# Patient Record
Sex: FEMALE | Race: WHITE | NOT HISPANIC OR LATINO | Employment: FULL TIME | URBAN - METROPOLITAN AREA
[De-identification: names, ages, dates, MRNs, and addresses within clinical notes are randomized per-mention and may not be internally consistent; named-entity substitution may affect disease eponyms.]

---

## 2017-01-18 ENCOUNTER — TRANSFERRED RECORDS (OUTPATIENT)
Dept: HEALTH INFORMATION MANAGEMENT | Facility: CLINIC | Age: 32
End: 2017-01-18

## 2017-01-26 DIAGNOSIS — J45.30 MILD PERSISTENT ASTHMA WITHOUT COMPLICATION: Primary | ICD-10-CM

## 2017-01-26 NOTE — TELEPHONE ENCOUNTER
montelukast (SINGULAIR) 10 MG tablet      Last Written Prescription Date: 06/29/2016  Last Fill Quantity: 30,  # refills: 11   Last Office Visit with [facility], [facility] or [facility] prescribing provider: 09/13/2016                                             ** Requesting for a 90 day supply**    Katerin Bhatt MA

## 2017-01-29 RX ORDER — MONTELUKAST SODIUM 10 MG/1
TABLET ORAL
Qty: 90 TABLET | Refills: 0 | Status: SHIPPED | OUTPATIENT
Start: 2017-01-29 | End: 2017-07-05

## 2017-06-22 NOTE — PROGRESS NOTES
"   SUBJECTIVE:     CC: Karrie Zhang is an 32 year old woman who presents for preventive health visit.     Healthy Habits:    Do you get at least three servings of calcium containing foods daily (dairy, green leafy vegetables, etc.)? {YES/NO, DAIRY INTAKE:146405::\"yes\"}    Amount of exercise or daily activities, outside of work: {AMOUNT EXERCISE:263699}    Problems taking medications regularly {Yes /No default:248764::\"No\"}    Medication side effects: {Yes /No default.:851903::\"No\"}    Have you had an eye exam in the past two years? {YESNOBLANK:901875}    Do you see a dentist twice per year? {YESNOBLANK:740659}    Do you have sleep apnea, excessive snoring or daytime drowsiness?{YESNOBLANK:919275}    {Outside tests to abstract? :033905}    {additional problems to add:453113}    Today's PHQ-2 Score:   PHQ-2 ( 1999 [employer]) 6/29/2016   Q1: Little interest or pleasure in doing things 0   Q2: Feeling down, depressed or hopeless 0   PHQ-2 Score 0     {PHQ-2 LOOK IN ASSESSMENTS :117370}  Abuse: Current or Past(Physical, Sexual or Emotional)- {YES/NO/NA:075291}  Do you feel safe in your environment - {YES/NO/NA:269934}    Social History   Substance Use Topics     Smoking status: Never Smoker     Smokeless tobacco: Not on file     Alcohol use 8.4 oz/week     14 Standard drinks or equivalent per week     {ETOH AUDIT:010392}    Recent Labs   Lab Test  06/29/16   1236   CHOL  169   HDL  67   LDL  72  89   TRIG  149   NHDL  102       Reviewed orders with patient.  Reviewed health maintenance and updated orders accordingly - {Yes/No:715906::\"Yes\"}    Mammo Decision Support:  {Mammo:597318}    Pertinent mammograms are reviewed under the imaging tab.  History of abnormal Pap smear: {PAP HX:993261}    Reviewed and updated as needed this visit by clinical staff         Reviewed and updated as needed this visit by Provider        {HISTORY OPTIONS:298578}    ROS:  {FEMALE PREVENTATIVE " "ROS:494449}    {CHRONICPROBDATA:393650}  OBJECTIVE:     There were no vitals taken for this visit.  EXAM:  {Exam Choices:868607}    ASSESSMENT/PLAN:     {Diag Picklist:902028}    COUNSELING:   {FEMALE COUNSELING MESSAGES:816809::\"Reviewed preventive health counseling, as reflected in patient instructions\"}    {Blood Pressure Screenin}     reports that she has never smoked. She does not have any smokeless tobacco history on file.  {Tobacco Cessation needed for ACO -- Delete if patient is a non-smoker:361011}  Estimated body mass index is 32.89 kg/(m^2) as calculated from the following:    Height as of 16: 5' 7\" (1.702 m).    Weight as of 16: 210 lb (95.3 kg).   {Weight Management Plan needed for ACO:592415}    Counseling Resources:  ATP IV Guidelines  Pooled Cohorts Equation Calculator  Breast Cancer Risk Calculator  FRAX Risk Assessment  ICSI Preventive Guidelines  Dietary Guidelines for Americans, 2010  USDA's MyPlate  ASA Prophylaxis  Lung CA Screening    Vanessa Granados MD  [facility]  "

## 2017-06-22 NOTE — PATIENT INSTRUCTIONS
[facility]    If you have any questions regarding to your visit please contact your care team:       Team Red:   Clinic Hours Telephone Number   Dr. Vanessa Becerra  (pediatrics)  Obdulia Acuna NP 7am-7pm  Monday - Thursday   7am-5pm  Fridays  [phone] (fax)    Rogelio NORIEGA  [phone]   Urgent Care - [place] and [place] Monday-Friday  [place] - 11am-8pm  Saturday-Sunday  Both sites - 9am-5pm  [phone] - [facility]  [phone] - [place]       What options do I have for visits at the clinic other than the traditional office visit?  To expand how we care for you, many of our providers are utilizing electronic visits (e-visits) and telephone visits, when medically appropriate, for interactions with their patients rather than a visit in the clinic.   We also offer nurse visits for many medical concerns. Just like any other service, we will bill your insurance company for this type of visit based on time spent on the phone with your provider. Not all insurance companies cover these visits. Please check with your medical insurance if this type of visit is covered. You will be responsible for any charges that are not paid by your insurance.      E-visits via [employer]:  generally incur a $35.00 fee.  Telephone visits:  Time spent on the phone: *charged based on time that is spent on the phone in increments of 10 minutes. Estimated cost:   5-10 mins $30.00   11-20 mins. $59.00   21-30 mins. $85.00     As always, Thank you for trusting us with your health care needs!              Preventive Health Recommendations  Female Ages 26 - 39  Yearly exam:   See your health care provider every year in order to    Review health changes.     Discuss preventive care.      Review your medicines if you your doctor has prescribed any.    Until age 30: Get a Pap test every three years (more often if you have had an abnormal result).    After age 30:  Talk to your doctor about whether you should have a Pap test every 3 years or have a Pap test with HPV screening every 5 years.   You do not need a Pap test if your uterus was removed (hysterectomy) and you have not had cancer.  You should be tested each year for STDs (sexually transmitted diseases), if you're at risk.   Talk to your provider about how often to have your cholesterol checked.  If you are at risk for diabetes, you should have a diabetes test (fasting glucose).  Shots: Get a flu shot each year. Get a tetanus shot every 10 years.   Nutrition:     Eat at least 5 servings of fruits and vegetables each day.    Eat whole-grain bread, whole-wheat pasta and brown rice instead of white grains and rice.    Talk to your provider about Calcium and Vitamin D.     Lifestyle    Exercise at least 150 minutes a week (30 minutes a day, 5 days of the week). This will help you control your weight and prevent disease.    Limit alcohol to one drink per day.    No smoking.     Wear sunscreen to prevent skin cancer.    See your dentist every six months for an exam and cleaning.

## 2017-06-29 DIAGNOSIS — F33.0 MAJOR DEPRESSIVE DISORDER, RECURRENT EPISODE, MILD (H): ICD-10-CM

## 2017-06-29 RX ORDER — BUSPIRONE HYDROCHLORIDE 15 MG/1
TABLET ORAL
Qty: 60 TABLET | Refills: 0 | Status: SHIPPED | OUTPATIENT
Start: 2017-06-29 | End: 2017-08-08

## 2017-06-29 NOTE — TELEPHONE ENCOUNTER
MA - please call and complete a PHQ-9 with patient and schedule a follow up appointment.    Mary Vo RN - BC

## 2017-06-29 NOTE — TELEPHONE ENCOUNTER
busPIRone (BUSPAR) 15 MG tablet       Last Written Prescription Date: 6/29/2016  Last Fill Quantity: 60; # refills: 11  Last Office Visit with [facility], [facility] or [facility] prescribing provider:  9/13/2016   Next 5 appointments (look out 90 days)     Jul 05, 2017  2:20 PM CDT   PHYSICAL with Vanessa Granados MD   [facility] ([facility])    [address] [address]   [phone]                   Last PHQ-9 score on record=   PHQ-9 SCORE 5/3/2016   Total Score 5       No results found for: AST  No results found for: ALT

## 2017-06-29 NOTE — TELEPHONE ENCOUNTER
Prescription approved per [facility] Refill Protocol.  Mary Vo, RN - BC

## 2017-06-29 NOTE — TELEPHONE ENCOUNTER
PHQ 9 completed. Appointment scheduled for next week.  Joan KANG CMA ([facility])

## 2017-06-29 NOTE — TELEPHONE ENCOUNTER
Reason for call:  Med refill  Patient called regarding (reason for call): prescription-buspar  Additional comments: Patient is out and was given 3 as an emergency from [employer]    Phone number to reach patient:  Home number on file [phone] (home)    Best Time:  Anytime till 4 today then after 430    Can we leave a detailed message on this number?  YES

## 2017-06-30 ASSESSMENT — PATIENT HEALTH QUESTIONNAIRE - PHQ9: SUM OF ALL RESPONSES TO PHQ QUESTIONS 1-9: 4

## 2017-07-05 ENCOUNTER — OFFICE VISIT (OUTPATIENT)
Dept: FAMILY MEDICINE | Facility: CLINIC | Age: 32
End: 2017-07-05
Payer: COMMERCIAL

## 2017-07-05 VITALS
DIASTOLIC BLOOD PRESSURE: 80 MMHG | HEIGHT: 67 IN | SYSTOLIC BLOOD PRESSURE: 118 MMHG | RESPIRATION RATE: 16 BRPM | WEIGHT: 215 LBS | HEART RATE: 95 BPM | TEMPERATURE: 97.8 F | BODY MASS INDEX: 33.74 KG/M2 | OXYGEN SATURATION: 95 %

## 2017-07-05 DIAGNOSIS — Z11.3 SCREEN FOR STD (SEXUALLY TRANSMITTED DISEASE): ICD-10-CM

## 2017-07-05 DIAGNOSIS — Z00.00 ROUTINE HISTORY AND PHYSICAL EXAMINATION OF ADULT: Primary | ICD-10-CM

## 2017-07-05 DIAGNOSIS — Z91.09 MULTIPLE ENVIRONMENTAL ALLERGIES: ICD-10-CM

## 2017-07-05 DIAGNOSIS — J45.30 MILD PERSISTENT ASTHMA WITHOUT COMPLICATION: ICD-10-CM

## 2017-07-05 DIAGNOSIS — F33.0 MAJOR DEPRESSIVE DISORDER, RECURRENT EPISODE, MILD (H): ICD-10-CM

## 2017-07-05 DIAGNOSIS — Z30.41 ENCOUNTER FOR BIRTH CONTROL PILLS MAINTENANCE: ICD-10-CM

## 2017-07-05 PROCEDURE — 87591 N.GONORRHOEAE DNA AMP PROB: CPT | Performed by: FAMILY MEDICINE

## 2017-07-05 PROCEDURE — 99395 PREV VISIT EST AGE 18-39: CPT | Performed by: FAMILY MEDICINE

## 2017-07-05 PROCEDURE — G0124 SCREEN C/V THIN LAYER BY MD: HCPCS | Performed by: FAMILY MEDICINE

## 2017-07-05 PROCEDURE — 87389 HIV-1 AG W/HIV-1&-2 AB AG IA: CPT | Performed by: FAMILY MEDICINE

## 2017-07-05 PROCEDURE — 87491 CHLMYD TRACH DNA AMP PROBE: CPT | Performed by: FAMILY MEDICINE

## 2017-07-05 PROCEDURE — 36415 COLL VENOUS BLD VENIPUNCTURE: CPT | Performed by: FAMILY MEDICINE

## 2017-07-05 PROCEDURE — 87624 HPV HI-RISK TYP POOLED RSLT: CPT | Performed by: FAMILY MEDICINE

## 2017-07-05 PROCEDURE — G0145 SCR C/V CYTO,THINLAYER,RESCR: HCPCS | Performed by: FAMILY MEDICINE

## 2017-07-05 PROCEDURE — 86780 TREPONEMA PALLIDUM: CPT | Performed by: FAMILY MEDICINE

## 2017-07-05 RX ORDER — FLUTICASONE PROPIONATE 50 MCG
1 SPRAY, SUSPENSION (ML) NASAL DAILY
Qty: 16 G | Refills: 11 | Status: SHIPPED | OUTPATIENT
Start: 2017-07-05 | End: 2018-03-27

## 2017-07-05 RX ORDER — MONTELUKAST SODIUM 10 MG/1
TABLET ORAL
Qty: 90 TABLET | Refills: 3 | Status: SHIPPED | OUTPATIENT
Start: 2017-07-05 | End: 2018-07-30

## 2017-07-05 RX ORDER — NORGESTIMATE AND ETHINYL ESTRADIOL 7DAYSX3 28
1 KIT ORAL DAILY
Qty: 84 TABLET | Refills: 4 | Status: SHIPPED | OUTPATIENT
Start: 2017-07-05 | End: 2018-08-21

## 2017-07-05 RX ORDER — BUSPIRONE HYDROCHLORIDE 15 MG/1
15 TABLET ORAL 2 TIMES DAILY
Qty: 180 TABLET | Refills: 3 | Status: SHIPPED | OUTPATIENT
Start: 2017-07-05 | End: 2018-08-21

## 2017-07-05 ASSESSMENT — ANXIETY QUESTIONNAIRES
1. FEELING NERVOUS, ANXIOUS, OR ON EDGE: SEVERAL DAYS
GAD7 TOTAL SCORE: 5
6. BECOMING EASILY ANNOYED OR IRRITABLE: SEVERAL DAYS
7. FEELING AFRAID AS IF SOMETHING AWFUL MIGHT HAPPEN: NOT AT ALL
3. WORRYING TOO MUCH ABOUT DIFFERENT THINGS: SEVERAL DAYS
2. NOT BEING ABLE TO STOP OR CONTROL WORRYING: MORE THAN HALF THE DAYS
5. BEING SO RESTLESS THAT IT IS HARD TO SIT STILL: NOT AT ALL

## 2017-07-05 ASSESSMENT — PATIENT HEALTH QUESTIONNAIRE - PHQ9: 5. POOR APPETITE OR OVEREATING: NOT AT ALL

## 2017-07-05 NOTE — LETTER
My Depression Action Plan  Name: Karrie Zhang   Date of Birth 1985  Date: 7/5/2017    My doctor: Vanessa Granados   My clinic: [address]  [place] [address] [phone]          GREEN    ZONE   Good Control    What it looks like:     Things are going generally well. You have normal up s and down s. You may even feel depressed from time to time, but bad moods usually last less than a day.   What you need to do:  1. Continue to care for yourself (see self care plan)  2. Check your depression survival kit and update it as needed  3. Follow your physician s recommendations including any medication.  4. Do not stop taking medication unless you consult with your physician first.           YELLOW         ZONE Getting Worse    What it looks like:     Depression is starting to interfere with your life.     It may be hard to get out of bed; you may be starting to isolate yourself from others.    Symptoms of depression are starting to last most all day and this has happened for several days.     You may have suicidal thoughts but they are not constant.   What you need to do:     1. Call your care team, your response to treatment will improve if you keep your care team informed of your progress. Yellow periods are signs an adjustment may need to be made.     2. Continue your self-care, even if you have to fake it!    3. Talk to someone in your support network    4. Open up your depression survival kit           RED    ZONE Medical Alert - Get Help    What it looks like:     Depression is seriously interfering with your life.     You may experience these or other symptoms: You can t get out of bed most days, can t work or engage in other necessary activities, you have trouble taking care of basic hygiene, or basic responsibilities, thoughts of suicide or death that will not go away, self-injurious behavior.     What you need to do:  1. Call your care team and request  a same-day appointment. If they are not available (weekends or after hours) call your local crisis line, emergency room or 911.      Electronically signed by: Vanessa Granados, July 5, 2017    Depression Self Care Plan / Survival Kit    Self-Care for Depression  Here s the deal. Your body and mind are really not as separate as most people think.  What you do and think affects how you feel and how you feel influences what you do and think. This means if you do things that people who feel good do, it will help you feel better.  Sometimes this is all it takes.  There is also a place for medication and therapy depending on how severe your depression is, so be sure to consult with your medical provider and/ or Behavioral Health Consultant if your symptoms are worsening or not improving.     In order to better manage my stress, I will:    Exercise  Get some form of exercise, every day. This will help reduce pain and release endorphins, the  feel good  chemicals in your brain. This is almost as good as taking antidepressants!  This is not the same as joining a gym and then never going! (they count on that by the way ) It can be as simple as just going for a walk or doing some gardening, anything that will get you moving.      Hygiene   Maintain good hygiene (Get out of bed in the morning, Make your bed, Brush your teeth, Take a shower, and Get dressed like you were going to work, even if you are unemployed).  If your clothes don't fit try to get ones that do.    Diet  I will strive to eat foods that are good for me, drink plenty of water, and avoid excessive sugar, caffeine, alcohol, and other mood-altering substances.  Some foods that are helpful in depression are: complex carbohydrates, B vitamins, flaxseed, fish or fish oil, fresh fruits and vegetables.    Psychotherapy  I agree to participate in Individual Therapy (if recommended).    Medication  If prescribed medications, I agree to take them.  Missing doses can result  in serious side effects.  I understand that drinking alcohol, or other illicit drug use, may cause potential side effects.  I will not stop my medication abruptly without first discussing it with my provider.    Staying Connected With Others  I will stay in touch with my friends, family members, and my primary care provider/team.    Use your imagination  Be creative.  We all have a creative side; it doesn t matter if it s oil painting, sand castles, or mud pies! This will also kick up the endorphins.    Witness Beauty  (AKA stop and smell the roses) Take a look outside, even in mid-winter. Notice colors, textures. Watch the squirrels and birds.     Service to others  Be of service to others.  There is always someone else in need.  By helping others we can  get out of ourselves  and remember the really important things.  This also provides opportunities for practicing all the other parts of the program.    Humor  Laugh and be silly!  Adjust your TV habits for less news and crime-drama and more comedy.    Control your stress  Try breathing deep, massage therapy, biofeedback, and meditation. Find time to relax each day.     My support system    Clinic Contact:  Phone number:    Contact 1:  Phone number:    Contact 2:  Phone number:    Anglican/:  Phone number:    Therapist:  Phone number:    Local crisis center:    Phone number:    Other community support:  Phone number:

## 2017-07-05 NOTE — MR AVS SNAPSHOT
After Visit Summary   7/5/2017    Karrie Zhang    MRN: [id number]           Patient Information     Date Of Birth          1985        Visit Information        Provider Department      7/5/2017 2:20 PM Vanessa Granados MD [facility]        Today's Diagnoses     Routine history and physical examination of adult    -  1    Major depressive disorder, recurrent episode, mild (H)        Mild persistent asthma without complication        Multiple environmental allergies        Encounter for birth control pills maintenance        Screen for STD (sexually transmitted disease)          Care Instructions    [facility]    If you have any questions regarding to your visit please contact your care team:       Team Red:   Clinic Hours Telephone Number   Dr. Vanessa Becerra  (pediatrics)  Obdulia Acuna NP 7am-7pm  Monday - Thursday   7am-5pm  Fridays  [phone] (fax)    Rogelio NORIEGA  [phone]   Urgent Care - [place] and [place] Monday-Friday  [place] - 11am-8pm  Saturday-Sunday  Both sites - 9am-5pm  [phone] - [facility]  [phone] - [place]       What options do I have for visits at the clinic other than the traditional office visit?  To expand how we care for you, many of our providers are utilizing electronic visits (e-visits) and telephone visits, when medically appropriate, for interactions with their patients rather than a visit in the clinic.   We also offer nurse visits for many medical concerns. Just like any other service, we will bill your insurance company for this type of visit based on time spent on the phone with your provider. Not all insurance companies cover these visits. Please check with your medical insurance if this type of visit is covered. You will be responsible for any charges that are not paid by your insurance.      E-visits via [employer]:  generally incur a $35.00  fee.  Telephone visits:  Time spent on the phone: *charged based on time that is spent on the phone in increments of 10 minutes. Estimated cost:   5-10 mins $30.00   11-20 mins. $59.00   21-30 mins. $85.00     As always, Thank you for trusting us with your health care needs!              Preventive Health Recommendations  Female Ages 26 - 39  Yearly exam:   See your health care provider every year in order to    Review health changes.     Discuss preventive care.      Review your medicines if you your doctor has prescribed any.    Until age 30: Get a Pap test every three years (more often if you have had an abnormal result).    After age 30: Talk to your doctor about whether you should have a Pap test every 3 years or have a Pap test with HPV screening every 5 years.   You do not need a Pap test if your uterus was removed (hysterectomy) and you have not had cancer.  You should be tested each year for STDs (sexually transmitted diseases), if you're at risk.   Talk to your provider about how often to have your cholesterol checked.  If you are at risk for diabetes, you should have a diabetes test (fasting glucose).  Shots: Get a flu shot each year. Get a tetanus shot every 10 years.   Nutrition:     Eat at least 5 servings of fruits and vegetables each day.    Eat whole-grain bread, whole-wheat pasta and brown rice instead of white grains and rice.    Talk to your provider about Calcium and Vitamin D.     Lifestyle    Exercise at least 150 minutes a week (30 minutes a day, 5 days of the week). This will help you control your weight and prevent disease.    Limit alcohol to one drink per day.    No smoking.     Wear sunscreen to prevent skin cancer.    See your dentist every six months for an exam and cleaning.            Follow-ups after your visit        Follow-up notes from your care team     Return in about 1 year (around 7/5/2018) for physical, asthma.      Who to contact     If you have questions or need follow up  "information about today's clinic visit or your schedule please contact [facility] directly at [phone].  Normal or non-critical lab and imaging results will be communicated to you by "[employer]hart, letter or phone within 4 business days after the clinic has received the results. If you do not hear from us within 7 days, please contact the clinic through "[employer]hart or phone. If you have a critical or abnormal lab result, we will notify you by phone as soon as possible.  Submit refill requests through [employer] or call your pharmacy and they will forward the refill request to us. Please allow 3 business days for your refill to be completed.          Additional Information About Your Visit        "[employer] Information     [employer] gives you secure access to your electronic health record. If you see a primary care provider, you can also send messages to your care team and make appointments. If you have questions, please call your primary care clinic.  If you do not have a primary care provider, please call [phone] and they will assist you.        Care EveryWhere ID     This is your Care EveryWhere ID. This could be used by other organizations to access your [place] medical records  [id number]        Your Vitals Were     Pulse Temperature Respirations Height Last Period Pulse Oximetry    95 97.8  F (36.6  C) 16 5' 7\" (1.702 m) 06/05/2017 95%    BMI (Body Mass Index)                   33.67 kg/m2            Blood Pressure from Last 3 Encounters:   07/05/17 118/80   09/13/16 111/73   06/29/16 117/74    Weight from Last 3 Encounters:   07/05/17 215 lb (97.5 kg)   09/13/16 210 lb (95.3 kg)   06/29/16 207 lb (93.9 kg)              We Performed the Following     Anti Treponema     Asthma Action Plan (AAP)     Chlamydia trachomatis PCR     DEPRESSION ACTION PLAN (DAP)     HIV Antigen Antibody Combo     HPV High Risk Types DNA Cervical     Neisseria gonorrhoeae PCR     Pap imaged thin layer screen with HPV - " recommended age 30 - 65 years (select HPV order below)          Today's Medication Changes          These changes are accurate as of: 7/5/17  3:06 PM.  If you have any questions, ask your nurse or doctor.               These medicines have changed or have updated prescriptions.        Dose/Directions    * busPIRone 15 MG tablet   Commonly known as:  BUSPAR   This may have changed:  Another medication with the same name was added. Make sure you understand how and when to take each.   Used for:  Major depressive disorder, recurrent episode, mild (H)   Changed by:  Vanessa Granados MD        TAKE 1 TABLET(15 MG) BY MOUTH TWICE DAILY   Quantity:  60 tablet   Refills:  0       * busPIRone 15 MG tablet   Commonly known as:  BUSPAR   This may have changed:  You were already taking a medication with the same name, and this prescription was added. Make sure you understand how and when to take each.   Used for:  Major depressive disorder, recurrent episode, mild (H)   Changed by:  Vanessa Granados MD        Dose:  15 mg   Take 1 tablet (15 mg) by mouth 2 times daily   Quantity:  180 tablet   Refills:  3       montelukast 10 MG tablet   Commonly known as:  SINGULAIR   This may have changed:  See the new instructions.   Used for:  Mild persistent asthma without complication   Changed by:  Vanessa Granados MD        TAKE 1 TABLET(10 MG) BY MOUTH AT BEDTIME   Quantity:  90 tablet   Refills:  3       * Notice:  This list has 2 medication(s) that are the same as other medications prescribed for you. Read the directions carefully, and ask your doctor or other care provider to review them with you.         Where to get your medicines      These medications were sent to [id number]  [facility] - [address]  [address] [place] [address]     Phone:  [phone]     busPIRone 15 MG tablet    FLUoxetine 20 MG capsule    fluticasone 50 MCG/ACT spray     montelukast 10 MG tablet    norgestim-eth estrad triphasic 0.18/0.215/0.25 MG-35 MCG per tablet                Primary Care Provider Office Phone # Fax #    Vanessa Granados [phone]       [address] [address]        Equal Access to Services     [facility]: Hadii aad ku hadasho Soomaali, waaxda luqadaha, qaybta kaalmada adeegyada, waxay idiin hayaan adeeg kharash la'aan ah. So [facility] [phone].    ATENCIÓN: Si habla español, tiene a arenas disposición servicios gratuitos de asistencia lingüística. Llame al [phone].    We comply with applicable federal civil rights laws and [place] laws. We do not discriminate on the basis of race, color, national origin, age, disability sex, sexual orientation or gender identity.            Thank you!     Thank you for choosing [facility]  for your care. Our goal is always to provide you with excellent care. Hearing back from our patients is one way we can continue to improve our services. Please take a few minutes to complete the written survey that you may receive in the mail after your visit with us. Thank you!             Your Updated Medication List - Protect others around you: Learn how to safely use, store and throw away your medicines at www.disposemymeds.org.          This list is accurate as of: 7/5/17  3:06 PM.  Always use your most recent med list.                   Brand Name Dispense Instructions for use Diagnosis    * busPIRone 15 MG tablet    BUSPAR    60 tablet    TAKE 1 TABLET(15 MG) BY MOUTH TWICE DAILY    Major depressive disorder, recurrent episode, mild (H)       * busPIRone 15 MG tablet    BUSPAR    180 tablet    Take 1 tablet (15 mg) by mouth 2 times daily    Major depressive disorder, recurrent episode, mild (H)       clindamycin 1 % solution    CLEOCIN T    60 mL    Apply topically 2 times daily    Acne vulgaris       FLUoxetine 20 MG capsule    PROzac    90 capsule    Take 1  capsule (20 mg) by mouth daily    Major depressive disorder, recurrent episode, mild (H)       fluticasone 50 MCG/ACT spray    FLONASE    16 g    Spray 1 spray into both nostrils daily    Mild persistent asthma without complication       montelukast 10 MG tablet    SINGULAIR    90 tablet    TAKE 1 TABLET(10 MG) BY MOUTH AT BEDTIME    Mild persistent asthma without complication       norgestim-eth estrad triphasic 0.18/0.215/0.25 MG-35 MCG per tablet    TRINESSA (28)    84 tablet    Take 1 tablet by mouth daily    Encounter for birth control pills maintenance       * Notice:  This list has 2 medication(s) that are the same as other medications prescribed for you. Read the directions carefully, and ask your doctor or other care provider to review them with you.

## 2017-07-05 NOTE — PROGRESS NOTES
SUBJECTIVE:   CC: Karrie Zhang is an 32 year old obese woman  who presents for preventive health visit.     Patient has depression, mild recurrent, with no medication side effects and no anhedonia or low mood, without suicidal ideation.  Patient also has asthma, mild persistent.  Patient has had asthma for years.  Occasional wheezing and shortness of breath are triggered by allergies and relieved by albuterol.     Physical   Annual:     Getting at least 3 servings of Calcium per day::  Yes    Bi-annual eye exam::  NO    Dental care twice a year::  NO    Sleep apnea or symptoms of sleep apnea::  Daytime drowsiness and Excessive snoring    Diet::  Regular (no restrictions)    Frequency of exercise::  1 day/week    Duration of exercise::  30-45 minutes    Taking medications regularly::  Yes    Medication side effects::  None    Additional concerns today::  YES        Right knee (feel like a pulled muscle back of knee x4 day     Today's PHQ-2 Score:   PHQ-2 (  [employer]) 2017   Q1: Little interest or pleasure in doing things 1   Q2: Feeling down, depressed or hopeless 1   PHQ-2 Score 2   Q1: Little interest or pleasure in doing things Several days   Q2: Feeling down, depressed or hopeless Several days   PHQ-2 Score 2       Abuse: Current or Past(Physical, Sexual or Emotional)- No  Do you feel safe in your environment - Yes    Social History   Substance Use Topics     Smoking status: Never Smoker     Smokeless tobacco: Not on file     Alcohol use 8.4 oz/week     14 Standard drinks or equivalent per week     The patient does not drink >3 drinks per day nor >7 drinks per week.    Reviewed orders with patient.  Reviewed health maintenance and updated orders accordingly - Yes  Patient Active Problem List   Diagnosis     Perioral dermatitis     Mild persistent asthma without complication     Anxiety     Major depressive disorder, recurrent episode, mild (H)     Multiple environmental allergies     Obesity  (BMI 30-39.9)     Cervical high risk HPV (human papillomavirus) test positive     Rosacea, acne     Past Surgical History:   Procedure Laterality Date     LASER [place], CERVICAL  2005       Social History   Substance Use Topics     Smoking status: Never Smoker     Smokeless tobacco: Not on file     Alcohol use 8.4 oz/week     14 Standard drinks or equivalent per week     Family History   Problem Relation Age of Onset     OSTEOPOROSIS Mother      DIABETES Father      Other Cancer Father      Pancreatic     Substance Abuse Father      alcoholic: stopped when I was in grade school     Prostate Cancer Maternal Grandfather      Prostate Cancer Other      Depression Brother      Coronary Artery Disease No family hx of          Current Outpatient Prescriptions   Medication Sig Dispense Refill     busPIRone (BUSPAR) 15 MG tablet Take 1 tablet (15 mg) by mouth 2 times daily 180 tablet 3     FLUoxetine (PROZAC) 20 MG capsule Take 1 capsule (20 mg) by mouth daily 90 capsule 3     fluticasone (FLONASE) 50 MCG/ACT spray Spray 1 spray into both nostrils daily 16 g 11     norgestim-eth estrad triphasic (TRINESSA, 28,) 0.18/0.215/0.25 MG-35 MCG per tablet Take 1 tablet by mouth daily 84 tablet 4     montelukast (SINGULAIR) 10 MG tablet TAKE 1 TABLET(10 MG) BY MOUTH AT BEDTIME 90 tablet 3     busPIRone (BUSPAR) 15 MG tablet TAKE 1 TABLET(15 MG) BY MOUTH TWICE DAILY 60 tablet 0     [DISCONTINUED] montelukast (SINGULAIR) 10 MG tablet TAKE 1 TABLET(10 MG) BY MOUTH AT BEDTIME 90 tablet 0     clindamycin (CLEOCIN T) 1 % solution Apply topically 2 times daily (Patient not taking: Reported on 7/5/2017) 60 mL 11     Allergies   Allergen Reactions     Penicillins Rash       Mammogram not appropriate for this patient based on age.    Pertinent mammograms are reviewed under the imaging tab.  History of abnormal Pap smear:   YES - ELBA 2/3 on biopsy - PAP/HPV co-testing at 12, 24 months.  If two negative results repeat co-testing in 3 years, if  "negative then routine screening.  Last 3 Pap Results:   PAP (no units)   Date Value   06/29/2016 NIL       Reviewed and updated as needed this visit by clinical staff  Tobacco  Allergies  Meds  Problems  Med Hx  Surg Hx  Fam Hx  Soc Hx          Reviewed and updated as needed this visit by Provider  Tobacco  Allergies  Meds  Problems  Surg Hx  Fam Hx  Soc Hx       Past Medical History:   Diagnosis Date     Allergic rhinitis      Cervical dysplasia      Cervical high risk HPV (human papillomavirus) test positive 6/29/16    neg 16/18     Depressive disorder      Rosacea, acne      Uncomplicated asthma       Past Surgical History:   Procedure Laterality Date     LASER TX, CERVICAL  2005       ROS:  C: NEGATIVE for fever, chills, change in weight  I: NEGATIVE for worrisome rashes, moles or lesions  E: NEGATIVE for vision changes or irritation  ENT: NEGATIVE for ear, mouth and throat problems  R: NEGATIVE for significant cough or SOB  B: NEGATIVE for masses, tenderness or discharge  CV: NEGATIVE for chest pain, palpitations or peripheral edema  GI: NEGATIVE for nausea, abdominal pain, heartburn, or change in bowel habits  : NEGATIVE for unusual urinary or vaginal symptoms. Periods are regular.  M: NEGATIVE for significant arthralgias or myalgia  N: NEGATIVE for weakness, dizziness or paresthesias  P: NEGATIVE for changes in mood or affect     OBJECTIVE:   /80  Pulse 95  Temp 97.8  F (36.6  C)  Resp 16  Ht 5' 7\" (1.702 m)  Wt 215 lb (97.5 kg)  LMP 06/05/2017  SpO2 95%  BMI 33.67 kg/m2  EXAM:  GENERAL: alert, no distress and obese  EYES: Eyes grossly normal to inspection, PERRL and conjunctivae and sclerae normal  HENT: ear canals and TM's normal, nose and mouth without ulcers or lesions  NECK: no adenopathy, no asymmetry, masses, or scars and thyroid normal to palpation  RESP: lungs clear to auscultation - no rales, rhonchi or wheezes  BREAST: normal without masses, tenderness or nipple " discharge and no palpable axillary masses or adenopathy  CV: regular rate and rhythm, normal S1 S2, no S3 or S4, no murmur   ABDOMEN: soft, nontender, no hepatosplenomegaly, no masses and bowel sounds normal   (female): normal female external genitalia, normal urethral meatus, vaginal mucosa pink, moist, well rugated, and normal cervix/adnexa/uterus without masses or discharge  MS: no gross musculoskeletal defects noted, no edema  SKIN: no suspicious lesions or rashes  NEURO: Normal strength and tone, mentation intact and speech normal  PSYCH: mentation appears normal, affect normal/bright    ASSESSMENT/PLAN:   (Z00.00) Routine history and physical examination of adult  (primary encounter diagnosis)  Plan: Pap imaged thin layer screen with HPV -         recommended age 30 - 65 years (select HPV order        below), HPV High Risk Types DNA Cervical          (F33.0) Major depressive disorder, recurrent episode, mild (H)  Comment: Well controlled with medications without side effects.   Plan: busPIRone (BUSPAR) 15 MG tablet, FLUoxetine         (PROZAC) 20 MG capsule        Follow-up yearly     (J45.30) Mild persistent asthma without complication  Comment: Well controlled with medications without side effects.   Plan: fluticasone (FLONASE) 50 MCG/ACT spray,         montelukast (SINGULAIR) 10 MG tablet        Follow-up yearly     (Z91.09) Multiple environmental allergies  Comment: Well controlled with medications without side effects.     (Z30.41) Encounter for birth control pills maintenance  Plan: norgestim-eth estrad triphasic (TRINESSA, 28,)         0.18/0.215/0.25 MG-35 MCG per tablet             COUNSELING:  Reviewed preventive health counseling, as reflected in patient instructions  Special attention given to:        Regular exercise       Healthy diet/nutrition       Contraception       Osteoporosis Prevention/Bone Health       Safe sex practices/STD prevention       HIV screeninx in teen years, 1x in adult  "years, and at intervals if high risk       The ASCVD Risk score (Gauri MIKAEAL Jr, et al., 2013) failed to calculate for the following reasons:    The 2013 ASCVD risk score is only valid for ages 40 to 79    BP Screening:   Last 3 BP Readings:    BP Readings from Last 3 Encounters:   07/05/17 118/80   09/13/16 111/73   06/29/16 117/74       The following was recommended to the patient:  Re-screen BP within a year and recommended lifestyle modifications     reports that she has never smoked. She does not have any smokeless tobacco history on file.    Estimated body mass index is 33.67 kg/(m^2) as calculated from the following:    Height as of this encounter: 5' 7\" (1.702 m).    Weight as of this encounter: 215 lb (97.5 kg).   Weight management plan: Discussed healthy diet and exercise guidelines and patient will follow up in 12 months in clinic to re-evaluate.    Counseling Resources:  ATP IV Guidelines  Pooled Cohorts Equation Calculator  Breast Cancer Risk Calculator  FRAX Risk Assessment  ICSI Preventive Guidelines  Dietary Guidelines for Americans, 2010  USDA's MyPlate  ASA Prophylaxis  Lung CA Screening    Vanessa Granados MD  [facility]  Answers for HPI/ROS submitted by the patient on 7/2/2017   PHQ-2 Score: 2    "

## 2017-07-05 NOTE — LETTER
My Asthma Action Plan  Name: Karrie Zhang   YOB: 1985  Date: 7/5/2017   My doctor: Vanessa Granados MD   My clinic: [facility]        My Control Medicine: Montelukast (Singulair) -  10 mg daily  My Rescue Medicine: Albuterol (Proair/Ventolin/Proventil) inhaler 2 puffs every 4 hours as needed   My Asthma Severity: intermittent  Avoid your asthma triggers: upper respiratory infections               GREEN ZONE   Good Control    I feel good    No cough or wheeze    Can work, sleep and play without asthma symptoms       Take your asthma control medicine every day.     1. If exercise triggers your asthma, take your rescue medication    15 minutes before exercise or sports, and    During exercise if you have asthma symptoms  2. Spacer to use with inhaler: If you have a spacer, make sure to use it with your inhaler             YELLOW ZONE Getting Worse  I have ANY of these:    I do not feel good    Cough or wheeze    Chest feels tight    Wake up at night   1. Keep taking your Green Zone medications  2. Start taking your rescue medicine:    every 20 minutes for up to 1 hour. Then every 4 hours for 24-48 hours.  3. If you stay in the Yellow Zone for more than 12-24 hours, contact your doctor.  4. If you do not return to the Green Zone in 12-24 hours or you get worse, start taking your oral steroid medicine if prescribed by your provider.           RED ZONE Medical Alert - Get Help  I have ANY of these:    I feel awful    Medicine is not helping    Breathing getting harder    Trouble walking or talking    Nose opens wide to breathe       1. Take your rescue medicine NOW  2. If your provider has prescribed an oral steroid medicine, start taking it NOW  3. Call your doctor NOW  4. If you are still in the Red Zone after 20 minutes and you have not reached your doctor:    Take your rescue medicine again and    Call 911 or go to the emergency room right away    See your regular doctor within 2 weeks  of an Emergency Room or Urgent Care visit for follow-up treatment.        Electronically signed by: Vanessa Granados, July 5, 2017    Annual Reminders:  Meet with Asthma Educator,  Flu Shot in the Fall, consider Pneumonia Vaccination for patients with asthma (aged 19 and older).    Pharmacy: [id number] - [place], [place] - [address] AT [facility] [facility] & [address]                    Asthma Triggers  How To Control Things That Make Your Asthma Worse    Triggers are things that make your asthma worse.  Look at the list below to help you find your triggers and what you can do about them.  You can help prevent asthma flare-ups by staying away from your triggers.      Trigger                                                          What you can do   Cigarette Smoke  Tobacco smoke can make asthma worse. Do not allow smoking in your home, car or around you.  Be sure no one smokes at a child s day care or school.  If you smoke, ask your health care provider for ways to help you quit.  Ask family members to quit too.  Ask your health care provider for a referral to Quit Plan to help you quit smoking, or call 4-562-686-PLAN.     Colds, Flu, Bronchitis  These are common triggers of asthma. Wash your hands often.  Don t touch your eyes, nose or mouth.  Get a flu shot every year.     Dust Mites  These are tiny bugs that live in cloth or carpet. They are too small to see. Wash sheets and blankets in hot water every week.   Encase pillows and mattress in dust mite proof covers.  Avoid having carpet if you can. If you have carpet, vacuum weekly.   Use a dust mask and HEPA vacuum.   Pollen and Outdoor Mold  Some people are allergic to trees, grass, or weed pollen, or molds. Try to keep your windows closed.  Limit time out doors when pollen count is high.   Ask you health care provider about taking medicine during allergy season.     Animal Dander  Some people are allergic to skin flakes, urine or saliva from  pets with fur or feathers. Keep pets with fur or feathers out of your home.    If you can t keep the pet outdoors, then keep the pet out of your bedroom.  Keep the bedroom door closed.  Keep pets off cloth furniture and away from stuffed toys.     Mice, Rats, and Cockroaches  Some people are allergic to the waste from these pests.   Cover food and garbage.  Clean up spills and food crumbs.  Store grease in the refrigerator.   Keep food out of the bedroom.   Indoor Mold  This can be a trigger if your home has high moisture. Fix leaking faucets, pipes, or other sources of water.   Clean moldy surfaces.  Dehumidify basement if it is damp and smelly.   Smoke, Strong Odors, and Sprays  These can reduce air quality. Stay away from strong odors and sprays, such as perfume, powder, hair spray, paints, smoke incense, paint, cleaning products, candles and new carpet.   Exercise or Sports  Some people with asthma have this trigger. Be active!  Ask your doctor about taking medicine before sports or exercise to prevent symptoms.    Warm up for 5-10 minutes before and after sports or exercise.     Other Triggers of Asthma  Cold air:  Cover your nose and mouth with a scarf.  Sometimes laughing or crying can be a trigger.  Some medicines and food can trigger asthma.

## 2017-07-05 NOTE — NURSING NOTE
"Chief Complaint   Patient presents with     Physical       Initial /80  Pulse 95  Temp 97.8  F (36.6  C)  Resp 16  Ht 5' 7\" (1.702 m)  Wt 215 lb (97.5 kg)  LMP 06/05/2017  SpO2 95%  BMI 33.67 kg/m2 Estimated body mass index is 33.67 kg/(m^2) as calculated from the following:    Height as of this encounter: 5' 7\" (1.702 m).    Weight as of this encounter: 215 lb (97.5 kg).  Medication Reconciliation: complete     Fernando Prescott. MA      "

## 2017-07-06 LAB
C TRACH DNA SPEC QL NAA+PROBE: NORMAL
HIV 1+2 AB+HIV1 P24 AG SERPL QL IA: NORMAL
N GONORRHOEA DNA SPEC QL NAA+PROBE: NORMAL
SPECIMEN SOURCE: NORMAL
SPECIMEN SOURCE: NORMAL

## 2017-07-06 ASSESSMENT — ASTHMA QUESTIONNAIRES: ACT_TOTALSCORE: 24

## 2017-07-06 ASSESSMENT — ANXIETY QUESTIONNAIRES: GAD7 TOTAL SCORE: 5

## 2017-07-06 ASSESSMENT — PATIENT HEALTH QUESTIONNAIRE - PHQ9: SUM OF ALL RESPONSES TO PHQ QUESTIONS 1-9: 6

## 2017-07-06 NOTE — PROGRESS NOTES
Your results are normal.  Your final test results are pending.  Please check your chart again within 3 to 5 days. You will receive further instruction when your full test result panel is complete.    Vanessa Granados MD

## 2017-07-07 LAB — T PALLIDUM IGG+IGM SER QL: NEGATIVE

## 2017-07-11 LAB
COPATH REPORT: ABNORMAL
PAP: ABNORMAL

## 2017-07-12 LAB
FINAL DIAGNOSIS: ABNORMAL
HPV HR 12 DNA CVX QL NAA+PROBE: POSITIVE
HPV16 DNA SPEC QL NAA+PROBE: NEGATIVE
HPV18 DNA SPEC QL NAA+PROBE: NEGATIVE
SPECIMEN DESCRIPTION: ABNORMAL

## 2017-08-08 ENCOUNTER — OFFICE VISIT (OUTPATIENT)
Dept: OBGYN | Facility: CLINIC | Age: 32
End: 2017-08-08
Payer: COMMERCIAL

## 2017-08-08 ENCOUNTER — TELEPHONE (OUTPATIENT)
Dept: FAMILY MEDICINE | Facility: CLINIC | Age: 32
End: 2017-08-08

## 2017-08-08 VITALS
TEMPERATURE: 97.8 F | WEIGHT: 217 LBS | DIASTOLIC BLOOD PRESSURE: 80 MMHG | HEART RATE: 79 BPM | BODY MASS INDEX: 33.99 KG/M2 | OXYGEN SATURATION: 97 % | SYSTOLIC BLOOD PRESSURE: 123 MMHG

## 2017-08-08 DIAGNOSIS — R87.810 CERVICAL HIGH RISK HPV (HUMAN PAPILLOMAVIRUS) TEST POSITIVE: ICD-10-CM

## 2017-08-08 DIAGNOSIS — R87.612 PAPANICOLAOU SMEAR OF CERVIX WITH LOW GRADE SQUAMOUS INTRAEPITHELIAL LESION (LGSIL): Primary | ICD-10-CM

## 2017-08-08 PROCEDURE — 99202 OFFICE O/P NEW SF 15 MIN: CPT | Mod: 25 | Performed by: OBSTETRICS & GYNECOLOGY

## 2017-08-08 PROCEDURE — 57454 BX/CURETT OF CERVIX W/SCOPE: CPT | Performed by: OBSTETRICS & GYNECOLOGY

## 2017-08-08 PROCEDURE — 88305 TISSUE EXAM BY PATHOLOGIST: CPT | Performed by: OBSTETRICS & GYNECOLOGY

## 2017-08-08 NOTE — MR AVS SNAPSHOT
After Visit Summary   8/8/2017    Karrie Zhang    MRN: [id number]           Patient Information     Date Of Birth          1985        Visit Information        Provider Department      8/8/2017 1:00 PM Vincent Armstrong MD; [place] [facility] ROOM [facility]        Today's Diagnoses     Papanicolaou smear of cervix with low grade squamous intraepithelial lesion (LGSIL)    -  1    Cervical high risk HPV (human papillomavirus) test positive           Follow-ups after your visit        Who to contact     If you have questions or need follow up information about today's clinic visit or your schedule please contact [facility] directly at [phone].  Normal or non-critical lab and imaging results will be communicated to you by [employer], letter or phone within 4 business days after the clinic has received the results. If you do not hear from us within 7 days, please contact the clinic through [employer] or phone. If you have a critical or abnormal lab result, we will notify you by phone as soon as possible.  Submit refill requests through [employer] or call your pharmacy and they will forward the refill request to us. Please allow 3 business days for your refill to be completed.          Additional Information About Your Visit        MyChart Information     [employer] gives you secure access to your electronic health record. If you see a primary care provider, you can also send messages to your care team and make appointments. If you have questions, please call your primary care clinic.  If you do not have a primary care provider, please call [phone] and they will assist you.        Care EveryWhere ID     This is your Care EveryWhere ID. This could be used by other organizations to access your [facility] medical records  [id number]        Your Vitals Were     Pulse Temperature Pulse Oximetry BMI (Body Mass Index)          79 97.8  F (36.6  C) (Oral) 97% 33.99 kg/m2          Blood Pressure from Last 3 Encounters:   08/08/17 123/80   07/05/17 118/80   09/13/16 111/73    Weight from Last 3 Encounters:   08/08/17 217 lb (98.4 kg)   07/05/17 215 lb (97.5 kg)   09/13/16 210 lb (95.3 kg)              We Performed the Following     COLP CERVIX/UPPER VAGINA     Surgical pathology exam        Primary Care Provider Office Phone # Fax #    Vanessa Granados [phone]       [address] [address]        Equal Access to Services     [facility]: Hadii aad ku hadasho Soomaali, waaxda luqadaha, qaybta kaalmada adeegyada, estephanie stanton hayaan vinnie clinton . So [facility] [phone].    ATENCIÓN: Si habla español, tiene a arenas disposición servicios gratuitos de asistencia lingüística. Llame al [phone].    We comply with applicable federal civil rights laws and [place] laws. We do not discriminate on the basis of race, color, national origin, age, disability sex, sexual orientation or gender identity.            Thank you!     Thank you for choosing [facility]  for your care. Our goal is always to provide you with excellent care. Hearing back from our patients is one way we can continue to improve our services. Please take a few minutes to complete the written survey that you may receive in the mail after your visit with us. Thank you!             Your Updated Medication List - Protect others around you: Learn how to safely use, store and throw away your medicines at www.disposemymeds.org.          This list is accurate as of: 8/8/17  2:03 PM.  Always use your most recent med list.                   Brand Name Dispense Instructions for use Diagnosis    busPIRone 15 MG tablet    BUSPAR    180 tablet    Take 1 tablet (15 mg) by mouth 2 times daily    Major depressive disorder, recurrent episode, mild (H)       FLUoxetine 20 MG capsule    PROzac    90 capsule    Take 1 capsule (20 mg) by mouth daily    Major depressive  disorder, recurrent episode, mild (H)       fluticasone 50 MCG/ACT spray    FLONASE    16 g    Spray 1 spray into both nostrils daily    Mild persistent asthma without complication       montelukast 10 MG tablet    SINGULAIR    90 tablet    TAKE 1 TABLET(10 MG) BY MOUTH AT BEDTIME    Mild persistent asthma without complication       norgestim-eth estrad triphasic 0.18/0.215/0.25 MG-35 MCG per tablet    TRINESSA (28)    84 tablet    Take 1 tablet by mouth daily    Encounter for birth control pills maintenance

## 2017-08-08 NOTE — TELEPHONE ENCOUNTER
Encouraged patient to go to urgent care, patient very hesitant due to cost. Has not decided if she will go or not.  Obdulia Walter RN

## 2017-08-08 NOTE — NURSING NOTE
"Chief Complaint   Patient presents with     Colposcopy       Initial /80  Pulse 79  Temp 97.8  F (36.6  C) (Oral)  Wt 217 lb (98.4 kg)  SpO2 97%  BMI 33.99 kg/m2 Estimated body mass index is 33.99 kg/(m^2) as calculated from the following:    Height as of 7/5/17: 5' 7\" (1.702 m).    Weight as of this encounter: 217 lb (98.4 kg).  Medication Reconciliation: complete  Aliza Pineda M.A.    "

## 2017-08-08 NOTE — TELEPHONE ENCOUNTER
Reason for call:  Medication   If this is a refill request, has the caller requested the refill from the pharmacy already? No  Will the patient be using a [facility]? No  Name of the pharmacy and phone number for the current request: [id number]  [place], [place] - [address] AT [address]    Name of the medication requested: Had colposcopy; bad cramping is asking for something else.    Other request: Please contact patient to discuss.      Phone number to reach patient:  Home number on file [phone] (home)    Best Time:  ASAP    Can we leave a detailed message on this number?  YES

## 2017-08-08 NOTE — TELEPHONE ENCOUNTER
Severe cramping after colposcopy. Used ice and heat packs.  mg x2 with little relief. Pain level at 7/10.  Patient had biopsies and feels they were unnecessary.  Please advise.  Obdulia Walter RN

## 2017-08-08 NOTE — PROGRESS NOTES
Patient Name: Karrie Zhang              Date: 8/8/2017   YOB: 1985                         Age: 32 year old   Phone: [phone] (home)   ________________________________________________________________________  I have been asked to see Karrie in consultation by Dr. Granados  to discuss the pap smear, findings and possible further evaluation.  The patient's pap smear history is as noted:  2005: cervical laser treatment for dysplasia and cryotherapy.  6/29/16: NIL Pap, + HR HPV (Neg 16/18). Plan cotest in 1 year.   7/5/17 LSIL Pap, + HR HPV (Not 16/18).  I attempted to ensure that the patient was educated regarding the nature of her findings and implications to date.  We reviewed the role of HPV, incidence in the population and the natural history of the infection, and its transmission.  We also reviewed ways to minimize her future risk, the effect of HPV on the cervix and treatment options available, should they be indicated.    The pathophysiology of the cervix, including a discussion of the squamous and columnar cells, metaplasia and dysplasia have been reviewed, drawings, sketches and the pamphlets were reviewed with her.      No LMP recorded.  Current Birth Control Method: oral contraceptive  Age at first sexual intercourse: 16 years old  Number of sexual partners (lifetime): patient declined   History of veneral diseases: :  Chlamydia in college   History of genital warts:  No  Visible warts now?:  No  Family History of  Cervical, Uterine or Vaginal Cancer?: No    Past Medical History:   Diagnosis Date     Allergic rhinitis      Cervical dysplasia      Cervical high risk HPV (human papillomavirus) test positive 6/29/16, 7/5/17    neg 16/18     Depressive disorder      Papanicolaou smear of cervix with low grade squamous intraepithelial lesion (LGSIL) 07/05/2017     Rosacea, acne      Uncomplicated asthma        Past Surgical History:   Procedure Laterality Date     LASER TX, CERVICAL  2005         Outpatient Encounter Prescriptions as of 8/8/2017   Medication Sig Dispense Refill     busPIRone (BUSPAR) 15 MG tablet Take 1 tablet (15 mg) by mouth 2 times daily 180 tablet 3     FLUoxetine (PROZAC) 20 MG capsule Take 1 capsule (20 mg) by mouth daily 90 capsule 3     fluticasone (FLONASE) 50 MCG/ACT spray Spray 1 spray into both nostrils daily 16 g 11     norgestim-eth estrad triphasic (TRINESSA, 28,) 0.18/0.215/0.25 MG-35 MCG per tablet Take 1 tablet by mouth daily 84 tablet 4     montelukast (SINGULAIR) 10 MG tablet TAKE 1 TABLET(10 MG) BY MOUTH AT BEDTIME 90 tablet 3     [DISCONTINUED] busPIRone (BUSPAR) 15 MG tablet TAKE 1 TABLET(15 MG) BY MOUTH TWICE DAILY 60 tablet 0     [DISCONTINUED] clindamycin (CLEOCIN T) 1 % solution Apply topically 2 times daily 60 mL 11     No facility-administered encounter medications on file as of 8/8/2017.         Allergies as of 08/08/2017 - Valdemar as Reviewed 08/08/2017   Allergen Reaction Noted     Penicillins Rash 05/03/2016       Social History     Social History     Marital status: Single     Spouse name: N/A     Number of children: 0     Years of education: 16     Occupational History       Other     Social History Main Topics     Smoking status: Never Smoker     Smokeless tobacco: Never Used     Alcohol use 8.4 oz/week     14 Standard drinks or equivalent per week     Drug use: No     Sexual activity: Not Currently     Partners: Male     Birth control/ protection: Pill, Condom     Other Topics Concern     Parent/Sibling W/ Cabg, Mi Or Angioplasty Before 65f 55m? No     Social History Narrative        Family History   Problem Relation Age of Onset     OSTEOPOROSIS Mother      DIABETES Father      Other Cancer Father      Pancreatic     Substance Abuse Father      alcoholic: stopped when I was in grade school     Prostate Cancer Maternal Grandfather      Prostate Cancer Other      Depression Brother      Coronary Artery Disease No family hx of           Review Of Systems  10 point ROS of systems including Constitutional, Eyes, Respiratory, Cardiovascular, Gastroenterology, Genitourinary, Integumentary, Muscularskeletal, Psychiatric were all negative except for pertinent positives noted in my HPI and in the PMH.      Exam:   /80  Pulse 79  Temp 97.8  F (36.6  C) (Oral)  Wt 217 lb (98.4 kg)  SpO2 97%  BMI 33.99 kg/m2  GENERAL:  WNWD female NAD  HEENT: NC/AT, EOMI  Lungs:  Good respiratory effort   SKIN: normal skin turgor  GAIT: Normal  NECK: Symmetrical, no masses noted   VULVA: Normal Genitalia  BUS: Normal  URETHRA:  No hypermobility noted  URETHRAL MEATUS:  No masses noted  VAGINA: Normal mucosa, no discharge  CERVIX: Closed, mobile, no discharge  PERIANAL:  No masses or lesions seen  EXTREMITIES: no clubbing, cyanosis, or edema    Assessment:  High risk HPV  LSIL pap smear     Plan:  Recommend to Proceed with Colpo  The details of the colposcopic procedure were reviewed, the risks of missed diagnoses, pain, infection, and bleeding.    TT 20 min, in addition to the time for the procedure  CT greater than 50%, as noted above in the HPI and in the Plan.     Vincent Armstrong MD        Procedure:  Procedure for colposcopy and biopsy has been explained to the patient and consent obtained.    Before the procedure, it was ensured that the patient was educated regarding the nature of her findings and implications to date.  We reviewed the role of HPV and the natural history of the infection.  We also reviewed ways to minimize her future risk, the effect of HPV on the cervix and treatment options available, should they be indicated.    The pathophysiology of the cervix, including a discussion of the squamous and columnar cells, metaplasia and dysplasia have been reviewed, drawings, sketches and the pamphlets were reviewed with her.  The details of the colposcopic procedure were reviewed, the risks of missed diagnoses, pain, infection, and bleeding.   Questions seemed to be answered before proceeding and the patient then consented to the procedure.     Speculum placed in vagina and excellent visualization of cervix achieved, cervix swabbed  with acetic acid solution.    biopsies taken (including ECC): 3   Hemostasis effected with Silver Nitrate     Findings:  Cervix: no visible lesions and no concerning findings  Vaginal inspection: normal without visible lesions.  Procedure Summary: Patient tolerated procedure well and colposcopy adequate.      Assessment:   LSIL pap   High risk HPV    Plan:  Specimens labelled and sent to pathology.  Will base further treatment on pathology findings.  Post biopsy instructions given to patient and call to discuss Pathology results.    Vincent Armstrong MD

## 2017-08-10 LAB — COPATH REPORT: NORMAL

## 2017-08-10 NOTE — TELEPHONE ENCOUNTER
I called patient's number and I left a message I had received her messages and I was wondering how she was doing.   Vincent Armstrong MD

## 2017-12-06 ENCOUNTER — TRANSFERRED RECORDS (OUTPATIENT)
Dept: HEALTH INFORMATION MANAGEMENT | Facility: CLINIC | Age: 32
End: 2017-12-06

## 2017-12-12 ENCOUNTER — MYC MEDICAL ADVICE (OUTPATIENT)
Dept: FAMILY MEDICINE | Facility: CLINIC | Age: 32
End: 2017-12-12

## 2017-12-12 ENCOUNTER — E-VISIT (OUTPATIENT)
Dept: FAMILY MEDICINE | Facility: CLINIC | Age: 32
End: 2017-12-12
Payer: COMMERCIAL

## 2017-12-12 DIAGNOSIS — F10.10 ALCOHOL ABUSE: ICD-10-CM

## 2017-12-12 DIAGNOSIS — F33.0 MAJOR DEPRESSIVE DISORDER, RECURRENT EPISODE, MILD (H): Primary | ICD-10-CM

## 2017-12-12 DIAGNOSIS — F41.9 ANXIETY: ICD-10-CM

## 2017-12-12 PROCEDURE — 99444 ZZC PHYSICIAN ONLINE EVALUATION & MANAGEMENT SERVICE: CPT | Performed by: FAMILY MEDICINE

## 2017-12-12 RX ORDER — FLUOXETINE 40 MG/1
40 CAPSULE ORAL DAILY
Qty: 30 CAPSULE | Refills: 1 | Status: SHIPPED | OUTPATIENT
Start: 2017-12-12 | End: 2018-03-12

## 2017-12-12 ASSESSMENT — ANXIETY QUESTIONNAIRES
3. WORRYING TOO MUCH ABOUT DIFFERENT THINGS: MORE THAN HALF THE DAYS
GAD7 TOTAL SCORE: 10
1. FEELING NERVOUS, ANXIOUS, OR ON EDGE: NEARLY EVERY DAY
7. FEELING AFRAID AS IF SOMETHING AWFUL MIGHT HAPPEN: NOT AT ALL
5. BEING SO RESTLESS THAT IT IS HARD TO SIT STILL: NOT AT ALL
GAD7 TOTAL SCORE: 10
7. FEELING AFRAID AS IF SOMETHING AWFUL MIGHT HAPPEN: NOT AT ALL
2. NOT BEING ABLE TO STOP OR CONTROL WORRYING: MORE THAN HALF THE DAYS
6. BECOMING EASILY ANNOYED OR IRRITABLE: SEVERAL DAYS
4. TROUBLE RELAXING: MORE THAN HALF THE DAYS
GAD7 TOTAL SCORE: 10

## 2017-12-12 ASSESSMENT — PATIENT HEALTH QUESTIONNAIRE - PHQ9
SUM OF ALL RESPONSES TO PHQ QUESTIONS 1-9: 10
10. IF YOU CHECKED OFF ANY PROBLEMS, HOW DIFFICULT HAVE THESE PROBLEMS MADE IT FOR YOU TO DO YOUR WORK, TAKE CARE OF THINGS AT HOME, OR GET ALONG WITH OTHER PEOPLE: VERY DIFFICULT
SUM OF ALL RESPONSES TO PHQ QUESTIONS 1-9: 10

## 2017-12-13 ASSESSMENT — ANXIETY QUESTIONNAIRES: GAD7 TOTAL SCORE: 10

## 2017-12-13 ASSESSMENT — PATIENT HEALTH QUESTIONNAIRE - PHQ9: SUM OF ALL RESPONSES TO PHQ QUESTIONS 1-9: 10

## 2018-03-27 ENCOUNTER — OFFICE VISIT (OUTPATIENT)
Dept: FAMILY MEDICINE | Facility: CLINIC | Age: 33
End: 2018-03-27
Payer: COMMERCIAL

## 2018-03-27 VITALS
DIASTOLIC BLOOD PRESSURE: 66 MMHG | RESPIRATION RATE: 20 BRPM | BODY MASS INDEX: 34.06 KG/M2 | WEIGHT: 217 LBS | HEART RATE: 81 BPM | HEIGHT: 67 IN | TEMPERATURE: 97.9 F | OXYGEN SATURATION: 97 % | SYSTOLIC BLOOD PRESSURE: 108 MMHG

## 2018-03-27 DIAGNOSIS — R06.83 SNORING: ICD-10-CM

## 2018-03-27 DIAGNOSIS — Z11.3 SCREEN FOR STD (SEXUALLY TRANSMITTED DISEASE): ICD-10-CM

## 2018-03-27 DIAGNOSIS — E66.9 OBESITY (BMI 30-39.9): ICD-10-CM

## 2018-03-27 DIAGNOSIS — F33.0 MAJOR DEPRESSIVE DISORDER, RECURRENT EPISODE, MILD (H): Primary | ICD-10-CM

## 2018-03-27 DIAGNOSIS — L65.9 HAIR LOSS: ICD-10-CM

## 2018-03-27 DIAGNOSIS — F41.9 ANXIETY: ICD-10-CM

## 2018-03-27 LAB
BASOPHILS # BLD AUTO: 0 10E9/L (ref 0–0.2)
BASOPHILS NFR BLD AUTO: 0.4 %
DIFFERENTIAL METHOD BLD: NORMAL
EOSINOPHIL # BLD AUTO: 0.4 10E9/L (ref 0–0.7)
EOSINOPHIL NFR BLD AUTO: 3.7 %
ERYTHROCYTE [DISTWIDTH] IN BLOOD BY AUTOMATED COUNT: 12.5 % (ref 10–15)
HCT VFR BLD AUTO: 39.2 % (ref 35–47)
HGB BLD-MCNC: 13.4 G/DL (ref 11.7–15.7)
LYMPHOCYTES # BLD AUTO: 2.6 10E9/L (ref 0.8–5.3)
LYMPHOCYTES NFR BLD AUTO: 27.8 %
MCH RBC QN AUTO: 30.9 PG (ref 26.5–33)
MCHC RBC AUTO-ENTMCNC: 34.2 G/DL (ref 31.5–36.5)
MCV RBC AUTO: 90 FL (ref 78–100)
MONOCYTES # BLD AUTO: 0.6 10E9/L (ref 0–1.3)
MONOCYTES NFR BLD AUTO: 6.3 %
NEUTROPHILS # BLD AUTO: 5.8 10E9/L (ref 1.6–8.3)
NEUTROPHILS NFR BLD AUTO: 61.8 %
PLATELET # BLD AUTO: 280 10E9/L (ref 150–450)
RBC # BLD AUTO: 4.34 10E12/L (ref 3.8–5.2)
WBC # BLD AUTO: 9.3 10E9/L (ref 4–11)

## 2018-03-27 PROCEDURE — 87591 N.GONORRHOEAE DNA AMP PROB: CPT | Performed by: FAMILY MEDICINE

## 2018-03-27 PROCEDURE — 87389 HIV-1 AG W/HIV-1&-2 AB AG IA: CPT | Performed by: FAMILY MEDICINE

## 2018-03-27 PROCEDURE — 99213 OFFICE O/P EST LOW 20 MIN: CPT | Performed by: FAMILY MEDICINE

## 2018-03-27 PROCEDURE — 84443 ASSAY THYROID STIM HORMONE: CPT | Performed by: FAMILY MEDICINE

## 2018-03-27 PROCEDURE — 85025 COMPLETE CBC W/AUTO DIFF WBC: CPT | Performed by: FAMILY MEDICINE

## 2018-03-27 PROCEDURE — 36415 COLL VENOUS BLD VENIPUNCTURE: CPT | Performed by: FAMILY MEDICINE

## 2018-03-27 PROCEDURE — 87491 CHLMYD TRACH DNA AMP PROBE: CPT | Performed by: FAMILY MEDICINE

## 2018-03-27 PROCEDURE — 82947 ASSAY GLUCOSE BLOOD QUANT: CPT | Performed by: FAMILY MEDICINE

## 2018-03-27 PROCEDURE — 86780 TREPONEMA PALLIDUM: CPT | Performed by: FAMILY MEDICINE

## 2018-03-27 RX ORDER — FLUOXETINE 40 MG/1
CAPSULE ORAL
Qty: 90 CAPSULE | Refills: 1 | Status: SHIPPED | OUTPATIENT
Start: 2018-03-27 | End: 2018-08-21

## 2018-03-27 RX ORDER — FLUOXETINE 40 MG/1
CAPSULE ORAL
Qty: 30 CAPSULE | Refills: 1 | Status: SHIPPED | OUTPATIENT
Start: 2018-03-27 | End: 2018-03-27

## 2018-03-27 ASSESSMENT — ANXIETY QUESTIONNAIRES
IF YOU CHECKED OFF ANY PROBLEMS ON THIS QUESTIONNAIRE, HOW DIFFICULT HAVE THESE PROBLEMS MADE IT FOR YOU TO DO YOUR WORK, TAKE CARE OF THINGS AT HOME, OR GET ALONG WITH OTHER PEOPLE: SOMEWHAT DIFFICULT
6. BECOMING EASILY ANNOYED OR IRRITABLE: NOT AT ALL
3. WORRYING TOO MUCH ABOUT DIFFERENT THINGS: NOT AT ALL
5. BEING SO RESTLESS THAT IT IS HARD TO SIT STILL: NOT AT ALL
GAD7 TOTAL SCORE: 2
1. FEELING NERVOUS, ANXIOUS, OR ON EDGE: MORE THAN HALF THE DAYS
2. NOT BEING ABLE TO STOP OR CONTROL WORRYING: NOT AT ALL
7. FEELING AFRAID AS IF SOMETHING AWFUL MIGHT HAPPEN: NOT AT ALL

## 2018-03-27 ASSESSMENT — PATIENT HEALTH QUESTIONNAIRE - PHQ9: 5. POOR APPETITE OR OVEREATING: NOT AT ALL

## 2018-03-27 ASSESSMENT — PAIN SCALES - GENERAL: PAINLEVEL: MILD PAIN (3)

## 2018-03-27 NOTE — MR AVS SNAPSHOT
After Visit Summary   3/27/2018    Karrie Zhang    MRN: [id number]           Patient Information     Date Of Birth          1985        Visit Information        Provider Department      3/27/2018 5:40 PM Vanessa Granados MD [facility]        Today's Diagnoses     Major depressive disorder, recurrent episode, mild (H)    -  1    Anxiety        Hair loss        Screen for STD (sexually transmitted disease)        Obesity (BMI 30-39.9)        Snoring          Care Instructions    [facility]    If you have any questions regarding to your visit please contact your care team:       Team Red:   Clinic Hours Telephone Number   Dr. Vanessa Acuna, NP   7am-7pm  Monday - Thursday   7am-5pm  Fridays  [phone]  (Appointment scheduling available 24/7)    Questions about your visit?   Team Line  [phone]   [facility] [place] [facility] 11am-9pm Monday-Friday Saturday-Sunday- 9am-5pm   [place] - 5pm-9pm Monday-Friday Saturday-Sunday- 9am-5pm  [phone] - [facility]  [phone] - [place]       What options do I have for visits at the clinic other than the traditional office visit?  To expand how we care for you, many of our providers are utilizing electronic visits (e-visits) and telephone visits, when medically appropriate, for interactions with their patients rather than a visit in the clinic.   We also offer nurse visits for many medical concerns. Just like any other service, we will bill your insurance company for this type of visit based on time spent on the phone with your provider. Not all insurance companies cover these visits. Please check with your medical insurance if this type of visit is covered. You will be responsible for any charges that are not paid by your insurance.      E-visits via [employer] [facility]:  generally incur a $35.00 fee.  Telephone visits:  Time spent on the phone:  *charged based on time that is spent on the phone in increments of 10 minutes. Estimated cost:   5-10 mins $30.00   11-20 mins. $59.00   21-30 mins. $85.00     Use [employer] (secure email communication and access to your chart) to send your primary care provider a message or make an appointment. Ask someone on your Team how to sign up for [employer].  For a Price Quote for your services, please call our [employer]. Line at [phone].      As always, Thank you for trusting us with your health care needs!  Candi DUMONT MA            Follow-ups after your visit        Follow-up notes from your care team     Return in about 4 months (around 8/8/2018), or if symptoms worsen or fail to improve, for physical.      Who to contact     If you have questions or need follow up information about today's clinic visit or your schedule please contact [facility] directly at [phone].  Normal or non-critical lab and imaging results will be communicated to you by [employer], letter or phone within 4 business days after the clinic has received the results. If you do not hear from us within 7 days, please contact the clinic through [employer] or phone. If you have a critical or abnormal lab result, we will notify you by phone as soon as possible.  Submit refill requests through [employer] or call your pharmacy and they will forward the refill request to us. Please allow 3 business days for your refill to be completed.          Additional Information About Your Visit        [employer] Information     [employer] gives you secure access to your electronic health record. If you see a primary care provider, you can also send messages to your care team and make appointments. If you have questions, please call your primary care clinic.  If you do not have a primary care provider, please call [phone] and they will assist you.        Care EveryWhere ID     This is your Care EveryWhere ID. This could be used by other organizations to  "access your [facility] medical records  [id number]        Your Vitals Were     Pulse Temperature Respirations Height Last Period Pulse Oximetry    81 97.9  F (36.6  C) (Oral) 20 5' 7\" (1.702 m) 03/22/2018 97%    BMI (Body Mass Index)                   33.99 kg/m2            Blood Pressure from Last 3 Encounters:   03/27/18 108/66   08/08/17 123/80   07/05/17 118/80    Weight from Last 3 Encounters:   03/27/18 217 lb (98.4 kg)   08/08/17 217 lb (98.4 kg)   07/05/17 215 lb (97.5 kg)              We Performed the Following     Anti Treponema     CBC with platelets differential     Chlamydia trachomatis PCR     Glucose     HIV Antigen Antibody Combo     Neisseria gonorrhoeae PCR     TSH with free T4 reflex          Today's Medication Changes          These changes are accurate as of 3/27/18  6:02 PM.  If you have any questions, ask your nurse or doctor.               Start taking these medicines.        Dose/Directions    FLUoxetine 40 MG capsule   Commonly known as:  PROzac   Used for:  Major depressive disorder, recurrent episode, mild (H), Anxiety   Started by:  Vanessa Granados MD        TAKE 1 CAPSULE BY MOUTH EVERY DAY   Quantity:  90 capsule   Refills:  1            Where to get your medicines      [id number]  [place] - [address] AT [facility] [place]  [address] [facility] [address]     Phone:  [phone]     FLUoxetine 40 MG capsule                Primary Care Provider Office Phone # Fax #    Vanessa Granados [phone] [address] [facility]  [place] [address]        Equal Access to Services     EDUARDA DAVILA : Hadii ildefonso juarez Soradames, waaxda luqadaha, qaybta kaalmada cher, estephanie sorenson. So [facility] [phone].    ATENCIÓN: Si habla español, tiene a arenas disposición servicios gratuitos de asistencia lingüística. Llame al [phone].    We comply with applicable " federal civil rights laws and [place] laws. We do not discriminate on the basis of race, color, national origin, age, disability, sex, sexual orientation, or gender identity.            Thank you!     Thank you for choosing [facility]  for your care. Our goal is always to provide you with excellent care. Hearing back from our patients is one way we can continue to improve our services. Please take a few minutes to complete the written survey that you may receive in the mail after your visit with us. Thank you!             Your Updated Medication List - Protect others around you: Learn how to safely use, store and throw away your medicines at www.disposemymeds.org.          This list is accurate as of 3/27/18  6:02 PM.  Always use your most recent med list.                   Brand Name Dispense Instructions for use Diagnosis    busPIRone 15 MG tablet    BUSPAR    180 tablet    Take 1 tablet (15 mg) by mouth 2 times daily    Major depressive disorder, recurrent episode, mild (H)       FLUoxetine 40 MG capsule    PROzac    90 capsule    TAKE 1 CAPSULE BY MOUTH EVERY DAY    Major depressive disorder, recurrent episode, mild (H), Anxiety       montelukast 10 MG tablet    SINGULAIR    90 tablet    TAKE 1 TABLET(10 MG) BY MOUTH AT BEDTIME    Mild persistent asthma without complication       norgestim-eth estrad triphasic 0.18/0.215/0.25 MG-35 MCG per tablet    TRINESSA (28)    84 tablet    Take 1 tablet by mouth daily    Encounter for birth control pills maintenance

## 2018-03-27 NOTE — PATIENT INSTRUCTIONS
[facility]    If you have any questions regarding to your visit please contact your care team:       Team Red:   Clinic Hours Telephone Number   Dr. Vanessa Acuna, NP   7am-7pm  Monday - Thursday   7am-5pm  Fridays  [phone]  (Appointment scheduling available 24/7)    Questions about your visit?   Team Line  [phone]   Urgent Care - [place] - 11am-9pm Monday-Friday Saturday-Sunday- 9am-5pm   [place] - 5pm-9pm Monday-Friday Saturday-Sunday- 9am-5pm  [phone] - [place]   [phone] - [place]       What options do I have for visits at the clinic other than the traditional office visit?  To expand how we care for you, many of our providers are utilizing electronic visits (e-visits) and telephone visits, when medically appropriate, for interactions with their patients rather than a visit in the clinic.   We also offer nurse visits for many medical concerns. Just like any other service, we will bill your insurance company for this type of visit based on time spent on the phone with your provider. Not all insurance companies cover these visits. Please check with your medical insurance if this type of visit is covered. You will be responsible for any charges that are not paid by your insurance.      E-visits via [employer]gt:  generally incur a $35.00 fee.  Telephone visits:  Time spent on the phone: *charged based on time that is spent on the phone in increments of 10 minutes. Estimated cost:   5-10 mins $30.00   11-20 mins. $59.00   21-30 mins. $85.00     Use [employer] (secure email communication and access to your chart) to send your primary care provider a message or make an appointment. Ask someone on your Team how to sign up for gdgt.  For a Price Quote for your services, please call our Consumer Price Line at [phone].      As always, Thank you for trusting us with your health care needs!  Candi DUMONT  MA

## 2018-03-27 NOTE — PROGRESS NOTES
"  SUBJECTIVE:   Curtis Alexis is a 33 year old female who presents to clinic today for the following health issues:    Depression and Anxiety Follow-Up    Status since last visit: improved, some anxiety    Other associated symptoms:None    Complicating factors:     Significant life event: Yes-  dermatologist told her that she's losing hair, new job is going well     Current substance abuse: Alcohol    PHQ-9 7/5/2017 12/12/2017 3/12/2018   Total Score 6 10 5   Q9: Suicide Ideation Several days Several days Not at all     RICKY-7 SCORE 5/3/2016 7/5/2017 12/12/2017   Total Score - - 10 (moderate anxiety)   Total Score 2 5 10     .  PHQ-9  English  PHQ-9   Any Language  RICKY-7  Suicide Assessment Five-step Evaluation and Treatment (SAFE-T)    Amount of exercise or physical activity: not lately    Problems taking medications regularly: No    Medication side effects: none    Diet: regular (no restrictions)    ROS:  CONSTITUTIONAL: fatigue; NEGATIVE for fever, chills, change in weight  INTEGUMENTARY/SKIN: hair loss, rosacea, peroral dermatitis   RESP: snoring    female: NEGATIVE for dysuria or discharge   PSYCHIATRIC: anxiety and HX depression    OBJECTIVE:     /66 (BP Location: Left arm, Patient Position: Chair, Cuff Size: Adult Large)  Pulse 81  Temp 97.9  F (36.6  C) (Oral)  Resp 20  Ht 5' 7\" (1.702 m)  Wt 217 lb (98.4 kg)  LMP 03/22/2018  SpO2 97%  BMI 33.99 kg/m2  Body mass index is 33.99 kg/(m^2).  GENERAL: alert, no distress and obese  MS: extremities normal- no gross deformities noted  SKIN: facial erythema   PSYCH: mentation appears normal, affect normal/bright    Diagnostic Test Results:  Results for orders placed or performed in visit on 08/08/17   Surgical pathology exam   Result Value Ref Range    Copath Report       Patient Name: CURTIS ALEXIS  MR#: [id number]  Specimen #: [id number]  Collected: 8/8/2017  Received: 8/9/2017  Reported: 8/10/2017 20:00  Ordering Phy(s): BRIELLE GARDNER " "GO    For improved result formatting, select 'View Enhanced Report Format'  under Linked Documents section.    SPECIMEN(S):  A: Cervical biopsy, 5 and 11 o'clock  B: Endocervical curettings    FINAL DIAGNOSIS:  A.  Cervix at 5:00 and 11:00, biopsies:  Cervix from transformation zone with:  - Normal mature squamous epithelium.  - Normal endocervical glandular epithelium.    B.  Endocervix, curettings:  - Several fragments of normal mature squamous epithelium.  - A small amount of normal endocervical tissue.    COMMENT:  Pap smear [id number] diagnosed as LSIL (reviewed and confirmed) does not  correlate with [id number] and -B.    Electronically signed out by:    Judit Pérez M.D.    CLINICAL HISTORY:  32 year old female with LSIL pap smear [id number] and positive high risk  HPV, not 16 or 18, on 7/5/2017.    GROSS:   A. The specimen is received in formalin, labeled with the patient's name  and date of birth, and designated \"cervical biopsy 5:00 and 11:00\". It  consists of two unoriented fragments of tan-white soft tissue admixed  with a scant amount of mucus, measuring 0.3 cm and 0.6 cm in greatest  dimension.  Entirely submitted in one cassette.    B. The specimen is received in formalin, labeled with the patient's name  and date of birth, and designated \"ECC\". It consists of a Cytobrush in  formalin, from which a 1.5 x 1.0 x 0.3 cm aggregate of pale tan, mucoid  material is obtained. The specimen is filtered over lens paper, wrapped,  and entirely submitted in one cassette. (Dictated by: Elyse AYALA  8/9/2017 10:07 AM)    MICROSCOPIC:  Microscopic examination is performed.  Multiple deeper tissue levels are  also examined microscopically on specimens A and B.    CPT Codes:  A: 34157-YW3  B: 92384-WX5    TESTING LAB LOCATION:  [address] [address] [phone]    COLLECTION SITE:  Client: [facility]" [place], [place]  Location: [facility] (B)         ASSESSMENT/PLAN:   (F33.0) Major depressive disorder, recurrent episode, mild (H)  (primary encounter diagnosis)  (F41.9) Anxiety  Plan: FLUoxetine (PROZAC) 40 MG capsule        Follow-up 6 months     (L65.9) Hair loss  Plan: TSH with free T4 reflex, CBC with platelets         differential        Continue rogaine and follow-up with dermatology as needed     (Z11.3) Screen for STD (sexually transmitted disease)  Plan: HIV Antigen Antibody Combo, Anti Treponema,         Neisseria gonorrhoeae PCR, Chlamydia         trachomatis PCR          (E66.9) Obesity (BMI 30-39.9)  Plan: TSH with free T4 reflex, Glucose          (R06.83) Snoring  Plan: offered sleep study to rule out IRINA      See Patient Instructions    Vanessa Granados MD  [facility]

## 2018-03-28 LAB
GLUCOSE SERPL-MCNC: 82 MG/DL (ref 70–99)
HIV 1+2 AB+HIV1 P24 AG SERPL QL IA: NONREACTIVE
TSH SERPL DL<=0.005 MIU/L-ACNC: 3.47 MU/L (ref 0.4–4)

## 2018-03-28 ASSESSMENT — ASTHMA QUESTIONNAIRES: ACT_TOTALSCORE: 24

## 2018-03-28 ASSESSMENT — ANXIETY QUESTIONNAIRES: GAD7 TOTAL SCORE: 2

## 2018-03-28 ASSESSMENT — PATIENT HEALTH QUESTIONNAIRE - PHQ9: SUM OF ALL RESPONSES TO PHQ QUESTIONS 1-9: 7

## 2018-03-29 LAB
C TRACH DNA SPEC QL NAA+PROBE: NEGATIVE
N GONORRHOEA DNA SPEC QL NAA+PROBE: NEGATIVE
SPECIMEN SOURCE: NORMAL
SPECIMEN SOURCE: NORMAL
T PALLIDUM IGG+IGM SER QL: NEGATIVE

## 2018-04-05 ENCOUNTER — NURSE TRIAGE (OUTPATIENT)
Dept: NURSING | Facility: CLINIC | Age: 33
End: 2018-04-05

## 2018-04-05 ENCOUNTER — OFFICE VISIT (OUTPATIENT)
Dept: FAMILY MEDICINE | Facility: CLINIC | Age: 33
End: 2018-04-05
Payer: COMMERCIAL

## 2018-04-05 VITALS
DIASTOLIC BLOOD PRESSURE: 68 MMHG | SYSTOLIC BLOOD PRESSURE: 106 MMHG | HEART RATE: 81 BPM | BODY MASS INDEX: 34.3 KG/M2 | TEMPERATURE: 97 F | OXYGEN SATURATION: 97 % | RESPIRATION RATE: 16 BRPM | WEIGHT: 219 LBS

## 2018-04-05 DIAGNOSIS — K12.2 UVULITIS: Primary | ICD-10-CM

## 2018-04-05 LAB
DEPRECATED S PYO AG THROAT QL EIA: NORMAL
SPECIMEN SOURCE: NORMAL

## 2018-04-05 PROCEDURE — 87880 STREP A ASSAY W/OPTIC: CPT | Performed by: FAMILY MEDICINE

## 2018-04-05 PROCEDURE — 87081 CULTURE SCREEN ONLY: CPT | Performed by: FAMILY MEDICINE

## 2018-04-05 PROCEDURE — 99213 OFFICE O/P EST LOW 20 MIN: CPT | Performed by: FAMILY MEDICINE

## 2018-04-05 NOTE — PROGRESS NOTES
SUBJECTIVE:   Karrie Zhang is a 33 year old female who presents to clinic today for the following health issues:    Chief Complaint   Patient presents with     Swelling     uvula     Woke up this morning with swollen uvula, redness as well  This morning when woke up was painful took some ibuprofen and pain/swelling went away  Normal tonsils  No fever  Year round allergies  Sick contacts - co worker was sick last week    Problem list and histories reviewed & adjusted, as indicated.  Additional history: as documented    BP Readings from Last 3 Encounters:   04/05/18 106/68   03/27/18 108/66   08/08/17 123/80    Wt Readings from Last 3 Encounters:   04/05/18 219 lb (99.3 kg)   03/27/18 217 lb (98.4 kg)   08/08/17 217 lb (98.4 kg)        Reviewed and updated as needed this visit by clinical staff  Tobacco  Allergies  Meds  Problems       Reviewed and updated as needed this visit by Provider  Allergies  Meds  Problems         ROS:  Constitutional, HEENT, cardiovascular, pulmonary, gi and gu systems are negative, except as otherwise noted.    OBJECTIVE:     /68  Pulse 81  Temp 97  F (36.1  C) (Oral)  Resp 16  Wt 219 lb (99.3 kg)  LMP 03/22/2018  SpO2 97%  BMI 34.3 kg/m2  Body mass index is 34.3 kg/(m^2).  GENERAL: healthy, alert and no distress  EYES: Eyes grossly normal to inspection, PERRL and conjunctivae and sclerae normal  HENT: ear canals and TM's normal, swelling at the base of the uvula, normal tonsils, no exudate  NECK: no adenopathy, no asymmetry, masses, or scars and thyroid normal to palpation  RESP: lungs clear to auscultation - no rales, rhonchi or wheezes  CV: regular rate and rhythm, normal S1 S2, no S3 or S4, no murmur, click or rub, no peripheral edema and peripheral pulses strong  SKIN: no suspicious lesions or rashes  NEURO: Normal strength and tone, mentation intact and speech normal  PSYCH: mentation appears normal, affect normal/bright    ASSESSMENT/PLAN:     1.  Uvulitis  Strep was negative, given patient's strong allergy history, this is likely the cause.  Recommend taking zyrtec for the next few days, continue to monitor, if it gets worse will consider steroids.  - Strep, Rapid Screen  - Beta strep group A culture    Follow up if symptoms worsen or fail to improve.     Johnnie Gupta MD  [facility]

## 2018-04-05 NOTE — TELEPHONE ENCOUNTER
"  Additional Information    Negative: Severe difficulty breathing (e.g., struggling for each breath, speaks in single words, stridor)    Negative: Sounds like a life-threatening emergency to the triager    Negative: [1] Diagnosed strep throat AND [2] taking antibiotic AND [3] symptoms continue    Negative: Throat culture results, call about    Negative: Productive cough is main symptom    Negative: Non-productive cough is main symptom    Negative: Hoarseness is main symptom    Negative: Runny nose is main symptom    Negative: [1] Drooling or spitting out saliva (because can't swallow) AND [2] normal breathing    Negative: Unable to open mouth completely    Negative: [1] Difficulty breathing AND [2] not severe    Negative: Fever > 104 F (40 C)    Negative: [1] Refuses to drink anything AND [2] for > 12 hours    Negative: [1] Drinking very little AND [2] dehydration suspected (e.g., no urine > 12 hours, very dry mouth, very lightheaded)    Negative: Patient sounds very sick or weak to the triager    Negative: SEVERE (e.g., excruciating) throat pain     Pain at 1.    Negative: [1] Pus on tonsils (back of throat) AND [2]  fever AND [3] swollen neck lymph nodes (\"glands\")    Negative: [1] Rash AND [2] widespread (especially chest and abdomen)    Negative: Earache also present    Negative: Fever present > 3 days (72 hours)    Negative: Diabetes mellitus or weak immune system (e.g., HIV positive, cancer chemo, splenectomy, organ transplant)    Negative: History of rheumatic fever    Negative: [1] Adult is leaving on a trip AND [2] requests an antibiotic NOW    Negative: [1] Positive throat culture or rapid strep test (according to lab, PCP, caller, etc.) AND [2] NO  standing order to call in prescription for antibiotic    Negative: [1] Exposure to family member (or spouse or boyfriend/girlfriend) with test-proven strep AND [2] within last 10 days    Negative: [1] Sore throat is the only symptom AND [2] present > 48 " hours    Negative: [1] Sore throat with cough/cold symptoms AND [2] present > 5 days    [1] Sore throat is the only symptom AND [2] sore throat present < 48 hours  (all triage questions negative)    Protocols used: SORE THROAT-ADULT-AH    I advised 911 if she develops any difficulty breathing or swallowing. I connected with scheduling for an appointment and advised the  to tell her to go to urgent care if there is no appointment available.  Saima Kearney RN-[facility]

## 2018-04-05 NOTE — PATIENT INSTRUCTIONS
Uvulitis    The uvula is the tissue that hangs in the back of the throat. Uvulitis is inflammation of the uvula. Inflammation happens when the body responds to an injury, allergic reaction, infection or illness. Symptoms of inflammation may include redness, irritation, itching, swelling, or burning. Uvulitis is more common in children than adults.  Symptoms of uvulitis include:    Sore throat    Trouble swallowing    Painful swallowing    Trouble breathing  Possible causes of uvulitis include:    Throat infection    Inhaling or swallowing chemicals     Inhaling hot air or steam    Allergic reaction to something eaten, touched or breathed in  In rare cases, uvulitis can be caused by a condition called angioedema. Angioendema can be:    Hereditary (runs in the family).     A side effect of a class of drugs used for high blood pressure called  ACE inhibitors.     Life-threatening. It can lead to swelling of the air passage in the mouth or throat. Severe swelling can block your breathing and cause death. Watch for the earliest signs of this illness. Call 911 if the swelling involves the face, mouth, or throat areas.  To help find the cause of the uvulitis, imaging tests may be done. If infection is suspected, swabs from the throat or samples of blood may be tested. Questions may be asked about an individual s vaccination history to be sure it is up to date. A cause for uvulitis is not always found.  Treatment depends on the cause and the severity of the symptoms.  Home care  Medicines: The doctor may prescribe antibiotics for an infection. For an allergic reaction or angioedema, medicines called steroids or antihistamines may be given. Follow instructions when using any medicine.  To care for the condition at home:    Rest until the symptoms go away.    If medicines were prescribed, be sure they are taken as directed. They should be taken until they are gone or the healthcare provider says to stop them.    If you were  told that your angioedema was from a medicine that you are taking, you must stop taking this medicine. Contact your doctor for a prescription for a different medicine. Advise future medical providers that you are allergic to this medicine.    Contact your healthcare provider before taking any over-the-counter medicines.    Drink fluids. Pain when swallowing may make it harder to drink and lead to dehydration. To prevent this, sip fluids throughout the day. Children can be given frozen juice bars, milk, or other cold liquids. Watch for the signs of dehydration listed below.    Ask your healthcare provider when you can return to work or school. Ask your child s healthcare provider when your child can return to school or .  Follow-up care  Follow up with the healthcare provider as directed. You may be referred to a specialist (an allergy doctor and/or an ear, nose, or throat doctor) for further evaluation and treatment. Make these visits as soon as possible.  When to seek medical advice  Call the healthcare provider right away for any of the following:    Symptoms not going away or getting worse    Symptoms of dehydration, including dark urine, dry mouth, cracked lips, dizziness, or sunken eyes    A child acting very sick or not improving  Fever in adults:     Fever over 100.4 F (38 C), or as directed by the healthcare provider.  Fever in children:    Child of any age has repeated fevers above 104 F (40 C).    Child younger than 2 years of age has a fever of 100.4 F (38 C) that continues for more than 1 day.    Child 2 years old or older has a fever of 100.4 F (38 C) that continues for more than 3 days.  Call 911  Call 911 if any of these occur:    Drooling or trouble swallowing    Trouble breathing    Trouble talking    Swollen tongue, lips, face, or throat (may be indicated by voice changes)    Jerking and loss of consciousness; seizure    Lack of response, extreme drowsiness, or trouble waking  up    Fainting    Confusion    Child being unresponsive    Skin or lips look blue, purple, or gray  Date Last Reviewed: 11/20/2015 2000-2017 [employer]. [address], [place] [address]. All rights reserved. This information is not intended as a substitute for professional medical care. Always follow your healthcare professional's instructions.

## 2018-04-05 NOTE — MR AVS SNAPSHOT
After Visit Summary   4/5/2018    Karrie Zhang    MRN: [id number]           Patient Information     Date Of Birth          1985        Visit Information        Provider Department      4/5/2018 9:40 AM Johnnie Han MD [facility]        Today's Diagnoses     Uvulitis    -  1      Care Instructions      Uvulitis    The uvula is the tissue that hangs in the back of the throat. Uvulitis is inflammation of the uvula. Inflammation happens when the body responds to an injury, allergic reaction, infection or illness. Symptoms of inflammation may include redness, irritation, itching, swelling, or burning. Uvulitis is more common in children than adults.  Symptoms of uvulitis include:    Sore throat    Trouble swallowing    Painful swallowing    Trouble breathing  Possible causes of uvulitis include:    Throat infection    Inhaling or swallowing chemicals     Inhaling hot air or steam    Allergic reaction to something eaten, touched or breathed in  In rare cases, uvulitis can be caused by a condition called angioedema. Angioendema can be:    Hereditary (runs in the family).     A side effect of a class of drugs used for high blood pressure called  ACE inhibitors.     Life-threatening. It can lead to swelling of the air passage in the mouth or throat. Severe swelling can block your breathing and cause death. Watch for the earliest signs of this illness. Call 911 if the swelling involves the face, mouth, or throat areas.  To help find the cause of the uvulitis, imaging tests may be done. If infection is suspected, swabs from the throat or samples of blood may be tested. Questions may be asked about an individual s vaccination history to be sure it is up to date. A cause for uvulitis is not always found.  Treatment depends on the cause and the severity of the symptoms.  Home care  Medicines: The doctor may prescribe antibiotics for an infection. For an allergic reaction or  angioedema, medicines called steroids or antihistamines may be given. Follow instructions when using any medicine.  To care for the condition at home:    Rest until the symptoms go away.    If medicines were prescribed, be sure they are taken as directed. They should be taken until they are gone or the healthcare provider says to stop them.    If you were told that your angioedema was from a medicine that you are taking, you must stop taking this medicine. Contact your doctor for a prescription for a different medicine. Advise future medical providers that you are allergic to this medicine.    Contact your healthcare provider before taking any over-the-counter medicines.    Drink fluids. Pain when swallowing may make it harder to drink and lead to dehydration. To prevent this, sip fluids throughout the day. Children can be given frozen juice bars, milk, or other cold liquids. Watch for the signs of dehydration listed below.    Ask your healthcare provider when you can return to work or school. Ask your child s healthcare provider when your child can return to school or .  Follow-up care  Follow up with the healthcare provider as directed. You may be referred to a specialist (an allergy doctor and/or an ear, nose, or throat doctor) for further evaluation and treatment. Make these visits as soon as possible.  When to seek medical advice  Call the healthcare provider right away for any of the following:    Symptoms not going away or getting worse    Symptoms of dehydration, including dark urine, dry mouth, cracked lips, dizziness, or sunken eyes    A child acting very sick or not improving  Fever in adults:     Fever over 100.4 F (38 C), or as directed by the healthcare provider.  Fever in children:    Child of any age has repeated fevers above 104 F (40 C).    Child younger than 2 years of age has a fever of 100.4 F (38 C) that continues for more than 1 day.    Child 2 years old or older has a fever of 100.4 F  (38 C) that continues for more than 3 days.  Call 911  Call 911 if any of these occur:    Drooling or trouble swallowing    Trouble breathing    Trouble talking    Swollen tongue, lips, face, or throat (may be indicated by voice changes)    Jerking and loss of consciousness; seizure    Lack of response, extreme drowsiness, or trouble waking up    Fainting    Confusion    Child being unresponsive    Skin or lips look blue, purple, or gray  Date Last Reviewed: 11/20/2015 2000-2017 [employer]. [address] [place] [address]. All rights reserved. This information is not intended as a substitute for professional medical care. Always follow your healthcare professional's instructions.                Follow-ups after your visit        Follow-up notes from your care team     Return if symptoms worsen or fail to improve.      Who to contact     If you have questions or need follow up information about today's clinic visit or your schedule please contact [facility] directly at [phone].  Normal or non-critical lab and imaging results will be communicated to you by [employer], letter or phone within 4 business days after the clinic has received the results. If you do not hear from us within 7 days, please contact the clinic through [employer] or phone. If you have a critical or abnormal lab result, we will notify you by phone as soon as possible.  Submit refill requests through [employer] or call your pharmacy and they will forward the refill request to us. Please allow 3 business days for your refill to be completed.          Additional Information About Your Visit        [email or web address] Information     [employer] gives you secure access to your electronic health record. If you see a primary care provider, you can also send messages to your care team and make appointments. If you have questions, please call your primary care clinic.  If you do not have a primary care provider, please call  [phone] and they will assist you.        Care EveryWhere ID     This is your Care EveryWhere ID. This could be used by other organizations to access your [place] medical records  [id number]        Your Vitals Were     Pulse Temperature Respirations Last Period Pulse Oximetry BMI (Body Mass Index)    81 97  F (36.1  C) (Oral) 16 03/22/2018 97% 34.3 kg/m2       Blood Pressure from Last 3 Encounters:   04/05/18 106/68   03/27/18 108/66   08/08/17 123/80    Weight from Last 3 Encounters:   04/05/18 219 lb (99.3 kg)   03/27/18 217 lb (98.4 kg)   08/08/17 217 lb (98.4 kg)              We Performed the Following     Beta strep group A culture     Strep, Rapid Screen        Primary Care Provider Office Phone # Fax #    Vanessa Granados [phone]       [address] [facility] [address]        Equal Access to Services     PETTY [facility] Hospital[facility] : Hadii aad ku hadasho Soomaali, waaxda luqadaha, qaybta kaalmada adeegyada, waxay idiin hayaan vinnie kharajameson layarelis . So [facility] [phone].    ATENCIÓN: Si habla español, tiene a arenas disposición servicios gratuitos de asistencia lingüística. Llame al [phone].    We comply with applicable federal civil rights laws and [place] laws. We do not discriminate on the basis of race, color, national origin, age, disability, sex, sexual orientation, or gender identity.            Thank you!     Thank you for choosing [facility]  for your care. Our goal is always to provide you with excellent care. Hearing back from our patients is one way we can continue to improve our services. Please take a few minutes to complete the written survey that you may receive in the mail after your visit with us. Thank you!             Your Updated Medication List - Protect others around you: Learn how to safely use, store and throw away your medicines at www.disposemymeds.org.          This list is accurate as of 4/5/18 10:37 AM.  Always use your most recent med list.                    Brand Name Dispense Instructions for use Diagnosis    busPIRone 15 MG tablet    BUSPAR    180 tablet    Take 1 tablet (15 mg) by mouth 2 times daily    Major depressive disorder, recurrent episode, mild (H)       FLUoxetine 40 MG capsule    PROzac    90 capsule    TAKE 1 CAPSULE BY MOUTH EVERY DAY    Major depressive disorder, recurrent episode, mild (H), Anxiety       montelukast 10 MG tablet    SINGULAIR    90 tablet    TAKE 1 TABLET(10 MG) BY MOUTH AT BEDTIME    Mild persistent asthma without complication       norgestim-eth estrad triphasic 0.18/0.215/0.25 MG-35 MCG per tablet    TRINESSA (28)    84 tablet    Take 1 tablet by mouth daily    Encounter for birth control pills maintenance

## 2018-04-06 LAB
BACTERIA SPEC CULT: NORMAL
SPECIMEN SOURCE: NORMAL

## 2018-04-12 DIAGNOSIS — J45.30 MILD PERSISTENT ASTHMA WITHOUT COMPLICATION: Primary | ICD-10-CM

## 2018-04-12 RX ORDER — ALBUTEROL SULFATE 90 UG/1
2 AEROSOL, METERED RESPIRATORY (INHALATION) EVERY 6 HOURS PRN
Qty: 1 INHALER | Refills: 5 | Status: SHIPPED | OUTPATIENT
Start: 2018-04-12 | End: 2019-05-13

## 2018-04-12 NOTE — TELEPHONE ENCOUNTER
Signed Prescriptions:                        Disp   Refills    albuterol (PROAIR HFA/PROVENTIL HFA/VENTOL*1 Inha*5        Sig: Inhale 2 puffs into the lungs every 6 hours as needed           for shortness of breath / dyspnea or wheezing  Authorizing Provider: SARAH ROCA
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
## 2018-04-15 ENCOUNTER — MYC MEDICAL ADVICE (OUTPATIENT)
Dept: FAMILY MEDICINE | Facility: CLINIC | Age: 33
End: 2018-04-15

## 2018-04-16 ENCOUNTER — OFFICE VISIT (OUTPATIENT)
Dept: FAMILY MEDICINE | Facility: CLINIC | Age: 33
End: 2018-04-16
Payer: COMMERCIAL

## 2018-04-16 VITALS
SYSTOLIC BLOOD PRESSURE: 112 MMHG | OXYGEN SATURATION: 96 % | HEART RATE: 82 BPM | BODY MASS INDEX: 34.24 KG/M2 | WEIGHT: 218.6 LBS | RESPIRATION RATE: 16 BRPM | TEMPERATURE: 97 F | DIASTOLIC BLOOD PRESSURE: 72 MMHG

## 2018-04-16 DIAGNOSIS — J45.31 MILD PERSISTENT ASTHMA WITH EXACERBATION: ICD-10-CM

## 2018-04-16 DIAGNOSIS — J32.9 BACTERIAL SINUSITIS: Primary | ICD-10-CM

## 2018-04-16 DIAGNOSIS — B96.89 BACTERIAL SINUSITIS: Primary | ICD-10-CM

## 2018-04-16 PROCEDURE — 99213 OFFICE O/P EST LOW 20 MIN: CPT | Performed by: FAMILY MEDICINE

## 2018-04-16 RX ORDER — PREDNISONE 20 MG/1
40 TABLET ORAL DAILY
Qty: 10 TABLET | Refills: 0 | Status: SHIPPED | OUTPATIENT
Start: 2018-04-16 | End: 2019-02-08

## 2018-04-16 RX ORDER — LEVOFLOXACIN 500 MG/1
500 TABLET, FILM COATED ORAL DAILY
Qty: 7 TABLET | Refills: 0 | Status: SHIPPED | OUTPATIENT
Start: 2018-04-16 | End: 2018-08-22

## 2018-04-16 NOTE — MR AVS SNAPSHOT
After Visit Summary   4/16/2018    Karrie Zhang    MRN: [id number]           Patient Information     Date Of Birth          1985        Visit Information        Provider Department      4/16/2018 7:40 AM Johnnie Han MD [facility]        Today's Diagnoses     Bacterial sinusitis    -  1    Mild persistent asthma with exacerbation          Care Instructions      Asthma (Adult)  Asthma is a disease where the medium and  small air passages within the lung go into spasm and restrict the flow of air. Inflammation and swelling of the airways cause further blockage. During an acute asthma attack, these factors cause trouble breathing, wheezing, cough and chest tightness.    An asthma attack can be triggered by many things. Common triggers include infections such as the common cold, bronchitis, and pneumonia. Irritants such as smoke or pollutants in the air, very cold air, emotional upset, and exercise can also trigger an attack. In many adults with asthma, allergies to dust, mold, pollen and animal dander can cause an asthma attack. Skipping doses of daily asthma medicine can also bring on an asthma attack.  Asthma can be controlled using the proper medicines prescribed by your healthcare provider and avoiding exposure to known triggers including allergens and irritants.  Home care    Take prescribed medicine exactly at the times advised. If you need medicine such as from a hand held inhaler or aerosol breathing machine more than every 4 hours, contact your healthcare provider or seek immediate medical attention. If prescribed an antibiotic or prednisone, take all of the medicine as prescribed, even if you are feeling better after a few days.    Do not smoke. Avoid being exposed to the smoke of others.    Some people with asthma have worsening of their symptoms when they take aspirin and non-steroidal or fever-reducing medicines like ibuprofen and naproxen. Talk to  "your healthcare provider if you think this may apply to you.  Follow-up care  Follow up with your healthcare provider, or as advised. Always bring all of your current medicines to any appointments with your healthcare provider. Also bring a complete list of medicines even those not taken for asthma. If you do not already have one, talk to your healthcare provider about developing a personalized \"Asthma Action Plan.\"  A pneumococcal (pneumonia) vaccine and yearly flu shot (every fall) are recommended. Ask your doctor about this.  When to seek medical advice  Call your healthcare provider right away if any of these occur:     Increased wheezing or shortness of breath    Need to use your inhalers more often than usual without relief    Fever of 100.4 F (38 C) or higher, or as directed by your healthcare provider    Coughing up lots of dark-colored or bloody sputum (mucus)    Chest pain with each breath    If you use a peak flow meter as part of an Asthma Action Plan, and you are still in the yellow zone (50% to 80%) 15 minutes after using inhaler medicine.  Call 911  Call 911 if any of the following occur    Trouble walking or talking because of shortness of breath    If you use a peak flow meter as part of an Asthma Action Plan and you are still in the red zone (less than 50%) 15 minutes after using inhaler medicine    Lips or fingernails turning gray or blue  Date Last Reviewed: 5/1/2017 2000-2017 [employer]. [address] [place] [address]. All rights reserved. This information is not intended as a substitute for professional medical care. Always follow your healthcare professional's instructions.        Acute Bacterial Rhinosinusitis (ABRS)    Acute bacterial rhinosinusitis (ABRS) is an infection of your nasal cavity and sinuses. It s caused by bacteria. Acute means that you ve had symptoms for less than 4 weeks, but possibly up to 12 weeks.  Understanding your sinuses  The nasal cavity " is the large air-filled space behind your nose. The sinuses are a group of spaces formed by the bones of your face. They connect with your nasal cavity. ABRS causes the tissue lining these spaces to become inflamed. Mucus may not drain normally. This leads to facial pain and other symptoms.  What causes ABRS?  ABRS most often follows an upper respiratory infection caused by a virus. Bacteria then infect the lining of your nasal cavity and sinuses. But you can also get ABRS if you have:    Nasal allergies    Long-term nasal swelling and congestion not caused by allergies    Blockage in the nose  Symptoms of ABRS  The symptoms of ABRS may be different for each person and include:    Nasal congestion or blockage    Pain or pressure in the face    Thick, colored drainage from the nose  Other symptoms may include:    Runny nose    Fluid draining from the nose down the throat (postnasal drip)    Headache    Cough    Pain    Fever  Diagnosing ABRS  ABRS may be diagnosed if you ve had an upper respiratory infection like a cold and cough for 10 or more days without improvement or with worsening symptoms. Your healthcare provider will ask about your symptoms and your medical history. The provider will check your vital signs, including your temperature. You ll have a physical exam. The healthcare provider will check your ears, nose, and throat. You likely won t need any tests. If ABRS comes back, you may have a culture or other tests.  Treatment for ABRS  Treatment may include:    Antibiotic medicine. This is for symptoms that last for at least 10 to 14 days.    Nasal corticosteroid medicine. Drops or spray used in the nose can lessen swelling and congestion.    Over-the-counter pain medicine. This is to lessen sinus pain and pressure.    Nasal decongestant medicine. Spray or drops may help to lessen congestion. Do not use them for more than a few days.    Salt wash (saline irrigation). This can help to loosen mucus.  Possible  complications of ABRS  ABRS may come back or become long-term (chronic). In rare cases, ABRS may cause complications such as:     Inflamed tissue around the brain and spinal cord (meningitis)    Inflamed tissue around the eyes (orbital cellulitis)    Inflamed bones around the sinuses (osteitis)  These problems may need to be treated in a hospital with intravenous (IV) antibiotic medicine or surgery.  When to call the healthcare provider  Call your healthcare provider if you have any of the following:    Symptoms that don t get better, or get worse    Symptoms that don t get better after 3 to 5 days on antibiotics    Trouble seeing    Swelling around your eyes    Confusion or trouble staying awake   Date Last Reviewed: 5/1/2017 2000-2017 [employer]. [address] [place] [address], [place] [address]. All rights reserved. This information is not intended as a substitute for professional medical care. Always follow your healthcare professional's instructions.      [facility]    If you have any questions regarding to your visit please contact your care team:       Team Purple:   Clinic Hours Telephone Number   Dr. Adina Gupta   7am-7pm  Monday - Thursday   7am-5pm  Fridays  [phone]  (Appointment scheduling available 24/7)    Questions about your Visit?   Team Line:  [phone]   Urgent Care - [place] - 11am-9pm Monday-Friday Saturday-Sunday- 9am-5pm   [place] - 5pm-9pm Monday-Friday Saturday-Sunday- 9am-5pm  [phone] - [place]   [phone] [facility]       What options do I have for visits at the clinic other than the traditional office visit?  To expand how we care for you, many of our providers are utilizing electronic visits (e-visits) and telephone visits, when medically appropriate, for interactions with their patients rather than a visit in the clinic.   We also offer nurse visits  for many medical concerns. Just like any other service, we will bill your insurance company for this type of visit based on time spent on the phone with your provider. Not all insurance companies cover these visits. Please check with your medical insurance if this type of visit is covered. You will be responsible for any charges that are not paid by your insurance.      E-visits via nGamehart:  generally incur a $35.00 fee.  Telephone visits:  Time spent on the phone: *charged based on time that is spent on the phone in increments of 10 minutes. Estimated cost:   5-10 mins $30.00   11-20 mins. $59.00   21-30 mins. $85.00     Use DATY (secure email communication and access to your chart) to send your primary care provider a message or make an appointment. Ask someone on your Team how to sign up for DATY.  For a Price Quote for your services, please call our [employer] Line at [phone].  As always, Thank you for trusting us with your health care needs!    Ania Varela MA            Follow-ups after your visit        Follow-up notes from your care team     Return if symptoms worsen or fail to improve.      Who to contact     If you have questions or need follow up information about today's clinic visit or your schedule please contact [facility] directly at [phone].  Normal or non-critical lab and imaging results will be communicated to you by nGamehart, letter or phone within 4 business days after the clinic has received the results. If you do not hear from us within 7 days, please contact the clinic through nGamehart or phone. If you have a critical or abnormal lab result, we will notify you by phone as soon as possible.  Submit refill requests through DATY or call your pharmacy and they will forward the refill request to us. Please allow 3 business days for your refill to be completed.          Additional Information About Your Visit        DATY Information     DATY gives you  secure access to your electronic health record. If you see a primary care provider, you can also send messages to your care team and make appointments. If you have questions, please call your primary care clinic.  If you do not have a primary care provider, please call [phone] and they will assist you.        Care EveryWhere ID     This is your Care EveryWhere ID. This could be used by other organizations to access your [place] medical records  [id number]        Your Vitals Were     Pulse Temperature Respirations Last Period Pulse Oximetry BMI (Body Mass Index)    82 97  F (36.1  C) (Oral) 16 03/22/2018 96% 34.24 kg/m2       Blood Pressure from Last 3 Encounters:   04/16/18 112/72   04/05/18 106/68   03/27/18 108/66    Weight from Last 3 Encounters:   04/16/18 218 lb 9.6 oz (99.2 kg)   04/05/18 219 lb (99.3 kg)   03/27/18 217 lb (98.4 kg)              Today, you had the following     No orders found for display         Today's Medication Changes          These changes are accurate as of 4/16/18  8:29 AM.  If you have any questions, ask your nurse or doctor.               Start taking these medicines.        Dose/Directions    levofloxacin 500 MG tablet   Commonly known as:  LEVAQUIN   Used for:  Bacterial sinusitis, Mild persistent asthma with exacerbation   Started by:  Johnnie Han MD        Dose:  500 mg   Take 1 tablet (500 mg) by mouth daily   Quantity:  7 tablet   Refills:  0       predniSONE 20 MG tablet   Commonly known as:  DELTASONE   Used for:  Bacterial sinusitis, Mild persistent asthma with exacerbation   Started by:  Johnnie Han MD        Dose:  40 mg   Take 2 tablets (40 mg) by mouth daily for 5 days   Quantity:  10 tablet   Refills:  0            Where to get your medicines      [id number] [place]  [address] AT [facility] & [place]  [address]  [address]     Phone:  [phone]     levofloxacin 500 MG tablet    predniSONE 20 MG tablet                Primary Care Provider Office Phone # Fax #    Vanessa Granados [phone] [address] [facility]  [place] [address]        Equal Access to Services     [facility] CenterBERTHA : Hadii aad ku hadasho Soomaali, waaxda luqadaha, qaybta kaalmada adeegyada, waxay lavonin haykoltonn adedenise marcie mary beth sorenson. So [facility] [phone].    ATENCIÓN: Si habla español, tiene a arenas disposición servicios gratuitos de asistencia lingüística. [facility] [phone].    We comply with applicable federal civil rights laws and [place] laws. We do not discriminate on the basis of race, color, national origin, age, disability, sex, sexual orientation, or gender identity.            Thank you!     Thank you for choosing [facility]  for your care. Our goal is always to provide you with excellent care. Hearing back from our patients is one way we can continue to improve our services. Please take a few minutes to complete the written survey that you may receive in the mail after your visit with us. Thank you!             Your Updated Medication List - Protect others around you: Learn how to safely use, store and throw away your medicines at www.disposemymeds.org.          This list is accurate as of 4/16/18  8:29 AM.  Always use your most recent med list.                   Brand Name Dispense Instructions for use Diagnosis    albuterol 108 (90 Base) MCG/ACT Inhaler    PROAIR HFA/PROVENTIL HFA/VENTOLIN HFA    1 Inhaler    Inhale 2 puffs into the lungs every 6 hours as needed for shortness of breath / dyspnea or wheezing    Mild persistent asthma without complication       busPIRone 15 MG tablet    BUSPAR    180 tablet    Take 1 tablet (15 mg) by mouth 2 times daily    Major depressive disorder, recurrent episode, mild (H)       FLUoxetine 40 MG capsule    PROzac    90 capsule    TAKE 1 CAPSULE BY MOUTH EVERY DAY    Major  depressive disorder, recurrent episode, mild (H), Anxiety       levofloxacin 500 MG tablet    LEVAQUIN    7 tablet    Take 1 tablet (500 mg) by mouth daily    Bacterial sinusitis, Mild persistent asthma with exacerbation       montelukast 10 MG tablet    SINGULAIR    90 tablet    TAKE 1 TABLET(10 MG) BY MOUTH AT BEDTIME    Mild persistent asthma without complication       norgestim-eth estrad triphasic 0.18/0.215/0.25 MG-35 MCG per tablet    TRINESSA (28)    84 tablet    Take 1 tablet by mouth daily    Encounter for birth control pills maintenance       predniSONE 20 MG tablet    DELTASONE    10 tablet    Take 2 tablets (40 mg) by mouth daily for 5 days    Bacterial sinusitis, Mild persistent asthma with exacerbation

## 2018-04-16 NOTE — PATIENT INSTRUCTIONS
"  Asthma (Adult)  Asthma is a disease where the medium and  small air passages within the lung go into spasm and restrict the flow of air. Inflammation and swelling of the airways cause further blockage. During an acute asthma attack, these factors cause trouble breathing, wheezing, cough and chest tightness.    An asthma attack can be triggered by many things. Common triggers include infections such as the common cold, bronchitis, and pneumonia. Irritants such as smoke or pollutants in the air, very cold air, emotional upset, and exercise can also trigger an attack. In many adults with asthma, allergies to dust, mold, pollen and animal dander can cause an asthma attack. Skipping doses of daily asthma medicine can also bring on an asthma attack.  Asthma can be controlled using the proper medicines prescribed by your healthcare provider and avoiding exposure to known triggers including allergens and irritants.  Home care    Take prescribed medicine exactly at the times advised. If you need medicine such as from a hand held inhaler or aerosol breathing machine more than every 4 hours, contact your healthcare provider or seek immediate medical attention. If prescribed an antibiotic or prednisone, take all of the medicine as prescribed, even if you are feeling better after a few days.    Do not smoke. Avoid being exposed to the smoke of others.    Some people with asthma have worsening of their symptoms when they take aspirin and non-steroidal or fever-reducing medicines like ibuprofen and naproxen. Talk to your healthcare provider if you think this may apply to you.  Follow-up care  Follow up with your healthcare provider, or as advised. Always bring all of your current medicines to any appointments with your healthcare provider. Also bring a complete list of medicines even those not taken for asthma. If you do not already have one, talk to your healthcare provider about developing a personalized \"Asthma Action " "Plan.\"  A pneumococcal (pneumonia) vaccine and yearly flu shot (every fall) are recommended. Ask your doctor about this.  When to seek medical advice  Call your healthcare provider right away if any of these occur:     Increased wheezing or shortness of breath    Need to use your inhalers more often than usual without relief    Fever of 100.4 F (38 C) or higher, or as directed by your healthcare provider    Coughing up lots of dark-colored or bloody sputum (mucus)    Chest pain with each breath    If you use a peak flow meter as part of an Asthma Action Plan, and you are still in the yellow zone (50% to 80%) 15 minutes after using inhaler medicine.  Call 911  Call 911 if any of the following occur    Trouble walking or talking because of shortness of breath    If you use a peak flow meter as part of an Asthma Action Plan and you are still in the red zone (less than 50%) 15 minutes after using inhaler medicine    Lips or fingernails turning gray or blue  Date Last Reviewed: 5/1/2017 2000-2017 [employer]. [address] [place] [address]. All rights reserved. This information is not intended as a substitute for professional medical care. Always follow your healthcare professional's instructions.        Acute Bacterial Rhinosinusitis (ABRS)    Acute bacterial rhinosinusitis (ABRS) is an infection of your nasal cavity and sinuses. It s caused by bacteria. Acute means that you ve had symptoms for less than 4 weeks, but possibly up to 12 weeks.  Understanding your sinuses  The nasal cavity is the large air-filled space behind your nose. The sinuses are a group of spaces formed by the bones of your face. They connect with your nasal cavity. ABRS causes the tissue lining these spaces to become inflamed. Mucus may not drain normally. This leads to facial pain and other symptoms.  What causes ABRS?  ABRS most often follows an upper respiratory infection caused by a virus. Bacteria then infect the " lining of your nasal cavity and sinuses. But you can also get ABRS if you have:    Nasal allergies    Long-term nasal swelling and congestion not caused by allergies    Blockage in the nose  Symptoms of ABRS  The symptoms of ABRS may be different for each person and include:    Nasal congestion or blockage    Pain or pressure in the face    Thick, colored drainage from the nose  Other symptoms may include:    Runny nose    Fluid draining from the nose down the throat (postnasal drip)    Headache    Cough    Pain    Fever  Diagnosing ABRS  ABRS may be diagnosed if you ve had an upper respiratory infection like a cold and cough for 10 or more days without improvement or with worsening symptoms. Your healthcare provider will ask about your symptoms and your medical history. The provider will check your vital signs, including your temperature. You ll have a physical exam. The healthcare provider will check your ears, nose, and throat. You likely won t need any tests. If ABRS comes back, you may have a culture or other tests.  Treatment for ABRS  Treatment may include:    Antibiotic medicine. This is for symptoms that last for at least 10 to 14 days.    Nasal corticosteroid medicine. Drops or spray used in the nose can lessen swelling and congestion.    Over-the-counter pain medicine. This is to lessen sinus pain and pressure.    Nasal decongestant medicine. Spray or drops may help to lessen congestion. Do not use them for more than a few days.    Salt wash (saline irrigation). This can help to loosen mucus.  Possible complications of ABRS  ABRS may come back or become long-term (chronic). In rare cases, ABRS may cause complications such as:     Inflamed tissue around the brain and spinal cord (meningitis)    Inflamed tissue around the eyes (orbital cellulitis)    Inflamed bones around the sinuses (osteitis)  These problems may need to be treated in a hospital with intravenous (IV) antibiotic medicine or surgery.  When  to call the healthcare provider  Call your healthcare provider if you have any of the following:    Symptoms that don t get better, or get worse    Symptoms that don t get better after 3 to 5 days on antibiotics    Trouble seeing    Swelling around your eyes    Confusion or trouble staying awake   Date Last Reviewed: 5/1/2017 2000-2017 [employer]. [address] [place] [address]. All rights reserved. This information is not intended as a substitute for professional medical care. Always follow your healthcare professional's instructions.      [facility]    If you have any questions regarding to your visit please contact your care team:       Team Purple:   Clinic Hours Telephone Number   Dr. Adina uGpta   7am-7pm  Monday - Thursday   7am-5pm  Fridays  [phone]  (Appointment scheduling available 24/7)    Questions about your Visit?   Team Line:  [phone]   Urgent Care - [place] and [place] - 11am-9pm Monday-Friday Saturday-Sunday- 9am-5pm   [place] - 5pm-9pm Monday-Friday Saturday-Sunday- 9am-5pm  [phone] - [place]   [phone] - [place]       What options do I have for visits at the clinic other than the traditional office visit?  To expand how we care for you, many of our providers are utilizing electronic visits (e-visits) and telephone visits, when medically appropriate, for interactions with their patients rather than a visit in the clinic.   We also offer nurse visits for many medical concerns. Just like any other service, we will bill your insurance company for this type of visit based on time spent on the phone with your provider. Not all insurance companies cover these visits. Please check with your medical insurance if this type of visit is covered. You will be responsible for any charges that are not paid by your insurance.      E-visits via [employer]:  generally  incur a $35.00 fee.  Telephone visits:  Time spent on the phone: *charged based on time that is spent on the phone in increments of 10 minutes. Estimated cost:   5-10 mins $30.00   11-20 mins. $59.00   21-30 mins. $85.00     Use WebActionhart (secure email communication and access to your chart) to send your primary care provider a message or make an appointment. Ask someone on your Team how to sign up for [email or web address].  For a Price Quote for your services, please call our [employer] Line at [phone].  As always, Thank you for trusting us with your health care needs!    Ania Varela MA

## 2018-04-16 NOTE — PROGRESS NOTES
SUBJECTIVE:   Karrie Zhang is a 33 year old female who presents to clinic today for the following health issues:    ENT Symptoms             Symptoms: cc Present Absent Comment   Fever/Chills   x    Fatigue  x     Muscle Aches   x    Eye Irritation   x    Sneezing   x    Nasal Cornel/Drg  x     Sinus Pressure/Pain  x     Loss of smell   x    Dental pain   x    Sore Throat   x    Swollen Glands   x    Ear Pain/Fullness  x     Cough  x     Wheeze  x     Chest Pain  x     Shortness of breath  x     Rash       Other         Symptom duration:     Symptom severity:  moderate   Treatments tried:  saline spray and asprin   Contacts:  none     Patient presents for URI symptoms for the past 5 days or so.  Symptoms started as a sore throat, then developed productive cough, some wheezing, and headache/pressure over right eye.  No nasal drainage.  Has been afebrile, just picked up an inhaler refill today.  Patient has history of mild persistent asthma with very rare exacerbations.    Problem list and histories reviewed & adjusted, as indicated.  Additional history: as documented    BP Readings from Last 3 Encounters:   04/16/18 112/72   04/05/18 106/68   03/27/18 108/66    Wt Readings from Last 3 Encounters:   04/16/18 218 lb 9.6 oz (99.2 kg)   04/05/18 219 lb (99.3 kg)   03/27/18 217 lb (98.4 kg)        Reviewed and updated as needed this visit by clinical staff  Tobacco  Allergies  Meds  Problems       Reviewed and updated as needed this visit by Provider  Allergies  Meds  Problems         ROS:  Constitutional, HEENT, cardiovascular, pulmonary, gi and gu systems are negative, except as otherwise noted.    OBJECTIVE:     /72  Pulse 82  Temp 97  F (36.1  C) (Oral)  Resp 16  Wt 218 lb 9.6 oz (99.2 kg)  LMP 03/22/2018  SpO2 96%  BMI 34.24 kg/m2  Body mass index is 34.24 kg/(m^2).  GENERAL: healthy, alert and no distress  EYES: Eyes grossly normal to inspection, PERRL and conjunctivae and sclerae  normal  HENT: ear canals and TM's normal, nose and mouth without ulcers or lesions  NECK: no adenopathy, no asymmetry, masses, or scars and thyroid normal to palpation  RESP: Scattered wheeze, no crackles  CV: regular rate and rhythm, normal S1 S2, no S3 or S4, no murmur, click or rub, no peripheral edema and peripheral pulses strong  SKIN: no suspicious lesions or rashes  PSYCH: mentation appears normal, affect normal/bright    ASSESSMENT/PLAN:     1. Bacterial sinusitis  Will given levaquin as patient is allergic to penicillins and has suffered side effects to azithromycin in the past  - predniSONE (DELTASONE) 20 MG tablet; Take 2 tablets (40 mg) by mouth daily for 5 days  Dispense: 10 tablet; Refill: 0  - levofloxacin (LEVAQUIN) 500 MG tablet; Take 1 tablet (500 mg) by mouth daily  Dispense: 7 tablet; Refill: 0    2. Mild persistent asthma with exacerbation  Recommend she use her inhaler every 4-6 hours for the next 48 hours regardless of symptoms, prednisone as well.  Warning signs discussed.  - predniSONE (DELTASONE) 20 MG tablet; Take 2 tablets (40 mg) by mouth daily for 5 days  Dispense: 10 tablet; Refill: 0  - levofloxacin (LEVAQUIN) 500 MG tablet; Take 1 tablet (500 mg) by mouth daily  Dispense: 7 tablet; Refill: 0    Follow up if symptoms worsen or fail to improve.     Johnnie Gupta MD  [facility]

## 2018-04-17 ENCOUNTER — TELEPHONE (OUTPATIENT)
Dept: FAMILY MEDICINE | Facility: CLINIC | Age: 33
End: 2018-04-17

## 2018-04-17 NOTE — TELEPHONE ENCOUNTER
Patient was seen yesterday for sinusitis, and given prescription for prednisone and levaquin.  Patient wondering how long until she feels better.  Also see Raise message patient sent.   Thanks  Yola Quiroz RN

## 2018-04-17 NOTE — TELEPHONE ENCOUNTER
Reason for Call:  Other call back    Detailed comments:  Patient calling. She was seen yesterday. She was given prednisone and an antibiotic. How soon can she expect to start feeling better? Please call and let know.     Phone Number Patient can be reached at: Home number on file [phone] (home)    Best Time:  any    Can we leave a detailed message on this number? YES    Call taken on 4/17/2018 at 12:35 PM by Mary Omer

## 2018-07-30 DIAGNOSIS — J45.30 MILD PERSISTENT ASTHMA WITHOUT COMPLICATION: ICD-10-CM

## 2018-07-30 RX ORDER — MONTELUKAST SODIUM 10 MG/1
TABLET ORAL
Qty: 90 TABLET | Refills: 2 | Status: SHIPPED | OUTPATIENT
Start: 2018-07-30 | End: 2019-05-11

## 2018-07-30 NOTE — TELEPHONE ENCOUNTER
Signed Prescriptions:                        Disp   Refills    montelukast (SINGULAIR) 10 MG tablet       90 tab*2        Sig: TAKE 1 TABLET BY MOUTH AT BEDTIME  Authorizing Provider: SARAH ROCA  Ordering User: LIZ WHITE RN refilled medication per [facility] Refill Protocol.     Liz White RN

## 2018-08-21 NOTE — PATIENT INSTRUCTIONS
[facility]    If you have any questions regarding to your visit please contact your care team:       Team Red:   Clinic Hours Telephone Number   Dr. Vanessa Acuna, NP   7am-7pm  Monday - Thursday   7am-5pm  Fridays  [phone]  (Appointment scheduling available 24/7)    Questions about your recent visit?   Team Line  [phone]   Ur[facility] [place] [facility] - 11am-9pm Monday-Friday Saturday-Sunday- 9am-5pm   [place] - 5pm-9pm Monday-Friday Saturday-Sunday- 9am-5pm  [phone] - [place]  [phone] - [place]       What options do I have for a visit other than an office visit? We offer electronic visits (e-visits) and telephone visits, when medically appropriate.  Please check with your medical insurance to see if these types of visits are covered, as you will be responsible for any charges that are not paid by your insurance.      You can use [employer] (secure electronic communication) to access to your chart, send your primary care provider a message, or make an appointment. Ask a team member how to get started.     For a price quote for your services, please call our Consumer Price Line at [phone] or our Imaging Cost estimation line at [phone] (for imaging tests).        Preventive Health Recommendations  Female Ages 26 - 39  Yearly exam:   See your health care provider every year in order to    Review health changes.     Discuss preventive care.      Review your medicines if you your doctor has prescribed any.    Until age 30: Get a Pap test every three years (more often if you have had an abnormal result).    After age 30: Talk to your doctor about whether you should have a Pap test every 3 years or have a Pap test with HPV screening every 5 years.   You do not need a Pap test if your uterus was removed (hysterectomy) and you have not had cancer.  You should be tested each year for STDs (sexually  transmitted diseases), if you're at risk.   Talk to your provider about how often to have your cholesterol checked.  If you are at risk for diabetes, you should have a diabetes test (fasting glucose).  Shots: Get a flu shot each year. Get a tetanus shot every 10 years.   Nutrition:     Eat at least 5 servings of fruits and vegetables each day.    Eat whole-grain bread, whole-wheat pasta and brown rice instead of white grains and rice.    Get adequate Calcium and Vitamin D.     Lifestyle    Exercise at least 150 minutes a week (30 minutes a day, 5 days of the week). This will help you control your weight and prevent disease.    Limit alcohol to one drink per day.    No smoking.     Wear sunscreen to prevent skin cancer.    See your dentist every six months for an exam and cleaning.        CHEMICAL DEPENDENCY PROGRAMS:  [facility] ([place])     [phone]  [facility] ([place])     [phone]  [place] Alcohol and Drug treatment programs: [phone]  [facility]     [phone]  [facility] ([place])     [phone]  [facility] ([place]) Outpatient Program     [phone]  [facility] ([place])     [phone]  [facility] ([place])     [phone]  [facility])     [phone]  [facility] ([place])     [phone]  [facility] ([place])     [phone]  [facility] ([place])     [phone]  [facility] Extended Care [facility] and Primary ([place])     [phone]  [facility] ([place])     [phone]  [facility]    [phone]  [facility] ([place])     [phone]  [phone]  [place] [facility]: ([place]) [phone]  Alcoholics Anonymous: [facility]     [phone]  Discharged by Joan KANG CMA ([facility])

## 2018-08-22 ENCOUNTER — OFFICE VISIT (OUTPATIENT)
Dept: FAMILY MEDICINE | Facility: CLINIC | Age: 33
End: 2018-08-22
Payer: COMMERCIAL

## 2018-08-22 ENCOUNTER — RADIANT APPOINTMENT (OUTPATIENT)
Dept: GENERAL RADIOLOGY | Facility: CLINIC | Age: 33
End: 2018-08-22
Attending: FAMILY MEDICINE
Payer: COMMERCIAL

## 2018-08-22 VITALS
SYSTOLIC BLOOD PRESSURE: 100 MMHG | WEIGHT: 213 LBS | BODY MASS INDEX: 33.43 KG/M2 | OXYGEN SATURATION: 97 % | HEART RATE: 85 BPM | RESPIRATION RATE: 12 BRPM | DIASTOLIC BLOOD PRESSURE: 70 MMHG | HEIGHT: 67 IN | TEMPERATURE: 97.8 F

## 2018-08-22 DIAGNOSIS — F41.9 ANXIETY: ICD-10-CM

## 2018-08-22 DIAGNOSIS — G89.29 CHRONIC BILATERAL LOW BACK PAIN WITHOUT SCIATICA: ICD-10-CM

## 2018-08-22 DIAGNOSIS — F33.0 MAJOR DEPRESSIVE DISORDER, RECURRENT EPISODE, MILD (H): ICD-10-CM

## 2018-08-22 DIAGNOSIS — Z30.41 ENCOUNTER FOR BIRTH CONTROL PILLS MAINTENANCE: ICD-10-CM

## 2018-08-22 DIAGNOSIS — Z00.00 ROUTINE HISTORY AND PHYSICAL EXAMINATION OF ADULT: Primary | ICD-10-CM

## 2018-08-22 DIAGNOSIS — R06.83 SNORING: ICD-10-CM

## 2018-08-22 DIAGNOSIS — J45.30 MILD PERSISTENT ASTHMA WITHOUT COMPLICATION: ICD-10-CM

## 2018-08-22 DIAGNOSIS — M54.50 CHRONIC BILATERAL LOW BACK PAIN WITHOUT SCIATICA: ICD-10-CM

## 2018-08-22 DIAGNOSIS — F10.10 ALCOHOL ABUSE: ICD-10-CM

## 2018-08-22 LAB
ALBUMIN UR-MCNC: NEGATIVE MG/DL
APPEARANCE UR: CLEAR
BILIRUB UR QL STRIP: NEGATIVE
COLOR UR AUTO: YELLOW
GLUCOSE UR STRIP-MCNC: NEGATIVE MG/DL
HGB UR QL STRIP: NEGATIVE
KETONES UR STRIP-MCNC: NEGATIVE MG/DL
LEUKOCYTE ESTERASE UR QL STRIP: NEGATIVE
NITRATE UR QL: NEGATIVE
PH UR STRIP: 6 PH (ref 5–7)
SOURCE: NORMAL
SP GR UR STRIP: 1.02 (ref 1–1.03)
UROBILINOGEN UR STRIP-ACNC: 0.2 EU/DL (ref 0.2–1)

## 2018-08-22 PROCEDURE — G0145 SCR C/V CYTO,THINLAYER,RESCR: HCPCS | Performed by: FAMILY MEDICINE

## 2018-08-22 PROCEDURE — 72100 X-RAY EXAM L-S SPINE 2/3 VWS: CPT

## 2018-08-22 PROCEDURE — 87624 HPV HI-RISK TYP POOLED RSLT: CPT | Performed by: FAMILY MEDICINE

## 2018-08-22 PROCEDURE — 99213 OFFICE O/P EST LOW 20 MIN: CPT | Mod: 25 | Performed by: FAMILY MEDICINE

## 2018-08-22 PROCEDURE — 81003 URINALYSIS AUTO W/O SCOPE: CPT | Performed by: FAMILY MEDICINE

## 2018-08-22 PROCEDURE — 99395 PREV VISIT EST AGE 18-39: CPT | Performed by: FAMILY MEDICINE

## 2018-08-22 RX ORDER — FLUOXETINE 40 MG/1
CAPSULE ORAL
Qty: 90 CAPSULE | Refills: 3 | Status: SHIPPED | OUTPATIENT
Start: 2018-08-22 | End: 2019-09-30

## 2018-08-22 RX ORDER — BUSPIRONE HYDROCHLORIDE 15 MG/1
15 TABLET ORAL 2 TIMES DAILY
Qty: 180 TABLET | Refills: 3 | Status: SHIPPED | OUTPATIENT
Start: 2018-08-22 | End: 2019-08-24

## 2018-08-22 RX ORDER — NORGESTIMATE AND ETHINYL ESTRADIOL 7DAYSX3 28
1 KIT ORAL DAILY
Qty: 84 TABLET | Refills: 4 | Status: SHIPPED | OUTPATIENT
Start: 2018-08-22 | End: 2018-11-25

## 2018-08-22 ASSESSMENT — ENCOUNTER SYMPTOMS
PALPITATIONS: 0
WEAKNESS: 0
DYSURIA: 0
PARESTHESIAS: 0
MYALGIAS: 1
COUGH: 0
FEVER: 0
ABDOMINAL PAIN: 0
HEMATURIA: 0
HEADACHES: 1
ARTHRALGIAS: 0
SORE THROAT: 0
DIARRHEA: 0
HEARTBURN: 0
JOINT SWELLING: 0
SHORTNESS OF BREATH: 0
HEMATOCHEZIA: 1
NERVOUS/ANXIOUS: 1
NAUSEA: 0
EYE PAIN: 0
CONSTIPATION: 0
CHILLS: 0
FREQUENCY: 0
BREAST MASS: 0
DIZZINESS: 0

## 2018-08-22 NOTE — LETTER
My Depression Action Plan  Name: Karrie Zhang   Date of Birth 1985  Date: 8/22/2018    My doctor: Vanessa Granados   My clinic: [address]  [place] [address] [phone]          GREEN    ZONE   Good Control    What it looks like:     Things are going generally well. You have normal up s and down s. You may even feel depressed from time to time, but bad moods usually last less than a day.   What you need to do:  1. Continue to care for yourself (see self care plan)  2. Check your depression survival kit and update it as needed  3. Follow your physician s recommendations including any medication.  4. Do not stop taking medication unless you consult with your physician first.           YELLOW         ZONE Getting Worse    What it looks like:     Depression is starting to interfere with your life.     It may be hard to get out of bed; you may be starting to isolate yourself from others.    Symptoms of depression are starting to last most all day and this has happened for several days.     You may have suicidal thoughts but they are not constant.   What you need to do:     1. Call your care team, your response to treatment will improve if you keep your care team informed of your progress. Yellow periods are signs an adjustment may need to be made.     2. Continue your self-care, even if you have to fake it!    3. Talk to someone in your support network    4. Open up your depression survival kit           RED    ZONE Medical Alert - Get Help    What it looks like:     Depression is seriously interfering with your life.     You may experience these or other symptoms: You can t get out of bed most days, can t work or engage in other necessary activities, you have trouble taking care of basic hygiene, or basic responsibilities, thoughts of suicide or death that will not go away, self-injurious behavior.     What you need to do:  1. Call your care team and request  a same-day appointment. If they are not available (weekends or after hours) call your local crisis line, emergency room or 911.            Depression Self Care Plan / Survival Kit    Self-Care for Depression  Here s the deal. Your body and mind are really not as separate as most people think.  What you do and think affects how you feel and how you feel influences what you do and think. This means if you do things that people who feel good do, it will help you feel better.  Sometimes this is all it takes.  There is also a place for medication and therapy depending on how severe your depression is, so be sure to consult with your medical provider and/ or Behavioral Health Consultant if your symptoms are worsening or not improving.     In order to better manage my stress, I will:    Exercise  Get some form of exercise, every day. This will help reduce pain and release endorphins, the  feel good  chemicals in your brain. This is almost as good as taking antidepressants!  This is not the same as joining a gym and then never going! (they count on that by the way ) It can be as simple as just going for a walk or doing some gardening, anything that will get you moving.      Hygiene   Maintain good hygiene (Get out of bed in the morning, Make your bed, Brush your teeth, Take a shower, and Get dressed like you were going to work, even if you are unemployed).  If your clothes don't fit try to get ones that do.    Diet  I will strive to eat foods that are good for me, drink plenty of water, and avoid excessive sugar, caffeine, alcohol, and other mood-altering substances.  Some foods that are helpful in depression are: complex carbohydrates, B vitamins, flaxseed, fish or fish oil, fresh fruits and vegetables.    Psychotherapy  I agree to participate in Individual Therapy (if recommended).    Medication  If prescribed medications, I agree to take them.  Missing doses can result in serious side effects.  I understand that drinking  alcohol, or other illicit drug use, may cause potential side effects.  I will not stop my medication abruptly without first discussing it with my provider.    Staying Connected With Others  I will stay in touch with my friends, family members, and my primary care provider/team.    Use your imagination  Be creative.  We all have a creative side; it doesn t matter if it s oil painting, sand castles, or mud pies! This will also kick up the endorphins.    Witness Beauty  (AKA stop and smell the roses) Take a look outside, even in mid-winter. Notice colors, textures. Watch the squirrels and birds.     Service to others  Be of service to others.  There is always someone else in need.  By helping others we can  get out of ourselves  and remember the really important things.  This also provides opportunities for practicing all the other parts of the program.    Humor  Laugh and be silly!  Adjust your TV habits for less news and crime-drama and more comedy.    Control your stress  Try breathing deep, massage therapy, biofeedback, and meditation. Find time to relax each day.     My support system    Clinic Contact:  Phone number:    Contact 1:  Phone number:    Contact 2:  Phone number:    Synagogue/:  Phone number:    Therapist:  Phone number:    Local crisis center:    Phone number:    Other community support:  Phone number:

## 2018-08-22 NOTE — LETTER
My Asthma Action Plan  Name: Karrie Zhang   YOB: 1985  Date: 8/22/2018   My doctor: Vanessa Granados MD   My clinic: [facility]        My Control Medicine: None  My Rescue Medicine: Albuterol (Proair/Ventolin/Proventil) inhaler     My Asthma Severity: intermittent  Avoid your asthma triggers: upper respiratory infections               GREEN ZONE   Good Control    I feel good    No cough or wheeze    Can work, sleep and play without asthma symptoms       Take your asthma control medicine every day.     1. If exercise triggers your asthma, take your rescue medication    15 minutes before exercise or sports, and    During exercise if you have asthma symptoms  2. Spacer to use with inhaler: If you have a spacer, make sure to use it with your inhaler             YELLOW ZONE Getting Worse  I have ANY of these:    I do not feel good    Cough or wheeze    Chest feels tight    Wake up at night   1. Keep taking your Green Zone medications  2. Start taking your rescue medicine:    every 20 minutes for up to 1 hour. Then every 4 hours for 24-48 hours.  3. If you stay in the Yellow Zone for more than 12-24 hours, contact your doctor.  4. If you do not return to the Green Zone in 12-24 hours or you get worse, start taking your oral steroid medicine if prescribed by your provider.           RED ZONE Medical Alert - Get Help  I have ANY of these:    I feel awful    Medicine is not helping    Breathing getting harder    Trouble walking or talking    Nose opens wide to breathe       1. Take your rescue medicine NOW  2. If your provider has prescribed an oral steroid medicine, start taking it NOW  3. Call your doctor NOW  4. If you are still in the Red Zone after 20 minutes and you have not reached your doctor:    Take your rescue medicine again and    Call 911 or go to the emergency room right away    See your regular doctor within 2 weeks of an Emergency Room or Urgent Care visit for follow-up  treatment.          Annual Reminders:  Meet with Asthma Educator,  Flu Shot in the Fall, consider Pneumonia Vaccination for patients with asthma (aged 19 and older).    Pharmacy[facility]    [id number]  [place], [place] - [address] AT On license of [id number] - [place], [place] - [address] AT [facility] [address]                      Asthma Triggers  How To Control Things That Make Your Asthma Worse    Triggers are things that make your asthma worse.  Look at the list below to help you find your triggers and what you can do about them.  You can help prevent asthma flare-ups by staying away from your triggers.      Trigger                                                          What you can do   Cigarette Smoke  Tobacco smoke can make asthma worse. Do not allow smoking in your home, car or around you.  Be sure no one smokes at a child s day care or school.  If you smoke, ask your health care provider for ways to help you quit.  Ask family members to quit too.  Ask your health care provider for a referral to Quit Plan to help you quit smoking, or call 6-919-177-PLAN.     Colds, Flu, Bronchitis  These are common triggers of asthma. Wash your hands often.  Don t touch your eyes, nose or mouth.  Get a flu shot every year.     Dust Mites  These are tiny bugs that live in cloth or carpet. They are too small to see. Wash sheets and blankets in hot water every week.   Encase pillows and mattress in dust mite proof covers.  Avoid having carpet if you can. If you have carpet, vacuum weekly.   Use a dust mask and HEPA vacuum.   Pollen and Outdoor Mold  Some people are allergic to trees, grass, or weed pollen, or molds. Try to keep your windows closed.  Limit time out doors when pollen count is high.   Ask you health care provider about taking medicine during allergy season.     Animal Dander  Some people are allergic to skin flakes, urine or saliva from pets with fur or feathers.  Keep pets with fur or feathers out of your home.    If you can t keep the pet outdoors, then keep the pet out of your bedroom.  Keep the bedroom door closed.  Keep pets off cloth furniture and away from stuffed toys.     Mice, Rats, and Cockroaches  Some people are allergic to the waste from these pests.   Cover food and garbage.  Clean up spills and food crumbs.  Store grease in the refrigerator.   Keep food out of the bedroom.   Indoor Mold  This can be a trigger if your home has high moisture. Fix leaking faucets, pipes, or other sources of water.   Clean moldy surfaces.  Dehumidify basement if it is damp and smelly.   Smoke, Strong Odors, and Sprays  These can reduce air quality. Stay away from strong odors and sprays, such as perfume, powder, hair spray, paints, smoke incense, paint, cleaning products, candles and new carpet.   Exercise or Sports  Some people with asthma have this trigger. Be active!  Ask your doctor about taking medicine before sports or exercise to prevent symptoms.    Warm up for 5-10 minutes before and after sports or exercise.     Other Triggers of Asthma  Cold air:  Cover your nose and mouth with a scarf.  Sometimes laughing or crying can be a trigger.  Some medicines and food can trigger asthma.

## 2018-08-22 NOTE — PROGRESS NOTES
SUBJECTIVE:   CC: Karrie Zhang is an 33 year old woman obese  with alcohol abuse and anxiety who presents for preventive health visit.     She complains of low back pain for months, positional with bending or lifting, without radiation down the legs. Precipitating factors: none recalled by the patient. Prior history of back problems: no prior back problems. There is no numbness or weakness in the legs. Patient also has asthma, mild persistent.    Patient has depression, recurrent, with no medication side effects and mild anhedonia or low mood, without suicidal ideation.  Patient has had asthma for years.  Occasional wheezing and shortness of breath are triggered by colds and relieved by albuterol. She complains of fatigue and snoring.     Physical   Annual:     Getting at least 3 servings of Calcium per day:  NO    Bi-annual eye exam:  NO    Dental care twice a year:  Yes    Sleep apnea or symptoms of sleep apnea:  Daytime drowsiness    Diet:  Regular (no restrictions)    Frequency of exercise:  None    Taking medications regularly:  Yes    Medication side effects:  None    Additional concerns today:  No        Lower back pain    Today's PHQ-2 Score:   PHQ-2 ( 1999 Pfizer) 2018   Q1: Little interest or pleasure in doing things 1   Q2: Feeling down, depressed or hopeless 1   PHQ-2 Score 2   Q1: Little interest or pleasure in doing things Several days   Q2: Feeling down, depressed or hopeless Several days   PHQ-2 Score 2       Abuse: Current or Past(Physical, Sexual or Emotional)- No  Do you feel safe in your environment - Yes    Social History   Substance Use Topics     Smoking status: Never Smoker     Smokeless tobacco: Never Used     Alcohol use 21.0 oz/week     14 Standard drinks or equivalent, 21 Shots of liquor per week     Alcohol Use 2018   If you drink alcohol do you typically have greater than 3 drinks per day OR greater than 7 drinks per week? Yes   AUDIT SCORE  17       Reviewed  orders with patient.  Reviewed health maintenance and updated orders accordingly - Yes  Patient Active Problem List   Diagnosis     Perioral dermatitis     Mild persistent asthma without complication     Anxiety     Major depressive disorder, recurrent episode, mild (H)     Multiple environmental allergies     Obesity (BMI 30-39.9)     Cervical high risk HPV (human papillomavirus) test positive     Rosacea, acne     Alcohol abuse     Past Surgical History:   Procedure Laterality Date     LASER TX, CERVICAL  2005       Social History   Substance Use Topics     Smoking status: Never Smoker     Smokeless tobacco: Never Used     Alcohol use 21.0 oz/week     14 Standard drinks or equivalent, 21 Shots of liquor per week     Family History   Problem Relation Age of Onset     Osteoperosis Mother      Diabetes Father      Other Cancer Father      Pancreatic     Substance Abuse Father      alcoholic: stopped when I was in grade school     Prostate Cancer Maternal Grandfather      Prostate Cancer Other      Depression Brother      Coronary Artery Disease No family hx of          Current Outpatient Prescriptions   Medication Sig Dispense Refill     albuterol (PROAIR HFA/PROVENTIL HFA/VENTOLIN HFA) 108 (90 Base) MCG/ACT Inhaler Inhale 2 puffs into the lungs every 6 hours as needed for shortness of breath / dyspnea or wheezing 1 Inhaler 5     busPIRone (BUSPAR) 15 MG tablet Take 1 tablet (15 mg) by mouth 2 times daily 180 tablet 3     FLUoxetine (PROZAC) 40 MG capsule TAKE 1 CAPSULE BY MOUTH EVERY DAY 90 capsule 3     montelukast (SINGULAIR) 10 MG tablet TAKE 1 TABLET BY MOUTH AT BEDTIME 90 tablet 2     norgestim-eth estrad triphasic (TRINESSA, 28,) 0.18/0.215/0.25 MG-35 MCG per tablet Take 1 tablet by mouth daily 84 tablet 4     Allergies   Allergen Reactions     Penicillins Rash       Mammogram not appropriate for this patient based on age.    Pertinent mammograms are reviewed under the imaging tab.  History of abnormal Pap  smear:   YES - ELBA 2/3 on biopsy - PAP/HPV co-testing at 12, 24 months.  If two negative results repeat co-testing in 3 years, if negative then routine screening.  Last 3 Pap and HPV Results:   PAP / HPV Latest Ref Rng & Units 7/5/2017 6/29/2016   PAP - LSIL(A) NIL   HPV 16 DNA NEG Negative Negative   HPV 18 DNA NEG Negative Negative   OTHER HR HPV NEG Positive(A) Positive(A)     PAP / HPV Latest Ref Rng & Units 7/5/2017 6/29/2016   PAP - LSIL(A) NIL   HPV 16 DNA NEG Negative Negative   HPV 18 DNA NEG Negative Negative   OTHER HR HPV NEG Positive(A) Positive(A)     Reviewed and updated as needed this visit by clinical staff  Tobacco  Allergies  Meds  Problems  Med Hx  Surg Hx  Fam Hx  Soc Hx          Reviewed and updated as needed this visit by Provider  Allergies  Meds  Problems  Surg Hx  Fam Hx        Past Medical History:   Diagnosis Date     Alcohol abuse      Allergic rhinitis      Alopecia      Cervical dysplasia      Cervical high risk HPV (human papillomavirus) test positive 6/29/16, 7/5/17    neg 16/18     Depressive disorder      H/O colposcopy with cervical biopsy 08/08/2017 08/08/17: [place] [facility] and [facility] normal.      Papanicolaou smear of cervix with low grade squamous intraepithelial lesion (LGSIL) 07/05/2017     Rosacea, acne      Uncomplicated asthma         Review of Systems   Constitutional: Negative for chills and fever.   HENT: Negative for congestion, ear pain, hearing loss and sore throat.    Eyes: Positive for visual disturbance. Negative for pain.   Respiratory: Negative for cough and shortness of breath.    Cardiovascular: Negative for chest pain, palpitations and peripheral edema.   Gastrointestinal: Positive for hematochezia. Negative for abdominal pain, constipation, diarrhea, heartburn and nausea.   Breasts:  Negative for tenderness, breast mass and discharge.   Genitourinary: Negative for dysuria, frequency, genital sores, hematuria, pelvic pain, urgency, vaginal bleeding and  "vaginal discharge.   Musculoskeletal: Positive for myalgias. Negative for arthralgias and joint swelling.   Skin: Negative for rash.   Neurological: Positive for headaches. Negative for dizziness, weakness and paresthesias.   Psychiatric/Behavioral: Negative for mood changes. The patient is nervous/anxious.      OBJECTIVE:   /70  Pulse 85  Temp 97.8  F (36.6  C) (Oral)  Resp 12  Ht 5' 7\" (1.702 m)  Wt 213 lb (96.6 kg)  LMP 08/06/2018  SpO2 97%  Breastfeeding? No  BMI 33.36 kg/m2  Physical Exam  GENERAL: alert, no distress and obese  EYES: Eyes grossly normal to inspection, PERRL and conjunctivae and sclerae normal  HENT: ear canals and TM's normal, nose and mouth without ulcers or lesions  NECK: no adenopathy, no asymmetry, masses, or scars and thyroid normal to palpation  RESP: lungs clear to auscultation - no rales, rhonchi or wheezes  BREAST: normal without masses, tenderness or nipple discharge and no palpable axillary masses or adenopathy  CV: regular rate and rhythm, normal S1 S2, no S3 or S4, no murmur, click or rub, no peripheral edema and peripheral pulses strong  ABDOMEN: soft, nontender, no hepatosplenomegaly, no masses and bowel sounds normal   (female): normal female external genitalia, normal urethral meatus, vaginal mucosa pink, moist, well rugated, and normal cervix/adnexa/uterus without masses or discharge  MS: no gross musculoskeletal defects noted, no edema  SKIN: no suspicious lesions or rashes  NEURO: Normal strength and tone, mentation intact and speech normal  PSYCH: mentation appears normal, affect flat and fatigued    Diagnostic Test Results:  Results for orders placed or performed in visit on 08/22/18   UA reflex to Microscopic and Culture   Result Value Ref Range    Color Urine Yellow     Appearance Urine Clear     Glucose Urine Negative NEG^Negative mg/dL    Bilirubin Urine Negative NEG^Negative    Ketones Urine Negative NEG^Negative mg/dL    Specific Gravity Urine 1.020 " 1.003 - 1.035    Blood Urine Negative NEG^Negative    pH Urine 6.0 5.0 - 7.0 pH    Protein Albumin Urine Negative NEG^Negative mg/dL    Urobilinogen Urine 0.2 0.2 - 1.0 EU/dL    Nitrite Urine Negative NEG^Negative    Leukocyte Esterase Urine Negative NEG^Negative    Source Midstream Urine        ASSESSMENT/PLAN:   (Z00.00) Routine history and physical examination of adult  (primary encounter diagnosis)  Plan: Pap imaged thin layer screen with HPV -         recommended age 30 - 65 years (select HPV order        below), HPV High Risk Types DNA Cervical          (F33.0) Major depressive disorder, recurrent episode, mild (H)  Plan: FLUoxetine (PROZAC) 40 MG capsule, busPIRone         (BUSPAR) 15 MG tablet          (F41.9) Anxiety  Plan: FLUoxetine (PROZAC) 40 MG capsule          (F10.10) Alcohol abuse  Plan: OFFICE/OUTPT VISIT,EST,LEVL III        Urged outpatient treatment     (J45.30) Mild persistent asthma without complication  Plan: continue medications     (R06.83) Snoring  Plan: SLEEP EVALUATION & MANAGEMENT REFERRAL - [facility]          [phone] (Age 15 and up), OFFICE/OUTPT         VISIT,EST,LEVL III          (M54.5,  G89.29) Chronic bilateral low back pain without sciatica  Plan: UA reflex to Microscopic and Culture, XR Lumbar        Spine 2/3 Views, OFFICE/OUTPT VISIT,EST,LEVL         III        Over the counter pain medication as needed, ice or heat as she finds helpful, avoidance of aggravating activities, and return for persistence for consideration of further imaging or referral.     (Z30.41) Encounter for birth control pills maintenance  Plan: norgestim-eth estrad triphasic (TRINESSA, 28,)         0.18/0.215/0.25 MG-35 MCG per tablet            COUNSELING:  Reviewed preventive health counseling, as reflected in patient instructions  Special attention given to:        Regular exercise       Healthy diet/nutrition       Contraception       Osteoporosis  "Prevention/Bone Health       HIV screeninx in teen years, 1x in adult years, and at intervals if high risk       The ASCVD Risk score (Laie MIKAELA Jr, et al., 2013) failed to calculate for the following reasons:    The 2013 ASCVD risk score is only valid for ages 40 to 79    BP Readings from Last 1 Encounters:   18 100/70     Estimated body mass index is 33.36 kg/(m^2) as calculated from the following:    Height as of this encounter: 5' 7\" (1.702 m).    Weight as of this encounter: 213 lb (96.6 kg).      Weight management plan: Discussed healthy diet and exercise guidelines and patient will follow up in 12 months in clinic to re-evaluate.     reports that she has never smoked. She has never used smokeless tobacco.      Counseling Resources:  ATP IV Guidelines  Pooled Cohorts Equation Calculator  Breast Cancer Risk Calculator  FRAX Risk Assessment  ICSI Preventive Guidelines  Dietary Guidelines for Americans,   USDA's MyPlate  ASA Prophylaxis  Lung CA Screening    Vanessa Granados MD  [facility]  "

## 2018-08-24 LAB
COPATH REPORT: NORMAL
PAP: NORMAL

## 2018-08-24 ASSESSMENT — ASTHMA QUESTIONNAIRES: ACT_TOTALSCORE: 25

## 2018-08-24 ASSESSMENT — PATIENT HEALTH QUESTIONNAIRE - PHQ9: SUM OF ALL RESPONSES TO PHQ QUESTIONS 1-9: 8

## 2018-08-28 LAB
FINAL DIAGNOSIS: NORMAL
HPV HR 12 DNA CVX QL NAA+PROBE: NEGATIVE
HPV16 DNA SPEC QL NAA+PROBE: NEGATIVE
HPV18 DNA SPEC QL NAA+PROBE: NEGATIVE
SPECIMEN DESCRIPTION: NORMAL
SPECIMEN SOURCE CVX/VAG CYTO: NORMAL

## 2018-11-25 DIAGNOSIS — Z30.41 ENCOUNTER FOR BIRTH CONTROL PILLS MAINTENANCE: ICD-10-CM

## 2018-11-26 RX ORDER — NORGESTIMATE-ETHINYL ESTRADIOL 7DAYSX3 28
TABLET ORAL
Qty: 84 TABLET | Refills: 1 | Status: SHIPPED | OUTPATIENT
Start: 2018-11-26 | End: 2019-05-10

## 2018-11-29 ENCOUNTER — TELEPHONE (OUTPATIENT)
Dept: FAMILY MEDICINE | Facility: CLINIC | Age: 33
End: 2018-11-29

## 2018-11-29 NOTE — TELEPHONE ENCOUNTER
Reason for Call:  Other call back    Detailed comments: Patient would like a return call regarding her last visit and charges for x-rays. Please call to discuss.    Phone Number Patient can be reached at: Home number on file [phone] (home)    Best Time: ASAP    Can we leave a detailed message on this number? YES    Call taken on 11/29/2018 at 9:45 AM by Jesica Ramirez

## 2018-11-30 NOTE — TELEPHONE ENCOUNTER
Dr. Granados, this patient you saw on 08/22/2018 is disputing the fact that she had an xray as a part of her visit with you. Please review the chart and call patient and discuss that the xray was preformed.    Thank you

## 2018-11-30 NOTE — TELEPHONE ENCOUNTER
Called patient she said she never had an x-ray so should not be charged for this. Please advise.  Anna Rico,

## 2018-12-11 ENCOUNTER — MYC MEDICAL ADVICE (OUTPATIENT)
Dept: FAMILY MEDICINE | Facility: CLINIC | Age: 33
End: 2018-12-11

## 2018-12-11 DIAGNOSIS — R06.83 SNORING: Primary | ICD-10-CM

## 2019-01-11 ENCOUNTER — PRE VISIT (OUTPATIENT)
Dept: SLEEP MEDICINE | Facility: CLINIC | Age: 34
End: 2019-01-11

## 2019-01-11 NOTE — TELEPHONE ENCOUNTER
"  1.  Reason for the visit:  Consult to discuss fatigue and snoring  2.  Referring provider and clinic name:  Dr. Granados [facility]  3.  Previous Sleep Doctor or Pulmonlogist (clinic name)?  None noted  4.  Records, Procedures, Imaging, and Labs (see below)  No records to obtain        All NOTES from previous office visits that pertain to why they are being seen in the Sleep Center    Previous Sleep Studies, Chest CT, Echos and reports that pertain to why they are seeing Sleep Center    All Sleep records that have been done in the last 2 years that pertain to why they are seeing Sleep Center            Are they being seen for continuation of care for Cpap/Bipap/Avap/Trilogy/Dental Device?     If yes to above Who and Where was Device issued/currently getting supplies from?     Are you currently on \"Supplemental Oxygen\" during the day or night?                                                                                                                                                         Please remind pt to bring Cpap machine and ask to arrive 15 minutes early to appointment due traffic and congestion                                                 5. Pt Sleep Center Packet received Message left asking pt to arrive 30 minutes early to appointment if no packet received.        Yes: \"please make sure that you bring this to your appointment completed, either the doctor will not see you until this completed or you may be asked to reschedule your appointment.\"     No: mail or email to the pt and explain, \"please make sure that you bring this to your appointment completed, either the doctor will not see you until this completed or you may be asked to reschedule your appointment.\"     ~If pt coming early to fill packet out, ask that they come 30 minutes prior to their appointment~     6. Has the pt's medication list been updated and preferred pharmacy added?     7. Has the allergy list been " "reviewed?    \"Thank you for choosing [facility] and we look forward to seeing you at your upcoming appointment\"     "

## 2019-01-14 ENCOUNTER — OFFICE VISIT (OUTPATIENT)
Dept: SLEEP MEDICINE | Facility: CLINIC | Age: 34
End: 2019-01-14
Payer: COMMERCIAL

## 2019-01-14 VITALS
HEIGHT: 67 IN | RESPIRATION RATE: 16 BRPM | HEART RATE: 68 BPM | BODY MASS INDEX: 34.21 KG/M2 | OXYGEN SATURATION: 96 % | DIASTOLIC BLOOD PRESSURE: 78 MMHG | WEIGHT: 218 LBS | SYSTOLIC BLOOD PRESSURE: 114 MMHG

## 2019-01-14 DIAGNOSIS — R06.83 SNORING: ICD-10-CM

## 2019-01-14 DIAGNOSIS — G47.33 OSA (OBSTRUCTIVE SLEEP APNEA): Primary | ICD-10-CM

## 2019-01-14 PROCEDURE — 99204 OFFICE O/P NEW MOD 45 MIN: CPT | Performed by: INTERNAL MEDICINE

## 2019-01-14 ASSESSMENT — MIFFLIN-ST. JEOR: SCORE: 1716.47

## 2019-01-14 NOTE — PROGRESS NOTES
Sleep Center   Outpatient Sleep Medicine Consultation  January 14, 2019      Name: Karrie Zhang MRN# [id number]   Age: 33 year old YOB: 1985     Date of Consultation: January 14, 2019  Consultation is requested by: Vanessa Granados MD  [address]  [place] [id number]  Primary care provider: Vanessa Granados       Reason for Sleep Consult:     Karrie Zhang is a 33 year old female with daytime tiredness and non-restorative sleep in the setting of motor restlessness and nocturnal snoring          Assessment and Plan:     Summary Sleep Diagnoses:      Obstructive sleep apnea    Allergic rhinitis; mild intermittent asthma    Possible atypical RLS    RBD likely secondary to SSRI    Anxiety/depression    Obesity    Summary Recommendations:      Home sleep testing is required by a payer in the setting.  If this study is not diagnostic, we will proceed to polysomnography with evening dose of zolpidem given some anticipatory concerns regarding falling asleep    Flonase for 1-2 weeks during active seasonal allergic rhinitis    Patient would be interested in long-term use of CPAP and possibly an oral device.  We discussed potential complications of untreated disease as well as full range of treatment options including provided educational materials and figures.               History of Present Illness:     Karrie Zhang is a 33 year old female who has developed increasing sleep disruption, loud snoring, occasionally observed apneas and nonrestorative sleep with a 15 pound weight gain over the past several years.  She acknowledges excessive movements throughout the night occasional gasping at night with only rare episodes of reflux.  Although just she describes increased truncal movements sleep disruption she has not particularly bothered by leg discomfort.  She has no difficulty initiating sleep or maintaining sleep.        SLEEP-WAKE SCHEDULE:     Natural bedtime   10:30 pm  Wake time     8:30  Weekday bedtime 9 PM with a 15-minute latency and 10 PM on weekends with a 15-minute latency.  Awakening 7 AM on weekdays with an alarm clock and 8: 30 a.m. on weekends without an alarm clock.  She awakens 1-2 times per night for 15-30 minutes generally to go to the bathroom check time or drink water.  She takes 2 naps per week for 2 hours which are voluntary and has no problems with inappropriate napping though she has occasional drowsiness on long drives she has not had accidents     SCALES       SLEEP APNEA: [place] score 3       INSOMNIA:  Insomnia severity score: 15       SLEEPINESS: [place] sleepiness scale: 11    Drowsy driving/near accidents drowsy without near accidents    SLEEP COMPLAINTS:  Cardio-respiratory    Snoring- 7/week with choking  Dyspnea- denies  Morning headaches or confusion-denies  Coexisting Lung disease: denies    Coexisting Heart disease: denies    Does patient have a bed partner: denies  Has bed partner been sleeping separately because of snoring:  denies            RLS Screen: Need to stretch and move without leg sensory changes, flips around through positions    Periodic limb movement: kicked walls    Narcolepsy:  denies sudden urges of sleep attacks    Cataplexy:  denies     Sleep paralysis:  denies      Hallucinations:   denies       Sleep Behaviors:    Night terrors: denies    Bruxism: denies    Automatic behaviors: sleep walking once     Other subjective complaints:    Anxiety or rumination denies    Pain and discomfort at  night: denies    Waking up with heart pounding or racing: denies    GERD or aspiration:denies         Parasomnia:   NREM - denies recurrent persistent confusional arousal, night eating, sleep walking or sleep terrors   REM  - less than once per month with kicking with dream enactment           Medications:     Current Outpatient Medications   Medication Sig     albuterol (PROAIR HFA/PROVENTIL HFA/VENTOLIN HFA) 108 (90 Base) MCG/ACT Inhaler Inhale 2 puffs  into the lungs every 6 hours as needed for shortness of breath / dyspnea or wheezing     busPIRone (BUSPAR) 15 MG tablet Take 1 tablet (15 mg) by mouth 2 times daily     FLUoxetine (PROZAC) 40 MG capsule TAKE 1 CAPSULE BY MOUTH EVERY DAY     montelukast (SINGULAIR) 10 MG tablet TAKE 1 TABLET BY MOUTH AT BEDTIME     TRINESSA, 28, 0.18/0.215/0.25 MG-35 MCG per tablet TAKE 1 TABLET BY MOUTH EVERY DAY     No current facility-administered medications for this visit.         Allergies   Allergen Reactions     Penicillins Rash            Past Medical History:     Does not need 02 supplement at night   Past Medical History:   Diagnosis Date     Alcohol abuse      Allergic rhinitis      Alopecia      Cervical dysplasia      Cervical high risk HPV (human papillomavirus) test positive 6/29/16, 7/5/17    neg 16/18     Depressive disorder      H/O colposcopy with cervical biopsy 08/08/2017 08/08/17: [place] [facility] and ECC normal.      Papanicolaou smear of cervix with low grade squamous intraepithelial lesion (LGSIL) 07/05/2017     Rosacea, acne      Uncomplicated asthma              Past Surgical History:    Previous upper airway surgery no  Past Surgical History:   Procedure Laterality Date     LASER TX, CERVICAL  2005            Social History:     Social History     Tobacco Use     Smoking status: Never Smoker     Smokeless tobacco: Never Used   Substance Use Topics     Alcohol use: Yes     Alcohol/week: 21.0 oz     Types: 14 Standard drinks or equivalent, 21 Shots of liquor per week                Family History:     Family History   Problem Relation Age of Onset     Osteoporosis Mother      Diabetes Father      Other Cancer Father         Pancreatic     Substance Abuse Father         alcoholic: stopped when I was in grade school     Prostate Cancer Maternal Grandfather      Prostate Cancer Other      Depression Brother      Coronary Artery Disease No family hx of              Review of Systems:     A complete 10 point review  of systems was negative other than HPI or as commented below:   Night sweats  Occ headaches  Asthma with wheezing  Endometriosis on OCp             Physical Examination:   There were no vitals taken for this visit.     Constitutional: . Awake, alert, cooperative, dressed casually, good eye contact, comfortably sitting in a chair, in no apparent distress  Mood: euthymic; affect congruent with full range and intensity.  Attention/Concentration:  Normal   Eyes: No icterus.  ENT: Mallampati Class: III.    Cardiovascular: Regular S1 and S2, no gallops or murmurs. No carotid bruits  Neck: Supple, no thyroid enlargement.   Pulmonary:  Chest symmetric, lungs clear bilaterally and no crackles, wheezes or rales  Extremities:  No pedal edema.  Muscle/joint: Strength and tone normal   Skin:  No rash or significant lesions.   Gait Normal.  Neurologic: Alert, oriented x3, no focal neurological deficit, cranial nerves grossly normal            Data: All pertinent previous laboratory data reviewed     No results found for: PH, PHARTERIAL, PO2, [id number], SAT, PCO2, HCO3, BASEEXCESS, XENIA, BEB  Lab Results   Component Value Date    TSH 3.47 03/27/2018     Lab Results   Component Value Date    GLC 82 03/27/2018     Lab Results   Component Value Date    HGB 13.4 03/27/2018    HGB 12.7 09/13/2016     No results found for: BUN, CR  No results found for: AST, ALT, GGT, ALKPHOS, BILITOTAL, BILICONJ, BILIDIRECT, BUTCH        Copy to: Vanessa Granados MD 1/14/2019     Total time spent with patient: 45 min >50% counseling

## 2019-01-14 NOTE — PATIENT INSTRUCTIONS
"MY TREATMENT INFORMATION FOR SLEEP APNEA-  Karrie Zhang    DOCTOR : ALLI CASTILLO  SLEEP CENTER :      MY CONTACT NUMBER:     Am I having a sleep study at a sleep center?  Make sure you have an appointment for the study before you leave!    Am I having a home sleep study?  Watch this video:  [email or web address]/watch?v=[email or web address]  Please verify your insurance coverage with your insurance carrier    Frequently asked questions:  1. What is Obstructive Sleep Apnea (IRINA)? IRINA is the most common type of sleep apnea. Apnea means, \"without breath.\"  Apnea is most often caused by narrowing or collapse of the upper airway as muscles relax during sleep.   Almost everyone has occasional apneas. Most people with sleep apnea have had brief interruptions at night frequently for many years.  The severity of sleep apnea is related to how frequent and severe the events are.   2. What are the consequences of IRINA? Symptoms include: feeling sleepy during the day, snoring loudly, gasping or stopping of breathing, trouble sleeping, and occasionally morning headaches or heartburn at night.  Sleepiness can be serious and even increase the risk of falling asleep while driving. Other health consequences may include development of high blood pressure and other cardiovascular disease in persons who are susceptible. Untreated IRINA  can contribute to heart disease, stroke and diabetes.   3. What are the treatment options? In most situations, sleep apnea is a lifelong disease that must be managed with daily therapy. Medications are not effective for sleep apnea and surgery is generally not considered until other therapies have been tried. Your treatment is your choice . Continuous Positive Airway (CPAP) works right away and is the therapy that is effective in nearly everyone. An oral device to hold your jaw forward is usually the next most reliable option. Other options include postioning devices (to " keep you off your back), weight loss, and surgery including a tongue pacing device. There is more detail about some of these options below.    Important tips for using CPAP and similar devices   Know your equipment:  CPAP is continuous positive airway pressure that prevents obstructive sleep apnea by keeping the throat from collapsing while you are sleeping. In most cases, the device is  smart  and can slowly self-adjusts if your throat collapses and keeps a record every day of how well you are treated-this information is available to you and your care team.  BPAP is bilevel positive airway pressure that keeps your throat open and also assists each breath with a pressure boost to maintain adequate breathing.  Special kinds of BPAP are used in patients who have inadequate breathing from lung or heart disease. In most cases, the device is  smart  and can slowly self-adjusts to assist breathing. Like CPAP, the device keeps a record of how well you are treated.  Your mask is your connection to the device. You get to choose what feels most comfortable and the staff will help to make sure if fits. Here: are some examples of the different masks that are available:        Key points to remember on your journey with sleep apnea:  1. Sleep study.  PAP devices often need to be adjusted during a sleep study to show that they are effective and adjusted right.  2. Good tips to remember: Try wearing just the mask during a quiet time during the day so your body adapts to wearing it. A humidifier is recommended for comfort in most cases to prevent drying of your nose and throat. Allergy medication from your provider may help you if you are having nasal congestion.  3. Getting settled-in. It takes more than one night for most of us to get used to wearing a mask. Try wearing just the mask during a quiet time during the day so your body adapts to wearing it. A humidifier is recommended for comfort in most cases. Our team will work with  you carefully on the first day and will be in contact within 4 days and again at 2 and 4 weeks for advice and remote device adjustments. Your therapy is evaluated by the device each day.   4. Use it every night. The more you are able to sleep naturally for 7-8 hours, the more likely you will have good sleep and to prevent health risks or symptoms from sleep apnea. Even if you use it 4 hours it helps. Occasionally all of us are unable to use a medical therapy, in sleep apnea, it is not dangerous to miss one night.   5. Communicate. Call our skilled team on the number provided on the first day if your visit for problems that make it difficult to wear the device. Over 2 out of 3 patients can learn to wear the device long-term with help from our team. Remember to call our team or your sleep providers if you are unable to wear the device as we may have other solutions for those who cannot adapt to mask CPAP therapy. It is recommended that you sleep your sleep provider within the first 3 months and yearly after that if you are not having problems.   Take care of your equipment. Make sure you clean your mask and tubing using directions every day and that your filter and mask are replaced as recommended or if they are not working.     BESIDES CPAP, WHAT OTHER THERAPIES ARE THERE?    Positioning Device  Positioning devices are generally used when sleep apnea is mild and only occurs on your back.This example shows a pillow that straps around the waist. It may be appropriate for those whose sleep study shows milder sleep apnea that occurs primarily when lying flat on one's back. Preliminary studies have shown benefit but effectiveness at home may need to be verified by a home sleep test. These devices are generally not covered by medical insurance.  Examples of devices that maintain sleeping on the back to prevent snoring and mild sleep apnea.    Belt type body positioner  Http://[employer]/    Electronic  reminder  Http://nightshifttherapy.com/  Http://www.[employer].com.au/    Oral Appliance  What is oral appliance therapy?  An oral appliance device fits on your teeth at night like a retainer used after having braces. The device is made by a specialized dentist and requires several visits over 1-2 months before a manufactured device is made to fit your teeth and is adjusted to prevent your sleep apnea. Once an oral device is working properly, snoring should be improved. A home sleep test may be recommended at that time if to determine whether the sleep apnea is adequately treated.       Some things to remember:  -Oral devices are often, but not always, covered by your medical insurance. Be sure to check with your insurance provider.   -If you are referred for oral therapy, you will be given a list of specialized dentists to consider or you may choose to visit the Web site of the American Academy of Dental Sleep Medicine  -Oral devices are less likely to work if you have severe sleep apnea or are extremely overweight.     More detailed information  An oral appliance is a small acrylic device that fits over the upper and lower teeth  (similar to a retainer or a mouth guard). This device slightly moves jaw forward, which moves the base of the tongue forward, opens the airway, improves breathing for effective treat snoring and obstructive sleep apnea in perhaps 7 out of 10 people .  The best working devices are custom-made by a dental device  after a mold is made of the teeth 1, 2, 3.  When is an oral appliance indicated?  Oral appliance therapy is recommended as a first-line treatment for patients with primary snoring, mild sleep apnea, and for patients with moderate sleep apnea who prefer appliance therapy to use of CPAP4, 5. Severity of sleep apnea is determined by sleep testing and is based on the number of respiratory events per hour of sleep.   How successful is oral appliance therapy?  The success rate  of oral appliance therapy in patients with mild sleep apnea is 75-80% while in patients with moderate sleep apnea it is 50-70%. The chance of success in patients with severe sleep apnea is 40-50%. The research also shows that oral appliances have a beneficial effect on the cardiovascular health of IRINA patients at the same magnitude as CPAP therapy7.  Oral appliances should be a second-line treatment in cases of severe sleep apnea, but if not completely successful then a combination therapy utilizing CPAP plus oral appliance therapy may be effective. Oral appliances tend to be effective in a broad range of patients although studies show that the patients who have the highest success are females, younger patients, those with milder disease, and less severe obesity. 3, 6.   Finding a dentist that practices dental sleep medicine  Specific training is available through the American Academy of Dental Sleep Medicine for dentists interested in working in the field of sleep. To find a dentist who is educated in the field of sleep and the use of oral appliances, near you, visit the Web site of the American Academy of Dental Sleep Medicine.    References  1. Leia et al. Objectively measured vs self-reported compliance during oral appliance therapy for sleep-disordered breathing. Chest 2013; 144(5): 3309-1299.  2. Gely et al. Objective measurement of compliance during oral appliance therapy for sleep-disordered breathing. Thorax 2013; 68(1): 91-96.  3. Jacque et al. Mandibular advancement devices in 620 men and women with IRINA and snoring: tolerability and predictors of treatment success. Chest 2004; 125: 3457-4299.  4. Jaimee, et al. Oral appliances for snoring and IRINA: a review. Sleep 2006; 29: 244-262.  5. Martha et al. Oral appliance treatment for IRINA: an update. J Clin Sleep Med 2014; 10(2): 215-227.  6. Dang et al. Predictors of OSAH treatment outcome. J Dent Res 2007; 86:  7602-4028.      Weight Loss:    Weight loss is a long-term strategy that may improve sleep apnea in some patients.    Weight management is a personal decision and the decision should be based on your interest and the potential benefits.  If you are interested in exploring weight loss strategies, the following discussion covers the impact on weight loss on sleep apnea and the approaches that may be successful.    Being overweight does not necessarily mean you will have health consequences.  Those who have BMI over 35 or over 27 with existing medical conditions carries greater risk.   Weight loss decreases severity of sleep apnea in most people with obesity. For those with mild obesity who have developed snoring with weight gain, even 15-30 pound weight loss can improve and occasionally eliminate sleep apnea.  Structured and life-long dietary and health habits are necessary to lose weight and keep healthier weight levels.     Though there may be significant health benefits from weight loss, long-term weight loss is very difficult to achieve- studies show success with dietary management in less than 10% of people. In addition, substantial weight loss may require years of dietary control and may be difficult if patients have severe obesity. In these cases, surgical management may be considered.  Finally, older individuals who have tolerated obesity without health complications may be less likely to benefit from weight loss strategies.        Your BMI is Body mass index is 34.14 kg/m .  Weight management is a personal decision.  If you are interested in exploring weight loss strategies, the following discussion covers the approaches that may be successful. Body mass index (BMI) is one way to tell whether you are at a healthy weight, overweight, or obese. It measures your weight in relation to your height.  A BMI of 18.5 to 24.9 is in the healthy range. A person with a BMI of 25 to 29.9 is considered overweight, and  someone with a BMI of 30 or greater is considered obese. More than two-thirds of American adults are considered overweight or obese.  Being overweight or obese increases the risk for further weight gain. Excess weight may lead to heart disease and diabetes.  Creating and following plans for healthy eating and physical activity may help you improve your health.  Weight control is part of healthy lifestyle and includes exercise, emotional health, and healthy eating habits. Careful eating habits lifelong are the mainstay of weight control. Though there are significant health benefits from weight loss, long-term weight loss with diet alone may be very difficult to achieve- studies show long-term success with dietary management in less than 10% of people. Attaining a healthy weight may be especially difficult to achieve in those with severe obesity. In some cases, medications, devices and surgical management might be considered.  What can you do?  If you are overweight or obese and are interested in methods for weight loss, you should discuss this with your provider.     Consider reducing daily calorie intake by 500 calories.     Keep a food journal.     Avoiding skipping meals, consider cutting portions instead.    Diet combined with exercise helps maintain muscle while optimizing fat loss. Strength training is particularly important for building and maintaining muscle mass. Exercise helps reduce stress, increase energy, and improves fitness. Increasing exercise without diet control, however, may not burn enough calories to loose weight.       Start walking three days a week 10-20 minutes at a time    Work towards walking thirty minutes five days a week     Eventually, increase the speed of your walking for 1-2 minutes at time    In addition, we recommend that you review healthy lifestyles and methods for weight loss available through the National Institutes of Health patient information  sites:  http://win.niddk.nih.gov/publications/index.htm    And look into health and wellness programs that may be available through your health insurance provider, employer, local community center, or yordan club.          Surgery:    Surgery for obstructive sleep apnea is considered generally only when other therapies fail to work. Surgery may be discussed with you if you are having a difficult time tolerating CPAP and or when there is an abnormal structure that requires surgical correction.  Nose and throat surgeries often enlarge the airway to prevent collapse.  Most of these surgeries create pain for 1-2 weeks and up to half of the most common surgeries are not effective throughout life.  You should carefully discuss the benefits and drawbacks to surgery with your sleep provider and surgeon to determine if it is the best solution for you.   More information  Surgery for IRINA is directed at areas that are responsible for narrowing or complete obstruction of the airway during sleep.  There are a wide range of procedures available to enlarge and/or stabilize the airway to prevent blockage of breathing in the three major areas where it can occur: the palate, tongue, and nasal regions.  Successful surgical treatment depends on the accurate identification of the factors responsible for obstructive sleep apnea in each person.  A personalized approach is required because there is no single treatment that works well for everyone.  Because of anatomic variation, consultation with an examination by a sleep surgeon is a critical first step in determining what surgical options are best for each patient.  In some cases, examination during sedation may be recommended in order to guide the selection of procedures.  Patients will be counseled about risks and benefits as well as the typical recovery course after surgery. Surgery is typically not a cure for a person s IRINA.  However, surgery will often significantly improve one s IRINA  severity (termed  success rate ).  Even in the absence of a cure, surgery will decrease the cardiovascular risk associated with OSA7; improve overall quality of life8 (sleepiness, functionality, sleep quality, etc).      Palate Procedures:  Patients with IRINA often have narrowing of their airway in the region of their tonsils and uvula.  The goals of palate procedures are to widen the airway in this region as well as to help the tissues resist collapse.  Modern palate procedure techniques focus on tissue conservation and soft tissue rearrangement, rather than tissue removal.  Often the uvula is preserved in this procedure. Residual sleep apnea is common in patient after pharyngoplasty with an average reduction in sleep apnea events of 33%2.      Tongue Procedures:  ExamWhile patients are awake, the muscles that surround the throat are active and keep this region open for breathing. These muscles relax during sleep, allowing the tongue and other structures to collapse and block breathing.  There are several different tongue procedures available.  Selection of a tongue base procedure depends on characteristics seen on physical exam.  Generally, procedures are aimed at removing bulky tissues in this area or preventing the back of the tongue from falling back during sleep.  Success rates for tongue surgery range from 50-62%3.    Hypoglossal Nerve Stimulation:  Hypoglossal nerve stimulation has recently received approval from the [place] Food and Drug Administration for the treatment of obstructive sleep apnea.  This is based on research showing that the system was safe and effective in treating sleep apnea6.  Results showed that the median AHI score decreased 68%, from 29.3 to 9.0. This therapy uses an implant system that senses breathing patterns and delivers mild stimulation to airway muscles, which keeps the airway open during sleep.  The system consists of three fully implanted components: a small generator  (similar in size to a pacemaker), a breathing sensor, and a stimulation lead.  Using a small handheld remote, a patient turns the therapy on before bed and off upon awakening.    Candidates for this device must be greater than 22 years of age, have moderate to severe IRINA (AHI between 20-65), BMI less than 32, have tried CPAP/oral appliance without success, and have appropriate upper airway anatomy (determined by a sleep endoscopy performed by Dr. Almendarez).    Hypoglossal Nerve Stimulation Pathway:    The sleep surgeon s office will work with the patient through the insurance prior-authorization process (including communications and appeals).    Nasal Procedures:  Nasal obstruction can interfere with nasal breathing during the day and night.  Studies have shown that relief of nasal obstruction can improve the ability of some patients to tolerate positive airway pressure therapy for obstructive sleep apnea1.  Treatment options include medications such as nasal saline, topical corticosteroid and antihistamine sprays, and oral medications such as antihistamines or decongestants. Non-surgical treatments can include external nasal dilators for selected patients. If these are not successful by themselves, surgery can improve the nasal airway either alone or in combination with these other options.      Combination Procedures:  Combination of surgical procedures and other treatments may be recommended, particularly if patients have more than one area of narrowing or persistent positional disease.  The success rate of combination surgery ranges from 66-80%2,3.    References  1. Aster SCHWARZ. The Role of the Nose in Snoring and Obstructive Sleep Apnoea: An Update.  Eur Arch Otorhinolaryngol. 2011; 268: 1365-73.  2.  Varghese SM; Jesus JA; Sayra JR; Pallanch JF; Malaika WARNER; Vinnie PIZARRO; Flavia OGDEN. Surgical modifications of the upper airway for obstructive sleep apnea in adults: a systematic review and meta-analysis. SLEEP  2010;33(10):3355-2622. Melchor DEY. Hypopharyngeal surgery in obstructive sleep apnea: an evidence-based medicine review.  Arch Otolaryngol Head Neck Surg. 2006 Feb;132(2):206-13.  3. Stephan MENDOZA, Jennifer Y, [employer]. The efficacy of anatomically based multilevel surgery for obstructive sleep apnea. Otolaryngol Head Neck Surg. 2003 Oct;129(4):327-35.  4. Kezirian E, Goldberg A. Hypopharyngeal Surgery in Obstructive Sleep Apnea: An Evidence-Based Medicine Review. Arch Otolaryngol Head Neck Surg. 2006 Feb;132(2):206-13.  5. Paulette ZAIDI et al. Upper-Airway Stimulation for Obstructive Sleep Apnea.  N Engl J Med. 2014 Jan 9;370(2):139-49.  6. Radha Y et al. Increased Incidence of Cardiovascular Disease in Middle-aged Men with Obstructive Sleep Apnea. Am J Respir Crit Care Med; 2002 166: 159-165  7. Vivienne EM et al. Studying Life Effects and Effectiveness of Palatopharyngoplasty (SLEEP) study: Subjective Outcomes of Isolated Uvulopalatopharyngoplasty. Otolaryngol Head Neck Surg. 2011; 144: 623-631.            Your BMI is Body mass index is 34.14 kg/m .  Weight management is a personal decision.  If you are interested in exploring weight loss strategies, the following discussion covers the approaches that may be successful. Body mass index (BMI) is one way to tell whether you are at a healthy weight, overweight, or obese. It measures your weight in relation to your height.  A BMI of 18.5 to 24.9 is in the healthy range. A person with a BMI of 25 to 29.9 is considered overweight, and someone with a BMI of 30 or greater is considered obese. More than two-thirds of American adults are considered overweight or obese.  Being overweight or obese increases the risk for further weight gain. Excess weight may lead to heart disease and diabetes.  Creating and following plans for healthy eating and physical activity may help you improve your health.  Weight control is part of healthy lifestyle and includes exercise, emotional health, and  healthy eating habits. Careful eating habits lifelong are the mainstay of weight control. Though there are significant health benefits from weight loss, long-term weight loss with diet alone may be very difficult to achieve- studies show long-term success with dietary management in less than 10% of people. Attaining a healthy weight may be especially difficult to achieve in those with severe obesity. In some cases, medications, devices and surgical management might be considered.  What can you do?  If you are overweight or obese and are interested in methods for weight loss, you should discuss this with your provider.     Consider reducing daily calorie intake by 500 calories.     Keep a food journal.     Avoiding skipping meals, consider cutting portions instead.    Diet combined with exercise helps maintain muscle while optimizing fat loss. Strength training is particularly important for building and maintaining muscle mass. Exercise helps reduce stress, increase energy, and improves fitness. Increasing exercise without diet control, however, may not burn enough calories to loose weight.       Start walking three days a week 10-20 minutes at a time    Work towards walking thirty minutes five days a week     Eventually, increase the speed of your walking for 1-2 minutes at time    In addition, we recommend that you review healthy lifestyles and methods for weight loss available through the National Institutes of Health patient information sites:  http://win.niddk.nih.gov/publications/index.htm    And look into health and wellness programs that may be available through your health insurance provider, employer, local community center, or yordan club.    Weight management plan: Patient was referred to their PCP to discuss a diet and exercise plan.

## 2019-02-05 ENCOUNTER — E-VISIT (OUTPATIENT)
Dept: FAMILY MEDICINE | Facility: CLINIC | Age: 34
End: 2019-02-05

## 2019-02-05 DIAGNOSIS — R22.30 AXILLARY MASS, UNSPECIFIED LATERALITY: Primary | ICD-10-CM

## 2019-02-07 ENCOUNTER — MYC MEDICAL ADVICE (OUTPATIENT)
Dept: NURSING | Facility: CLINIC | Age: 34
End: 2019-02-07

## 2019-02-07 NOTE — TELEPHONE ENCOUNTER
GUSTAVO on patient's VM advising her that DR. olson is not in the office until Monday but she did recommend appointment or urgent care today  Scheduling number given    Nigel Alanis RN

## 2019-02-07 NOTE — TELEPHONE ENCOUNTER
Please call patient: Needs to be seen today - find appointment or go to urgent care  Vanessa Granados MD

## 2019-02-08 ENCOUNTER — OFFICE VISIT (OUTPATIENT)
Dept: FAMILY MEDICINE | Facility: CLINIC | Age: 34
End: 2019-02-08
Payer: COMMERCIAL

## 2019-02-08 VITALS
SYSTOLIC BLOOD PRESSURE: 122 MMHG | BODY MASS INDEX: 33.74 KG/M2 | HEIGHT: 67 IN | DIASTOLIC BLOOD PRESSURE: 76 MMHG | HEART RATE: 72 BPM | TEMPERATURE: 98.9 F | WEIGHT: 215 LBS

## 2019-02-08 DIAGNOSIS — L72.3 SEBACEOUS CYST: Primary | ICD-10-CM

## 2019-02-08 PROCEDURE — 99213 OFFICE O/P EST LOW 20 MIN: CPT | Performed by: FAMILY MEDICINE

## 2019-02-08 ASSESSMENT — MIFFLIN-ST. JEOR: SCORE: 1702.86

## 2019-02-08 NOTE — PROGRESS NOTES
SUBJECTIVE:   Karrie Zhang is a 33 year old female who presents to clinic today for the following health issues:      Concern - Lump under left armpit  Onset: 2 weeks    Description:   Pea size lump and now about grape size    Intensity: mild    Progression of Symptoms:  improving    Accompanying Signs & Symptoms:  Some pain- did drain some when she popped it, redness    Previous history of similar problem:   None    Precipitating factors:   Worsened by: Squeezed it     Alleviating factors:  Improved by: None    Therapies Tried and outcome: None    Lump in left armpit was initially pea sized and was not painful.  It started to grow a couple weeks ago and became red and painful.  She squeezed it and white material was expressed.  Since then it has shrunk in size.  It is no longer painful or red.      Problem list and histories reviewed & adjusted, as indicated.  Additional history: as documented    Patient Active Problem List   Diagnosis     Perioral dermatitis     Mild persistent asthma without complication     Anxiety     Major depressive disorder, recurrent episode, mild (H)     Multiple environmental allergies     Obesity (BMI 30-39.9)     Cervical high risk HPV (human papillomavirus) test positive     Rosacea, acne     Alcohol abuse     Past Surgical History:   Procedure Laterality Date     LASER TX, CERVICAL  2005       Social History     Tobacco Use     Smoking status: Never Smoker     Smokeless tobacco: Never Used   Substance Use Topics     Alcohol use: Yes     Alcohol/week: 21.0 oz     Types: 14 Standard drinks or equivalent, 21 Shots of liquor per week     Family History   Problem Relation Age of Onset     Osteoporosis Mother      Diabetes Father      Other Cancer Father         Pancreatic     Substance Abuse Father         alcoholic: stopped when I was in grade school     Prostate Cancer Maternal Grandfather      Prostate Cancer Other      Depression Brother      Coronary Artery Disease No family hx  "of            Reviewed and updated as needed this visit by clinical staff       Reviewed and updated as needed this visit by Provider         ROS:  Constitutional, HEENT, cardiovascular, pulmonary, gi and gu systems are negative, except as otherwise noted.    OBJECTIVE:     /76 (BP Location: Right arm, Patient Position: Sitting, Cuff Size: Adult Large)   Pulse 72   Temp 98.9  F (37.2  C) (Oral)   Ht 1.702 m (5' 7\")   Wt 97.5 kg (215 lb)   LMP 01/25/2019   BMI 33.67 kg/m    Body mass index is 33.67 kg/m .  GENERAL: healthy, alert and no distress  SKIN: Approx 1cm mobile, cystic mass in left axilla.  No surrounding erythema or swelling.  No drainage.     ASSESSMENT/PLAN:     1. Sebaceous cyst  - given the history of the cyst growing and the expressing white material, this is likely a sebaceous cyst  - No infection or swelling currently, so ok to watch and wait  - Advised office procedure for removal if the cyst bothers her, but reassured her that she can leave it be with no worries      Follow up as needed for sebaceous cyst removal if needed     Misa Cast, DO  [facility]    "

## 2019-02-08 NOTE — PATIENT INSTRUCTIONS
Patient Education     Epidermoid Cyst (Sebaceous Cyst), No Infection  An epidermoid cyst (sebaceous cyst) is a term that refers to 2 similar types of cysts: those found in the skin (epidermoid), and those found around hair follicles (pilar).  Some general facts about these cysts:    A cyst is a sac filled with material that is often cheesy, fatty, oily, or fibrous. The material in them can be thick (like cottage cheese) or liquid.    They form slowly under the skin, and can be found on most parts of the body. They are most often found in hairier areas like the scalp, face, upper back, and genitals.    You can usually move them slightly if you try.    They can be smaller than a pea or as large as a few inches.    They are usually not painful, unless they become inflamed or infected.    The area around the cyst may smell bad. If the cyst breaks open, the material inside it often smells bad too.  Causes  Epidermoid cysts are caused when skin (epidermal) cells move under the skin surface, or are covered over by it. These cells continue to multiply, like skin does normally. They then form a wall around themselves (cyst) and secrete normal skin fluids (keratin). This may be developmental. But it often happens because of an injury to the skin.  Epidermoid cysts are often found around hair follicles. These follicles are like cysts, but they have openings. Normal lubricating oils for your hair are sent out through these openings. A cyst occurs when an opening becomes blocked or the site inflamed. This often occurs when there is damage to the hair follicles by a scrape or wound.    Pilar cysts are similar to epidermoid cysts. But they start from a different part of the hair follicle, and are more likely to be on the scalp.  Symptoms    Feeling a lump just beneath the skin    It may or may not be painful    The cyst may or may not smell bad    The cyst may become inflamed or red    The cyst may leak fluid or thick  material  Home care  Epidermoid cysts often go away without any treatment. If your cyst doesn t go away, and it bothers you, it may be drained or removed. If the cyst drains on its own, it may return. Resist the temptation to squeeze, pop, stick a needle in it, or cut it open. This often leads to an infection and scarring. If it gets severely inflamed or infected, you should seek medical care. Be sure to clean the cyst area when bathing or showering. Watch for the signs of infection listed below.  Follow-up care  Follow up with your healthcare provider, or as advised.  When to seek medical advice  Call your healthcare provider right away if any of these occur:    Swelling, redness, or pain    Pus coming from the cyst  Date Last Reviewed: 8/1/2016 2000-2018 [employer]. [address] [place] [address], [place] [address]. All rights reserved. This information is not intended as a substitute for professional medical care. Always follow your healthcare professional's instructions.

## 2019-02-19 ENCOUNTER — OFFICE VISIT (OUTPATIENT)
Dept: SLEEP MEDICINE | Facility: CLINIC | Age: 34
End: 2019-02-19
Payer: COMMERCIAL

## 2019-02-19 DIAGNOSIS — G47.33 OSA (OBSTRUCTIVE SLEEP APNEA): Primary | ICD-10-CM

## 2019-02-19 NOTE — PROGRESS NOTES
Pt is completing a home sleep test. Pt was instructed on how to put on the Noxturnal T3 device and associated equipment before going to bed and given the opportunity to practice putting it on before leaving the sleep center. Pt was reminded to bring the home sleep test kit back to the center tomorrow, at agreed upon time for download and reporting.       Toribio Damon  Sleep Clinic Specialist - [place]

## 2019-02-20 ENCOUNTER — DOCUMENTATION ONLY (OUTPATIENT)
Dept: SLEEP MEDICINE | Facility: CLINIC | Age: 34
End: 2019-02-20
Payer: COMMERCIAL

## 2019-02-20 NOTE — PROGRESS NOTES
HST failed. Patient notified and will repeat test Feb 25. Charges have been removed.    Toribio Damon  Sleep Clinic Specialist - [place]

## 2019-02-20 NOTE — PROGRESS NOTES
Pt returned HST device. It was downloaded and forwarded data to the clinical specialist for scoring.    After clinical specialist review. HST was unable to be scored due to insufficient data.    HST failed. Patient notified and will repeat test Feb 25. Charges have been removed.      Toribio Damon  Sleep Clinic Specialist - [place]

## 2019-02-25 ENCOUNTER — TELEPHONE (OUTPATIENT)
Dept: SLEEP MEDICINE | Facility: CLINIC | Age: 34
End: 2019-02-25

## 2019-02-25 ENCOUNTER — OFFICE VISIT (OUTPATIENT)
Dept: SLEEP MEDICINE | Facility: CLINIC | Age: 34
End: 2019-02-25
Payer: COMMERCIAL

## 2019-02-25 ENCOUNTER — DOCUMENTATION ONLY (OUTPATIENT)
Dept: SLEEP MEDICINE | Facility: CLINIC | Age: 34
End: 2019-02-25

## 2019-02-25 DIAGNOSIS — F51.04 PSYCHOPHYSIOLOGICAL INSOMNIA: ICD-10-CM

## 2019-02-25 DIAGNOSIS — G47.33 OSA (OBSTRUCTIVE SLEEP APNEA): ICD-10-CM

## 2019-02-25 PROCEDURE — G0399 HOME SLEEP TEST/TYPE 3 PORTA: HCPCS | Performed by: INTERNAL MEDICINE

## 2019-02-25 RX ORDER — ZOLPIDEM TARTRATE 5 MG/1
TABLET ORAL
Qty: 1 TABLET | Refills: 0 | Status: SHIPPED | OUTPATIENT
Start: 2019-02-25 | End: 2019-09-30

## 2019-02-25 NOTE — TELEPHONE ENCOUNTER
Patient called at 8:55 AM on 2/25/2019 and I verified the pharmacy for the medication. Medication was faxed to [facility] in [place].    Betzy Ardon [facility]

## 2019-02-25 NOTE — TELEPHONE ENCOUNTER
----- Message from Krystle Moreno sent at 2/25/2019  7:58 AM CST -----  Regarding: ambien needed   Patient picked up her hst and needs an Ambien for tonight Monday 2/25. Send to pharmacy listed in patients chart per patient.    Thank you  Krystle

## 2019-02-26 ENCOUNTER — DOCUMENTATION ONLY (OUTPATIENT)
Dept: SLEEP MEDICINE | Facility: CLINIC | Age: 34
End: 2019-02-26
Payer: COMMERCIAL

## 2019-02-26 DIAGNOSIS — G47.33 OSA (OBSTRUCTIVE SLEEP APNEA): Primary | ICD-10-CM

## 2019-02-26 NOTE — PROGRESS NOTES
This HSAT was performed using a Noxturnal T3 device which recorded snore, sound, movement activity, body position, nasal pressure, oronasal thermal airflow, pulse, oximetry and both chest and abdominal respiratory effort. HSAT data was restricted to the time patient states they were in bed.     HSAT was scored using 1B 4% hypopnea rule.     AHI: 39.5. Snoring was reported as loud.  Time with SpO2 below 89% was 8.0 minutes.   Overall signal quality was good     Pt will follow up with sleep provider to determine appropriate therapy.     Ordering MD, Jason, MD Stiven Martinez, [facility], RST System Clinical Specialist 02/26/2019

## 2019-02-26 NOTE — PROGRESS NOTES
Pt returned HST device. It was downloaded and forwarded data to the clinical specialist for scoring.      Toribio Damon  Sleep Clinic Specialist - [place]

## 2019-02-27 ENCOUNTER — TELEPHONE (OUTPATIENT)
Dept: SLEEP MEDICINE | Facility: CLINIC | Age: 34
End: 2019-02-27

## 2019-02-27 NOTE — PROCEDURES
"HOME SLEEP STUDY INTERPRETATION    Patient: Karrie Zhang  MRN: [id number]  YOB: 1985  Study Date:  2/25/2019  Referring Provider: Vanessa Granados   Ordering Provider:Juan Gomez MD     Indications for Home Study: Karrie Zhang is a 33 year old female with daytime tiredness and non-restorative sleep in the setting of motor restlessness and nocturnal snoring.  Medical history includes allergic rhinitis, mild intermittent asthma, obesity, anxiety and depression.    Estimated body mass index is 33.67 kg/m  as calculated from the following:    Height as of 2/8/19: 1.702 m (5' 7\").    Weight as of 2/8/19: 97.5 kg (215 lb).  [place] Sleepiness Scale: 11/24  STOP-BANG: 3/8    Data: A full night home sleep study was performed recording the standard physiologic parameters including body position, movement, sound, nasal pressure, thermal oral airflow, chest and abdominal movements with respiratory inductance plethysmography, and oxygen saturation by pulse oximetry. Pulse rate was estimated by oximetry recording. This study was considered  adequate based on > 4 hours of quality oximetry and respiratory recording. As specified by the AASM Manual for the Scoring of Sleep and Associated events, version 2.3, Rule VIII.D 1B, 4% oxygen desaturation scoring for hypopneas is used as a standard of care on all home sleep apnea testing.    Analysis Time: 493.4 minutes    Respiration:   Sleep Associated Hypoxemia: sustained hypoxemia was present. Baseline oxygen saturation was 93 %. Time with saturation less than or equal to 88% was 8.0 minutes. The lowest oxygen saturation was 80 %.   Snoring: Snoring was present.  Respiratory events: The home study revealed a presence of 105 obstructive apneas and 6 mixed and central apneas. There were 214 hypopneas resulting in a combined apnea/hypopnea index [AHI] of 39.5 events per hour.  AHI was 37.4 per hour supine, - per hour prone, 48.4 per hour on left side, and 30.5 per hour " on right side.   Pattern: Excluding events noted above, respiratory rate and pattern was Normal.    Position: Percent of time spent: supine -38.7 %, prone -0 %, on left -35.5%, on right -25.9 %.    Heart Rate: By pulse oximetry normal rate was noted.     Assessment:     Severe obstructive sleep apnea.    Sleep associated hypoxemia was present.    Recommendations:    Suggest initiating empiric treatment with auto titrating CPAP device with pressure settings 5-15 cm of water with follow-up through sleep therapy management program. Suggest obtaining overnight oximetry with auto CPAP in 2-3 weeks after starting CPAP  treatment to check for resolution of the hypoxemia.    Suggest optimizing sleep hygiene and avoiding sleep deprivation.    Weight management.    Diagnosis Code(s): Obstructive Sleep Apnea G47.33, Hypoxemia G47.36, Snoring R06.83    Miguelangel Castellon MD, February 26, 2019   Diplomate, American Board of Internal Medicine, Sleep Medicine

## 2019-02-27 NOTE — TELEPHONE ENCOUNTER
Patient called me today 02/27/19 at 11:57 am to schedule cpap machine setup appointment. I went over with her the machine we carry and the price. I also let her know to call her insurance to see what the coverage is for the pap machine. Patient asked if we will still setup the machine if she bought it online and I told her we cannot but she can see if the sleep clinic can set it up or she will have to figure out how to set it up. Patient would like to schedule with [facility] showroom if she decides to get the machine. I gave her [facility] number and also let her know she can call me with any other questions as well.

## 2019-03-07 ENCOUNTER — DOCUMENTATION ONLY (OUTPATIENT)
Dept: SLEEP MEDICINE | Facility: CLINIC | Age: 34
End: 2019-03-07

## 2019-03-07 NOTE — PROGRESS NOTES
Patient was offered choice of vendor and chose [facility].  Patient Karrie Zhang was set up at [facility] on March 7, 2019. Patient received a [employer]med AirSense 10 Auto. Pressures were set at 5-15 cm H2O.   Patient s ramp is 5 cm H2O for Auto and FLEX/EPR is EPR, 2.  Patient received a [employer] Mask name: ESON 2  Nasal mask size Small, heated tubing and heated humidifier.  Patient is enrolled in the STM Program and does not need to meet compliance.   Eli Jones

## 2019-03-11 ENCOUNTER — DOCUMENTATION ONLY (OUTPATIENT)
Dept: SLEEP MEDICINE | Facility: CLINIC | Age: 34
End: 2019-03-11
Payer: COMMERCIAL

## 2019-03-11 NOTE — PROGRESS NOTES
3 DAY STM VISIT    Diagnostic AHI:  39.5 HST    Patient contacted for 3 day STM visit.   Subjective measures:  Patient having trouble with headgear sliding up. Patient  sometimes wakes up groggy and getting water in her mask.     Device type: Auto-CPAP  PAP settings from order::  CPAP min 5 cm  H20       CPAP max 15 cm  H20        Mask type:    Nasal Mask     Device settings from machine      Min CPAP 5.0            Max CPAP 15.0       Assessment: Nightly usage over four hours. Patient to lower her machine.  Action plan: Pt to have f/u 14 day STM visit.  Patient has a follow up visit scheduled:   no.

## 2019-03-22 ENCOUNTER — DOCUMENTATION ONLY (OUTPATIENT)
Dept: SLEEP MEDICINE | Facility: CLINIC | Age: 34
End: 2019-03-22

## 2019-03-22 NOTE — PROGRESS NOTES
14  DAY STM VISIT    Diagnostic AHI:   39.5 HST    Subjective measures:   Pt reports things are going ok so far.  She feels her mask is not keeping seal.  She feels that the strap and tube location uncomfortable.   She would like to change to a different style  of mask    Assessment: Pt meeting objective benchmarks.  Patient failing following subjective benchmarks: mask discomfort     Action plan: schedule mask fit appointment and pt to have 30 day STM visit.      Device type: Auto-CPAP    PAP settings: CPAP min 5.0 cm  H20       CPAP max 15.0 cm  H20           95th% pressure 10.2 cm  H20     Mask type:  Nasal Mask    Objective measures: 14 day rolling measures      Compliance  92 %      Leak  15.94 lpm  last  upload      AHI 1.97   last  upload      Average number of minutes 529      Objective measure goal  Compliance   Goal >70%  Leak   Goal < 24 lpm  AHI  Goal < 5  Usage  Goal >240

## 2019-03-27 ENCOUNTER — DOCUMENTATION ONLY (OUTPATIENT)
Dept: SLEEP MEDICINE | Facility: CLINIC | Age: 34
End: 2019-03-27

## 2019-03-27 NOTE — PROGRESS NOTES
PATIENT CAME INTO [facility] TODAY FOR A 30 DAY MASK EXCHANGE. SHE WANTED TO TRY THE AIRFIT N30I. SHE TRIED ON THE AIRFIT N30I SMALL FRAME WITH THE MEDIUM AND THE SMALL CUSHIONS. THE SMALL SEEMED TO FIT HER BETTER AND WAS MORE COMFORTABLE. I DID NOT HAVE ANY HERE SO I AM SHIPPING ONE OUT TO HER FROM [facility].

## 2019-04-09 ENCOUNTER — DOCUMENTATION ONLY (OUTPATIENT)
Dept: SLEEP MEDICINE | Facility: CLINIC | Age: 34
End: 2019-04-09

## 2019-04-09 NOTE — PROGRESS NOTES
30 DAY STM VISIT    Diagnostic AHI:   39.5 HST     Subjective measures:   Pt reports that things are going ok.  She received a new mask.  She is having some breakout around mask.  She does feels she sleeps better with the device.      Assessment: Pt meeting objective benchmarks.  Patient failing following subjective benchmarks: skin irritation.    Action plan: pt to have 6 month STM visit  Patient has not scheduled a follow up visit with Dr. Castellon  She will try using a barrier between mask and face for comfort.   Device type: Auto-CPAP  PAP settings: CPAP min 5.0 cm  H20     CPAP max 15.0 cm  H20         95th% pressure 9.7 cm  H20   Mask type:  Nasal Mask  Objective measures: 14 day rolling measures      Compliance  100 %      Leak  25.04 lpm  last  upload      AHI 2.63   last  upload      Average number of minutes 470      Objective measure goal  Compliance   Goal >70%  Leak   Goal < 24 lpm  AHI  Goal < 5  Usage  Goal >240

## 2019-05-10 ENCOUNTER — OFFICE VISIT (OUTPATIENT)
Dept: FAMILY MEDICINE | Facility: CLINIC | Age: 34
End: 2019-05-10
Payer: COMMERCIAL

## 2019-05-10 VITALS
SYSTOLIC BLOOD PRESSURE: 108 MMHG | HEART RATE: 88 BPM | WEIGHT: 224 LBS | RESPIRATION RATE: 15 BRPM | DIASTOLIC BLOOD PRESSURE: 74 MMHG | OXYGEN SATURATION: 98 % | TEMPERATURE: 97.9 F | BODY MASS INDEX: 35.08 KG/M2

## 2019-05-10 DIAGNOSIS — J06.9 VIRAL URI WITH COUGH: Primary | ICD-10-CM

## 2019-05-10 DIAGNOSIS — Z30.41 ENCOUNTER FOR BIRTH CONTROL PILLS MAINTENANCE: ICD-10-CM

## 2019-05-10 DIAGNOSIS — R07.0 THROAT PAIN: ICD-10-CM

## 2019-05-10 LAB
DEPRECATED S PYO AG THROAT QL EIA: NORMAL
SPECIMEN SOURCE: NORMAL

## 2019-05-10 PROCEDURE — 87880 STREP A ASSAY W/OPTIC: CPT | Performed by: NURSE PRACTITIONER

## 2019-05-10 PROCEDURE — 87081 CULTURE SCREEN ONLY: CPT | Performed by: NURSE PRACTITIONER

## 2019-05-10 PROCEDURE — 99213 OFFICE O/P EST LOW 20 MIN: CPT | Performed by: NURSE PRACTITIONER

## 2019-05-10 NOTE — TELEPHONE ENCOUNTER
Patient is checking status of previous refill request. She is leaving town Sunday and needs to pick this Rx up from the pharmacy by tomorrow please.

## 2019-05-10 NOTE — PROGRESS NOTES
SUBJECTIVE:   Karrie Zhang is a 34 year old female who presents to clinic today for the following   health issues:        Acute Illness   Acute illness concerns: sore throat  Onset: 2 day    Fever: no    Chills/Sweats: no    Headache (location?): YES    Sinus Pressure:YES    Conjunctivitis:  no    Ear Pain: no    Rhinorrhea: YES    Congestion: YES    Sore Throat: YES     Cough: YES- productive of thick sputum    Wheeze: no     Decreased Appetite: no     Nausea: no     Vomiting: no    Diarrhea:  no    Dysuria/Freq.: no    Fatigue/Achiness: no    Sick/Strep Exposure: no     Therapies Tried and outcome: nyquil, tylenol, ibuprofen    Feels like she's being choked when laying down. Has post-nasal drip also. No increase in wheeze, SOB, or Albuterol use.     Additional history: as documented    Reviewed  and updated as needed this visit by clinical staff         Reviewed and updated as needed this visit by Provider         Patient Active Problem List   Diagnosis     Perioral dermatitis     Mild persistent asthma without complication     Anxiety     Major depressive disorder, recurrent episode, mild (H)     Multiple environmental allergies     Obesity (BMI 30-39.9)     Cervical high risk HPV (human papillomavirus) test positive     Rosacea, acne     Alcohol abuse     Past Surgical History:   Procedure Laterality Date     LASER TX, CERVICAL  2005       Social History     Tobacco Use     Smoking status: Never Smoker     Smokeless tobacco: Never Used   Substance Use Topics     Alcohol use: Yes     Alcohol/week: 21.0 oz     Types: 14 Standard drinks or equivalent, 21 Shots of liquor per week     Family History   Problem Relation Age of Onset     Osteoporosis Mother      Diabetes Father      Other Cancer Father         Pancreatic     Substance Abuse Father         alcoholic: stopped when I was in grade school     Prostate Cancer Maternal Grandfather      Prostate Cancer Other      Depression Brother      Coronary Artery  Disease No family hx of            ROS:  Constitutional, HEENT, cardiovascular, pulmonary, gi and gu systems are negative, except as otherwise noted.    OBJECTIVE:     /74   Pulse 88   Temp 97.9  F (36.6  C)   Resp 15   Wt 101.6 kg (224 lb)   SpO2 98%   BMI 35.08 kg/m    Body mass index is 35.08 kg/m .  GENERAL: healthy, alert and no distress  EYES: Eyes grossly normal to inspection, PERRL and conjunctivae and sclerae normal  HENT: normal cephalic/atraumatic, ear canals and TM's normal, nose and mouth without ulcers or lesions, oropharynx clear, oral mucous membranes moist and mild erythema posterior pharynx  NECK: bilateral anterior cervical mildly enlarged, tender nodes, no asymmetry, masses, or scars and thyroid normal to palpation  RESP: lungs clear to auscultation - no rales, rhonchi or wheezes  CV: regular rate and rhythm, normal S1 S2, no S3 or S4, no murmur, click or rub, no peripheral edema and peripheral pulses strong    Diagnostic Test Results:  Results for orders placed or performed in visit on 05/10/19   Strep, Rapid Screen   Result Value Ref Range    Specimen Description Throat     Rapid Strep A Screen       NEGATIVE: No Group A streptococcal antigen detected by immunoassay, await culture report.   Beta strep group A culture   Result Value Ref Range    Specimen Description Throat     Culture Micro No beta hemolytic Streptococcus Group A isolated        ASSESSMENT/PLAN:     1. Viral URI with cough  Reported symptoms out of proportion with exam findings. Reviewed viral vs bacterial infections and recommend supportive cares including fluids, tea with honey, over the counter cough medication, resting. Reviewed warning signs and when to follow up. For the lymphadenopathy, reassured this is normal in response to URIs and may take Ibuprofen 600 mg three times per day for 3 days to reduce swelling/pain.    2. Throat pain    - Strep, Rapid Screen  - Beta strep group A culture  - Benzocaine 10 MG  LOZG; Take 1 lozenge by mouth every 2 hours as needed (sore throat)  Dispense: 30 lozenge; Refill: 1    See Patient Instructions    JOHNNIE Lyn [facility]

## 2019-05-11 ENCOUNTER — COMMUNICATION - HEALTHEAST (OUTPATIENT)
Dept: SCHEDULING | Facility: CLINIC | Age: 34
End: 2019-05-11

## 2019-05-11 ENCOUNTER — OFFICE VISIT - HEALTHEAST (OUTPATIENT)
Dept: FAMILY MEDICINE | Facility: CLINIC | Age: 34
End: 2019-05-11

## 2019-05-11 DIAGNOSIS — H69.93 DYSFUNCTION OF BOTH EUSTACHIAN TUBES: ICD-10-CM

## 2019-05-11 DIAGNOSIS — J02.9 ACUTE PHARYNGITIS, UNSPECIFIED ETIOLOGY: ICD-10-CM

## 2019-05-11 DIAGNOSIS — J45.30 MILD PERSISTENT ASTHMA WITHOUT COMPLICATION: ICD-10-CM

## 2019-05-11 DIAGNOSIS — J01.10 ACUTE NON-RECURRENT FRONTAL SINUSITIS: ICD-10-CM

## 2019-05-11 LAB
BACTERIA SPEC CULT: NORMAL
SPECIMEN SOURCE: NORMAL

## 2019-05-13 DIAGNOSIS — J45.30 MILD PERSISTENT ASTHMA WITHOUT COMPLICATION: ICD-10-CM

## 2019-05-13 RX ORDER — NORGESTIMATE AND ETHINYL ESTRADIOL 7DAYSX3 28
KIT ORAL
Qty: 84 TABLET | Refills: 0 | Status: SHIPPED | OUTPATIENT
Start: 2019-05-13 | End: 2019-08-08

## 2019-05-13 NOTE — TELEPHONE ENCOUNTER
"Prescription approved per [facility] Refill Protocol.    Called and left message for pt notifying her that script has been sent to pharmacy and asked that she call RN hotline if any questions or concerns.    Requested Prescriptions   Signed Prescriptions Disp Refills    TRI-SPRINTEC 0.18/0.215/0.25 MG-35 MCG tablet 84 tablet 0     Sig: TAKE 1 TABLET BY MOUTH EVERY DAY       Contraceptives Protocol Passed - 5/13/2019  7:01 AM        Passed - Patient is not a current smoker if age is 35 or older        Passed - Recent (12 mo) or future (30 days) visit within the authorizing provider's specialty     Patient had office visit in the last 12 months or has a visit in the next 30 days with authorizing provider or within the authorizing provider's specialty.  See \"Patient Info\" tab in inbasket, or \"Choose Columns\" in Meds & Orders section of the refill encounter.              Passed - Medication is active on med list        Passed - No active pregnancy on record        Passed - No positive pregnancy test in past 12 months        Joan Nieto RN  [facility]    "

## 2019-05-14 RX ORDER — MONTELUKAST SODIUM 10 MG/1
TABLET ORAL
Qty: 90 TABLET | Refills: 1 | Status: SHIPPED | OUTPATIENT
Start: 2019-05-14 | End: 2019-09-30

## 2019-05-14 NOTE — TELEPHONE ENCOUNTER
"Routing refill request to provider for review/approval because:  Failed [facility] refill protocol, see below:    ACT Total Scores 7/5/2017 3/27/2018 8/22/2018   ACT TOTAL SCORE (Goal Greater than or Equal to 20) 24 24 25   In the past 12 months, how many times did you visit the emergency room for your asthma without being admitted to the hospital? 0 0 0   In the past 12 months, how many times were you hospitalized overnight because of your asthma? 0 0 0     Requested Prescriptions   Pending Prescriptions Disp Refills     montelukast (SINGULAIR) 10 MG tablet [Pharmacy Med Name: MONTELUKAST 10MG TABLETS]  Last Written Prescription Date:  7/30/18  Last Fill Quantity: 90,  # refills: 2   Last office visit: 5/10/2019 with prescribing provider:     Future Office Visit:   90 tablet 0     Sig: TAKE 1 TABLET BY MOUTH AT BEDTIME       Leukotriene Inhibitors Protocol Failed - 5/13/2019  8:10 AM        Failed - Asthma control assessment score within normal limits in last 6 months     Please review ACT score.           Passed - Patient is age 12 or older     If patient is under 16, ok to refill using age based dosing.           Passed - Medication is active on med list        Passed - Recent (6 mo) or future (30 days) visit within the authorizing provider's specialty     Patient had office visit in the last 6 months or has a visit in the next 30 days with authorizing provider or within the authorizing provider's specialty.  See \"Patient Info\" tab in inbasket, or \"Choose Columns\" in Meds & Orders section of the refill encounter.            Mary Vo, RN - [facility]      "

## 2019-05-15 RX ORDER — ALBUTEROL SULFATE 90 UG/1
2 AEROSOL, METERED RESPIRATORY (INHALATION) EVERY 6 HOURS PRN
Qty: 18 G | Refills: 3 | Status: SHIPPED | OUTPATIENT
Start: 2019-05-15 | End: 2022-12-26

## 2019-08-08 DIAGNOSIS — Z30.41 ENCOUNTER FOR BIRTH CONTROL PILLS MAINTENANCE: ICD-10-CM

## 2019-08-09 RX ORDER — NORGESTIMATE AND ETHINYL ESTRADIOL 7DAYSX3 28
KIT ORAL
Qty: 28 TABLET | Refills: 0 | Status: SHIPPED | OUTPATIENT
Start: 2019-08-09 | End: 2019-09-02

## 2019-08-24 DIAGNOSIS — F33.0 MAJOR DEPRESSIVE DISORDER, RECURRENT EPISODE, MILD (H): ICD-10-CM

## 2019-08-26 ENCOUNTER — TELEPHONE (OUTPATIENT)
Dept: FAMILY MEDICINE | Facility: CLINIC | Age: 34
End: 2019-08-26

## 2019-08-26 RX ORDER — BUSPIRONE HYDROCHLORIDE 15 MG/1
TABLET ORAL
Qty: 180 TABLET | Refills: 0 | Status: SHIPPED | OUTPATIENT
Start: 2019-08-26 | End: 2019-09-30

## 2019-08-26 NOTE — TELEPHONE ENCOUNTER
Patient is failing ACT. Letter sent to patient. postponing encounter until 9-16-19  Tami Bhatt CMA on 8/26/2019 at 8:13 AM

## 2019-08-26 NOTE — LETTER
August 26, 2019          Karrie Zhang,  [address] [place]  [place] [address]          Dear Karrie Zhang      Monitoring and managing your preventative and chronic health conditions are very important to us. Our records indicate that you have not scheduled for Asthma Control Test  which was recommended by Dr. Reeves. Please fill out the questionnaires and return back in envelope provided       If you have received your health care elsewhere, please call the clinic so the information can be documented in your chart.    Please call [phone] or message us through your [email or web address] account to schedule an appointment or provide information for your chart.     Feel free to contact us if you have any questions or concerns!    I look forward to seeing you and working with you on your health care needs.     Sincerely,       Your [facility] Team/[facility]

## 2019-09-02 ENCOUNTER — MYC REFILL (OUTPATIENT)
Dept: FAMILY MEDICINE | Facility: CLINIC | Age: 34
End: 2019-09-02

## 2019-09-02 DIAGNOSIS — Z30.41 ENCOUNTER FOR BIRTH CONTROL PILLS MAINTENANCE: ICD-10-CM

## 2019-09-03 RX ORDER — NORGESTIMATE AND ETHINYL ESTRADIOL 7DAYSX3 28
1 KIT ORAL DAILY
Qty: 28 TABLET | Refills: 0 | Status: SHIPPED | OUTPATIENT
Start: 2019-09-03 | End: 2019-09-30

## 2019-09-05 ENCOUNTER — DOCUMENTATION ONLY (OUTPATIENT)
Dept: SLEEP MEDICINE | Facility: CLINIC | Age: 34
End: 2019-09-05
Payer: COMMERCIAL

## 2019-09-05 NOTE — PROGRESS NOTES
6 month [facility]    STM Recheck Visit     Diagnostic AHI:  39.5  HST    Data only recheck     Assessment: Pt meeting objective benchmarks.  Patient compliance is 100% the last 180 days.   Action plan:   Pt to follow up per provider request.       Device type: Auto-CPAP  PAP settings: CPAP min 5.0 cm  H20     CPAP max 15.0 cm  H20    95th% pressure 9.7 cm  H20   Objective measures: 14 day rolling measures      Compliance  100 %      Leak  16.96 lpm  last  upload      AHI 1.04   last  upload      Average number of minutes 577      Objective measure goal  Compliance   Goal >70%  Leak   Goal < 24 lpm  AHI  Goal < 5  Usage  Goal >240

## 2019-09-30 ENCOUNTER — OFFICE VISIT (OUTPATIENT)
Dept: FAMILY MEDICINE | Facility: CLINIC | Age: 34
End: 2019-09-30
Payer: COMMERCIAL

## 2019-09-30 VITALS
HEIGHT: 66 IN | BODY MASS INDEX: 36.93 KG/M2 | HEART RATE: 73 BPM | RESPIRATION RATE: 16 BRPM | DIASTOLIC BLOOD PRESSURE: 78 MMHG | TEMPERATURE: 97.4 F | OXYGEN SATURATION: 97 % | WEIGHT: 229.8 LBS | SYSTOLIC BLOOD PRESSURE: 122 MMHG

## 2019-09-30 DIAGNOSIS — J45.30 MILD PERSISTENT ASTHMA WITHOUT COMPLICATION: ICD-10-CM

## 2019-09-30 DIAGNOSIS — Z11.3 SCREEN FOR STD (SEXUALLY TRANSMITTED DISEASE): ICD-10-CM

## 2019-09-30 DIAGNOSIS — F10.10 ALCOHOL ABUSE: ICD-10-CM

## 2019-09-30 DIAGNOSIS — Z00.00 ROUTINE GENERAL MEDICAL EXAMINATION AT A HEALTH CARE FACILITY: Primary | ICD-10-CM

## 2019-09-30 DIAGNOSIS — F41.9 ANXIETY: ICD-10-CM

## 2019-09-30 DIAGNOSIS — F33.0 MAJOR DEPRESSIVE DISORDER, RECURRENT EPISODE, MILD (H): ICD-10-CM

## 2019-09-30 DIAGNOSIS — E66.9 OBESITY (BMI 30-39.9): ICD-10-CM

## 2019-09-30 DIAGNOSIS — Z30.41 ENCOUNTER FOR BIRTH CONTROL PILLS MAINTENANCE: ICD-10-CM

## 2019-09-30 DIAGNOSIS — R79.89 ABNORMAL THYROID STIMULATING HORMONE (TSH) LEVEL: ICD-10-CM

## 2019-09-30 LAB
ALT SERPL W P-5'-P-CCNC: 18 U/L (ref 0–50)
CHOLEST SERPL-MCNC: 182 MG/DL
GLUCOSE SERPL-MCNC: 86 MG/DL (ref 70–99)
HDLC SERPL-MCNC: 57 MG/DL
LDLC SERPL CALC-MCNC: 87 MG/DL
NONHDLC SERPL-MCNC: 125 MG/DL
T4 FREE SERPL-MCNC: 0.95 NG/DL (ref 0.76–1.46)
TRIGL SERPL-MCNC: 191 MG/DL
TSH SERPL DL<=0.005 MIU/L-ACNC: 5.66 MU/L (ref 0.4–4)

## 2019-09-30 PROCEDURE — 80061 LIPID PANEL: CPT | Performed by: FAMILY MEDICINE

## 2019-09-30 PROCEDURE — 87491 CHLMYD TRACH DNA AMP PROBE: CPT | Performed by: FAMILY MEDICINE

## 2019-09-30 PROCEDURE — 36415 COLL VENOUS BLD VENIPUNCTURE: CPT | Performed by: FAMILY MEDICINE

## 2019-09-30 PROCEDURE — 84443 ASSAY THYROID STIM HORMONE: CPT | Performed by: FAMILY MEDICINE

## 2019-09-30 PROCEDURE — 99213 OFFICE O/P EST LOW 20 MIN: CPT | Mod: 25 | Performed by: FAMILY MEDICINE

## 2019-09-30 PROCEDURE — 90471 IMMUNIZATION ADMIN: CPT | Performed by: FAMILY MEDICINE

## 2019-09-30 PROCEDURE — 87389 HIV-1 AG W/HIV-1&-2 AB AG IA: CPT | Performed by: FAMILY MEDICINE

## 2019-09-30 PROCEDURE — 82947 ASSAY GLUCOSE BLOOD QUANT: CPT | Performed by: FAMILY MEDICINE

## 2019-09-30 PROCEDURE — 90686 IIV4 VACC NO PRSV 0.5 ML IM: CPT | Performed by: FAMILY MEDICINE

## 2019-09-30 PROCEDURE — 84439 ASSAY OF FREE THYROXINE: CPT | Performed by: FAMILY MEDICINE

## 2019-09-30 PROCEDURE — 99395 PREV VISIT EST AGE 18-39: CPT | Mod: 25 | Performed by: FAMILY MEDICINE

## 2019-09-30 PROCEDURE — 84460 ALANINE AMINO (ALT) (SGPT): CPT | Performed by: FAMILY MEDICINE

## 2019-09-30 PROCEDURE — 87591 N.GONORRHOEAE DNA AMP PROB: CPT | Performed by: FAMILY MEDICINE

## 2019-09-30 RX ORDER — FLUOXETINE 40 MG/1
CAPSULE ORAL
Qty: 90 CAPSULE | Refills: 3 | Status: SHIPPED | OUTPATIENT
Start: 2019-09-30 | End: 2020-09-29

## 2019-09-30 RX ORDER — NORGESTIMATE AND ETHINYL ESTRADIOL 7DAYSX3 28
1 KIT ORAL DAILY
Qty: 84 TABLET | Refills: 4 | Status: SHIPPED | OUTPATIENT
Start: 2019-09-30 | End: 2020-05-17

## 2019-09-30 RX ORDER — MONTELUKAST SODIUM 10 MG/1
1 TABLET ORAL AT BEDTIME
Qty: 90 TABLET | Refills: 3 | Status: SHIPPED | OUTPATIENT
Start: 2019-09-30 | End: 2020-09-29

## 2019-09-30 RX ORDER — BUSPIRONE HYDROCHLORIDE 15 MG/1
TABLET ORAL
Qty: 180 TABLET | Refills: 3 | Status: SHIPPED | OUTPATIENT
Start: 2019-09-30 | End: 2020-09-29

## 2019-09-30 ASSESSMENT — ENCOUNTER SYMPTOMS
HEARTBURN: 0
HEMATURIA: 0
JOINT SWELLING: 0
PALPITATIONS: 0
PARESTHESIAS: 0
NAUSEA: 0
DIZZINESS: 0
CONSTIPATION: 0
EYE PAIN: 0
ARTHRALGIAS: 0
DIARRHEA: 0
ABDOMINAL PAIN: 0
SORE THROAT: 0
SHORTNESS OF BREATH: 0
HEMATOCHEZIA: 0
FREQUENCY: 0
HEADACHES: 0
FEVER: 0
COUGH: 0
DYSURIA: 0
BREAST MASS: 0
MYALGIAS: 0
CHILLS: 0
NERVOUS/ANXIOUS: 0
WEAKNESS: 0

## 2019-09-30 ASSESSMENT — MIFFLIN-ST. JEOR: SCORE: 1745.12

## 2019-09-30 NOTE — LETTER
My Asthma Action Plan    Name: Karrie Zhang   YOB: 1985  Date: 9/30/2019   My doctor: Vanessa Granados MD   My clinic: [facility]        My Control Medicine: Montelukast (Singulair) -  10 mg daily  My Rescue Medicine: Albuterol (Proair/Ventolin/Proventil HFA) 2-4 puffs EVERY 4 HOURS as needed. Use a spacer if recommended by your provider.   My Asthma Severity:   Mild Persistent  Know your asthma triggers: upper respiratory infections               GREEN ZONE   Good Control    I feel good    No cough or wheeze    Can work, sleep and play without asthma symptoms       Take your asthma control medicine every day.     1. If exercise triggers your asthma, take your rescue medication    15 minutes before exercise or sports, and    During exercise if you have asthma symptoms  2. Spacer to use with inhaler: If you have a spacer, make sure to use it with your inhaler             YELLOW ZONE Getting Worse  I have ANY of these:    I do not feel good    Cough or wheeze    Chest feels tight    Wake up at night   1. Keep taking your Green Zone medications  2. Start taking your rescue medicine:    every 20 minutes for up to 1 hour. Then every 4 hours for 24-48 hours.  3. If you stay in the Yellow Zone for more than 12-24 hours, contact your doctor.  4. If you do not return to the Green Zone in 12-24 hours or you get worse, start taking your oral steroid medicine if prescribed by your provider.           RED ZONE Medical Alert - Get Help  I have ANY of these:    I feel awful    Medicine is not helping    Breathing getting harder    Trouble walking or talking    Nose opens wide to breathe       1. Take your rescue medicine NOW  2. If your provider has prescribed an oral steroid medicine, start taking it NOW  3. Call your doctor NOW  4. If you are still in the Red Zone after 20 minutes and you have not reached your doctor:    Take your rescue medicine again and    Call 911 or go to the emergency room  right away    See your regular doctor within 2 weeks of an Emergency Room or Urgent Care visit for follow-up treatment.          Annual Reminders:  Meet with Asthma Educator,  Flu Shot in the Fall, consider Pneumonia Vaccination for patients with asthma (aged 19 and older).    Pharmacy:    [id number] - [place], [place] - [address] AT [id number] - [place], [place] - [address] AT [facility] [address]                          Asthma Triggers  How To Control Things That Make Your Asthma Worse    Triggers are things that make your asthma worse.  Look at the list below to help you find your triggers and what you can do about them.  You can help prevent asthma flare-ups by staying away from your triggers.      Trigger                                                          What you can do   Cigarette Smoke  Tobacco smoke can make asthma worse. Do not allow smoking in your home, car or around you.  Be sure no one smokes at a child s day care or school.  If you smoke, ask your health care provider for ways to help you quit.  Ask family members to quit too.  Ask your health care provider for a referral to Quit Plan to help you quit smoking, or call 7-575-561-PLAN.     Colds, Flu, Bronchitis  These are common triggers of asthma. Wash your hands often.  Don t touch your eyes, nose or mouth.  Get a flu shot every year.     Dust Mites  These are tiny bugs that live in cloth or carpet. They are too small to see. Wash sheets and blankets in hot water every week.   Encase pillows and mattress in dust mite proof covers.  Avoid having carpet if you can. If you have carpet, vacuum weekly.   Use a dust mask and HEPA vacuum.   Pollen and Outdoor Mold  Some people are allergic to trees, grass, or weed pollen, or molds. Try to keep your windows closed.  Limit time out doors when pollen count is high.   Ask you health care provider about taking medicine during allergy  season.     Animal Dander  Some people are allergic to skin flakes, urine or saliva from pets with fur or feathers. Keep pets with fur or feathers out of your home.    If you can t keep the pet outdoors, then keep the pet out of your bedroom.  Keep the bedroom door closed.  Keep pets off cloth furniture and away from stuffed toys.     Mice, Rats, and Cockroaches   Some people are allergic to the waste from these pests.   Cover food and garbage.  Clean up spills and food crumbs.  Store grease in the refrigerator.   Keep food out of the bedroom.   Indoor Mold  This can be a trigger if your home has high moisture. Fix leaking faucets, pipes, or other sources of water.   Clean moldy surfaces.  Dehumidify basement if it is damp and smelly.   Smoke, Strong Odors, and Sprays  These can reduce air quality. Stay away from strong odors and sprays, such as perfume, powder, hair spray, paints, smoke incense, paint, cleaning products, candles and new carpet.   Exercise or Sports  Some people with asthma have this trigger. Be active!  Ask your doctor about taking medicine before sports or exercise to prevent symptoms.    Warm up for 5-10 minutes before and after sports or exercise.     Other Triggers of Asthma  Cold air:  Cover your nose and mouth with a scarf.  Sometimes laughing or crying can be a trigger.  Some medicines and food can trigger asthma.

## 2019-09-30 NOTE — PROGRESS NOTES
SUBJECTIVE:   CC: Karrie Zhang is an 34 year old woman  who presents for preventive health visit.     Patient has depression, mild recurrent, with no medication side effects and no anhedonia or low mood, without suicidal ideation.  She endorses overuse of alcohol to cope with anxiety and grief. Patient also has asthma, mild persistent. Patient has had asthma for years. Occasional wheezing and shortness of breath are triggered by colds and relieved by albuterol. She has difficulty losing weight.     Healthy Habits:     Getting at least 3 servings of Calcium per day:  NO    Bi-annual eye exam:  NO    Dental care twice a year:  Yes    Sleep apnea or symptoms of sleep apnea:  Sleep apnea    Diet:  Regular (no restrictions)    Frequency of exercise:  1 day/week    Duration of exercise:  Other    Taking medications regularly:  Yes    Barriers to taking medications:  None    Medication side effects:  Not applicable    PHQ-2 Total Score: 2    Additional concerns today:  Yes (STD screening, liver screening, referral to nutritionist)    Today's PHQ-2 Score:   PHQ-2 (  [employer]) 2019   Q1: Little interest or pleasure in doing things 1   Q2: Feeling down, depressed or hopeless 1   PHQ-2 Score 2   Q1: Little interest or pleasure in doing things Several days   Q2: Feeling down, depressed or hopeless Several days   PHQ-2 Score 2       Abuse: Current or Past(Physical, Sexual or Emotional)- No  Do you feel safe in your environment? Yes    Social History     Tobacco Use     Smoking status: Never Smoker     Smokeless tobacco: Never Used   Substance Use Topics     Alcohol use: Yes     Alcohol/week: 35.0 standard drinks     Types: 14 Standard drinks or equivalent, 21 Shots of liquor per week     If you drink alcohol do you typically have >3 drinks per day or >7 drinks per week? Yes      Alcohol Use 2019   Prescreen: >3 drinks/day or >7 drinks/week? Yes   Prescreen: >3 drinks/day or >7 drinks/week? -   AUDIT  SCORE  11     AUDIT - Alcohol Use Disorders Identification Test - Reproduced from the World Health Organization Audit 2001 (Second Edition) 9/30/2019   1.  How often do you have a drink containing alcohol? 4 or more times a week   2.  How many drinks containing alcohol do you have on a typical day when you are drinking? 1 or 2   3.  How often do you have five or more drinks on one occasion? Monthly   4.  How often during the last year have you found that you were not able to stop drinking once you had started? Less than monthly   5.  How often during the last year have you failed to do what was normally expected of you because of drinking? Never   6.  How often during the last year have you needed a first drink in the morning to get yourself going after a heavy drinking session? Never   7.  How often during the last year have you had a feeling of guilt or remorse after drinking? Less than monthly   8.  How often during the last year have you been unable to remember what happened the night before because of your drinking? Less than monthly   9.  Have you or someone else been injured because of your drinking? No   10. Has a relative, friend, doctor or other health care worker been concerned about your drinking or suggested you cut down? Yes, but not in the last year   TOTAL SCORE 11       Reviewed orders with patient.  Reviewed health maintenance and updated orders accordingly - Yes  Patient Active Problem List   Diagnosis     Perioral dermatitis     Mild persistent asthma without complication     Anxiety     Major depressive disorder, recurrent episode, mild (H)     Multiple environmental allergies     Obesity (BMI 30-39.9)     Cervical high risk HPV (human papillomavirus) test positive     Rosacea, acne     Alcohol abuse     Abnormal thyroid stimulating hormone (TSH) level     Past Surgical History:   Procedure Laterality Date     LASER [place], CERVICAL  2005       Social History     Tobacco Use     Smoking status:  Never Smoker     Smokeless tobacco: Never Used   Substance Use Topics     Alcohol use: Yes     Alcohol/week: 35.0 standard drinks     Types: 14 Standard drinks or equivalent, 21 Shots of liquor per week     Family History   Problem Relation Age of Onset     Osteoporosis Mother      Diabetes Father      Other Cancer Father         Pancreatic     Substance Abuse Father         alcoholic: stopped when I was in grade school     Prostate Cancer Maternal Grandfather      Prostate Cancer Other      Depression Brother      Coronary Artery Disease No family hx of          Current Outpatient Medications   Medication Sig Dispense Refill     albuterol (PROAIR HFA/PROVENTIL HFA/VENTOLIN HFA) 108 (90 Base) MCG/ACT inhaler Inhale 2 puffs into the lungs every 6 hours as needed for shortness of breath / dyspnea or wheezing 18 g 3     busPIRone (BUSPAR) 15 MG tablet TAKE 1 TABLET(15 MG) BY MOUTH TWICE DAILY 180 tablet 3     FLUoxetine (PROZAC) 40 MG capsule TAKE 1 CAPSULE BY MOUTH EVERY DAY 90 capsule 3     montelukast (SINGULAIR) 10 MG tablet Take 1 tablet (10 mg) by mouth At Bedtime 90 tablet 3     norgestim-eth estrad triphasic (TRI-SPRINTEC) 0.18/0.215/0.25 MG-35 MCG tablet Take 1 tablet by mouth daily 84 tablet 4     Allergies   Allergen Reactions     Penicillins Rash       Mammogram not appropriate for this patient based on age.    Pertinent mammograms are reviewed under the imaging tab.  History of abnormal Pap smear:   YES - ELBA 2/3 on biopsy - PAP/HPV co-testing at 12, 24 months.  If two negative results repeat co-testing in 3 years, if negative then routine screening.  Last 3 Pap and HPV Results:   PAP / HPV Latest Ref Rng & Units 8/22/2018 7/5/2017 6/29/2016   PAP - NIL LSIL(A) NIL   HPV 16 DNA NEG:Negative Negative Negative Negative   HPV 18 DNA NEG:Negative Negative Negative Negative   OTHER HR HPV NEG:Negative Negative Positive(A) Positive(A)     PAP / HPV Latest Ref Rng & Units 8/22/2018 7/5/2017 6/29/2016   PAP - NIL  LSIL(A) NIL   HPV 16 DNA NEG:Negative Negative Negative Negative   HPV 18 DNA NEG:Negative Negative Negative Negative   OTHER HR HPV NEG:Negative Negative Positive(A) Positive(A)     Reviewed and updated as needed this visit by clinical staff  Tobacco  Allergies  Meds  Problems  Med Hx  Surg Hx  Fam Hx         Reviewed and updated as needed this visit by Provider  Tobacco  Allergies  Meds  Problems  Med Hx  Surg Hx  Fam Hx        Past Medical History:   Diagnosis Date     Alcohol abuse      Allergic rhinitis      Alopecia      Cervical dysplasia      Cervical high risk HPV (human papillomavirus) test positive 6/29/16, 7/5/17    neg 16/18     Depressive disorder      H/O colposcopy with cervical biopsy 08/08/2017 08/08/17: [place] [facility] and [facility] normal.      Papanicolaou smear of cervix with low grade squamous intraepithelial lesion (LGSIL) 07/05/2017     Rosacea, acne      Uncomplicated asthma         Review of Systems   Constitutional: Positive for unexpected weight change. Negative for chills and fever.   HENT: Negative for congestion, ear pain, hearing loss and sore throat.    Eyes: Negative for pain and visual disturbance.   Respiratory: Negative for cough and shortness of breath.    Cardiovascular: Negative for chest pain, palpitations and peripheral edema.   Gastrointestinal: Negative for abdominal pain, constipation, diarrhea, heartburn, hematochezia and nausea.   Breasts:  Negative for tenderness, breast mass and discharge.   Genitourinary: Negative for dysuria, frequency, genital sores, hematuria, pelvic pain, urgency, vaginal bleeding and vaginal discharge.   Musculoskeletal: Negative for arthralgias, joint swelling and myalgias.   Skin: Negative for rash.   Neurological: Negative for dizziness, weakness, headaches and paresthesias.   Psychiatric/Behavioral: Negative for mood changes. The patient is nervous/anxious.      OBJECTIVE:   /78   Pulse 73   Temp 97.4  F (36.3  C) (Oral)   Resp  "16   Ht 1.67 m (5' 5.75\")   Wt 104.2 kg (229 lb 12.8 oz)   SpO2 97%   BMI 37.38 kg/m    Physical Exam  GENERAL: alert, no distress and obese  EYES: Eyes grossly normal to inspection, PERRL and conjunctivae and sclerae normal  HENT: ear canals and TM's normal, nose and mouth without ulcers or lesions  NECK: no adenopathy, no asymmetry, masses, or scars and thyroid normal to palpation  RESP: lungs clear to auscultation - no rales, rhonchi or wheezes  BREAST: normal without masses, tenderness or nipple discharge and no palpable axillary masses or adenopathy  CV: regular rate and rhythm, normal S1 S2, no S3 or S4, no murmur, click or rub, no peripheral edema and peripheral pulses strong  ABDOMEN: soft, nontender, no hepatosplenomegaly, no masses and bowel sounds normal  MS: no gross musculoskeletal defects noted, no edema  SKIN: no suspicious lesions or rashes  NEURO: Normal strength and tone, mentation intact and speech normal  PSYCH: mentation appears normal, affect normal/bright    Diagnostic Test Results:  Labs reviewed in Epic  Results for orders placed or performed in visit on 09/30/19   Glucose   Result Value Ref Range    Glucose 86 70 - 99 mg/dL   Lipid panel reflex to direct LDL Non-fasting   Result Value Ref Range    Cholesterol 182 <200 mg/dL    Triglycerides 191 (H) <150 mg/dL    HDL Cholesterol 57 >49 mg/dL    LDL Cholesterol Calculated 87 <100 mg/dL    Non HDL Cholesterol 125 <130 mg/dL   ALT   Result Value Ref Range    ALT 18 0 - 50 U/L   TSH with free T4 reflex   Result Value Ref Range    TSH 5.66 (H) 0.40 - 4.00 mU/L   T4 free   Result Value Ref Range    T4 Free 0.95 0.76 - 1.46 ng/dL       ASSESSMENT/PLAN:   (Z00.00) Routine general medical examination at a health care facility  (primary encounter diagnosis)  Plan: HIV Antigen Antibody Combo, Glucose, Lipid         panel reflex to direct LDL Non-fasting, T4         free, T4 free          (F33.0) Major depressive disorder, recurrent episode, mild " (H)  (F41.9) Anxiety  Plan: FLUoxetine (PROZAC) 40 MG capsule, MENTAL         HEALTH REFERRAL  - Adult; Outpatient Treatment;        Individual/Couples/Family/Group Therapy/Health         Psychology; Other: Behavioral Healthcare         Providers [phone]; We will contact you         to schedule the appointment or please call with        any questi..., OFFICE/OUTPT VISIT,EST,LEVL IV          (F10.10) Alcohol abuse  Plan: ALT, OFFICE/OUTPT VISIT,EST,LEVL IV        Urged cessation and offered my support.     (J45.30) Mild persistent asthma without complication  Comment: Well controlled with medications without side effects.   Plan: montelukast (SINGULAIR) 10 MG tablet,         OFFICE/OUTPT VISIT,EST,LEVL IV          (E66.9) Obesity (BMI 30-39.9)  Plan: NUTRITION REFERRAL, TSH with free T4 reflex,         OFFICE/OUTPT VISIT,EST,LEVL IV        Counseled to make better food choices, exercise as tolerated, and lose weight.     (Z30.41) Encounter for birth control pills maintenance  Plan: norgestim-eth estrad triphasic (TRI-SPRINTEC)         0.18/0.215/0.25 MG-35 MCG tablet         (Z11.3) Screen for STD (sexually transmitted disease)  Plan: Neisseria gonorrhoeae PCR, Chlamydia         trachomatis PCR          (R79.89) Abnormal thyroid stimulating hormone (TSH) level  Plan: TSH with free T4 reflex        See result note       COUNSELING:  Reviewed preventive health counseling, as reflected in patient instructions  Special attention given to:        Regular exercise       Healthy diet/nutrition       Contraception       Osteoporosis Prevention/Bone Health       Safe sex practices/STD prevention       HIV screeninx in teen years, 1x in adult years, and at intervals if high risk       The ASCVD Risk score ([place] Jr., et al., 2013) failed to calculate for the following reasons:    The 2013 ASCVD risk score is only valid for ages 40 to 79    Estimated body mass index is 37.38 kg/m  as calculated from the  "following:    Height as of this encounter: 1.67 m (5' 5.75\").    Weight as of this encounter: 104.2 kg (229 lb 12.8 oz).    Weight management plan: Discussed healthy diet and exercise guidelines     reports that she has never smoked. She has never used smokeless tobacco.      Counseling Resources:  ATP IV Guidelines  Pooled Cohorts Equation Calculator  Breast Cancer Risk Calculator  FRAX Risk Assessment  ICSI Preventive Guidelines  Dietary Guidelines for Americans, 2010  USDA's MyPlate  ASA Prophylaxis  Lung CA Screening    Vanessa Granados MD  [facility]  "

## 2019-10-01 ENCOUNTER — MYC MEDICAL ADVICE (OUTPATIENT)
Dept: FAMILY MEDICINE | Facility: CLINIC | Age: 34
End: 2019-10-01

## 2019-10-01 DIAGNOSIS — E78.1 HYPERTRIGLYCERIDEMIA: Primary | ICD-10-CM

## 2019-10-01 LAB
C TRACH DNA SPEC QL NAA+PROBE: NEGATIVE
HIV 1+2 AB+HIV1 P24 AG SERPL QL IA: NONREACTIVE
N GONORRHOEA DNA SPEC QL NAA+PROBE: NEGATIVE
SPECIMEN SOURCE: NORMAL
SPECIMEN SOURCE: NORMAL

## 2019-10-01 ASSESSMENT — ASTHMA QUESTIONNAIRES: ACT_TOTALSCORE: 22

## 2019-10-01 ASSESSMENT — ENCOUNTER SYMPTOMS
NERVOUS/ANXIOUS: 1
UNEXPECTED WEIGHT CHANGE: 1

## 2019-10-01 NOTE — RESULT ENCOUNTER NOTE
Your final test results are pending.  Please check your chart again within 2 - 3 days. You will receive further instruction when your full test result panel is complete.

## 2019-10-01 NOTE — RESULT ENCOUNTER NOTE
Liz,    Your HIV, STD, liver, diabetes and cholesterol tests are fine.     Your thyroid test is mildly abnormal. This could be related to your fatigue. Return for recheck of these labs in 6 weeks so we can consider a thyroid medication. Call [phone] or go to [email or web address] for a lab-only appointment.      Vanessa Granados MD

## 2019-10-31 ENCOUNTER — TRANSFERRED RECORDS (OUTPATIENT)
Dept: HEALTH INFORMATION MANAGEMENT | Facility: CLINIC | Age: 34
End: 2019-10-31

## 2019-10-31 ENCOUNTER — TELEPHONE (OUTPATIENT)
Dept: FAMILY MEDICINE | Facility: CLINIC | Age: 34
End: 2019-10-31

## 2019-10-31 ENCOUNTER — AMBULATORY - HEALTHEAST (OUTPATIENT)
Dept: LAB | Facility: HOSPITAL | Age: 34
End: 2019-10-31

## 2019-10-31 DIAGNOSIS — E03.9 HYPOTHYROIDISM: ICD-10-CM

## 2019-10-31 LAB
TSH SERPL DL<=0.005 MIU/L-ACNC: 2.1 UIU/ML (ref 0.3–5)
TSH SERPL-ACNC: 2.1 UIU/ML (ref 0.3–5)

## 2019-10-31 NOTE — TELEPHONE ENCOUNTER
Reason for call:  Order   Order or referral being requested: lab order  Reason for request: need faxed to [facility], fax # [phone]  Date needed: as soon as possible  Has the patient been seen by the PCP for this problem? NO    Additional comments: fax order    Phone number to reach patient:  Home number on file [phone] (home)    Best Time:  Any     Can we leave a detailed message on this number?  YES

## 2019-11-06 ENCOUNTER — E-VISIT (OUTPATIENT)
Dept: FAMILY MEDICINE | Facility: CLINIC | Age: 34
End: 2019-11-06
Payer: COMMERCIAL

## 2019-11-06 DIAGNOSIS — R30.0 DIFFICULT OR PAINFUL URINATION: Primary | ICD-10-CM

## 2019-11-06 PROCEDURE — 99444 ZZC PHYSICIAN ONLINE EVALUATION & MANAGEMENT SERVICE: CPT | Performed by: FAMILY MEDICINE

## 2019-11-07 ENCOUNTER — MYC MEDICAL ADVICE (OUTPATIENT)
Dept: FAMILY MEDICINE | Facility: CLINIC | Age: 34
End: 2019-11-07

## 2019-11-07 DIAGNOSIS — R30.0 DYSURIA: Primary | ICD-10-CM

## 2019-11-07 NOTE — PATIENT INSTRUCTIONS
Thank you for choosing us for your care. Given your symptoms, I would like you to do a lab-only visit to determine what is causing them.  I have placed the orders.  Please schedule an appointment with the lab right here in [facility], or call [phone].  I will let you know when the results are back and next steps to take.    Dysuria with Uncertain Cause (Adult)    The urethra is the tube that allows urine to pass out of the body. In a woman, the urethra is the opening above the vagina. In men, the urethra is the opening on the tip of the penis. Dysuria is the feeling of pain or burning in the urethra when passing urine.  Dysuria can be caused by anything that irritates or inflames the urethra. An infection or chemical irritation can cause this reaction. A bladder infection is the most common cause of dysuria in adults. A urine test can diagnose this. A bladder infection needs antibiotic treatment.  Soaps, lotions, colognes and feminine hygiene products can cause dysuria. So can birth control jellies, creams, and foams. It will go away 1 to 3 days after using these irritants.  Sexually transmitted diseases (STDs) such as chlamydia or gonorrhea can cause dysuria. Your healthcare provider may take a culture sample. Your provider may start you on antibiotic medicine before the culture test returns.  In women who have gone through menopause, dysuria can be from dryness in the lining of the urethra. This can be treated with hormones. Dysuria becomes long-term (chronic) when it lasts for weeks or months. You may need to see a specialist (urologist) to diagnose and treat chronic dysuria.  Home care  These home care tips may help:    Don't use any chemicals or products that you think may be causing your symptoms.    If you were given a prescription medicine, take as directed. Be sure to take it until it is all used up.    If a culture was taken, don't have sex until you have been told that it is negative. This means you  don't have an infection. Then follow your healthcare provider's advice to treat your condition.  If a culture was done and it is positive:    Both you and your sexual partner may need to be treated. This is true even if your partner has no symptoms.    Contact your healthcare provider or go to an urgent care clinic or the public health department to be looked at and treated.    Don't have sex until both you and your partner(s) have finished all antibiotics and your healthcare provider says you are no longer contagious.    Learn about and use safe sex practices. The safest sex is with a partner who has tested negative and only has sex with you. Condoms can prevent STDs from spreading, but they aren't a guarantee.  Follow-up care  Follow up with your healthcare provider, or as advised. If a culture was taken, you may call as directed for the results. If you have an STD, follow up with your provider or the public health department for a complete STD screening, including HIV testing. For more information, contact CDC-INFO at 126-255-0519.  When to seek medical advice  Call your healthcare provider right away if any of these occur:    You aren't better after 3 days of treatment    Fever of 100.4 F (38 C) or higher, or as directed by your healthcare provider    Back or belly pain that gets worse    You can't urinate because of pain    New discharge from the urethra, vagina, or penis    Painful sores on the penis    Rash or joint pain    Painful lumps (lymph nodes) in the groin    Testicle pain or swelling of the scrotum  Date Last Reviewed: 11/1/2016 2000-2018 [employer]. [address] [place] [address], [place] [address]. All rights reserved. This information is not intended as a substitute for professional medical care. Always follow your healthcare professional's instructions.

## 2019-11-08 ENCOUNTER — TELEPHONE (OUTPATIENT)
Dept: FAMILY MEDICINE | Facility: CLINIC | Age: 34
End: 2019-11-08

## 2019-11-08 NOTE — TELEPHONE ENCOUNTER
Reason for Call:  Other call back     Detailed comments: Patient calling wondering if that she should be getting a vaginal swab. Please call to advise.     Phone Number Patient can be reached at: Home number on file [phone] (home)    Best Time: any     Can we leave a detailed message on this number? YES    Call taken on 11/8/2019 at 1:27 PM by Keysha Aguillon

## 2019-11-11 DIAGNOSIS — R79.89 ABNORMAL THYROID STIMULATING HORMONE (TSH) LEVEL: ICD-10-CM

## 2019-11-11 DIAGNOSIS — R30.0 DYSURIA: ICD-10-CM

## 2019-11-11 DIAGNOSIS — E78.1 HYPERTRIGLYCERIDEMIA: ICD-10-CM

## 2019-11-11 DIAGNOSIS — R30.0 DIFFICULT OR PAINFUL URINATION: ICD-10-CM

## 2019-11-11 DIAGNOSIS — R82.90 ABNORMAL URINE FINDINGS: Primary | ICD-10-CM

## 2019-11-11 LAB
ALBUMIN UR-MCNC: 30 MG/DL
APPEARANCE UR: CLEAR
BACTERIA #/AREA URNS HPF: ABNORMAL /HPF
BILIRUB UR QL STRIP: NEGATIVE
CHOLEST SERPL-MCNC: 181 MG/DL
COLOR UR AUTO: YELLOW
GLUCOSE UR STRIP-MCNC: NEGATIVE MG/DL
HDLC SERPL-MCNC: 66 MG/DL
HGB UR QL STRIP: ABNORMAL
KETONES UR STRIP-MCNC: 15 MG/DL
LDLC SERPL CALC-MCNC: 89 MG/DL
LEUKOCYTE ESTERASE UR QL STRIP: ABNORMAL
NITRATE UR QL: POSITIVE
NON-SQ EPI CELLS #/AREA URNS LPF: ABNORMAL /LPF
NONHDLC SERPL-MCNC: 115 MG/DL
PH UR STRIP: 6 PH (ref 5–7)
RBC #/AREA URNS AUTO: ABNORMAL /HPF
SOURCE: ABNORMAL
SP GR UR STRIP: 1.02 (ref 1–1.03)
SPECIMEN SOURCE: NORMAL
T4 FREE SERPL-MCNC: 1 NG/DL (ref 0.76–1.46)
TRIGL SERPL-MCNC: 129 MG/DL
TSH SERPL DL<=0.005 MIU/L-ACNC: 6.57 MU/L (ref 0.4–4)
UROBILINOGEN UR STRIP-ACNC: 0.2 EU/DL (ref 0.2–1)
WBC #/AREA URNS AUTO: ABNORMAL /HPF
WET PREP SPEC: NORMAL

## 2019-11-11 PROCEDURE — 87591 N.GONORRHOEAE DNA AMP PROB: CPT | Performed by: FAMILY MEDICINE

## 2019-11-11 PROCEDURE — 84443 ASSAY THYROID STIM HORMONE: CPT | Performed by: FAMILY MEDICINE

## 2019-11-11 PROCEDURE — 87186 SC STD MICRODIL/AGAR DIL: CPT | Performed by: FAMILY MEDICINE

## 2019-11-11 PROCEDURE — 87086 URINE CULTURE/COLONY COUNT: CPT | Performed by: FAMILY MEDICINE

## 2019-11-11 PROCEDURE — 81001 URINALYSIS AUTO W/SCOPE: CPT | Performed by: FAMILY MEDICINE

## 2019-11-11 PROCEDURE — 87088 URINE BACTERIA CULTURE: CPT | Performed by: FAMILY MEDICINE

## 2019-11-11 PROCEDURE — 84439 ASSAY OF FREE THYROXINE: CPT | Performed by: FAMILY MEDICINE

## 2019-11-11 PROCEDURE — 87491 CHLMYD TRACH DNA AMP PROBE: CPT | Performed by: FAMILY MEDICINE

## 2019-11-11 PROCEDURE — 36415 COLL VENOUS BLD VENIPUNCTURE: CPT | Performed by: FAMILY MEDICINE

## 2019-11-11 PROCEDURE — 80061 LIPID PANEL: CPT | Performed by: FAMILY MEDICINE

## 2019-11-11 PROCEDURE — 87210 SMEAR WET MOUNT SALINE/INK: CPT | Performed by: FAMILY MEDICINE

## 2019-11-12 DIAGNOSIS — N30.00 ACUTE CYSTITIS WITHOUT HEMATURIA: ICD-10-CM

## 2019-11-12 DIAGNOSIS — E03.9 ACQUIRED HYPOTHYROIDISM: Primary | ICD-10-CM

## 2019-11-12 PROBLEM — R79.89 ABNORMAL THYROID STIMULATING HORMONE (TSH) LEVEL: Status: RESOLVED | Noted: 2019-09-30 | Resolved: 2019-11-12

## 2019-11-12 LAB
C TRACH DNA SPEC QL NAA+PROBE: NEGATIVE
N GONORRHOEA DNA SPEC QL NAA+PROBE: NEGATIVE
SPECIMEN SOURCE: NORMAL
SPECIMEN SOURCE: NORMAL

## 2019-11-12 RX ORDER — LEVOTHYROXINE SODIUM 75 UG/1
75 TABLET ORAL DAILY
Qty: 90 TABLET | Refills: 0 | Status: SHIPPED | OUTPATIENT
Start: 2019-11-12 | End: 2020-02-11

## 2019-11-12 RX ORDER — SULFAMETHOXAZOLE/TRIMETHOPRIM 800-160 MG
1 TABLET ORAL 2 TIMES DAILY
Qty: 6 TABLET | Refills: 0 | Status: SHIPPED | OUTPATIENT
Start: 2019-11-12 | End: 2019-11-15

## 2019-11-12 NOTE — RESULT ENCOUNTER NOTE
Please call patient:    You have a bladder infection. Please take bactrim twice daily as sent to your pharmacy. Call or return to clinic as needed if these symptoms worsen or fail to improve as anticipated.      Your wet prep vaginal swab is normal.    You have underactive thyroid, called hypothyroidism. This can cause fatigue and weight gain. I recommend taking a daily medication called levothyroxine indefinitely. A lab only appointment is required in 6 weeks to confirm the correct dose for you, and yearly lab tests thereafter. Call or return to clinic as needed if these symptoms worsen or fail to improve as anticipated.     Vanessa Granados MD

## 2019-11-13 LAB
BACTERIA SPEC CULT: ABNORMAL
SPECIMEN SOURCE: ABNORMAL

## 2020-01-09 ENCOUNTER — MYC MEDICAL ADVICE (OUTPATIENT)
Dept: FAMILY MEDICINE | Facility: CLINIC | Age: 35
End: 2020-01-09

## 2020-02-01 ENCOUNTER — TRANSFERRED RECORDS (OUTPATIENT)
Dept: HEALTH INFORMATION MANAGEMENT | Facility: CLINIC | Age: 35
End: 2020-02-01

## 2020-02-01 ENCOUNTER — AMBULATORY - HEALTHEAST (OUTPATIENT)
Dept: LAB | Facility: HOSPITAL | Age: 35
End: 2020-02-01

## 2020-02-01 DIAGNOSIS — E03.9 ACQUIRED HYPOTHYROIDISM: ICD-10-CM

## 2020-02-01 LAB
TSH SERPL DL<=0.005 MIU/L-ACNC: 1.01 UIU/ML (ref 0.3–5)
TSH SERPL-ACNC: 1.01 ULU/ML (ref 0.3–5)

## 2020-02-11 ENCOUNTER — MYC MEDICAL ADVICE (OUTPATIENT)
Dept: FAMILY MEDICINE | Facility: CLINIC | Age: 35
End: 2020-02-11

## 2020-02-11 DIAGNOSIS — E03.9 ACQUIRED HYPOTHYROIDISM: ICD-10-CM

## 2020-02-11 RX ORDER — LEVOTHYROXINE SODIUM 75 UG/1
75 TABLET ORAL DAILY
Qty: 90 TABLET | Refills: 1 | Status: SHIPPED | OUTPATIENT
Start: 2020-02-11 | End: 2020-08-28

## 2020-02-11 RX ORDER — LEVOTHYROXINE SODIUM 75 UG/1
TABLET ORAL
Qty: 90 TABLET | Refills: 0 | OUTPATIENT
Start: 2020-02-11

## 2020-02-11 NOTE — TELEPHONE ENCOUNTER
Patient is due for lab only appointment to recheck thyroid. Please call and schedule.     Siomara Christopher RN

## 2020-02-11 NOTE — TELEPHONE ENCOUNTER
levothyroxine (SYNTHROID/LEVOTHROID) 75 MCG tablet 90 tablet 1 2/11/2020  No   Sig - Route: Take 1 tablet (75 mcg) by mouth daily - Oral   Sent to pharmacy as: levothyroxine (SYNTHROID/LEVOTHROID) 75 MCG tablet   Class: E-Prescribe   Order: [id number]   E-Prescribing Status: Receipt confirmed by pharmacy (2/11/2020 11:32 AM CST)     Duplicate request.   Closing encounter.     Siomara Christopher RN

## 2020-02-11 NOTE — TELEPHONE ENCOUNTER
Last TSH was done on 11/11/19 per result note: A lab only appointment is required in 6 weeks to confirm the correct dose for you, and yearly lab tests thereafter.  [employer] message sent.   Yola Quiroz RN

## 2020-02-11 NOTE — TELEPHONE ENCOUNTER
TSH done 02/01/2020 at [facility] was 1.01 mU/L.  Pt's current medication: levothyroxine (SYNTHROID/LEVOTHROID) 75 MCG tablet daily.    Dr. Granados-  Should patient continue at current dose? When is next f/u office visit needed?

## 2020-03-12 ENCOUNTER — MYC MEDICAL ADVICE (OUTPATIENT)
Dept: FAMILY MEDICINE | Facility: CLINIC | Age: 35
End: 2020-03-12

## 2020-03-18 DIAGNOSIS — G47.33 OBSTRUCTIVE SLEEP APNEA (ADULT) (PEDIATRIC): Primary | ICD-10-CM

## 2020-03-19 ENCOUNTER — MYC REFILL (OUTPATIENT)
Dept: FAMILY MEDICINE | Facility: CLINIC | Age: 35
End: 2020-03-19

## 2020-03-19 DIAGNOSIS — Z30.41 ENCOUNTER FOR BIRTH CONTROL PILLS MAINTENANCE: ICD-10-CM

## 2020-03-19 RX ORDER — NORGESTIMATE AND ETHINYL ESTRADIOL 7DAYSX3 28
1 KIT ORAL DAILY
Qty: 84 TABLET | Refills: 4 | Status: CANCELLED | OUTPATIENT
Start: 2020-03-19

## 2020-03-19 NOTE — TELEPHONE ENCOUNTER
Called patient and line was busy. Will call again later to let patient know that there is refill at her pharmacy for   Tami Bhatt CMA on 3/19/2020 at 9:32 AM

## 2020-03-20 NOTE — TELEPHONE ENCOUNTER
My chart message sent to patient informing her she still has refills with pharmacy.  Joan KANG CMA ([facility])

## 2020-05-17 DIAGNOSIS — Z30.41 ENCOUNTER FOR BIRTH CONTROL PILLS MAINTENANCE: ICD-10-CM

## 2020-05-17 RX ORDER — NORGESTIMATE AND ETHINYL ESTRADIOL 7DAYSX3 28
KIT ORAL
Qty: 84 TABLET | Refills: 1 | Status: SHIPPED | OUTPATIENT
Start: 2020-05-17 | End: 2020-09-29

## 2020-06-01 ENCOUNTER — TELEPHONE (OUTPATIENT)
Dept: FAMILY MEDICINE | Facility: CLINIC | Age: 35
End: 2020-06-01

## 2020-06-01 ENCOUNTER — TELEPHONE (OUTPATIENT)
Dept: NUTRITION | Facility: CLINIC | Age: 35
End: 2020-06-01

## 2020-06-01 DIAGNOSIS — E66.9 OBESITY (BMI 30-39.9): Primary | ICD-10-CM

## 2020-06-01 NOTE — TELEPHONE ENCOUNTER
Nutrition Services:    Spoke with Liz after she reached out to our schedulers about Dietitian options for her.   She is concerned that a dietitian will not be able to help her as her cousin with hypothyroidism had no success with seeing a dietitian.     RD had a lengthy conversation with Liz describing RD services for weight management/hypothryroidism, Medical Weight Management etc.  After discussion, I feel as though Liz would be best suited with Medical Weight Management clinic.   She describes herself as feeling hungry all the time, having cravings, eating in the middle of the night etc.  With her cravings, she might really benefit from an appetite suppressant.    Message sent to her PCP, Dr. Granados regarding her options. If MD agrees that MW would be a good route for her, Liz would be receptive to trying this. [facility] also have dietitians in clinic that they refer their patients to as necessary.     RD will follow-up with Liz regarding a potential referral to [facility] or personally see her if she desires.       Zuly Celis RDN, LDN   [facility]   [phone]

## 2020-06-01 NOTE — TELEPHONE ENCOUNTER
----- Message from Zuly Celis RD sent at 6/1/2020  3:43 PM CDT -----  Regarding: Dietitian referral  Hi Dr. Granados,     I wanted to let you know that I spoke with Liz after she reached out to our schedulers about Dietitian options for her.   She is concerned that a dietitian will not be able to help her as her cousin with hypothyroidism had no success with seeing a dietitian.     I can certainly help her, however, after our lengthy conversation, I feel like she would be best suited with Medical Weight Management clinic.   She describes herself as feeling hungry all the time, having cravings, eating in the middle of the night etc.  With her cravings, she might really benefit from an appetite suppressant?    If you agree that MWM would be a good route for her, she would be receptive to trying this.  I believe that a referral can be placed through [facility]. They also have dietitians in clinic that they refer their patients to as necessary.     Let me know your thoughts and I can get in touch with Liz.     Thank you!    Zuly Celis RDN, N  [facility]  [phone]

## 2020-06-15 ENCOUNTER — OFFICE VISIT - HEALTHEAST (OUTPATIENT)
Dept: BEHAVIORAL HEALTH | Facility: CLINIC | Age: 35
End: 2020-06-15

## 2020-06-15 DIAGNOSIS — F43.23 ADJUSTMENT DISORDER WITH MIXED ANXIETY AND DEPRESSED MOOD: ICD-10-CM

## 2020-06-15 ASSESSMENT — PATIENT HEALTH QUESTIONNAIRE - PHQ9: SUM OF ALL RESPONSES TO PHQ QUESTIONS 1-9: 14

## 2020-06-17 ENCOUNTER — MYC MEDICAL ADVICE (OUTPATIENT)
Dept: FAMILY MEDICINE | Facility: CLINIC | Age: 35
End: 2020-06-17

## 2020-06-29 ENCOUNTER — RECORDS - HEALTHEAST (OUTPATIENT)
Dept: ADMINISTRATIVE | Facility: OTHER | Age: 35
End: 2020-06-29

## 2020-07-01 ENCOUNTER — MYC MEDICAL ADVICE (OUTPATIENT)
Dept: FAMILY MEDICINE | Facility: CLINIC | Age: 35
End: 2020-07-01

## 2020-07-01 DIAGNOSIS — M21.612 BUNION, LEFT: Primary | ICD-10-CM

## 2020-07-01 NOTE — TELEPHONE ENCOUNTER
Please see V[facility] message/pics and advise if you would recommend pt starting with primary care or go directly to podiatry.

## 2020-07-14 ENCOUNTER — MYC MEDICAL ADVICE (OUTPATIENT)
Dept: FAMILY MEDICINE | Facility: CLINIC | Age: 35
End: 2020-07-14

## 2020-07-14 ENCOUNTER — TELEPHONE (OUTPATIENT)
Dept: FAMILY MEDICINE | Facility: CLINIC | Age: 35
End: 2020-07-14

## 2020-07-14 ENCOUNTER — VIRTUAL VISIT (OUTPATIENT)
Dept: FAMILY MEDICINE | Facility: CLINIC | Age: 35
End: 2020-07-14
Payer: COMMERCIAL

## 2020-07-14 DIAGNOSIS — N30.00 ACUTE CYSTITIS WITHOUT HEMATURIA: ICD-10-CM

## 2020-07-14 DIAGNOSIS — N30.00 ACUTE CYSTITIS WITHOUT HEMATURIA: Primary | ICD-10-CM

## 2020-07-14 DIAGNOSIS — R82.90 NONSPECIFIC FINDING ON EXAMINATION OF URINE: Primary | ICD-10-CM

## 2020-07-14 LAB
ALBUMIN UR-MCNC: 100 MG/DL
APPEARANCE UR: ABNORMAL
BACTERIA #/AREA URNS HPF: ABNORMAL /HPF
BILIRUB UR QL STRIP: NEGATIVE
COLOR UR AUTO: YELLOW
GLUCOSE UR STRIP-MCNC: NEGATIVE MG/DL
HGB UR QL STRIP: ABNORMAL
KETONES UR STRIP-MCNC: NEGATIVE MG/DL
LEUKOCYTE ESTERASE UR QL STRIP: ABNORMAL
NITRATE UR QL: POSITIVE
NON-SQ EPI CELLS #/AREA URNS LPF: ABNORMAL /LPF
PH UR STRIP: 6 PH (ref 5–7)
RBC #/AREA URNS AUTO: ABNORMAL /HPF
SOURCE: ABNORMAL
SP GR UR STRIP: 1.02 (ref 1–1.03)
UROBILINOGEN UR STRIP-ACNC: 0.2 EU/DL (ref 0.2–1)
WBC #/AREA URNS AUTO: ABNORMAL /HPF

## 2020-07-14 PROCEDURE — 87088 URINE BACTERIA CULTURE: CPT | Performed by: NURSE PRACTITIONER

## 2020-07-14 PROCEDURE — 87186 SC STD MICRODIL/AGAR DIL: CPT | Performed by: NURSE PRACTITIONER

## 2020-07-14 PROCEDURE — 81001 URINALYSIS AUTO W/SCOPE: CPT | Performed by: NURSE PRACTITIONER

## 2020-07-14 PROCEDURE — 87086 URINE CULTURE/COLONY COUNT: CPT | Performed by: NURSE PRACTITIONER

## 2020-07-14 PROCEDURE — 99213 OFFICE O/P EST LOW 20 MIN: CPT | Mod: TEL | Performed by: PHYSICIAN ASSISTANT

## 2020-07-14 RX ORDER — SULFAMETHOXAZOLE/TRIMETHOPRIM 800-160 MG
1 TABLET ORAL 2 TIMES DAILY
Qty: 6 TABLET | Refills: 0 | Status: SHIPPED | OUTPATIENT
Start: 2020-07-14 | End: 2020-08-05

## 2020-07-14 NOTE — PROGRESS NOTES
"Karrie Zhang is a 35 year old female who is being evaluated via a billable telephone visit.      The patient has been notified of following:     \"This telephone visit will be conducted via a call between you and your physician/provider. We have found that certain health care needs can be provided without the need for a physical exam.  This service lets us provide the care you need with a short phone conversation.  If a prescription is necessary we can send it directly to your pharmacy.  If lab work is needed we can place an order for that and you can then stop by our lab to have the test done at a later time.    Telephone visits are billed at different rates depending on your insurance coverage. During this emergency period, for some insurers they may be billed the same as an in-person visit.  Please reach out to your insurance provider with any questions.    If during the course of the call the physician/provider feels a telephone visit is not appropriate, you will not be charged for this service.\"    Patient has given verbal consent for Telephone visit?  Yes    What phone number would you like to be contacted at? [phone]    How would you like to obtain your AVS? [place]    Subjective     Karrie Zhagn is a 35 year old female who presents via phone visit today for the following health issues:    HPI  URINARY TRACT SYMPTOMS      Duration: started last night    Description  dysuria and frequency    Intensity:  Mild     Accompanying signs and symptoms:  Fever/chills: no   Flank pain no   Nausea and vomiting: no   Vaginal symptoms: none  Abdominal/Pelvic Pain: no     History  History of frequent UTI's: no   History of kidney stones: no   Sexually Active: no   Possibility of pregnancy: No    Precipitating or alleviating factors: None    Therapies tried and outcome: none   Outcome: none    Patient says that frequency is more disruptive that pain currently.     Review of Systems   Constitutional, HEENT, " cardiovascular, pulmonary, gi and gu systems are negative, except as otherwise noted.           Diagnostic Test Results:  UA pending        Assessment/Plan:  1. Acute cystitis without hematuria  - *UA reflex to Microscopic and Culture ([facility] (except [place] and [place])  - sulfamethoxazole-trimethoprim (BACTRIM DS) 800-160 MG tablet; Take 1 tablet by mouth 2 times daily for 3 days  Dispense: 6 tablet; Refill: 0    Return today (on 7/14/2020) for Lab Work.      Phone call duration:  10 minutes    Solis Hutson PA-C

## 2020-07-14 NOTE — TELEPHONE ENCOUNTER
Reason for call:  Patient reporting a symptom    Symptom or request: UTI    Duration (how long have symptoms been present): 1 day    Have you been treated for this before? Yes    Additional comments: Patient wants to know if she can schedule an evisit or will she have to come in clinic for symptoms?    Phone Number patient can be reached at:  Home number on file [phone] (home)    Best Time:  any    Can we leave a detailed message on this number:  YES    Call taken on 7/14/2020 at 8:05 AM by Espinoza Martinez

## 2020-07-14 NOTE — TELEPHONE ENCOUNTER
No in person appointments available, advised patient she can schedule E-visit through mychart, telephone visit or video visit.  She would like phone visit. Appointment scheduled.   Yola Quiroz RN

## 2020-07-15 ENCOUNTER — TELEPHONE (OUTPATIENT)
Dept: FAMILY MEDICINE | Facility: CLINIC | Age: 35
End: 2020-07-15

## 2020-07-15 NOTE — TELEPHONE ENCOUNTER
Reason for call:  Results   Name of test or procedure: Labs  Date of test or procedure: 7/14/20  Location of test or procedure: [place]    Additional comments: na    Phone number to reach patient:  Home number on file [phone] (home)    Best Time:  any    Can we leave a detailed message on this number?  YES    Travel screening: Not Applicable

## 2020-07-15 NOTE — TELEPHONE ENCOUNTER
Please see mychart message from patient with questions about side effects to sulfamethoxazole-trimethoprim (BACTRIM DS) 800-160 MG tablet.

## 2020-07-15 NOTE — TELEPHONE ENCOUNTER
Called and spoke with patient & notified of provider's results as written.   Verbalizes understanding & no further questions.     Siomara Christopher RN

## 2020-07-15 NOTE — TELEPHONE ENCOUNTER
UA is abnormal indicating UTI. Culture is growing E. Coli, which is the most common cause of a UTI. Sensitivities are pending.  Obdulia Acuna, CNP

## 2020-07-16 LAB
BACTERIA SPEC CULT: ABNORMAL
SPECIMEN SOURCE: ABNORMAL

## 2020-07-24 ENCOUNTER — VIRTUAL VISIT (OUTPATIENT)
Dept: FAMILY MEDICINE | Facility: OTHER | Age: 35
End: 2020-07-24
Payer: COMMERCIAL

## 2020-07-24 PROCEDURE — 99421 OL DIG E/M SVC 5-10 MIN: CPT | Performed by: INTERNAL MEDICINE

## 2020-07-24 NOTE — PROGRESS NOTES
"Date: 2020 12:49:15  Clinician: Laurel Thorpe  Clinician NPI: [id number]  Patient: Karrie Zhang  Patient : 1985  Patient Address: [address] [place] [address]  Patient Phone: [phone]  Visit Protocol: URI  Patient Summary:  Karrie is a 35 year old ( : 1985 ) female who initiated a Visit for COVID-19 (Coronavirus) evaluation and screening. When asked the question \"Please sign me up to receive news, health information and promotions from [employer].\", Karrie responded \"No\".    When asked when her symptoms started, Karrie reported that she does not have any symptoms.   She denies having recent facial or sinus surgery in the past 60 days.    Pertinent COVID-19 (Coronavirus) information  In the past 14 days, Karrie has not worked in a congregate living setting.   She does not work or volunteer as healthcare worker or a  and does not work or volunteer in a healthcare facility.   Karrie also has not lived in a congregate living setting in the past 14 days. She does not live with a healthcare worker.   Karrie has had a close contact with a laboratory-confirmed COVID-19 patient in the last 14 days. Additional information about contact with COVID-19 (Coronavirus) patient as reported by the patient (free text): I was exposed at work on . 7/15 by a coworker that is on the opposite side of the building.  I only pass her in the hallway and use the same bathroom.  We received news that the coworker was positive on .  At that time everyone went to get tested this upcoming week.  I received my results back and I was negative.  Another coworker from the same side of the building tested positive with no symptoms.  My last close contact with her was .  How long am I supposed to quarant   Pertinent medical history  Karrie has taken an antibiotic medication in the past month. Antibiotic details as reported by the patient (free text): " Sulfameth trimethoprim for a bladder infection 7/14-7/16   Karrie typically gets a yeast infection when she takes antibiotics. She is not sure if she has used fluconazole (Diflucan) to treat previous yeast infections.   Karrie needs a return to work/school note.   Weight: 225 lbs   Karrie does not smoke or use smokeless tobacco.   She denies pregnancy and denies breastfeeding. She has menstruated in the past month.   Additional information as reported by the patient (free text): I have sleep apnea and use a CPAP daily.  I have hypothryroidism and I am on meds for that.   Weight: 225 lbs    MEDICATIONS: levothyroxine oral, Zyrtec oral, Singulair oral, albuterol sulfate inhalation, Ortho Tri-Cyclen (28) oral, buspirone oral, fluticasone propionate inhalation, Prozac oral, ALLERGIES: Penicillins  Clinician Response:  Dear Karrie,   Based on the current recommendations you would only need to quarantine if you personally spent at least 15 minutes within 6 feet of one of the people who tested positive.&nbsp; If you had that type of close exposure, then you would need to avoid other people and quarantine for 14 days from the last date you were exposed.&nbsp; Based on what you described, you should be able to return to work, as it does not sound like you had close exposure (15minutes within 6 feet).     If you develop symptoms of covid, contact your primary doctor or do another [facility] visit.    Diagnosis: Worries  Diagnosis ICD: R45.82

## 2020-07-27 ENCOUNTER — OFFICE VISIT - HEALTHEAST (OUTPATIENT)
Dept: BEHAVIORAL HEALTH | Facility: CLINIC | Age: 35
End: 2020-07-27

## 2020-07-27 DIAGNOSIS — F43.23 ADJUSTMENT DISORDER WITH MIXED ANXIETY AND DEPRESSED MOOD: ICD-10-CM

## 2020-08-05 ENCOUNTER — ANCILLARY PROCEDURE (OUTPATIENT)
Dept: GENERAL RADIOLOGY | Facility: CLINIC | Age: 35
End: 2020-08-05
Attending: PODIATRIST
Payer: COMMERCIAL

## 2020-08-05 ENCOUNTER — OFFICE VISIT (OUTPATIENT)
Dept: PODIATRY | Facility: CLINIC | Age: 35
End: 2020-08-05
Payer: COMMERCIAL

## 2020-08-05 VITALS
DIASTOLIC BLOOD PRESSURE: 80 MMHG | SYSTOLIC BLOOD PRESSURE: 125 MMHG | BODY MASS INDEX: 37.9 KG/M2 | HEART RATE: 86 BPM | WEIGHT: 233 LBS

## 2020-08-05 DIAGNOSIS — M21.6X1 PRONATION OF BOTH FEET: ICD-10-CM

## 2020-08-05 DIAGNOSIS — M21.619 BUNION: Primary | ICD-10-CM

## 2020-08-05 DIAGNOSIS — M21.6X2 PRONATION OF BOTH FEET: ICD-10-CM

## 2020-08-05 DIAGNOSIS — M21.619 BUNION: ICD-10-CM

## 2020-08-05 PROBLEM — R22.32 LUMP IN ARMPIT, LEFT: Status: ACTIVE | Noted: 2019-01-28

## 2020-08-05 PROCEDURE — 99203 OFFICE O/P NEW LOW 30 MIN: CPT | Performed by: PODIATRIST

## 2020-08-05 PROCEDURE — 73630 X-RAY EXAM OF FOOT: CPT | Mod: LT

## 2020-08-05 NOTE — LETTER
8/5/2020         RE: Karrie Zhang  [address]  [place] [address]        Dear Colleague,    Thank you for referring your patient, Karrie Zhang, to the [facility]. Please see a copy of my visit note below.     Subjective:    Pt is seen today in consult from Dr. Granados with the c/c of painful left foot.  This has been symptomatic for the past 3 months.  Points to lateral first MTPJ.  Has pain w/ ambulation and shoewear and is relieved by rest and going barefoot.  Denies pain in the contralateral foot.  Describes as burning pain.  It bothers the patient most when she is walking.  She has pain afterwards.  She denies any numbness.  She denies any trauma but she states she does hit her toes quite often.  History of OH abuse.  She denies ecchymosis.  She denies erythema.  She is noted no weakness.  Just walking short distances she does not have pain.  Starts to bother her after walking 15-20 minutes.  She has use a toe  which she believes helps.     ROS:   A 10-point review of systems was performed and is positive for that noted in the HPI and as seen below.  All other areas are negative.        Allergies   Allergen Reactions     Penicillins Rash       Current Outpatient Medications   Medication Sig Dispense Refill     albuterol (PROAIR HFA/PROVENTIL HFA/VENTOLIN HFA) 108 (90 Base) MCG/ACT inhaler Inhale 2 puffs into the lungs every 6 hours as needed for shortness of breath / dyspnea or wheezing 18 g 3     busPIRone (BUSPAR) 15 MG tablet TAKE 1 TABLET(15 MG) BY MOUTH TWICE DAILY 180 tablet 3     FLUoxetine (PROZAC) 40 MG capsule TAKE 1 CAPSULE BY MOUTH EVERY DAY 90 capsule 3     levothyroxine (SYNTHROID/LEVOTHROID) 75 MCG tablet Take 1 tablet (75 mcg) by mouth daily 90 tablet 1     montelukast (SINGULAIR) 10 MG tablet Take 1 tablet (10 mg) by mouth At Bedtime 90 tablet 3     TRI-SPRINTEC 0.18/0.215/0.25 MG-35 MCG tablet TAKE 1 TABLET BY MOUTH DAILY 84 tablet  1       Patient Active Problem List   Diagnosis     Perioral dermatitis     Mild persistent asthma without complication     Anxiety     Major depressive disorder, recurrent episode, mild (H)     Multiple environmental allergies     Obesity (BMI 30-39.9)     Cervical high risk HPV (human papillomavirus) test positive     Rosacea, acne     Alcohol abuse     Acquired hypothyroidism     Hay fever     Lump in armpit, left     Mixed anxiety depressive disorder       Past Medical History:   Diagnosis Date     Acquired hypothyroidism      Acquired hypothyroidism      Alcohol abuse      Allergic rhinitis      Alopecia      Cervical dysplasia      Cervical high risk HPV (human papillomavirus) test positive 6/29/16, 7/5/17    neg 16/18     Depressive disorder      H/O colposcopy with cervical biopsy 08/08/2017 08/08/17: [place] Bx and ECC normal.      Papanicolaou smear of cervix with low grade squamous intraepithelial lesion (LGSIL) 07/05/2017     Rosacea, acne      Uncomplicated asthma        Past Surgical History:   Procedure Laterality Date     LASER [place], CERVICAL  2005       Family History   Problem Relation Age of Onset     Osteoporosis Mother      Diabetes Father      Other Cancer Father         Pancreatic     Substance Abuse Father         alcoholic: stopped when I was in grade school     Prostate Cancer Maternal Grandfather      Prostate Cancer Other      Depression Brother      Coronary Artery Disease No family hx of        Social History     Tobacco Use     Smoking status: Never Smoker     Smokeless tobacco: Never Used   Substance Use Topics     Alcohol use: Yes     Alcohol/week: 35.0 standard drinks     Types: 14 Standard drinks or equivalent, 21 Shots of liquor per week       Objective:    O:  /80   Pulse 86   Wt 105.7 kg (233 lb)   BMI 37.90 kg/m  .      Constitutional/ general:  Pt is in no apparent distress, appears well-nourished.  Cooperative with history and physical exam.     Psych:  The patient  answered questions appropriately.  Normal affect.  Seems to have reasonable expectations, in terms of treatment.     Eyes:  Visual scanning/ tracking without deficit.    Ears:  Response to auditory stimuli is normal.  No  hearing aid devices.  Auricles in proper alignment.     Lymphatic:  Popliteal lymph nodes not enlarged.     Lungs:  Non labored breathing, non labored speech. No cough.  No audible wheezing. Even, quiet breathing.       Vascular:  Pedal pulses are palpable bilaterally for both the DP and PT arteries.  CFT < 3 sec.  No edema.  Pedal hair growth noted.     Neuro:  Alert and oriented x 3. Coordinated gait.  Light touch sensation is intact to the L4, L5, S1 distributions. No obvious deficits.  No evidence of neurological-based weakness, spasticity, or contracture in the lower extremities.     Derm: Normal texture and turgor.  No erythema, ecchymosis, or cyanosis.  No open lesions.     Musculoskeletal:    Lower extremity muscle strength is normal.  Patient is ambulatory without an assistive device or brace.    Pronated arch with weightbearing.   No equinus.    left bunion deformity noted.  No pain with range of motion.  Negative tracking with ROM.  No medial bursa or masses noted.  No pain on the sesamoids, medially or dorsally.  No pain with stressing extensor flexor tendon.  No pain anywhere in the second ray.  Patient has pain on the dorsal medial portion of the left first MTPJ.  Slight edema here.  With loading the joint no pain.        Radiographic Exam:   X-ray three views foot shows IM angle which is increased.  Abducted hallux.   We note that there is a very slight bone abnormality on the lateral base of the proximal phalanx.  Healing fracture possible here    Assessment:  Hallux abducto valgus deformity                           Pronation                          Left lateral first MTPJ pain    Plan:  Xray taken of foot.  Pointed out radiographic abnormality to patient.  Discussed past fracture  possible here which is healing.  Also discussed forceful bunion could be causing pain here however this is somewhat atypical.  Also discussed she is pronated how this causes more force on this joint.  She will start wearing a good shoe at all times and I made suggestions.  We discussed over-the-counter arch supports.  When walking she will do less heels.  Ibuprofen PRN for pain.  Discussed that if not better in 4 weeks we will have her return the clinic for repeat x-ray.  Return to clinic prn.  Thank you for allowing me participate in the care of this patient.        Hair Li DPM DPM, FACFAS      Again, thank you for allowing me to participate in the care of your patient.        Sincerely,        Hair Li DPM

## 2020-08-05 NOTE — PATIENT INSTRUCTIONS
We wish you continued good healing. If you have any questions or concerns, please do not hesitate to contact us at [phone]    Please remember to call and schedule a follow up appointment if one was recommended at your earliest convenience.   PODIATRY CLINIC HOURS  TELEPHONE NUMBER    Dr. Hair Li D.P.M [facility]    Clinics:  [facility]    [facility]   Tuesday 1PM-6PM  [place]  Wednesday 7AM-2PM  [facility]  Thursday 10AM-6PM  [place]  Friday 7AM-3PM  [place]  Specialty schedulers:   [phone] to make an appointment with any Specialty Provider.        Urgent Care locations:    [facility] Monday-Friday 5 pm - 9 pm. Saturday-Sunday 9 am -5pm    Monday-Friday 11 am - 9 pm Saturday 9 am - 5 pm     Monday-Sunday 12 noon-8PM [phone]     If you need a medication refill, please contact us you may need lab work and/or a follow up visit prior to your refill (i.e. Antifungal medications).    [employer] (secure e-mail communication and access to your chart) to send a message or to make an appointment.    If MRI needed please call [place] Maldonado at [phone]

## 2020-08-06 NOTE — PROGRESS NOTES
Subjective:    Pt is seen today in consult from Dr. Granados with the c/c of painful left foot.  This has been symptomatic for the past 3 months.  Points to lateral first MTPJ.  Has pain w/ ambulation and shoewear and is relieved by rest and going barefoot.  Denies pain in the contralateral foot.  Describes as burning pain.  It bothers the patient most when she is walking.  She has pain afterwards.  She denies any numbness.  She denies any trauma but she states she does hit her toes quite often.  History of [place] abuse.  She denies ecchymosis.  She denies erythema.  She is noted no weakness.  Just walking short distances she does not have pain.  Starts to bother her after walking 15-20 minutes.  She has use a toe  which she believes helps.     ROS:   A 10-point review of systems was performed and is positive for that noted in the HPI and as seen below.  All other areas are negative.        Allergies   Allergen Reactions     Penicillins Rash       Current Outpatient Medications   Medication Sig Dispense Refill     albuterol (PROAIR HFA/PROVENTIL HFA/VENTOLIN HFA) 108 (90 Base) MCG/ACT inhaler Inhale 2 puffs into the lungs every 6 hours as needed for shortness of breath / dyspnea or wheezing 18 g 3     busPIRone (BUSPAR) 15 MG tablet TAKE 1 TABLET(15 MG) BY MOUTH TWICE DAILY 180 tablet 3     FLUoxetine (PROZAC) 40 MG capsule TAKE 1 CAPSULE BY MOUTH EVERY DAY 90 capsule 3     levothyroxine (SYNTHROID/LEVOTHROID) 75 MCG tablet Take 1 tablet (75 mcg) by mouth daily 90 tablet 1     montelukast (SINGULAIR) 10 MG tablet Take 1 tablet (10 mg) by mouth At Bedtime 90 tablet 3     TRI-SPRINTEC 0.18/0.215/0.25 MG-35 MCG tablet TAKE 1 TABLET BY MOUTH DAILY 84 tablet 1       Patient Active Problem List   Diagnosis     Perioral dermatitis     Mild persistent asthma without complication     Anxiety     Major depressive disorder, recurrent episode, mild (H)     Multiple environmental allergies     Obesity (BMI 30-39.9)      Cervical high risk HPV (human papillomavirus) test positive     Rosacea, acne     Alcohol abuse     Acquired hypothyroidism     Hay fever     Lump in armpit, left     Mixed anxiety depressive disorder       Past Medical History:   Diagnosis Date     Acquired hypothyroidism      Acquired hypothyroidism      Alcohol abuse      Allergic rhinitis      Alopecia      Cervical dysplasia      Cervical high risk HPV (human papillomavirus) test positive 6/29/16, 7/5/17    neg 16/18     Depressive disorder      H/O colposcopy with cervical biopsy 08/08/2017 08/08/17: [facility] and [facility] normal.      Papanicolaou smear of cervix with low grade squamous intraepithelial lesion (LGSIL) 07/05/2017     Rosacea, acne      Uncomplicated asthma        Past Surgical History:   Procedure Laterality Date     LASER [place], CERVICAL  2005       Family History   Problem Relation Age of Onset     Osteoporosis Mother      Diabetes Father      Other Cancer Father         Pancreatic     Substance Abuse Father         alcoholic: stopped when I was in grade school     Prostate Cancer Maternal Grandfather      Prostate Cancer Other      Depression Brother      Coronary Artery Disease No family hx of        Social History     Tobacco Use     Smoking status: Never Smoker     Smokeless tobacco: Never Used   Substance Use Topics     Alcohol use: Yes     Alcohol/week: 35.0 standard drinks     Types: 14 Standard drinks or equivalent, 21 Shots of liquor per week       Objective:    O:  /80   Pulse 86   Wt 105.7 kg (233 lb)   BMI 37.90 kg/m  .      Constitutional/ general:  Pt is in no apparent distress, appears well-nourished.  Cooperative with history and physical exam.     Psych:  The patient answered questions appropriately.  Normal affect.  Seems to have reasonable expectations, in terms of treatment.     Eyes:  Visual scanning/ tracking without deficit.    Ears:  Response to auditory stimuli is normal.  No  hearing aid devices.  Auricles in  proper alignment.     Lymphatic:  Popliteal lymph nodes not enlarged.     Lungs:  Non labored breathing, non labored speech. No cough.  No audible wheezing. Even, quiet breathing.       Vascular:  Pedal pulses are palpable bilaterally for both the DP and PT arteries.  CFT < 3 sec.  No edema.  Pedal hair growth noted.     Neuro:  Alert and oriented x 3. Coordinated gait.  Light touch sensation is intact to the L4, L5, S1 distributions. No obvious deficits.  No evidence of neurological-based weakness, spasticity, or contracture in the lower extremities.     Derm: Normal texture and turgor.  No erythema, ecchymosis, or cyanosis.  No open lesions.     Musculoskeletal:    Lower extremity muscle strength is normal.  Patient is ambulatory without an assistive device or brace.    Pronated arch with weightbearing.   No equinus.    left bunion deformity noted.  No pain with range of motion.  Negative tracking with ROM.  No medial bursa or masses noted.  No pain on the sesamoids, medially or dorsally.  No pain with stressing extensor flexor tendon.  No pain anywhere in the second ray.  Patient has pain on the dorsal medial portion of the left first MTPJ.  Slight edema here.  With loading the joint no pain.        Radiographic Exam:   X-ray three views foot shows IM angle which is increased.  Abducted hallux.   We note that there is a very slight bone abnormality on the lateral base of the proximal phalanx.  Healing fracture possible here    Assessment:  Hallux abducto valgus deformity                           Pronation                          Left lateral first MTPJ pain    Plan:  Xray taken of foot.  Pointed out radiographic abnormality to patient.  Discussed past fracture possible here which is healing.  Also discussed forceful bunion could be causing pain here however this is somewhat atypical.  Also discussed she is pronated how this causes more force on this joint.  She will start wearing a good shoe at all times and I  made suggestions.  We discussed over-the-counter arch supports.  When walking she will do less heels.  Ibuprofen PRN for pain.  Discussed that if not better in 4 weeks we will have her return the clinic for repeat x-ray.  Return to clinic prn.  Thank you for allowing me participate in the care of this patient.        Hair Li, ALCON RONDON, FACFAS

## 2020-08-17 ENCOUNTER — OFFICE VISIT - HEALTHEAST (OUTPATIENT)
Dept: BEHAVIORAL HEALTH | Facility: CLINIC | Age: 35
End: 2020-08-17

## 2020-08-17 DIAGNOSIS — F43.23 ADJUSTMENT DISORDER WITH MIXED ANXIETY AND DEPRESSED MOOD: ICD-10-CM

## 2020-08-28 DIAGNOSIS — E03.9 ACQUIRED HYPOTHYROIDISM: ICD-10-CM

## 2020-08-28 RX ORDER — LEVOTHYROXINE SODIUM 75 UG/1
75 TABLET ORAL DAILY
Qty: 90 TABLET | Refills: 0 | Status: SHIPPED | OUTPATIENT
Start: 2020-08-28 | End: 2020-09-29

## 2020-08-28 NOTE — TELEPHONE ENCOUNTER
Reason for Call:  Medication or medication refill:    Do you use a [facility]?  Name of the pharmacy and phone number for the current request:  [id number] [address] [place] [facility] [address]    Name of the medication requested: levothyroxine (SYNTHROID/LEVOTHROID) 75 MCG tablet       Other request: Patient is out of medication and wants to know if she can get a refill asap.    Can we leave a detailed message on this number? YES    Phone number patient can be reached at: Home number on file [phone] (home)    Best Time: any    Call taken on 8/28/2020 at 9:29 AM by Espinoza Martinez

## 2020-08-31 ENCOUNTER — OFFICE VISIT - HEALTHEAST (OUTPATIENT)
Dept: BEHAVIORAL HEALTH | Facility: CLINIC | Age: 35
End: 2020-08-31

## 2020-08-31 DIAGNOSIS — F43.23 ADJUSTMENT DISORDER WITH MIXED ANXIETY AND DEPRESSED MOOD: ICD-10-CM

## 2020-09-01 ASSESSMENT — ANXIETY QUESTIONNAIRES
IF YOU CHECKED OFF ANY PROBLEMS ON THIS QUESTIONNAIRE, HOW DIFFICULT HAVE THESE PROBLEMS MADE IT FOR YOU TO DO YOUR WORK, TAKE CARE OF THINGS AT HOME, OR GET ALONG WITH OTHER PEOPLE: SOMEWHAT DIFFICULT
5. BEING SO RESTLESS THAT IT IS HARD TO SIT STILL: NOT AT ALL
GAD7 TOTAL SCORE: 7
2. NOT BEING ABLE TO STOP OR CONTROL WORRYING: SEVERAL DAYS
7. FEELING AFRAID AS IF SOMETHING AWFUL MIGHT HAPPEN: SEVERAL DAYS
4. TROUBLE RELAXING: SEVERAL DAYS
6. BECOMING EASILY ANNOYED OR IRRITABLE: MORE THAN HALF THE DAYS
1. FEELING NERVOUS, ANXIOUS, OR ON EDGE: SEVERAL DAYS
3. WORRYING TOO MUCH ABOUT DIFFERENT THINGS: SEVERAL DAYS

## 2020-09-08 ENCOUNTER — E-VISIT (OUTPATIENT)
Dept: FAMILY MEDICINE | Facility: CLINIC | Age: 35
End: 2020-09-08
Payer: COMMERCIAL

## 2020-09-08 DIAGNOSIS — R51.9 SINUS HEADACHE: Primary | ICD-10-CM

## 2020-09-08 PROCEDURE — 99421 OL DIG E/M SVC 5-10 MIN: CPT | Performed by: FAMILY MEDICINE

## 2020-09-08 RX ORDER — GUAIFEN/PHENYLEPH/ACETAMINOPHN 200-5-325
TABLET ORAL
Qty: 20 TABLET | Refills: 0 | Status: SHIPPED | OUTPATIENT
Start: 2020-09-08 | End: 2021-07-22

## 2020-09-28 ENCOUNTER — OFFICE VISIT - HEALTHEAST (OUTPATIENT)
Dept: BEHAVIORAL HEALTH | Facility: CLINIC | Age: 35
End: 2020-09-28

## 2020-09-28 DIAGNOSIS — F43.23 ADJUSTMENT DISORDER WITH MIXED ANXIETY AND DEPRESSED MOOD: ICD-10-CM

## 2020-09-29 ENCOUNTER — OFFICE VISIT (OUTPATIENT)
Dept: FAMILY MEDICINE | Facility: CLINIC | Age: 35
End: 2020-09-29
Payer: COMMERCIAL

## 2020-09-29 VITALS
WEIGHT: 227 LBS | HEART RATE: 67 BPM | HEIGHT: 66 IN | RESPIRATION RATE: 12 BRPM | SYSTOLIC BLOOD PRESSURE: 126 MMHG | BODY MASS INDEX: 36.48 KG/M2 | OXYGEN SATURATION: 98 % | TEMPERATURE: 98.7 F | DIASTOLIC BLOOD PRESSURE: 76 MMHG

## 2020-09-29 DIAGNOSIS — Z23 NEED FOR PROPHYLACTIC VACCINATION AND INOCULATION AGAINST INFLUENZA: ICD-10-CM

## 2020-09-29 DIAGNOSIS — Z30.41 ENCOUNTER FOR BIRTH CONTROL PILLS MAINTENANCE: ICD-10-CM

## 2020-09-29 DIAGNOSIS — F41.9 ANXIETY: ICD-10-CM

## 2020-09-29 DIAGNOSIS — J45.30 MILD PERSISTENT ASTHMA WITHOUT COMPLICATION: ICD-10-CM

## 2020-09-29 DIAGNOSIS — E03.9 ACQUIRED HYPOTHYROIDISM: ICD-10-CM

## 2020-09-29 DIAGNOSIS — Z00.00 ROUTINE GENERAL MEDICAL EXAMINATION AT A HEALTH CARE FACILITY: Primary | ICD-10-CM

## 2020-09-29 DIAGNOSIS — E66.9 OBESITY (BMI 30-39.9): ICD-10-CM

## 2020-09-29 DIAGNOSIS — F10.20 UNCOMPLICATED ALCOHOL DEPENDENCE (H): ICD-10-CM

## 2020-09-29 DIAGNOSIS — F33.0 MAJOR DEPRESSIVE DISORDER, RECURRENT EPISODE, MILD (H): ICD-10-CM

## 2020-09-29 PROCEDURE — 99213 OFFICE O/P EST LOW 20 MIN: CPT | Mod: 25 | Performed by: FAMILY MEDICINE

## 2020-09-29 PROCEDURE — 99395 PREV VISIT EST AGE 18-39: CPT | Mod: 25 | Performed by: FAMILY MEDICINE

## 2020-09-29 PROCEDURE — 90686 IIV4 VACC NO PRSV 0.5 ML IM: CPT | Performed by: FAMILY MEDICINE

## 2020-09-29 PROCEDURE — 90471 IMMUNIZATION ADMIN: CPT | Performed by: FAMILY MEDICINE

## 2020-09-29 RX ORDER — FLUOXETINE 40 MG/1
CAPSULE ORAL
Qty: 90 CAPSULE | Refills: 0 | Status: SHIPPED | OUTPATIENT
Start: 2020-09-29 | End: 2021-03-16

## 2020-09-29 RX ORDER — MONTELUKAST SODIUM 10 MG/1
1 TABLET ORAL AT BEDTIME
Qty: 90 TABLET | Refills: 3 | Status: SHIPPED | OUTPATIENT
Start: 2020-09-29 | End: 2021-12-14

## 2020-09-29 RX ORDER — BUSPIRONE HYDROCHLORIDE 15 MG/1
TABLET ORAL
Qty: 180 TABLET | Refills: 3 | Status: SHIPPED | OUTPATIENT
Start: 2020-09-29 | End: 2021-08-19

## 2020-09-29 RX ORDER — LEVOTHYROXINE SODIUM 75 UG/1
75 TABLET ORAL DAILY
Qty: 90 TABLET | Refills: 1 | Status: SHIPPED | OUTPATIENT
Start: 2020-09-29 | End: 2021-06-27

## 2020-09-29 RX ORDER — NORGESTIMATE AND ETHINYL ESTRADIOL 7DAYSX3 28
1 KIT ORAL DAILY
Qty: 84 TABLET | Refills: 4 | Status: SHIPPED | OUTPATIENT
Start: 2020-09-29 | End: 2021-07-22

## 2020-09-29 ASSESSMENT — PATIENT HEALTH QUESTIONNAIRE - PHQ9: SUM OF ALL RESPONSES TO PHQ QUESTIONS 1-9: 9

## 2020-09-29 ASSESSMENT — MIFFLIN-ST. JEOR: SCORE: 1727.45

## 2020-09-29 NOTE — LETTER
My Asthma Action Plan    Name: Karrie Zhang   YOB: 1985  Date: 9/29/2020   My doctor: Vanessa Granados MD   My clinic: [facility]        My Control Medicine: Montelukast (Singulair) -  10 mg daily  My Rescue Medicine: Albuterol (Proair/Ventolin/Proventil HFA) 2-4 puffs EVERY 4 HOURS as needed. Use a spacer if recommended by your provider.   My Asthma Severity:   Mild Persistent  Know your asthma triggers: upper respiratory infections               GREEN ZONE   Good Control    I feel good    No cough or wheeze    Can work, sleep and play without asthma symptoms       Take your asthma control medicine every day.     1. If exercise triggers your asthma, take your rescue medication    15 minutes before exercise or sports, and    During exercise if you have asthma symptoms  2. Spacer to use with inhaler: If you have a spacer, make sure to use it with your inhaler             YELLOW ZONE Getting Worse  I have ANY of these:    I do not feel good    Cough or wheeze    Chest feels tight    Wake up at night   1. Keep taking your Green Zone medications  2. Start taking your rescue medicine:    every 20 minutes for up to 1 hour. Then every 4 hours for 24-48 hours.  3. If you stay in the Yellow Zone for more than 12-24 hours, contact your doctor.  4. If you do not return to the Green Zone in 12-24 hours or you get worse, start taking your oral steroid medicine if prescribed by your provider.           RED ZONE Medical Alert - Get Help  I have ANY of these:    I feel awful    Medicine is not helping    Breathing getting harder    Trouble walking or talking    Nose opens wide to breathe       1. Take your rescue medicine NOW  2. If your provider has prescribed an oral steroid medicine, start taking it NOW  3. Call your doctor NOW  4. If you are still in the Red Zone after 20 minutes and you have not reached your doctor:    Take your rescue medicine again and    Call 911 or go to the emergency room  right away    See your regular doctor within 2 weeks of an Emergency Room or Urgent Care visit for follow-up treatment.          Annual Reminders:  Meet with Asthma Educator,  Flu Shot in the Fall, consider Pneumonia Vaccination for patients with asthma (aged 19 and older).    Pharmacy:    [facility] #[id number] - [place], [place] - [address] AT [facility] [address]  [id number] - [place], [place] - [address] AT [facility] [address]  [facility] #[id number] - [place], [place] - [address] [facility]  AT [facility]    Electronically signed by Vanessa Granados MD   Date: 09/29/20                      Asthma Triggers  How To Control Things That Make Your Asthma Worse    Triggers are things that make your asthma worse.  Look at the list below to help you find your triggers and what you can do about them.  You can help prevent asthma flare-ups by staying away from your triggers.      Trigger                                                          What you can do   Cigarette Smoke  Tobacco smoke can make asthma worse. Do not allow smoking in your home, car or around you.  Be sure no one smokes at a child s day care or school.  If you smoke, ask your health care provider for ways to help you quit.  Ask family members to quit too.  Ask your health care provider for a referral to Quit Plan to help you quit smoking, or call 5-444-193-PLAN.     Colds, Flu, Bronchitis  These are common triggers of asthma. Wash your hands often.  Don t touch your eyes, nose or mouth.  Get a flu shot every year.     Dust Mites  These are tiny bugs that live in cloth or carpet. They are too small to see. Wash sheets and blankets in hot water every week.   Encase pillows and mattress in dust mite proof covers.  Avoid having carpet if you can. If you have carpet, vacuum weekly.   Use a dust mask and HEPA vacuum.   Pollen and Outdoor Mold  Some people are allergic to trees, grass, or weed pollen, or  molds. Try to keep your windows closed.  Limit time out doors when pollen count is high.   Ask you health care provider about taking medicine during allergy season.     Animal Dander  Some people are allergic to skin flakes, urine or saliva from pets with fur or feathers. Keep pets with fur or feathers out of your home.    If you can t keep the pet outdoors, then keep the pet out of your bedroom.  Keep the bedroom door closed.  Keep pets off cloth furniture and away from stuffed toys.     Mice, Rats, and Cockroaches   Some people are allergic to the waste from these pests.   Cover food and garbage.  Clean up spills and food crumbs.  Store grease in the refrigerator.   Keep food out of the bedroom.   Indoor Mold  This can be a trigger if your home has high moisture. Fix leaking faucets, pipes, or other sources of water.   Clean moldy surfaces.  Dehumidify basement if it is damp and smelly.   Smoke, Strong Odors, and Sprays  These can reduce air quality. Stay away from strong odors and sprays, such as perfume, powder, hair spray, paints, smoke incense, paint, cleaning products, candles and new carpet.   Exercise or Sports  Some people with asthma have this trigger. Be active!  Ask your doctor about taking medicine before sports or exercise to prevent symptoms.    Warm up for 5-10 minutes before and after sports or exercise.     Other Triggers of Asthma  Cold air:  Cover your nose and mouth with a scarf.  Sometimes laughing or crying can be a trigger.  Some medicines and food can trigger asthma.

## 2020-09-29 NOTE — PROGRESS NOTES
SUBJECTIVE:   CC: Karrie Zhang is an 35 year old woman  who presents for preventive health visit.     Patient has been advised of split billing requirements and indicates understanding: Yes     Patient has depression, mild recurrent, with no medication side effects and mild anhedonia or low mood, without suicidal ideation.  Symptoms are accompanied by anxiety and overuse of alcohol. She has minimal relief from therapy and is trying a .     Patient also presents for follow-up of hypothyroidism, controlled on current medication.  Denies symptoms of hypo- or hyperthyroidism.     Patient also has asthma, persistent.  Patient has had asthma for years.  Occasional wheezing and shortness of breath are triggered by colds and relieved by albuterol.    Healthy Habits:    Getting at least 3 servings of Calcium per day:  NO    Bi-annual eye exam:  NO    Dental care twice a year:  NO    Sleep apnea or symptoms of sleep apnea:  Sleep apnea    Diet:  Regular (no restrictions)    Frequency of exercise:  1 day/week    Duration of exercise:  45-60 minutes    Taking medications regularly:  Yes    Barriers to taking medications:  None    Medication side effects:  None    PHQ-2 Total Score:    Additional concerns today:  Yes          Questions regarding pre-diabetes    Today's PHQ-2 Score:   PHQ-2 (  [employer]) 2020   Q1: Little interest or pleasure in doing things 1   Q2: Feeling down, depressed or hopeless 2   PHQ-2 Score 3   Q1: Little interest or pleasure in doing things -   Q2: Feeling down, depressed or hopeless -   PHQ-2 Score -       Abuse: Current or Past (Physical, Sexual or Emotional) - No  Do you feel safe in your environment? Yes        Social History     Tobacco Use     Smoking status: Never Smoker     Smokeless tobacco: Never Used   Substance Use Topics     Alcohol use: Yes     Alcohol/week: 35.0 standard drinks     Types: 14 Standard drinks or equivalent, 21 Shots of liquor per week     If  you drink alcohol do you typically have >3 drinks per day or >7 drinks per week? Yes      Alcohol Use 9/29/2020   Prescreen: >3 drinks/day or >7 drinks/week? -   Prescreen: >3 drinks/day or >7 drinks/week? -   AUDIT SCORE  17     AUDIT - Alcohol Use Disorders Identification Test - Reproduced from the World Health Organization Audit 2001 (Second Edition) 9/29/2020   1.  How often do you have a drink containing alcohol? 4 or more times a week   2.  How many drinks containing alcohol do you have on a typical day when you are drinking? 5 or 6   3.  How often do you have five or more drinks on one occasion? Daily or almost daily   4.  How often during the last year have you found that you were not able to stop drinking once you had started? Never   5.  How often during the last year have you failed to do what was normally expected of you because of drinking? Less than monthly   6.  How often during the last year have you needed a first drink in the morning to get yourself going after a heavy drinking session? Never   7.  How often during the last year have you had a feeling of guilt or remorse after drinking? Monthly   8.  How often during the last year have you been unable to remember what happened the night before because of your drinking? Monthly   9.  Have you or someone else been injured because of your drinking? No   10. Has a relative, friend, doctor or other health care worker been concerned about your drinking or suggested you cut down? Yes, but not in the last year   TOTAL SCORE 17       Reviewed orders with patient.  Reviewed health maintenance and updated orders accordingly - Yes  Patient Active Problem List   Diagnosis     Perioral dermatitis     Mild persistent asthma without complication     Anxiety     Major depressive disorder, recurrent episode, mild (H)     Multiple environmental allergies     Obesity (BMI 30-39.9)     Cervical high risk HPV (human papillomavirus) test positive     Rosacea, acne      Acquired hypothyroidism     Hay fever     Lump in armpit, left     Alcohol dependence (H)     Past Surgical History:   Procedure Laterality Date     LASER [place], CERVICAL  2005       Social History     Tobacco Use     Smoking status: Never Smoker     Smokeless tobacco: Never Used   Substance Use Topics     Alcohol use: Yes     Alcohol/week: 35.0 standard drinks     Types: 14 Standard drinks or equivalent, 21 Shots of liquor per week     Family History   Problem Relation Age of Onset     Osteoporosis Mother      Diabetes Father      Other Cancer Father         Pancreatic     Substance Abuse Father         alcoholic: stopped when I was in grade school     Prostate Cancer Maternal Grandfather      Prostate Cancer Other      Depression Brother      Alcoholism Brother      Coronary Artery Disease No family hx of          Current Outpatient Medications   Medication Sig Dispense Refill     albuterol (PROAIR HFA/PROVENTIL HFA/VENTOLIN HFA) 108 (90 Base) MCG/ACT inhaler Inhale 2 puffs into the lungs every 6 hours as needed for shortness of breath / dyspnea or wheezing 18 g 3     busPIRone (BUSPAR) 15 MG tablet TAKE 1 TABLET(15 MG) BY MOUTH TWICE DAILY 180 tablet 3     FLUoxetine (PROZAC) 40 MG capsule TAKE 1 CAPSULE BY MOUTH EVERY DAY 90 capsule 0     levothyroxine (SYNTHROID/LEVOTHROID) 75 MCG tablet Take 1 tablet (75 mcg) by mouth daily 90 tablet 1     montelukast (SINGULAIR) 10 MG tablet Take 1 tablet (10 mg) by mouth At Bedtime 90 tablet 3     norgestim-eth estrad triphasic (TRI-SPRINTEC) 0.18/0.215/0.25 MG-35 MCG tablet Take 1 tablet by mouth daily 84 tablet 4     Phenylephrine-APAP-guaiFENesin (TYLENOL SINUS SEVERE) 5-325-200 MG TABS Take two tablets every 4-6 hours (maximum: 10 tablets [acetaminophen 3,250 mg/guaifenesin 2,000 mg/phenylephrine 50 mg]/24 hours). 20 tablet 0     Allergies   Allergen Reactions     Penicillins Rash       Mammogram not appropriate for this patient based on age.    Pertinent mammograms are  reviewed under the imaging tab.  History of abnormal Pap smear:   YES - ELBA 2/3 on biopsy - PAP/HPV co-testing at 12, 24 months.  If two negative results repeat co-testing in 3 years, if negative then routine screening.  Last 3 Pap and HPV Results:   PAP / HPV Latest Ref Rng & Units 8/22/2018 7/5/2017 6/29/2016   PAP - NIL LSIL(A) NIL   HPV 16 DNA NEG:Negative Negative Negative Negative   HPV 18 DNA NEG:Negative Negative Negative Negative   OTHER HR HPV NEG:Negative Negative Positive(A) Positive(A)     PAP / HPV Latest Ref Rng & Units 8/22/2018 7/5/2017 6/29/2016   PAP - NIL LSIL(A) NIL   HPV 16 DNA NEG:Negative Negative Negative Negative   HPV 18 DNA NEG:Negative Negative Negative Negative   OTHER HR HPV NEG:Negative Negative Positive(A) Positive(A)     Reviewed and updated as needed this visit by clinical staff  Tobacco  Allergies  Meds  Problems  Med Hx  Surg Hx  Fam Hx  Soc Hx          Reviewed and updated as needed this visit by Provider  Tobacco  Allergies  Meds  Problems  Med Hx  Surg Hx  Fam Hx        Past Medical History:   Diagnosis Date     Acquired hypothyroidism      Alcohol abuse      Allergic rhinitis      Alopecia      Cervical dysplasia      Cervical high risk HPV (human papillomavirus) test positive 6/29/16, 7/5/17    neg 16/18     Depressive disorder      H/O colposcopy with cervical biopsy 08/08/2017 08/08/17: [place] [facility] and [facility] normal.      Papanicolaou smear of cervix with low grade squamous intraepithelial lesion (LGSIL) 07/05/2017     Rosacea, acne      Uncomplicated asthma         Review of Systems  CONSTITUTIONAL: NEGATIVE for fever, chills, change in weight  INTEGUMENTARU/SKIN: NEGATIVE for worrisome rashes, moles or lesions  EYES: NEGATIVE for vision changes or irritation  ENT: NEGATIVE for ear, mouth and throat problems  RESP: NEGATIVE for significant cough or SOB  BREAST: NEGATIVE for masses, tenderness or discharge  CV: NEGATIVE for chest pain, palpitations or peripheral  "edema  GI: NEGATIVE for nausea, abdominal pain, heartburn, or change in bowel habits  : NEGATIVE for unusual urinary or vaginal symptoms. Periods are regular.  MUSCULOSKELETAL: NEGATIVE for significant arthralgias or myalgia  NEURO: NEGATIVE for weakness, dizziness or paresthesias  PSYCHIATRIC: alcohol abuse and depressed mood     OBJECTIVE:   /76   Pulse 67   Temp 98.7  F (37.1  C) (Oral)   Resp 12   Ht 1.67 m (5' 5.75\")   Wt 103 kg (227 lb)   LMP 09/04/2020   SpO2 98%   Breastfeeding No   BMI 36.92 kg/m    Physical Exam  GENERAL: alert and no distress  EYES: Eyes grossly normal to inspection, PERRL and conjunctivae and sclerae normal  HENT: ear canals and TM's normal, nose and mouth without ulcers or lesions  NECK: no adenopathy, no asymmetry, masses, or scars and thyroid normal to palpation  RESP: lungs clear to auscultation - no rales, rhonchi or wheezes  BREAST: normal without masses, tenderness or nipple discharge and no palpable axillary masses or adenopathy  CV: regular rate and rhythm, normal S1 S2, no S3 or S4, no murmur, click or rub, no peripheral edema   ABDOMEN: soft, nontender, no hepatosplenomegaly, no masses and bowel sounds normal  MS: no gross musculoskeletal defects noted, no edema  SKIN: no suspicious lesions or rashes  NEURO: Normal strength and tone, mentation intact and speech normal  PSYCH: mentation appears normal and affect flat    Diagnostic Test Results:  Labs reviewed in Epic    ASSESSMENT/PLAN:   (Z00.00) Routine general medical examination at a health care facility  (primary encounter diagnosis)    (F33.0) Major depressive disorder, recurrent episode, mild (H)  (F10.20) Uncomplicated alcohol dependence (H)  (F41.9) Anxiety  Comment: Differential diagnoses would include: bipolar disease   Plan: OFFICE/OUTPT VISIT,EST,LEVL III, FLUoxetine         (PROZAC) 40 MG capsule        Recommended outpatient treatment program or AA, which she declines today. Continue therapy. " "Offered psychiatry referral or addition of bupropion, which she agrees to consider. Follow up in one month or sooner for worsening of symptoms or side effects.     (E66.9) Obesity (BMI 30-39.9)  Plan: OFFICE/OUTPT VISIT,EST,LEVL III, TSH with free         T4 reflex, Comprehensive metabolic panel        Counseled to make better food choices, exercise as tolerated, and lose weight.     (E03.9) Acquired hypothyroidism  Comment: euthyroid on replacement   Plan: OFFICE/OUTPT VISIT,EST,LEVL III, TSH with free         T4 reflex, levothyroxine (SYNTHROID/LEVOTHROID)        75 MCG tablet          (J45.30) Mild persistent asthma without complication  Comment: Well controlled with medications without side effects.   Plan: OFFICE/OUTPT VISIT,EST,LEVL III, montelukast         (SINGULAIR) 10 MG tablet         (Z30.41) Encounter for birth control pills maintenance  Plan: norgestim-eth estrad triphasic (TRI-SPRINTEC)         0.18/0.215/0.25 MG-35 MCG tablet             Patient has been advised of split billing requirements and indicates understanding: Yes  COUNSELING:  Reviewed preventive health counseling, as reflected in patient instructions  Special attention given to:        Regular exercise       Healthy diet/nutrition       Contraception       Osteoporosis Prevention/Bone Health       HIV screeninx in teen years, 1x in adult years, and at intervals if high risk       The ASCVD Risk score (Gauri DC Jr., et al., 2013) failed to calculate for the following reasons:    The 2013 ASCVD risk score is only valid for ages 40 to 79    Estimated body mass index is 36.92 kg/m  as calculated from the following:    Height as of this encounter: 1.67 m (5' 5.75\").    Weight as of this encounter: 103 kg (227 lb).    Weight management plan: Discussed healthy diet and exercise guidelines    She reports that she has never smoked. She has never used smokeless tobacco.      Counseling Resources:  ATP IV Guidelines  Pooled Cohorts Equation " Calculator  Breast Cancer Risk Calculator  BRCA-Related Cancer Risk Assessment: FHS-7 Tool  FRAX Risk Assessment  ICSI Preventive Guidelines  Dietary Guidelines for Americans, 2010  USDA's MyPlate  ASA Prophylaxis  Lung CA Screening    Vanessa Garnados MD  [facility]

## 2020-09-30 ASSESSMENT — ASTHMA QUESTIONNAIRES: ACT_TOTALSCORE: 25

## 2020-10-03 ENCOUNTER — TRANSFERRED RECORDS (OUTPATIENT)
Dept: HEALTH INFORMATION MANAGEMENT | Facility: CLINIC | Age: 35
End: 2020-10-03

## 2020-10-03 ENCOUNTER — AMBULATORY - HEALTHEAST (OUTPATIENT)
Dept: LAB | Facility: HOSPITAL | Age: 35
End: 2020-10-03

## 2020-10-03 DIAGNOSIS — F10.20 UNCOMPLICATED ALCOHOL DEPENDENCE (H): ICD-10-CM

## 2020-10-03 DIAGNOSIS — E66.9 OBESITY, UNSPECIFIED: ICD-10-CM

## 2020-10-03 LAB
ALBUMIN SERPL-MCNC: 3.8 G/DL (ref 3.5–5)
ALP SERPL-CCNC: 76 U/L (ref 45–120)
ALT SERPL W P-5'-P-CCNC: 17 U/L (ref 0–45)
ALT SERPL-CCNC: 17 U/L (ref 0–45)
ANION GAP SERPL CALCULATED.3IONS-SCNC: 9 MMOL/L (ref 5–18)
AST SERPL W P-5'-P-CCNC: 20 U/L (ref 0–40)
AST SERPL-CCNC: 20 U/L (ref 0–40)
BILIRUB SERPL-MCNC: 0.5 MG/DL (ref 0–1)
BUN SERPL-MCNC: 8 MG/DL (ref 8–22)
CALCIUM SERPL-MCNC: 8.8 MG/DL (ref 8.5–10.5)
CHLORIDE BLD-SCNC: 105 MMOL/L (ref 98–107)
CO2 SERPL-SCNC: 23 MMOL/L (ref 22–31)
CREAT SERPL-MCNC: 0.77 MG/DL (ref 0.6–1.1)
CREAT SERPL-MCNC: 0.77 MG/DL (ref 0.6–1.1)
GFR SERPL CREATININE-BSD FRML MDRD: >60 ML/MIN/1.73M2
GFR SERPL CREATININE-BSD FRML MDRD: >60 ML/MIN/1.73M2
GLUCOSE BLD-MCNC: 94 MG/DL (ref 70–125)
GLUCOSE SERPL-MCNC: 94 MG/DL (ref 70–125)
POTASSIUM BLD-SCNC: 4.3 MMOL/L (ref 3.5–5)
POTASSIUM SERPL-SCNC: 4.3 MMOL/L (ref 3.5–5)
PROT SERPL-MCNC: 6.7 G/DL (ref 6–8)
SODIUM SERPL-SCNC: 137 MMOL/L (ref 136–145)
TSH SERPL DL<=0.005 MIU/L-ACNC: 0.48 UIU/ML (ref 0.3–5)
TSH SERPL-ACNC: 0.48 ULU/ML (ref 0.3–5)

## 2020-10-07 ENCOUNTER — MYC MEDICAL ADVICE (OUTPATIENT)
Dept: FAMILY MEDICINE | Facility: CLINIC | Age: 35
End: 2020-10-07

## 2020-10-07 DIAGNOSIS — E66.9 OBESITY (BMI 30-39.9): Primary | ICD-10-CM

## 2020-10-07 NOTE — TELEPHONE ENCOUNTER
Pt is requesting a copy of her results from 10/03. Please notify patient how she can obtain (medical records) or offer result letter.

## 2020-10-13 ENCOUNTER — MYC MEDICAL ADVICE (OUTPATIENT)
Dept: FAMILY MEDICINE | Facility: CLINIC | Age: 35
End: 2020-10-13

## 2020-10-16 ENCOUNTER — NURSE TRIAGE (OUTPATIENT)
Dept: NURSING | Facility: CLINIC | Age: 35
End: 2020-10-16

## 2020-10-16 ENCOUNTER — E-VISIT (OUTPATIENT)
Dept: FAMILY MEDICINE | Facility: CLINIC | Age: 35
End: 2020-10-16
Payer: COMMERCIAL

## 2020-10-16 ENCOUNTER — TELEPHONE (OUTPATIENT)
Dept: FAMILY MEDICINE | Facility: CLINIC | Age: 35
End: 2020-10-16

## 2020-10-16 DIAGNOSIS — J06.9 ACUTE UPPER RESPIRATORY INFECTION: ICD-10-CM

## 2020-10-16 DIAGNOSIS — J06.9 UPPER RESPIRATORY TRACT INFECTION, UNSPECIFIED TYPE: Primary | ICD-10-CM

## 2020-10-16 PROCEDURE — 99421 OL DIG E/M SVC 5-10 MIN: CPT | Performed by: FAMILY MEDICINE

## 2020-10-16 RX ORDER — IPRATROPIUM BROMIDE 21 UG/1
2 SPRAY, METERED NASAL EVERY 12 HOURS
Qty: 1 BOX | Refills: 1 | Status: SHIPPED | OUTPATIENT
Start: 2020-10-16 | End: 2022-04-06

## 2020-10-16 NOTE — TELEPHONE ENCOUNTER
See other encounter from today regarding E-visit. Message sent to provider/care team in other encounter.     Beryl Oakes RN, BSN Nurse Triage Advisor 4:38 PM 10/16/2020     COVID 19 Nurse Triage Plan/Patient Instructions    Please be aware that novel coronavirus (COVID-19) may be circulating in the community. If you develop symptoms such as fever, cough, or SOB or if you have concerns about the presence of another infection including coronavirus (COVID-19), please contact your health care provider or visit [email or web address].     Disposition/Instructions    Home Care recommended. Reference Visit Selection Guide.    Thank you for taking steps to prevent the spread of this virus.  o Limit your contact with others.  o Wear a simple mask to cover your cough.  o Wash your hands well and often.    Resources    [facility]: About COVID-19: [email or web address]    CDC: What to Do If You're Sick: www.cdc.gov/coronavirus/2019-ncov/about/steps-when-sick.html    CDC: Ending Home Isolation: www.cdc.gov/coronavirus/2019-ncov/hcp/disposition-in-home-patients.html     CDC: Caring for Someone: www.cdc.gov/coronavirus/2019-ncov/if-you-are-sick/care-for-someone.html     [facility]: Interim Guidance for Hospital Discharge to Home: www.health.[email or web address].mn.us/diseases/coronavirus/hcp/hospdischarge.pdf    [facility] clinical trials (COVID-19 research studies): [email or web address]-trials     Below are the COVID-19 hotlines at the [facility] ([facility]). Interpreters are available.   o For health questions: Call 952-720-0466 or 1-368.719.2984 (7 a.m. to 7 p.m.)  o For questions about schools and childcare: Call 413-282-5611 or 1-934.910.5432 (7 a.m. to 7 p.m.)                   Additional Information    [1] Follow-up call to recent contact AND [2] information only call, no triage required    Negative: [1] Caller is not with the adult (patient) AND [2] probable NON-URGENT symptoms     Negative: General information question, no triage required and triager able to answer question    Negative: Question about upcoming scheduled test, no triage required and triager able to answer question    Negative: Health Information question, no triage required and triager able to answer question    Negative: Requesting regular office appointment    Negative: [1] Caller requesting NON-URGENT health information AND [2] PCP's office is the best resource    Negative: RN needs further essential information from caller in order to complete triage    Negative: [1] Caller is not with the adult (patient) AND [2] reporting urgent symptoms    Negative: Lab result questions    Negative: Medication questions    Negative: Caller can't be reached by phone    Negative: Caller has already spoken to PCP or another triager    Protocols used: INFORMATION ONLY CALL-A-

## 2020-10-16 NOTE — TELEPHONE ENCOUNTER
Reason for call:  Other   Patient called regarding (reason for call): e visit     Additional comments: Patient calling stating the she knows that it's a sinus infection, she doesn't think nasal spray is going to relief anything for her. Please call to advise.     Phone number to reach patient:  Home number on file [phone] (home)    Best Time:  Any     Can we leave a detailed message on this number?  YES    Travel screening: Not Applicable

## 2020-10-16 NOTE — PATIENT INSTRUCTIONS
I have sent an Rx for a nasal spray that may be helpful for your symptoms. And I have ordered a COVID-19 test, you should hear from us within 3 days to schedule this test.    Instructions for Patients  Your symptoms show that you may have coronavirus (COVID-19). This illness can cause fever, cough and trouble breathing. Many people get a mild case and get better on their own. Some people can get very sick.     Not all patients are tested for COVID-19. If you need to be tested, your care team will let you know.    How can I protect others?    Without a test, we can t know for sure that you have COVID-19. For safety, it s very important to follow these rules.    Stay home and away from others (self-isolate) until:    You ve had no fever--and no medicine that reduces fever--for 1 full day (24 hours), And     Your other symptoms have resolved (gotten better). For example, your cough or breathing has improved, And     At least 10 days have passed since your symptoms started.    During this time:    Stay in your own room (and use your own bathroom), if you can.    Stay away from others in your home. No hugging, kissing or shaking hands.    No visitors.    Don t go to work, school or anywhere else.     Clean  high touch  surfaces often (doorknobs, counters, handles, etc.). Use a household cleaning spray or wipes.    Cover your mouth and nose with a mask, tissue or washcloth to avoid spreading germs.    Wash your hands and face often. Use soap and water.    For more tips, go to https://www.cdc.gov/coronavirus/2019-ncov/downloads/10Things.pdf.    How can I take care of myself?    1. Get lots of rest. Drink extra fluids (unless a doctor has told you not to).     2. Take Tylenol (acetaminophen) for fever or pain. If you have liver or kidney problems, ask your family doctor if it's okay to take Tylenol.     Adults can take either:     650 mg (two 325 mg pills) every 4 to 6 hours, or     1,000 mg (two 500 mg pills) every 8 hours  as needed.     Note: Don't take more than 3,000 mg in one day.   Acetaminophen is found in many medicines (both prescribed and over-the-counter medicines). Read all labels to be sure you don't take too much.   For children, check the Tylenol bottle for the right dose. The dose is based on  the child's age or weight.    3. If you have other health problems (like cancer, heart failure, an organ transplant or severe kidney disease): Call your specialty clinic if you don't feel better in the next 2 days.    4. Know when to call 911: If your breathing is so bad that it keeps you from doing normal activities, call 911 or go to the emergency room. Tell them that you've been staying home and may have COVID-19.      Thank you for taking steps to prevent the spread of this virus.  o Limit your contact with others.  o Wear a simple mask to cover your cough.  o Wash your hands well and often.  o If you need medical care, go to [email or web address] or contact your health care provider.     For more about COVID-19 and caring for yourself at home, visit the CDC website at https://www.cdc.gov/coronavirus/2019-ncov/about/steps-when-sick.html.     To learn about care at [facility], please go to [email or web address].     [facility] clinical trials (COVID-19 research studies): [email or web address]-[email or web address]-trials.    Below are the COVID-19 hotlines at the [facility] Health ([facility]). Interpreters are available.     For health questions: Call 400-068-6701 or 1-448.921.7536 (7 a.m. to 7 p.m.)    For questions about schools and childcare: Call 642-018-2635 or 1-264.436.2386 (7 a.m. to 7 p.m.)     Patient Education     When to Use Antibiotics   Antibiotics are medicines used to treat infections caused by bacteria. They don t work for illnesses caused by viruses or an allergic reaction. In fact, taking antibiotics for reasons other than a bacterial infection can cause problems. For  example, you may have side effects from the medicine. And if you really need an antibiotic, it may not work well.                                                                                                                                              When antibiotics won t help  Your healthcare provider won t usually prescribe antibiotics for the following conditions. You can help by not asking for them if you have:     A cold. This type of illness is caused by a virus. It can cause a runny nose, stuffed-up nose, sneezing, coughing, headache, mild body aches, and low fever. A cold gets better on its own in a few days to a week.    The flu (influenza). This is a respiratory illness caused by a virus. The flu usually goes away on its own in a week or so. It can cause fever, body aches, sore throat, and fatigue.    Bronchitis. This is an infection in the lungs most often caused by a virus. You may have coughing, phlegm, body aches, and a low fever. A common type of bronchitis is known as a chest cold (acute bronchitis). This often happens after you have a respiratory infection like a common cold. Bronchitis can take weeks to go away, but antibiotics usually don t help.    Most sore throats. Sore throats are most often caused by viruses. Your throat may feel scratchy or achy, and it may hurt to swallow. You may also have a low fever and body aches. A sore throat usually gets better in a few days.    Most ear infections. An ear infection may be caused by a virus or bacteria. It causes pain in the ear. Antibiotics usually don t help, and the infection goes away on its own.    Most sinus infections (sinusitis). This kind of infection causes sinus pain and swelling, and a runny nose. In most cases, sinusitis goes away on its own, and antibiotics don t make recovery quicker.    Allergic rhinitis. This is a set of symptoms caused by an allergic reaction. You may have sneezing, a runny nose, itchy or watery eyes, or a sore  throat. Allergies are not treated with antibiotics.    Low fever. A mild fever that s less than 100.4 F (38 C) most likely doesn t need treatment with antibiotics.   When antibiotics can help   Antibiotics can be used to treat:                                                       Strep throat. This is a throat infectioncaused by a certain type of bacteria. Symptoms of strep throat include a sore throat, white patches on the tonsils, red spots on the roof of the mouth, fever, body aches, and nausea and vomiting.    Urinary tract infection (UTI). This is a bacterial infection of the bladder and the tube that takes urine out of the body. It can cause burning pain and urine that s cloudy or tinted with blood. UTIs are very common. Antibiotics usually help treat these infections.    Some ear infections. In some cases, a healthcare provider may prescribe antibiotics for an ear infection. You may need a test to show what s causing the ear infection.    Some sinus infections. In some cases, yourhealthcare provider may give you antibiotics. He or she may first need to make sure your symptoms aren t caused by a virus, fungus, allergies, or air pollutants such as smoke.   Your doctor may also recommend antibiotics if you have a condition that can affect your immune system, such as diabetes or cancer.   Self-care at home   If your infection can t be treated with antibiotics, you can take other steps to feel better. Try the remedies below. In general:     Rest and sleep as much as needed.    Drink water and other clear fluids.    Don t smoke, and avoid smoke from other people.    Use over-the-counter medicine such as acetaminophen to ease pain or fever, as directed by your healthcare provider.   To treat sinus pain or nasal congestion:     Put a warm, moist washcloth on your face where you feel sinus pain or pressure.    Use a nasal spray with medicine or saline, as directed by your healthcare provider.    Breathe in steam from  a hot shower.    Use a humidifier or cool mist vaporizer.   To quiet a cough:     Use a humidifier or cool mist vaporizer.    Breathe in steam from a hot shower.    Use cough lozenges.   To sooth a sore throat:     Suck on ice chips, popsicles, or lozenges.    Use a sore throat spray.    Use a humidifier or cool mist vaporizer.    Gargle with saltwater.    Drink warm liquids.   To ease ear pain:     Hold a warm, moist washcloth on the ear for 10 minutes at a time.  Date Last Reviewed: 9/1/2016 2000-2019 [employer]. [address] [place] [address]. All rights reserved. This information is not intended as a substitute for professional medical care. Always follow your healthcare professional's instructions.           Thank you for choosing us for your care. Given your symptoms, I would like you to do a lab-only visit to determine what is causing them.  I have placed the orders.  Please schedule an appointment with the lab right here in [place], or call [phone].  I will let you know when the results are back and next steps to take.  Thank you for choosing us for your care. Based on your symptoms and length of illness, I do not think that you need an antibiotic prescription at this time.  Please follow the care advise I ve provided and use the prescribed medication to help relieve your symptoms. View your full visit summary for details by clicking on the link below.     If you re not feeling better within 5-7 days, please respond to this message and we can consider if an antibiotic prescription is needed.  You can schedule an appointment right here in [place], or call [phone]  If the visit is for the same symptoms as your e-visit, we ll refund the cost of your e-visit if seen within seven days  I

## 2020-10-16 NOTE — TELEPHONE ENCOUNTER
Triage Call:    Patient had E-visit today regarding sinus symptoms.   Patient has a history of sinus infections and is usually prescribed an antibiotic.     Per E-visit note it states patient has had diarrhea from Azithromycin so would prefer not having that antibiotic prescribed.       Patient states that she has already tried Flonase over the counter 4 times daily with no relief along with over the counter medications, but patient stated she is limited to many OTC medication due to diagnosis of hypothyroidism.    Patient states that she knows the nasal spray will not help with her sx and requesting prescription for antibiotic.     Routing message high priority to provider who did E-visit today with patient.  Pended pharmacy preference.     Call patient back at [phone]   Okay to leave a detailed message? YES      Beryl Oakes RN, BSN Nurse Triage Advisor 4:36 PM 10/16/2020

## 2020-10-16 NOTE — TELEPHONE ENCOUNTER
Please see message below. Pt had e-visit today and was given Rx for ipratropium (ATROVENT) 0.03 % nasal spray.

## 2020-10-16 NOTE — TELEPHONE ENCOUNTER
Antibiotic not appropriate for sinus infection after 4-6 days of illness. I sent information to her MyChart. If symptoms worsen over the weekend, she should go to urgent care. If symptoms persistent for over 10 days, then she can do another Evisit or appointment with us for re-evaluation.  Irene Linton, APRN, CNP

## 2020-10-18 ENCOUNTER — VIRTUAL VISIT (OUTPATIENT)
Dept: FAMILY MEDICINE | Facility: OTHER | Age: 35
End: 2020-10-18
Payer: COMMERCIAL

## 2020-10-18 PROCEDURE — 99421 OL DIG E/M SVC 5-10 MIN: CPT | Performed by: FAMILY MEDICINE

## 2020-10-19 ENCOUNTER — OFFICE VISIT - HEALTHEAST (OUTPATIENT)
Dept: BEHAVIORAL HEALTH | Facility: CLINIC | Age: 35
End: 2020-10-19

## 2020-10-19 DIAGNOSIS — F43.23 ADJUSTMENT DISORDER WITH MIXED ANXIETY AND DEPRESSED MOOD: ICD-10-CM

## 2020-10-19 NOTE — PROGRESS NOTES
"Date: 10/18/2020 19:55:24  Clinician: Dorothy Chavez  Clinician NPI: [id number]  Patient: Karrie Zhang  Patient : 1985  Patient Address: [address] [place] [address]  Patient Phone: [phone]  Visit Protocol: URI  Patient Summary:  Karrie is a 35 year old ( : 1985 ) female who initiated a [facility] Visit for cold, sinus infection, or influenza. When asked the question \"Please sign me up to receive news, health information and promotions from [facility].\", Karrie responded \"Yes\".    Karrie states her symptoms started gradually 7-9 days ago. After her symptoms started, they improved and then got worse again.   Her symptoms consist of ear pain, a headache, a cough, facial pain or pressure, malaise, and a sore throat.   Symptom details     Cough: Karrie coughs a few times an hour and her cough is not more bothersome at night. Phlegm does not come into her throat when she coughs. She believes her cough is caused by post-nasal drip.     Sore throat: Karrie reports having mild throat pain (1-3 on a 10 point pain scale), does not have exudate on her tonsils, and can swallow liquids. She is not sure if the lymph nodes in her neck are enlarged. A rash has not appeared on the skin since the sore throat started.     Facial pain or pressure: The facial pain or pressure feels worse when bending over or leaning forward.     Headache: She states the headache is mild (1-3 on a 10 point pain scale).      Karrie denies having wheezing, fever, nasal congestion, anosmia, vomiting, rhinitis, nausea, myalgias, chills, teeth pain, ageusia, and diarrhea. She also denies taking antibiotic medication in the past month and having recent facial or sinus surgery in the past 60 days. She is not experiencing dyspnea.   Precipitating events  Within the past week, Karrie has not been exposed to someone with strep throat. She has not recently been exposed to someone with influenza. Karrie " has been in close contact with the following high risk individuals: adults 65 or older and children under the age of 5.   Pertinent COVID-19 (Coronavirus) information  In the past 14 days, Karrie has not worked in a congregate living setting.   She does not work or volunteer as healthcare worker or a  and does not work or volunteer in a healthcare facility.   Karrie also has not lived in a congregate living setting in the past 14 days. She does not live with a healthcare worker.   Karrie has not had a close contact with a laboratory-confirmed COVID-19 patient within 14 days of symptom onset.   Since December 2019, Karrie and has had upper respiratory infection (URI) or influenza-like illness. Has not been diagnosed with lab-confirmed COVID-19 test      Date(s) of previous URI or influenza-like illness (free-text): I get multiple sinus infections a year     Symptoms Karrie experienced during previous URI or influenza-like illness as reported by the patient (free-text): facial swelling/pain, ear pain, swollen neck glands, scratchy throat, thick mucous        Pertinent medical history  Karrie had 1 sinus infection within the past year.   Karrie does not get yeast infections when she takes antibiotics.   Karrie does not need a return to work/school note.   Weight: 215 lbs   Karrie does not smoke or use smokeless tobacco.   She denies pregnancy and denies breastfeeding. She has menstruated in the past month.   Additional information as reported by the patient (free text): I have taken the following meds to no avail: sinus pressure PE, mucinex (had side effects), still take zytrec, flonase on a twice basis or double, excedrin, aleve back &amp; muscle, hot packs on my face, steamy showers, atrovent, theraflu-   Weight: 215 lbs    MEDICATIONS: Zyrtec oral, fluticasone propionate inhalation, Ortho Tri-Cyclen (28) oral, albuterol sulfate inhalation, Prozac oral, buspirone oral,  levothyroxine oral, Singulair oral, ALLERGIES: Penicillins  Clinician Response:  Dear Karrie,  Based on the information provided, you have acute bacterial sinusitis, also known as a sinus infection. Sinus infections are caused by bacteria or a virus and symptoms are almost always identical. The difference between the 2 types of infections is timing.  Sinus infections start as viral infections and symptoms improve on their own in about 7 days. If symptoms have not improved after 7 days or have even worsened, a bacterial infection may have developed.  Medication information  I am prescribing:       Fluticasone 50 mcg/actuation nasal spray. Inhale 2 sprays in each nostril 1 time per day; after 1 week, may adjust to 1 - 2 sprays in each nostril 1 time per day. This medication takes several days to start working, so keep taking it even if it doesn't help right away. There are no refills with this prescription.      Doxycycline hyclate 100 mg oral tablet. Take 1 tablet by mouth 2 times per day for 7 days. There are no refills with this prescription.     Certain antibiotics such as Doxycycline, levofloxacin, and moxifloxacin can cause your skin to be more sensitive to the sun. Exposure to the sun when taking these antibiotics may cause skin rash, itching, redness, or a severe sunburn. Be sure to avoid prolonged or direct exposure to the sun, especially between 10 am and 3 pm, if possible. Wear protective clothing and use sunscreen of SPF 30 or higher when you are outdoors.  Yeast infections can be a common side effect of antibiotics. The most common symptom of a yeast infection is itchiness in and around the vagina. Other signs and symptoms include burning, redness, or a thick, white vaginal discharge that looks like cottage cheese and does not have a bad smell.  If you become pregnant during this course of treatment, stop taking the medication and contact your primary care provider.  Unless you are allergic to the  over-the-counter medication(s) below, I recommend using:     Saline nasal spray or drops(Ocean or store brand). Use 1-2 drops or sprays in each nostril as needed for congestion.   Over-the-counter medications do not require a prescription. Ask the pharmacist if you have any questions.  Self care  Steps you can take to be as comfortable as possible:     Rest.    Drink plenty of fluids.    Use throat lozenges.    Suck on frozen items such as popsicles.    Drink hot tea with lemon and honey.    Gargle with warm salt water (1/4 teaspoon of salt per 8 ounce glass of water).    Take a spoonful of honey to reduce your cough.     When to seek care  Please be seen in a clinic or urgent care if any of the following occur:     New symptoms develop, or symptoms become worse    Symptoms do not start to improve after 3 days of treatment     Call ahead before going to the clinic or urgent care.  It is possible to have an allergic reaction to an antibiotic even if you have not had one in the past. If you notice a new rash, significant swelling, or difficulty breathing, stop taking this medication immediately and go to a clinic or urgent care.  Call 911 or go to the emergency room if you feel that your throat is closing off, you suddenly develop a rash, you are unable to swallow fluids, you are drooling, or you are having difficulty breathing.  COVID-19 (Coronavirus) General Information  Because there is currently no vaccine to prevent infection, the best way to protect yourself is to avoid being exposed to this virus. Common symptoms of COVID-19 include but are not limited to fever, cough, and shortness of breath. These symptoms appear 2-14 days after you are exposed to the virus that causes COVID-19. Click here for more information from the CDC on how to protect yourself.  If you are sick with COVID-19 or suspect you are infected with the virus that causes COVID-19, follow the steps here from the CDC to help prevent the disease  from spreading to people in your home and community.  Click here for general information from the CDC on testing.  If you develop any of these emergency warning signs for COVID-19, get medical attention immediately:     Trouble breathing    Persistent pain or pressure in the chest    New confusion or inability to arouse    Bluish lips or face      Call your doctor or clinic before going in. Call 911 if you have a medical emergency and notify the  you have or think you may have COVID-19.  For more detailed and up to date information on COVID-19 (Coronavirus), please visit the CDC website.   Diagnosis: Acute bacterial sinusitis  Diagnosis ICD: J01.90  Prescription: doxycycline hyclate 100 mg oral tablet 14 tablet, 7 days supply. Take 1 tablet by mouth 2 times per day for 7 days. Refills: 0, Refill as needed: no, Allow substitutions: yes  Prescription: fluticasone 50 mcg/actuation nasal spray,suspension 1 120 spray aerosol with adapter (grams), 30 days supply. Inhale 2 sprays in each nostril 1 time per day; after 1 week, may adjust to 1 - 2 sprays in each nostril 1 time per day.. Refills: 0, Refill as needed: no, Allow substitutions: yes  Pharmacy: [id number] - [phone] - [address] [place] [address]

## 2020-10-21 ENCOUNTER — AMBULATORY - HEALTHEAST (OUTPATIENT)
Dept: BEHAVIORAL HEALTH | Facility: CLINIC | Age: 35
End: 2020-10-21

## 2020-10-26 ENCOUNTER — COMMUNICATION - HEALTHEAST (OUTPATIENT)
Dept: BEHAVIORAL HEALTH | Facility: CLINIC | Age: 35
End: 2020-10-26

## 2020-10-29 ENCOUNTER — OFFICE VISIT (OUTPATIENT)
Dept: PODIATRY | Facility: CLINIC | Age: 35
End: 2020-10-29
Payer: COMMERCIAL

## 2020-10-29 VITALS — OXYGEN SATURATION: 98 % | HEART RATE: 80 BPM | BODY MASS INDEX: 35.78 KG/M2 | WEIGHT: 220 LBS

## 2020-10-29 DIAGNOSIS — M20.5X2 HALLUX LIMITUS OF LEFT FOOT: Primary | ICD-10-CM

## 2020-10-29 PROCEDURE — 99214 OFFICE O/P EST MOD 30 MIN: CPT | Performed by: PODIATRIST

## 2020-10-29 RX ORDER — KETOCONAZOLE 20 MG/G
CREAM TOPICAL
COMMUNITY
Start: 2020-09-11 | End: 2022-04-06

## 2020-10-29 RX ORDER — FLUTICASONE PROPIONATE 50 MCG
SPRAY, SUSPENSION (ML) NASAL
COMMUNITY
Start: 2020-10-19 | End: 2022-09-07

## 2020-10-29 RX ORDER — TACROLIMUS 1 MG/G
OINTMENT TOPICAL
COMMUNITY
Start: 2020-09-11

## 2020-10-29 RX ORDER — DOXYCYCLINE HYCLATE 100 MG
TABLET ORAL
COMMUNITY
Start: 2020-10-19 | End: 2021-09-03

## 2020-10-29 RX ORDER — HYDROCORTISONE 2.5 %
CREAM (GRAM) TOPICAL
COMMUNITY
Start: 2020-10-16

## 2020-10-29 NOTE — PROGRESS NOTES
Subjective:    8/5/20   Pt is seen today in consult from Dr. Granados with the c/c of painful left foot.  This has been symptomatic for the past 3 months.  Points to lateral first MTPJ.  Has pain w/ ambulation and shoewear and is relieved by rest and going barefoot.  Denies pain in the contralateral foot.  Describes as burning pain.  It bothers the patient most when she is walking.  She has pain afterwards.  She denies any numbness.  She denies any trauma but she states she does hit her toes quite often.  History of OH abuse.  She denies ecchymosis.  She denies erythema.  She is noted no weakness.  Just walking short distances she does not have pain.  Starts to bother her after walking 15-20 minutes.  She has use a toe  which she believes helps.    10/29/20 patient returns for evaluation of her left first MTPJ.  Still having pain.  Aggravated by activity and relieved by rest.  She states flats especially bother this.  Has not improved at all since last visit.  Denies ecchymosis or weakness     ROS:   See above       Allergies   Allergen Reactions     Penicillins Rash       Current Outpatient Medications   Medication Sig Dispense Refill     albuterol (PROAIR HFA/PROVENTIL HFA/VENTOLIN HFA) 108 (90 Base) MCG/ACT inhaler Inhale 2 puffs into the lungs every 6 hours as needed for shortness of breath / dyspnea or wheezing 18 g 3     busPIRone (BUSPAR) 15 MG tablet TAKE 1 TABLET(15 MG) BY MOUTH TWICE DAILY 180 tablet 3     FLUoxetine (PROZAC) 40 MG capsule TAKE 1 CAPSULE BY MOUTH EVERY DAY 90 capsule 0     ipratropium (ATROVENT) 0.03 % nasal spray Spray 2 sprays into both nostrils every 12 hours 1 Box 1     levothyroxine (SYNTHROID/LEVOTHROID) 75 MCG tablet Take 1 tablet (75 mcg) by mouth daily 90 tablet 1     montelukast (SINGULAIR) 10 MG tablet Take 1 tablet (10 mg) by mouth At Bedtime 90 tablet 3     norgestim-eth estrad triphasic (TRI-SPRINTEC) 0.18/0.215/0.25 MG-35 MCG tablet Take 1 tablet by mouth daily 84  tablet 4     Phenylephrine-APAP-guaiFENesin (TYLENOL SINUS SEVERE) 5-325-200 MG TABS Take two tablets every 4-6 hours (maximum: 10 tablets [acetaminophen 3,250 mg/guaifenesin 2,000 mg/phenylephrine 50 mg]/24 hours). 20 tablet 0       Patient Active Problem List   Diagnosis     Perioral dermatitis     Mild persistent asthma without complication     Anxiety     Major depressive disorder, recurrent episode, mild (H)     Multiple environmental allergies     Obesity (BMI 30-39.9)     Cervical high risk HPV (human papillomavirus) test positive     Rosacea, acne     Acquired hypothyroidism     Hay fever     Lump in armpit, left     Alcohol dependence (H)       Past Medical History:   Diagnosis Date     Acquired hypothyroidism      Alcohol abuse      Allergic rhinitis      Alopecia      Cervical dysplasia      Cervical high risk HPV (human papillomavirus) test positive 6/29/16, 7/5/17    neg 16/18     Depressive disorder      H/O colposcopy with cervical biopsy 08/08/2017 08/08/17: [place] [facility] and ECC normal.      Papanicolaou smear of cervix with low grade squamous intraepithelial lesion (LGSIL) 07/05/2017     Rosacea, acne      Uncomplicated asthma        Past Surgical History:   Procedure Laterality Date     LASER TX, CERVICAL  2005       Family History   Problem Relation Age of Onset     Osteoporosis Mother      Diabetes Father      Other Cancer Father         Pancreatic     Substance Abuse Father         alcoholic: stopped when I was in grade school     Prostate Cancer Maternal Grandfather      Prostate Cancer Other      Depression Brother      Alcoholism Brother      Coronary Artery Disease No family hx of        Social History     Tobacco Use     Smoking status: Never Smoker     Smokeless tobacco: Never Used   Substance Use Topics     Alcohol use: Yes     Alcohol/week: 35.0 standard drinks     Types: 14 Standard drinks or equivalent, 21 Shots of liquor per week       Objective:    O:  There were no vitals taken for  this visit..      Constitutional/ general:  Pt is in no apparent distress, appears well-nourished.  Cooperative with history and physical exam.     Psych:  The patient answered questions appropriately.  Normal affect.  Seems to have reasonable expectations, in terms of treatment.     Eyes:  Visual scanning/ tracking without deficit.    Ears:  Response to auditory stimuli is normal.  No  hearing aid devices.  Auricles in proper alignment.     Lymphatic:  Popliteal lymph nodes not enlarged.     Lungs:  Non labored breathing, non labored speech. No cough.  No audible wheezing. Even, quiet breathing.       Vascular:  Pedal pulses are palpable bilaterally for both the DP and PT arteries.  CFT < 3 sec.  No edema.  Pedal hair growth noted.     Neuro:  Alert and oriented x 3. Coordinated gait.  Light touch sensation is intact to the L4, L5, S1 distributions. No obvious deficits.  No evidence of neurological-based weakness, spasticity, or contracture in the lower extremities.     Derm: Normal texture and turgor.  No erythema, ecchymosis, or cyanosis.  No open lesions.     Musculoskeletal:    Lower extremity muscle strength is normal.  Patient is ambulatory without an assistive device or brace.    Pronated arch with weightbearing.   No equinus.    left bunion deformity noted.  No pain with range of motion.  Negative tracking with ROM.  No medial bursa or masses noted.  No pain on the sesamoids, medially or dorsally.  No pain with stressing extensor flexor tendon.  No pain anywhere in the second ray.  Patient has pain on the dorsal left first MTPJ.  Slight edema here.  With loading the joint no pain.  With range of motion patient has slight pain at end range of motion        Radiographic Exam:   X-ray three views foot shows IM angle which is increased.  Abducted hallux.   We note that there is a very slight bone abnormality on the lateral base of the proximal phalanx.  Also known slight spurring dorsum base proximal  phalanx    Assessment: Left foot functional hallux limitus                          Pronation                              Plan:  Xray again reviewed.  Discussed with patient if she had a small fracture there should be healed by now.  Her symptoms are unchanged.  Discussed with her pronation she is developing functional hallux limitus.  We did point out the slight amount of spurring on the base of the dorsum proximal phalanx.  Discussed mechanics that causes.  Had a long discussion on shoes she should and should not wear.  Discussed ways to make this joint function more efficiently.  Discussed importance of range of motion to keep this joint supple.  She will also do this on the contralateral foot to prevent arthritis here as well.  RTC as needed.  25 minutes spent in total time caring for this patient and at least 20 minutes spent  face to face with patient regarding care.          Hair Li DPM DPM, FACFAS

## 2020-10-29 NOTE — PATIENT INSTRUCTIONS
We wish you continued good healing. If you have any questions or concerns, please do not hesitate to contact us at [phone]    [facility] [facility] (secure e-mail communication and access to your chart) to send a message or to make an appointment.    Please remember to call and schedule a follow up appointment if one was recommended at your earliest convenience.     +++OF MARCH 2020+++ LOCATION AND HOURS HAVE CHANGED    PLEASE CALL CLINICS TO VERIFY DAYS AND TIMES  PODIATRY CLINIC HOURS  TELEPHONE NUMBER    Dr. Hair CARRANZAPNICO [facility]        Clinics:  [facility]   Tuesday 1PM-6PM  [place]  Wednesday 745AM-330PM  [place]  Thursday/Friday 745AM-230PM  [place]     Specialty schedulers:   [phone] to make an appointment with any Specialty Provider.               If you need a medication refill, please contact us you may need lab work and/or a follow up visit prior to your refill (i.e. Antifungal medications).    If MRI needed please call Imaging at [phone]    Knee scooters can be picked up at ANY Medical Supply stores. For a list of suppliers please ask.

## 2020-10-29 NOTE — LETTER
10/29/2020         RE: Karrie Zhang  [address]  [place] [address]        Dear Colleague,    Thank you for referring your patient, Karrie Zhang, to the [facility]. Please see a copy of my visit note below.     Subjective:    8/5/20   Pt is seen today in consult from Dr. Granados with the c/c of painful left foot.  This has been symptomatic for the past 3 months.  Points to lateral first MTPJ.  Has pain w/ ambulation and shoewear and is relieved by rest and going barefoot.  Denies pain in the contralateral foot.  Describes as burning pain.  It bothers the patient most when she is walking.  She has pain afterwards.  She denies any numbness.  She denies any trauma but she states she does hit her toes quite often.  History of OH abuse.  She denies ecchymosis.  She denies erythema.  She is noted no weakness.  Just walking short distances she does not have pain.  Starts to bother her after walking 15-20 minutes.  She has use a toe  which she believes helps.    10/29/20 patient returns for evaluation of her left first MTPJ.  Still having pain.  Aggravated by activity and relieved by rest.  She states flats especially bother this.  Has not improved at all since last visit.  Denies ecchymosis or weakness     ROS:   See above       Allergies   Allergen Reactions     Penicillins Rash       Current Outpatient Medications   Medication Sig Dispense Refill     albuterol (PROAIR HFA/PROVENTIL HFA/VENTOLIN HFA) 108 (90 Base) MCG/ACT inhaler Inhale 2 puffs into the lungs every 6 hours as needed for shortness of breath / dyspnea or wheezing 18 g 3     busPIRone (BUSPAR) 15 MG tablet TAKE 1 TABLET(15 MG) BY MOUTH TWICE DAILY 180 tablet 3     FLUoxetine (PROZAC) 40 MG capsule TAKE 1 CAPSULE BY MOUTH EVERY DAY 90 capsule 0     ipratropium (ATROVENT) 0.03 % nasal spray Spray 2 sprays into both nostrils every 12 hours 1 Box 1     levothyroxine  (SYNTHROID/LEVOTHROID) 75 MCG tablet Take 1 tablet (75 mcg) by mouth daily 90 tablet 1     montelukast (SINGULAIR) 10 MG tablet Take 1 tablet (10 mg) by mouth At Bedtime 90 tablet 3     norgestim-eth estrad triphasic (TRI-SPRINTEC) 0.18/0.215/0.25 MG-35 MCG tablet Take 1 tablet by mouth daily 84 tablet 4     Phenylephrine-APAP-guaiFENesin (TYLENOL SINUS SEVERE) 5-325-200 MG TABS Take two tablets every 4-6 hours (maximum: 10 tablets [acetaminophen 3,250 mg/guaifenesin 2,000 mg/phenylephrine 50 mg]/24 hours). 20 tablet 0       Patient Active Problem List   Diagnosis     Perioral dermatitis     Mild persistent asthma without complication     Anxiety     Major depressive disorder, recurrent episode, mild (H)     Multiple environmental allergies     Obesity (BMI 30-39.9)     Cervical high risk HPV (human papillomavirus) test positive     Rosacea, acne     Acquired hypothyroidism     Hay fever     Lump in armpit, left     Alcohol dependence (H)       Past Medical History:   Diagnosis Date     Acquired hypothyroidism      Alcohol abuse      Allergic rhinitis      Alopecia      Cervical dysplasia      Cervical high risk HPV (human papillomavirus) test positive 6/29/16, 7/5/17    neg 16/18     Depressive disorder      H/O colposcopy with cervical biopsy 08/08/2017 08/08/17: [place] [facility]x and ECC normal.      Papanicolaou smear of cervix with low grade squamous intraepithelial lesion (LGSIL) 07/05/2017     Rosacea, acne      Uncomplicated asthma        Past Surgical History:   Procedure Laterality Date     LASER TX, CERVICAL  2005       Family History   Problem Relation Age of Onset     Osteoporosis Mother      Diabetes Father      Other Cancer Father         Pancreatic     Substance Abuse Father         alcoholic: stopped when I was in grade school     Prostate Cancer Maternal Grandfather      Prostate Cancer Other      Depression Brother      Alcoholism Brother      Coronary Artery Disease No family hx of        Social History      Tobacco Use     Smoking status: Never Smoker     Smokeless tobacco: Never Used   Substance Use Topics     Alcohol use: Yes     Alcohol/week: 35.0 standard drinks     Types: 14 Standard drinks or equivalent, 21 Shots of liquor per week       Objective:    O:  There were no vitals taken for this visit..      Constitutional/ general:  Pt is in no apparent distress, appears well-nourished.  Cooperative with history and physical exam.     Psych:  The patient answered questions appropriately.  Normal affect.  Seems to have reasonable expectations, in terms of treatment.     Eyes:  Visual scanning/ tracking without deficit.    Ears:  Response to auditory stimuli is normal.  No  hearing aid devices.  Auricles in proper alignment.     Lymphatic:  Popliteal lymph nodes not enlarged.     Lungs:  Non labored breathing, non labored speech. No cough.  No audible wheezing. Even, quiet breathing.       Vascular:  Pedal pulses are palpable bilaterally for both the DP and PT arteries.  CFT < 3 sec.  No edema.  Pedal hair growth noted.     Neuro:  Alert and oriented x 3. Coordinated gait.  Light touch sensation is intact to the L4, L5, S1 distributions. No obvious deficits.  No evidence of neurological-based weakness, spasticity, or contracture in the lower extremities.     Derm: Normal texture and turgor.  No erythema, ecchymosis, or cyanosis.  No open lesions.     Musculoskeletal:    Lower extremity muscle strength is normal.  Patient is ambulatory without an assistive device or brace.    Pronated arch with weightbearing.   No equinus.    left bunion deformity noted.  No pain with range of motion.  Negative tracking with ROM.  No medial bursa or masses noted.  No pain on the sesamoids, medially or dorsally.  No pain with stressing extensor flexor tendon.  No pain anywhere in the second ray.  Patient has pain on the dorsal left first MTPJ.  Slight edema here.  With loading the joint no pain.  With range of motion patient has  slight pain at end range of motion        Radiographic Exam:   X-ray three views foot shows IM angle which is increased.  Abducted hallux.   We note that there is a very slight bone abnormality on the lateral base of the proximal phalanx.  Also known slight spurring dorsum base proximal phalanx    Assessment: Left foot functional hallux limitus                          Pronation                              Plan:  Xray again reviewed.  Discussed with patient if she had a small fracture there should be healed by now.  Her symptoms are unchanged.  Discussed with her pronation she is developing functional hallux limitus.  We did point out the slight amount of spurring on the base of the dorsum proximal phalanx.  Discussed mechanics that causes.  Had a long discussion on shoes she should and should not wear.  Discussed ways to make this joint function more efficiently.  Discussed importance of range of motion to keep this joint supple.  She will also do this on the contralateral foot to prevent arthritis here as well.  RTC as needed.  25 minutes spent in total time caring for this patient and at least 20 minutes spent  face to face with patient regarding care.          Hair Li DPM DPM, FACFAS        Again, thank you for allowing me to participate in the care of your patient.        Sincerely,        Hair Li DPM

## 2020-12-08 ENCOUNTER — MYC MEDICAL ADVICE (OUTPATIENT)
Dept: FAMILY MEDICINE | Facility: CLINIC | Age: 35
End: 2020-12-08

## 2020-12-08 DIAGNOSIS — E03.9 ACQUIRED HYPOTHYROIDISM: Primary | ICD-10-CM

## 2020-12-11 ENCOUNTER — AMBULATORY - HEALTHEAST (OUTPATIENT)
Dept: LAB | Facility: HOSPITAL | Age: 35
End: 2020-12-11

## 2020-12-11 ENCOUNTER — TRANSFERRED RECORDS (OUTPATIENT)
Dept: HEALTH INFORMATION MANAGEMENT | Facility: CLINIC | Age: 35
End: 2020-12-11

## 2020-12-11 DIAGNOSIS — E03.9 HYPOTHYROIDISM: ICD-10-CM

## 2020-12-11 LAB
HBA1C MFR BLD: 4.7 %
HBA1C MFR BLD: 4.7 % (ref 0–5.7)
TSH SERPL DL<=0.005 MIU/L-ACNC: 1.16 UIU/ML (ref 0.3–5)
TSH SERPL-ACNC: 1.16 UIU/ML (ref 0.3–5)

## 2020-12-15 NOTE — PROGRESS NOTES
"Karrie Zhang is a 35 year old female who is being evaluated via a billable video visit.      The patient has been notified of following:     \"This video visit will be conducted via a call between you and your physician/provider. We have found that certain health care needs can be provided without the need for an in-person physical exam.  This service lets us provide the care you need with a video conversation.  If a prescription is necessary we can send it directly to your pharmacy.  If lab work is needed we can place an order for that and you can then stop by our lab to have the test done at a later time.    Video visits are billed at different rates depending on your insurance coverage.  Please reach out to your insurance provider with any questions.    If during the course of the call the physician/provider feels a video visit is not appropriate, you will not be charged for this service.\"    Patient has given verbal consent for Video visit? Yes  How would you like to obtain your AVS? MyChart  If you are dropped from the video visit, the video invite should be resent to: Text to cell phone: [phone]  Will anyone else be joining your video visit? No    Subjective     Karrie Zhang is a 35 year old female who presents today via video visit for the following health issues:    HPI     Vaginal Symptoms  Onset/Duration: a month and half ago  Description:  Vaginal Discharge: none   Itching (Pruritis): YES  Burning sensation:  YES  Odor: no  Accompanying Signs & Symptoms:  Urinary symptoms: no  Abdominal pain: no  Fever: no  History:   Sexually active: YES  New Partner: YES  Possibility of Pregnancy:  no  Recent antibiotic use: no  Previous vaginitis issues: YES  Precipitating or alleviating factors: uknown  Therapies tried and outcome: Monistat 3 day but it came back    LMP 3 weeks ago  No Urinary problems      Video Start Time: 2.07 PM      Review of Systems   Rest of the ROS is Negative except see " above and Problem list [stable]        Objective           Vitals:  No vitals were obtained today due to virtual visit.    Physical Exam     GENERAL: Healthy, alert and no distress  EYES: Eyes grossly normal to inspection.  No discharge or erythema, or obvious scleral/conjunctival abnormalities.  RESP: No audible wheeze, cough, or visible cyanosis.  No visible retractions or increased work of breathing.    SKIN: Visible skin clear. No significant rash, abnormal pigmentation or lesions.  NEURO: Cranial nerves grossly intact.  Mentation and speech appropriate for age.  PSYCH: Mentation appears normal, affect normal/bright, judgement and insight intact, normal speech and appearance well-groomed.      Pending         Assessment & Plan     Acute vaginitis  Pt will make lab appointment   - NEISSERIA GONORRHOEA PCR; Future  - CHLAMYDIA TRACHOMATIS PCR; Future  - Hepatitis B surface antigen; Future  - HIV Antigen Antibody Combo; Future  - Treponema Abs w Reflex to RPR and Titer; Future  - Wet prep; Future  - *UA reflex to Microscopic and Culture ([facility] (except [place] and [place]); Future      Will review labs and will let her Know      No follow-ups on file.    Yaneth Hidalgo MD  [facility]      Video-Visit Details    Type of service:  Video Visit    Video End Time:2:19 PM    Originating Location (pt. Location): Home    Distant Location (provider location):  [facility]     Platform used for Video Visit: Venuet[employer]   2:23 PM -visit complete

## 2020-12-16 ENCOUNTER — VIRTUAL VISIT (OUTPATIENT)
Dept: FAMILY MEDICINE | Facility: CLINIC | Age: 35
End: 2020-12-16
Payer: COMMERCIAL

## 2020-12-16 DIAGNOSIS — N76.0 BV (BACTERIAL VAGINOSIS): ICD-10-CM

## 2020-12-16 DIAGNOSIS — Z11.3 SCREEN FOR STD (SEXUALLY TRANSMITTED DISEASE): ICD-10-CM

## 2020-12-16 DIAGNOSIS — N76.0 ACUTE VAGINITIS: Primary | ICD-10-CM

## 2020-12-16 DIAGNOSIS — B96.89 BV (BACTERIAL VAGINOSIS): ICD-10-CM

## 2020-12-16 PROCEDURE — 99213 OFFICE O/P EST LOW 20 MIN: CPT | Mod: GT | Performed by: FAMILY MEDICINE

## 2020-12-17 ENCOUNTER — TELEPHONE (OUTPATIENT)
Dept: FAMILY MEDICINE | Facility: CLINIC | Age: 35
End: 2020-12-17

## 2020-12-17 DIAGNOSIS — N76.0 ACUTE VAGINITIS: Primary | ICD-10-CM

## 2020-12-17 RX ORDER — FLUCONAZOLE 150 MG/1
150 TABLET ORAL ONCE
Qty: 1 TABLET | Refills: 0 | Status: SHIPPED | OUTPATIENT
Start: 2020-12-17 | End: 2020-12-17

## 2020-12-17 NOTE — TELEPHONE ENCOUNTER
Reason for call:  Order   Order or referral being requested: Urine testing in place of swab testing/ the swab's are on back order and they do not have this.  Reason for request:  the swab's are on back order and they do not have this.  Date needed: as soon as possible Today 10:00 am    Has the patient been seen by the PCP for this problem? YES    Additional comments:   CHLAMYDIA TRACHOMATIS  NEISSERIA GONORRHOEA     Unable to do Wet prep they do not have swabs/ unless this can be collected another way    [facility]: [phone]    Screen for STD (sexually transmitted disease) need to be re-order for Urine collect. They do not have the swabs and they are on back order.     Best Time:  asap    Can we leave a detailed message on this number?  YES    Travel screening: Not Applicable

## 2020-12-17 NOTE — TELEPHONE ENCOUNTER
Please call-I will empirically give her something for yeast  She can call back next week and see if we have swabs  Please check why she could not do a wet prep

## 2020-12-17 NOTE — TELEPHONE ENCOUNTER
Spoke with lab and they just need the STD screen changed to urine.  They have the swabs to do the wet prep, so this can still be done.    Left message on answering machine for patient to call back to the nurse at [phone].    Joan Nieto RN  [facility]

## 2020-12-18 NOTE — TELEPHONE ENCOUNTER
Spoke with lab, they are aware that new orders were placed for urine for chlamydia and gonorrhea. They will run.     Still waiting on call back from patient.

## 2020-12-21 ENCOUNTER — TRANSFERRED RECORDS (OUTPATIENT)
Dept: HEALTH INFORMATION MANAGEMENT | Facility: CLINIC | Age: 35
End: 2020-12-21

## 2020-12-21 ENCOUNTER — AMBULATORY - HEALTHEAST (OUTPATIENT)
Dept: LAB | Facility: HOSPITAL | Age: 35
End: 2020-12-21

## 2020-12-21 DIAGNOSIS — N76.0 ACUTE VAGINITIS: ICD-10-CM

## 2020-12-21 DIAGNOSIS — Z11.3 SCREENING EXAMINATION FOR VENEREAL DISEASE: ICD-10-CM

## 2020-12-21 LAB
ALBUMIN UR-MCNC: ABNORMAL MG/DL
AMORPH CRY #/AREA URNS HPF: ABNORMAL /[HPF]
APPEARANCE UR: ABNORMAL
BACTERIA #/AREA URNS HPF: ABNORMAL HPF
BILIRUB UR QL STRIP: NEGATIVE
C TRACH DNA SPEC QL PROBE+SIG AMP: NEGATIVE
CLUE CELLS: NORMAL
COLOR UR AUTO: YELLOW
GLUCOSE UR STRIP-MCNC: NEGATIVE MG/DL
HBV SURFACE AG SERPL QL IA: NEGATIVE
HGB UR QL STRIP: NEGATIVE
HIV 1+2 AB+HIV1 P24 AG SERPL QL IA: NEGATIVE
KETONES UR STRIP-MCNC: ABNORMAL MG/DL
LEUKOCYTE ESTERASE UR QL STRIP: ABNORMAL
MUCOUS THREADS #/AREA URNS LPF: ABNORMAL LPF
N GONORRHOEA DNA SPEC QL PROBE+SIG AMP: NEGATIVE
NITRATE UR QL: NEGATIVE
PH UR STRIP: 6 [PH] (ref 4.5–8)
RBC #/AREA URNS AUTO: ABNORMAL HPF
SP GR UR STRIP: 1.02 (ref 1–1.03)
SPECIMEN DESCRIP: NORMAL
SPECIMEN DESCRIPTION: NORMAL
SQUAMOUS #/AREA URNS AUTO: >100 LPF
TRICHOMONAS, WET PREP: NORMAL
UROBILINOGEN UR STRIP-ACNC: ABNORMAL
WBC #/AREA URNS AUTO: ABNORMAL HPF
YEAST, WET PREP: NORMAL

## 2020-12-21 NOTE — TELEPHONE ENCOUNTER
TC- Please fax orders that were placed 12/17 for chlamydia and gonorrhea to [facility] at [phone].    Called patient. 2nd attempt. Left voice message to return call at [phone].

## 2020-12-22 LAB
BACTERIA SPEC CULT: NORMAL
T PALLIDUM AB SER QL: NEGATIVE

## 2020-12-23 ENCOUNTER — MYC MEDICAL ADVICE (OUTPATIENT)
Dept: FAMILY MEDICINE | Facility: CLINIC | Age: 35
End: 2020-12-23

## 2020-12-23 DIAGNOSIS — N76.1 CHRONIC VAGINITIS: Primary | ICD-10-CM

## 2020-12-23 NOTE — TELEPHONE ENCOUNTER
Patient returned call.    Labs were done at [facility] (12-21).  Results should be in Care Everywhere.  Please review.  Patient would like call back to discuss treatment as soon as possible before [place].  Maribel Buchanan,

## 2020-12-23 NOTE — TELEPHONE ENCOUNTER
Lab results are viewable now under Laboratory tab or under Care Everywhere--> [facility]--> Lab Results.

## 2020-12-23 NOTE — TELEPHONE ENCOUNTER
I am not seeing results on care everywhere  Can some get DEVAN from patient and call me the Results  Thanks

## 2020-12-24 LAB
C TRACH DNA SPEC QL PROBE+SIG AMP: NEGATIVE
N GONORRHOEA DNA SPEC QL NAA+PROBE: NEGATIVE

## 2020-12-24 NOTE — TELEPHONE ENCOUNTER
Pt says that Monistat otc helps but symptoms come back  Diflucan did not help  Labs reviewed   Advised That she leave a sampe for UC  Discussed use Monistat cream x 7 days  As this helps  See some one in clinic for pelvic exam

## 2020-12-27 LAB
MISCELLANEOUS TEST DEPT. - HE HISTORICAL: NORMAL
PERFORMING LAB: NORMAL
SPECIMEN STATUS: NORMAL
TEST NAME: NORMAL

## 2021-01-05 ENCOUNTER — MYC MEDICAL ADVICE (OUTPATIENT)
Dept: FAMILY MEDICINE | Facility: CLINIC | Age: 36
End: 2021-01-05

## 2021-01-11 ENCOUNTER — MYC MEDICAL ADVICE (OUTPATIENT)
Dept: FAMILY MEDICINE | Facility: CLINIC | Age: 36
End: 2021-01-11

## 2021-01-11 DIAGNOSIS — N76.0 BV (BACTERIAL VAGINOSIS): Primary | ICD-10-CM

## 2021-01-11 DIAGNOSIS — Z11.3 SCREEN FOR STD (SEXUALLY TRANSMITTED DISEASE): ICD-10-CM

## 2021-01-11 DIAGNOSIS — B96.89 BV (BACTERIAL VAGINOSIS): Primary | ICD-10-CM

## 2021-01-11 DIAGNOSIS — N76.0 ACUTE VAGINITIS: ICD-10-CM

## 2021-01-11 DIAGNOSIS — N76.1 CHRONIC VAGINITIS: ICD-10-CM

## 2021-01-11 LAB
ALBUMIN UR-MCNC: NEGATIVE MG/DL
APPEARANCE UR: CLEAR
BACTERIA #/AREA URNS HPF: ABNORMAL /HPF
BILIRUB UR QL STRIP: NEGATIVE
COLOR UR AUTO: YELLOW
GLUCOSE UR STRIP-MCNC: NEGATIVE MG/DL
HCV AB SERPL QL IA: NONREACTIVE
HGB UR QL STRIP: NEGATIVE
KETONES UR STRIP-MCNC: NEGATIVE MG/DL
LEUKOCYTE ESTERASE UR QL STRIP: ABNORMAL
NITRATE UR QL: NEGATIVE
NON-SQ EPI CELLS #/AREA URNS LPF: ABNORMAL /LPF
PH UR STRIP: 6 PH (ref 5–7)
RBC #/AREA URNS AUTO: ABNORMAL /HPF
SOURCE: ABNORMAL
SP GR UR STRIP: 1.02 (ref 1–1.03)
SPECIMEN SOURCE: ABNORMAL
UROBILINOGEN UR STRIP-ACNC: 0.2 EU/DL (ref 0.2–1)
WBC #/AREA URNS AUTO: ABNORMAL /HPF
WET PREP SPEC: ABNORMAL

## 2021-01-11 PROCEDURE — 87086 URINE CULTURE/COLONY COUNT: CPT | Performed by: FAMILY MEDICINE

## 2021-01-11 PROCEDURE — 36415 COLL VENOUS BLD VENIPUNCTURE: CPT | Performed by: FAMILY MEDICINE

## 2021-01-11 PROCEDURE — 87210 SMEAR WET MOUNT SALINE/INK: CPT | Performed by: FAMILY MEDICINE

## 2021-01-11 PROCEDURE — 81001 URINALYSIS AUTO W/SCOPE: CPT | Performed by: FAMILY MEDICINE

## 2021-01-11 PROCEDURE — 86803 HEPATITIS C AB TEST: CPT | Performed by: FAMILY MEDICINE

## 2021-01-11 RX ORDER — METRONIDAZOLE 500 MG/1
500 TABLET ORAL 2 TIMES DAILY
Qty: 14 TABLET | Refills: 0 | Status: SHIPPED | OUTPATIENT
Start: 2021-01-11 | End: 2021-01-11 | Stop reason: ALTCHOICE

## 2021-01-11 RX ORDER — METRONIDAZOLE 7.5 MG/G
1 GEL VAGINAL AT BEDTIME
Qty: 70 G | Refills: 0 | Status: SHIPPED | OUTPATIENT
Start: 2021-01-11 | End: 2021-01-16

## 2021-01-12 LAB
BACTERIA SPEC CULT: NORMAL
Lab: NORMAL
SPECIMEN SOURCE: NORMAL

## 2021-02-18 ENCOUNTER — MYC MEDICAL ADVICE (OUTPATIENT)
Dept: FAMILY MEDICINE | Facility: CLINIC | Age: 36
End: 2021-02-18

## 2021-02-18 DIAGNOSIS — M54.2 NECK PAIN: Primary | ICD-10-CM

## 2021-02-18 DIAGNOSIS — M54.9 BACK PAIN, UNSPECIFIED BACK LOCATION, UNSPECIFIED BACK PAIN LATERALITY, UNSPECIFIED CHRONICITY: ICD-10-CM

## 2021-02-25 ENCOUNTER — THERAPY VISIT (OUTPATIENT)
Dept: PHYSICAL THERAPY | Facility: CLINIC | Age: 36
End: 2021-02-25
Attending: FAMILY MEDICINE
Payer: COMMERCIAL

## 2021-02-25 DIAGNOSIS — M54.50 LOW BACK PAIN: ICD-10-CM

## 2021-02-25 DIAGNOSIS — M54.2 NECK PAIN: ICD-10-CM

## 2021-02-25 DIAGNOSIS — M54.9 BACK PAIN, UNSPECIFIED BACK LOCATION, UNSPECIFIED BACK PAIN LATERALITY, UNSPECIFIED CHRONICITY: ICD-10-CM

## 2021-02-25 PROCEDURE — 97110 THERAPEUTIC EXERCISES: CPT | Mod: GP | Performed by: PHYSICAL THERAPIST

## 2021-02-25 PROCEDURE — 97161 PT EVAL LOW COMPLEX 20 MIN: CPT | Mod: GP | Performed by: PHYSICAL THERAPIST

## 2021-02-25 NOTE — PROGRESS NOTES
"[facility] Initial Evaluation  Subjective:  The history is provided by the patient.   Patient Health History  Karrie Zhang being seen for lower back pain due to twisted pelvis and subluxation on my C5 disc.       Problem occurred: unknown   Pain is reported as 5/10 on pain scale.  General health as reported by patient is good.  Pertinent medical history includes: asthma, depression, mental illness, migraines/headaches, overweight, sleep disorder/apnea and thyroid problems.     Medical allergies: none.   Surgeries include:  Other. Other surgery history details: wisdom teeth removal, cervical cauterization.    Current medications:  Anti-depressants, thyroid medication and other. Other medications details: anxiety meds.    Current occupation is .   Primary job tasks include:  Computer work and prolonged sitting.                  Therapist Generated HPI Evaluation  Problem details: Pt presents today with neck and mid back pain. Pain is chronic but got worse this past fall. Locates pain in R side of neck and \"under\" R scap. Describes pain as sharp and achy, feels like there is a knot in her neck and scap. Also describes pain down both arms from upper arm to forearms. Pain worse with prolonged positions such as driving and working on computer. Painful with movements such as rotation checking blind spots and looking up. Pain better with ice, massage, topical creams.    Pt also presents with LBP/hip pain. Has been dealing with pain chronically, but worse withiin the past few months. Locates pain along general low back that radiates into lateral/anterior hips and down into buttocks bilat at times. Describes pain as a sharp zinging type pain. Also notes numbness in her back/buttock region. Pain worse with walking, stairs, lifting heavy objects, sitting and sleeping at times. Pt does a lot of computer work for work. Gets treatment from Chiro 1x/week with spinal manipulations and " e-stim..         Type of problem:  Cervical spine.                                                 Objective:  System         Lumbar/SI Evaluation  ROM:  Arom wnl lumbar: 1-2/10 LBP baseline.  AROM Lumbar:   Flexion:          To toes, no change in pain  Ext:                    75% ROM, 3/10 pain   Side Bend:        Left:  To knee, 2-3/10 LBP    Right:  To knee, 2-3/10 pain  Rotation:           Left:     Right:   Side Glide:        Left:     Right:                                  Cervical/Thoracic Evaluation  Arom wnl cervical: 1/10 neck pain baseline.     AROM:  AROM Cervical:    Flexion:          100% ROM, slight increase in general neck pain, notes tension down to mid thoracic  Extension:       100% ROM, 2/10 R neck pain  Rotation:         Left: 75% ROM, 3/10 R sided neck pain     Right: 75% ROM, 3/10 R sided neck pain  Side Bend:      Left:     Right:   AROM Thoracic:    Flexion:               100% ROM, slight R sided thoracic pain  Extension:          75% ROM, 3/10 pain  Rotation:            Left: 100% ROM, no change in pain     Right: 100% ROM, no change in pain                                                                  Ranjan Cervical Evaluation        Test Movements:      RET: During: produces  After: no effect  Mechanical Response: no effect                                Ranjan Lumbar Evaluation        Test Movements:      MARY: During: produces  After: no effect  Mechanical Response: no effect                General Evaluation:                        Posture:    Standing:   Rounded shoulders and forward head  Sitting:  Rounded shoulders and forward head                                           ROS    Assessment/Plan:    Patient is a 35 year old female with cervical and lumbar complaints.    Patient has the following significant findings with corresponding treatment plan.                Diagnosis 1:  Neck pain  Pain -  hot/cold therapy, US, electric stimulation, mechanical traction, manual  therapy, splint/taping/bracing/orthotics, self management, education, directional preference exercise and home program  Decreased ROM/flexibility - manual therapy, therapeutic exercise, therapeutic activity and home program  Decreased strength - therapeutic exercise, therapeutic activities and home program  Impaired muscle performance - neuro re-education and home program  Decreased function - therapeutic activities and home program  Impaired posture - neuro re-education, therapeutic activities and home program  Diagnosis 2:  LBP   Pain -  hot/cold therapy, US, electric stimulation, mechanical traction, manual therapy, splint/taping/bracing/orthotics, self management, education, directional preference exercise and home program  Decreased ROM/flexibility - manual therapy, therapeutic exercise, therapeutic activity and home program  Decreased strength - therapeutic exercise, therapeutic activities and home program  Impaired muscle performance - neuro re-education and home program  Decreased function - therapeutic activities and home program  Impaired posture - neuro re-education, therapeutic activities and home program    Therapy Evaluation Codes:   1) History comprised of:   Personal factors that impact the plan of care:      None.    Comorbidity factors that impact the plan of care are:      Asthma, Depression, Mental illness, Migraines/headaches, Overweight and Sleep disorder/apnea.     Medications impacting care: Anti-depressant.  2) Examination of Body Systems comprised of:   Body structures and functions that impact the plan of care:      Cervical spine and Lumbar spine.   Activity limitations that impact the plan of care are:      Bending, Driving, Lifting, Sitting, Stairs, Walking and Sleeping.  3) Clinical presentation characteristics are:   Stable/Uncomplicated.  4) Decision-Making    Low complexity using standardized patient assessment instrument and/or measureable assessment of functional  outcome.  Cumulative Therapy Evaluation is: Low complexity.    Previous and current functional limitations:  (See Goal Flow Sheet for this information)    Short term and Long term goals: (See Goal Flow Sheet for this information)     Communication ability:  Patient appears to be able to clearly communicate and understand verbal and written communication and follow directions correctly.  Treatment Explanation - The following has been discussed with the patient:   RX ordered/plan of care  Anticipated outcomes  Possible risks and side effects  This patient would benefit from PT intervention to resume normal activities.   Rehab potential is good.    Frequency:  1 X week, once daily  Duration:  for 8 weeks  Discharge Plan:  Achieve all LTG.  Independent in home treatment program.  Reach maximal therapeutic benefit.    Please refer to the daily flowsheet for treatment today, total treatment time and time spent performing 1:1 timed codes.

## 2021-03-27 ENCOUNTER — IMMUNIZATION (OUTPATIENT)
Dept: NURSING | Facility: CLINIC | Age: 36
End: 2021-03-27
Payer: COMMERCIAL

## 2021-03-27 PROCEDURE — 0011A PR COVID VAC MODERNA 100 MCG/0.5 ML IM: CPT

## 2021-03-27 PROCEDURE — 91301 PR COVID VAC MODERNA 100 MCG/0.5 ML IM: CPT

## 2021-04-09 ENCOUNTER — MYC MEDICAL ADVICE (OUTPATIENT)
Dept: FAMILY MEDICINE | Facility: CLINIC | Age: 36
End: 2021-04-09

## 2021-04-09 DIAGNOSIS — E66.9 OBESITY (BMI 30-39.9): ICD-10-CM

## 2021-04-09 DIAGNOSIS — M54.2 NECK PAIN: ICD-10-CM

## 2021-04-09 DIAGNOSIS — M54.50 LOW BACK PAIN, UNSPECIFIED BACK PAIN LATERALITY, UNSPECIFIED CHRONICITY, UNSPECIFIED WHETHER SCIATICA PRESENT: Primary | ICD-10-CM

## 2021-04-15 ENCOUNTER — OFFICE VISIT (OUTPATIENT)
Dept: FAMILY MEDICINE | Facility: CLINIC | Age: 36
End: 2021-04-15
Payer: COMMERCIAL

## 2021-04-15 ENCOUNTER — ANCILLARY PROCEDURE (OUTPATIENT)
Dept: GENERAL RADIOLOGY | Facility: CLINIC | Age: 36
End: 2021-04-15
Attending: FAMILY MEDICINE
Payer: COMMERCIAL

## 2021-04-15 ENCOUNTER — RECORDS - HEALTHEAST (OUTPATIENT)
Dept: ADMINISTRATIVE | Facility: OTHER | Age: 36
End: 2021-04-15

## 2021-04-15 ENCOUNTER — RECORDS - HEALTHEAST (OUTPATIENT)
Dept: SCHEDULING | Facility: CLINIC | Age: 36
End: 2021-04-15

## 2021-04-15 VITALS
WEIGHT: 208.8 LBS | HEART RATE: 79 BPM | OXYGEN SATURATION: 97 % | SYSTOLIC BLOOD PRESSURE: 130 MMHG | TEMPERATURE: 98.6 F | BODY MASS INDEX: 33.56 KG/M2 | DIASTOLIC BLOOD PRESSURE: 81 MMHG | HEIGHT: 66 IN

## 2021-04-15 DIAGNOSIS — M54.50 CHRONIC MIDLINE LOW BACK PAIN WITHOUT SCIATICA: ICD-10-CM

## 2021-04-15 DIAGNOSIS — G89.29 CHRONIC BILATERAL THORACIC BACK PAIN: ICD-10-CM

## 2021-04-15 DIAGNOSIS — G89.29 CHRONIC MIDLINE LOW BACK PAIN WITHOUT SCIATICA: ICD-10-CM

## 2021-04-15 DIAGNOSIS — M54.6 CHRONIC BILATERAL THORACIC BACK PAIN: ICD-10-CM

## 2021-04-15 DIAGNOSIS — M54.50 CHRONIC MIDLINE LOW BACK PAIN WITHOUT SCIATICA: Primary | ICD-10-CM

## 2021-04-15 DIAGNOSIS — G89.29 CHRONIC MIDLINE LOW BACK PAIN WITHOUT SCIATICA: Primary | ICD-10-CM

## 2021-04-15 PROCEDURE — 72200 X-RAY EXAM SI JOINTS: CPT | Performed by: RADIOLOGY

## 2021-04-15 PROCEDURE — 99214 OFFICE O/P EST MOD 30 MIN: CPT | Performed by: FAMILY MEDICINE

## 2021-04-15 RX ORDER — METHOCARBAMOL 500 MG/1
500 TABLET, FILM COATED ORAL 3 TIMES DAILY PRN
Qty: 90 TABLET | Refills: 0 | Status: SHIPPED | OUTPATIENT
Start: 2021-04-15 | End: 2021-09-03

## 2021-04-15 RX ORDER — NAPROXEN 500 MG/1
500 TABLET ORAL 2 TIMES DAILY WITH MEALS
Qty: 30 TABLET | Refills: 2 | Status: SHIPPED | OUTPATIENT
Start: 2021-04-15 | End: 2021-09-03

## 2021-04-15 ASSESSMENT — MIFFLIN-ST. JEOR: SCORE: 1639.89

## 2021-04-15 ASSESSMENT — ASTHMA QUESTIONNAIRES
QUESTION_4 LAST FOUR WEEKS HOW OFTEN HAVE YOU USED YOUR RESCUE INHALER OR NEBULIZER MEDICATION (SUCH AS ALBUTEROL): NOT AT ALL
QUESTION_2 LAST FOUR WEEKS HOW OFTEN HAVE YOU HAD SHORTNESS OF BREATH: NOT AT ALL
QUESTION_5 LAST FOUR WEEKS HOW WOULD YOU RATE YOUR ASTHMA CONTROL: COMPLETELY CONTROLLED
QUESTION_1 LAST FOUR WEEKS HOW MUCH OF THE TIME DID YOUR ASTHMA KEEP YOU FROM GETTING AS MUCH DONE AT WORK, SCHOOL OR AT HOME: NONE OF THE TIME
ACT_TOTALSCORE: 25
QUESTION_3 LAST FOUR WEEKS HOW OFTEN DID YOUR ASTHMA SYMPTOMS (WHEEZING, COUGHING, SHORTNESS OF BREATH, CHEST TIGHTNESS OR PAIN) WAKE YOU UP AT NIGHT OR EARLIER THAN USUAL IN THE MORNING: NOT AT ALL

## 2021-04-15 NOTE — PROGRESS NOTES
Assessment & Plan       ICD-10-CM    1. Chronic midline low back pain without sciatica  M54.5 MR Lumbar Spine w/o Contrast    G89.29 methocarbamol (ROBAXIN) 500 MG tablet     diclofenac (VOLTAREN) 1 % topical gel     naproxen (NAPROSYN) 500 MG tablet     XR Sacroiliac Joint 1/2 Views   2. Chronic bilateral thoracic back pain  M54.6 MR Thoracic Spine w/o Contrast    G89.29          Review of external notes as documented elsewhere in note            Return in about 2 weeks (around 4/29/2021) for With Specialist.    Johnnie Gupta MD  [facility] DOE Hill is a 36 year old who presents for the following health issues     HPI     Back Pain  Onset/Duration: Ongoing but getting worse in the last year  Description:   Location of pain: low back bilateral and middle of back bilateral  Character of pain: sharp, dull ache and stabbing  Pain radiation: none  New numbness or weakness in legs, not attributed to pain: no   Intensity: Currently 5/10, At its worst 9/10  Progression of Symptoms: worsening  History:   Specific cause: none  Pain interferes with job: YES  History of back problems: subluxation and arthritic changes in C5  Any previous MRI or X-rays: Yes- at [place].  Date 8/22/2018 and one done at [facility]  Sees a specialist for back pain: Goes to PT  Alleviating factors:   Improved by: sometimes icy hot patches and sometimes massage  Precipitating factors:  Worsened by: Walking and stretching  Therapies tried and outcome: icy hot patches, chiropractor, NSAIDs, Physical Therapy, rest, ice and heat. Epsom salt baths    Accompanying Signs & Symptoms:  Risk of Fracture: None  Risk of Cauda Equina: None  Risk of Infection: None  Risk of Cancer: None  Risk of Ankylosing Spondylitis: Onset at age <35, male, AND morning back stiffness  no             Mid/upper back pain  Started PT recently  Bone spur C5  No radiation  chiro requests MRI    Lower back pain  Bilateral  No radiation    Icy  "hot/cbd oil/ibuprofen/TENS unit   - helps a little    Review of Systems   Constitutional, HEENT, cardiovascular, pulmonary, gi and gu systems are negative, except as otherwise noted.      Objective    /81   Pulse 79   Temp 98.6  F (37  C) (Oral)   Ht 1.67 m (5' 5.75\")   Wt 94.7 kg (208 lb 12.8 oz)   SpO2 97%   BMI 33.96 kg/m    Body mass index is 33.96 kg/m .  Physical Exam   GENERAL: healthy, alert and no distress  EYES: Eyes grossly normal to inspection, PERRL and conjunctivae and sclerae normal  NECK: no adenopathy, no asymmetry, masses, or scars and thyroid normal to palpation  MS: no gross musculoskeletal defects noted, no edema  SKIN: no suspicious lesions or rashes  NEURO: Normal strength and tone, mentation intact and speech normal  PSYCH: mentation appears normal, affect normal/bright            "

## 2021-04-16 ASSESSMENT — ASTHMA QUESTIONNAIRES: ACT_TOTALSCORE: 25

## 2021-04-20 ENCOUNTER — MYC MEDICAL ADVICE (OUTPATIENT)
Dept: FAMILY MEDICINE | Facility: CLINIC | Age: 36
End: 2021-04-20

## 2021-04-20 DIAGNOSIS — G89.29 CHRONIC LOW BACK PAIN WITHOUT SCIATICA, UNSPECIFIED BACK PAIN LATERALITY: Primary | ICD-10-CM

## 2021-04-20 DIAGNOSIS — M54.50 CHRONIC LOW BACK PAIN WITHOUT SCIATICA, UNSPECIFIED BACK PAIN LATERALITY: Primary | ICD-10-CM

## 2021-04-21 ENCOUNTER — COMMUNICATION - HEALTHEAST (OUTPATIENT)
Dept: PHYSICAL MEDICINE AND REHAB | Facility: CLINIC | Age: 36
End: 2021-04-21

## 2021-04-21 ENCOUNTER — TELEPHONE (OUTPATIENT)
Dept: FAMILY MEDICINE | Facility: CLINIC | Age: 36
End: 2021-04-21

## 2021-04-21 ENCOUNTER — RECORDS - HEALTHEAST (OUTPATIENT)
Dept: RADIOLOGY | Facility: CLINIC | Age: 36
End: 2021-04-21

## 2021-04-21 ENCOUNTER — RECORDS - HEALTHEAST (OUTPATIENT)
Dept: ADMINISTRATIVE | Facility: OTHER | Age: 36
End: 2021-04-21

## 2021-04-21 ENCOUNTER — AMBULATORY - HEALTHEAST (OUTPATIENT)
Dept: PHYSICAL MEDICINE AND REHAB | Facility: CLINIC | Age: 36
End: 2021-04-21

## 2021-04-21 DIAGNOSIS — M54.50 CHRONIC LOW BACK PAIN WITHOUT SCIATICA, UNSPECIFIED BACK PAIN LATERALITY: ICD-10-CM

## 2021-04-21 DIAGNOSIS — G89.29 CHRONIC LOW BACK PAIN WITHOUT SCIATICA, UNSPECIFIED BACK PAIN LATERALITY: ICD-10-CM

## 2021-04-21 NOTE — TELEPHONE ENCOUNTER
Patient is calling for Dr. Han.    She is wondering if she has the correct orders for her MRI, according to her x-ray results.          She is wondering if this is more of a sacral/ lumbar issue, or thorasic, and what MRI needs to be completed.    She can only afford one MRI  due to her insurance.        Amelia HUMPHRIESN-RN  Triage Nurse  [facility]: [facility]

## 2021-04-21 NOTE — TELEPHONE ENCOUNTER
Spoke with Liz. Informed her of Dr. Granados's message as written. Pt given imaging phone number to call and cancel MRI for this evening. Will follow up with orthopedics.    Eli JOHNSON RN, BSN  [facility]

## 2021-04-21 NOTE — TELEPHONE ENCOUNTER
Patient called the clinic.  She receive the X-ray results note and recommendations (referral to Orthopedics).    She is inquiring if the provider wants her to continue with the MRI or waiting until after her consult with Orthopedics.    She has an appointment at 5pm today for the MRI.    Patient is inquiring if she needs to have MRI for both the Lumbar and the Sacrum or just focus on the Sacrum area for MRI due to insurance coverage (insurance covers only 2 MRI areas).

## 2021-04-24 ENCOUNTER — IMMUNIZATION (OUTPATIENT)
Dept: NURSING | Facility: CLINIC | Age: 36
End: 2021-04-24
Attending: INTERNAL MEDICINE
Payer: COMMERCIAL

## 2021-04-24 PROCEDURE — 91301 PR COVID VAC MODERNA 100 MCG/0.5 ML IM: CPT

## 2021-04-24 PROCEDURE — 0012A PR COVID VAC MODERNA 100 MCG/0.5 ML IM: CPT

## 2021-05-06 ENCOUNTER — OFFICE VISIT (OUTPATIENT)
Dept: FAMILY MEDICINE | Facility: CLINIC | Age: 36
End: 2021-05-06
Payer: COMMERCIAL

## 2021-05-06 VITALS
WEIGHT: 212.5 LBS | TEMPERATURE: 97.6 F | BODY MASS INDEX: 34.15 KG/M2 | HEART RATE: 69 BPM | DIASTOLIC BLOOD PRESSURE: 69 MMHG | SYSTOLIC BLOOD PRESSURE: 133 MMHG | OXYGEN SATURATION: 96 % | HEIGHT: 66 IN | RESPIRATION RATE: 16 BRPM

## 2021-05-06 DIAGNOSIS — Z30.017 INSERTION OF IMPLANTABLE SUBDERMAL CONTRACEPTIVE: Primary | ICD-10-CM

## 2021-05-06 LAB — HCG UR QL: NEGATIVE

## 2021-05-06 PROCEDURE — 11981 INSERTION DRUG DLVR IMPLANT: CPT | Performed by: PHYSICIAN ASSISTANT

## 2021-05-06 PROCEDURE — 81025 URINE PREGNANCY TEST: CPT | Performed by: PHYSICIAN ASSISTANT

## 2021-05-06 ASSESSMENT — MIFFLIN-ST. JEOR: SCORE: 1656.67

## 2021-05-06 NOTE — PROGRESS NOTES
"  Nexplanon Insertion:    Is a pregnancy test required: Yes.  Was it positive or negative?  Negative  Was a consent obtained?  Yes    Subjective: Karrie Zhang is a 36 year old  presents for Nexplanon.    Patient has been given the opportunity to ask questions about all forms of birth control, including all options appropriate for Karrie Zhang. Discussed that no method of birth control, except abstinence is 100% effective against pregnancy or sexually transmitted infection.     Karrie Zhang understands she may have the Nexplanon removed at any time and it should be removed by a health care provider.    The entire insertion procedure was reviewed with the patient, including care after placement.    Patient's last menstrual period was 2021. Last sexual activity: > 3 months. No allergy to betadine or shellfish.   HCG Qual Urine   Date Value Ref Range Status   2021 Negative NEG^Negative Final     Comment:     This test is for screening purposes.  Results should be interpreted along with   the clinical picture.  Confirmation testing is available if warranted by   ordering [id number], HCG Quantitative Pregnancy.           /69   Pulse 69   Temp 97.6  F (36.4  C) (Oral)   Resp 16   Ht 1.67 m (5' 5.75\")   Wt 96.4 kg (212 lb 8 oz)   LMP 2021   SpO2 96%   BMI 34.56 kg/m      PROCEDURE NOTE: -- Nexplanon Insertion    Reason for Insertion: abnormal uterine bleeding    Patient was placed supine with right arm exposed.  Pilo was made 8-10 cm above medial epicondyle and a guiding pilo 4 cm above the first.  Arm was prepped with Betadine. Insertion point was anesthetized with 2.5 mL 1% lidocaine. After stretching the skin with thumb and index finger around the insertion site, skin punctured with the tip of the needle inserted at 30 degrees and then lowered to horizontal position. The needle was then advanced to its full length. Applicator was then stabilized and slider was " unlocked. Slider was pulled back until it stopped and then removed.    Correct placement of the implant was confirmed by palpation in the patient's arm and visualizing the purple top of the obturator.   Bandage and pressure dressing applied to insertion site.    Lot # [id number]  Exp: 07/01/2023    EBL: minimal    Complications: none    ASSESSMENT:     ICD-10-CM    1. Insertion of implantable subdermal contraceptive  Z30.017 etonogestrel (NEXPLANON) 68 MG IMPL     etonogestrel (NEXPLANON) subdermal implant 68 mg     INSERTION NON-BIODEGRADABLE DRUG DELIVERY IMPLANT     HCG Qual, Urine ([id number])        PLAN:    Given 's handouts, including when to have Nexplanon removed, list of danger s/sx, side effects and follow up recommended. Encouraged condom use for prevention of STD. Back up contraception advised for 7 days. Advised to call for any fever, for prolonged or severe pain or bleeding, abnormal vaginal dischage. She was advised to use pain medications (ibuprofen) as needed for mild to moderate pain.     Solis Hutson PA-C

## 2021-05-06 NOTE — NURSING NOTE
Clinic Administered Medication Documentation      Intrauterine/Implant Insertion Documentation    Device was placed by provider (please see MAR for given by information). Please see MAR and medication order for additional information.     Type: Nexplanon  Remove/Replace by: 05/06/2024    Expiration Date:  07/01/2023    Katerin Bhatt CMA

## 2021-05-07 ENCOUNTER — HOSPITAL ENCOUNTER (OUTPATIENT)
Dept: PHYSICAL MEDICINE AND REHAB | Facility: CLINIC | Age: 36
Discharge: HOME | End: 2021-05-07
Attending: PHYSICAL MEDICINE & REHABILITATION
Payer: COMMERCIAL

## 2021-05-07 DIAGNOSIS — Q76.49 TRANSITIONAL VERTEBRA: ICD-10-CM

## 2021-05-07 DIAGNOSIS — M54.50 LUMBAR SPINE PAIN: ICD-10-CM

## 2021-05-07 DIAGNOSIS — M79.18 MYOFASCIAL PAIN: ICD-10-CM

## 2021-05-07 DIAGNOSIS — M54.6 THORACIC SPINE PAIN: ICD-10-CM

## 2021-05-07 DIAGNOSIS — R29.2 HYPER REFLEXIA: ICD-10-CM

## 2021-05-07 DIAGNOSIS — M54.2 CERVICALGIA: ICD-10-CM

## 2021-05-07 ASSESSMENT — MIFFLIN-ST. JEOR: SCORE: 1669.72

## 2021-05-13 ENCOUNTER — MYC MEDICAL ADVICE (OUTPATIENT)
Dept: FAMILY MEDICINE | Facility: CLINIC | Age: 36
End: 2021-05-13

## 2021-05-20 ENCOUNTER — HOSPITAL ENCOUNTER (OUTPATIENT)
Dept: MRI IMAGING | Facility: HOSPITAL | Age: 36
Discharge: HOME | End: 2021-05-20
Attending: PHYSICAL MEDICINE & REHABILITATION
Payer: COMMERCIAL

## 2021-05-20 DIAGNOSIS — M54.2 CERVICALGIA: ICD-10-CM

## 2021-05-20 DIAGNOSIS — R29.2 HYPER REFLEXIA: ICD-10-CM

## 2021-05-21 ENCOUNTER — COMMUNICATION - HEALTHEAST (OUTPATIENT)
Dept: PHYSICAL MEDICINE AND REHAB | Facility: CLINIC | Age: 36
End: 2021-05-21

## 2021-05-21 ENCOUNTER — OFFICE VISIT - HEALTHEAST (OUTPATIENT)
Dept: PHYSICAL THERAPY | Facility: CLINIC | Age: 36
End: 2021-05-21

## 2021-05-21 DIAGNOSIS — M54.2 CERVICAL PAIN: ICD-10-CM

## 2021-05-21 DIAGNOSIS — M54.50 CHRONIC BILATERAL LOW BACK PAIN WITHOUT SCIATICA: ICD-10-CM

## 2021-05-21 DIAGNOSIS — M79.18 MYOFASCIAL PAIN: ICD-10-CM

## 2021-05-21 DIAGNOSIS — M54.6 PAIN IN THORACIC SPINE: ICD-10-CM

## 2021-05-21 DIAGNOSIS — G89.29 CHRONIC BILATERAL LOW BACK PAIN WITHOUT SCIATICA: ICD-10-CM

## 2021-05-24 ENCOUNTER — OFFICE VISIT - HEALTHEAST (OUTPATIENT)
Dept: PHYSICAL THERAPY | Facility: CLINIC | Age: 36
End: 2021-05-24

## 2021-05-24 DIAGNOSIS — G89.29 CHRONIC BILATERAL LOW BACK PAIN WITHOUT SCIATICA: ICD-10-CM

## 2021-05-24 DIAGNOSIS — M54.2 CERVICAL PAIN: ICD-10-CM

## 2021-05-24 DIAGNOSIS — M79.18 MYOFASCIAL PAIN: ICD-10-CM

## 2021-05-24 DIAGNOSIS — M54.6 PAIN IN THORACIC SPINE: ICD-10-CM

## 2021-05-24 DIAGNOSIS — M54.50 CHRONIC BILATERAL LOW BACK PAIN WITHOUT SCIATICA: ICD-10-CM

## 2021-05-25 ENCOUNTER — VIRTUAL VISIT (OUTPATIENT)
Dept: SLEEP MEDICINE | Facility: CLINIC | Age: 36
End: 2021-05-25
Payer: COMMERCIAL

## 2021-05-25 VITALS — HEIGHT: 67 IN | WEIGHT: 205 LBS | BODY MASS INDEX: 32.18 KG/M2

## 2021-05-25 DIAGNOSIS — G47.33 OSA ON CPAP: Primary | ICD-10-CM

## 2021-05-25 PROCEDURE — 99213 OFFICE O/P EST LOW 20 MIN: CPT | Mod: GT | Performed by: INTERNAL MEDICINE

## 2021-05-25 ASSESSMENT — MIFFLIN-ST. JEOR: SCORE: 1642.5

## 2021-05-25 NOTE — PROGRESS NOTES
Liz is a 36 year old who is being evaluated via a billable video visit.      How would you like to obtain your AVS? MyChart  If the video visit is dropped, the invitation should be resent by: Text to cell phone: [phone]  Will anyone else be joining your video visit? No        Video-Visit Details    Type of service:  Video Visit  Video Start:   01:40 pm  Video Stop: 05/25/2021 01:51 pm  Originating Location (pt. Location): Home    Distant Location (provider location):  [facility]     Platform used for Video Visit: Crunched     Does patient have any form of state insurance? no    Do you have wifi? yes  Do you have a smart phone? yes  Can you download an chelly on your phone comfortably with out assistance? yes  Can you watch a [employer] video? Yes     [facility]     Sleep clinic follow up visit note    Date on this visit: 5/25/2021    Primary Physician: Vanessa Granados     Chief complaint: Follow-up of IRINA, review CPAP compliance    Karrie Zhang 36 year old female with PMH of severe IRINA who presents to sleep clinic for virtual visit today for follow up of  IRINA and review CPAP compliance.    She is currently being treated with auto titrating CPAP with pressure settings 5 to 15 cm water.    She reports the device regularly during sleep, but once in a while she may be very tired and fall asleep before putting the mask on.  She uses nasal mask and reports that it works well.  She denies air hunger   Pressure settings comfortable  She sleeps alone, there was no report of snoring with the CPAP when she travelled with her mom.  She denies waking up gasping for air with CPAP use.  Sleep quality is better with CPAP  Bedtime: 9/930 p.m. Wake time:630-7AM  Denies fatigue or excessive daytime sleepiness with CPAP use.  ESS:7/24  She reports feeling tired, but denies drowsiness while driving if she does not use the CPAP.     Previous Home sleep study report:  Study Date:  2/13/2019  Analysis Time:  493 minutes  Respiration:   Sleep Associated Hypoxemia: sustained hypoxemia was present. Baseline oxygen saturation was 93%.  Time with saturation less than or equal to 88% was 8 minutes. The lowest oxygen saturation was 80%.   Snoring: Snoring was present up to 100 dB.  Respiratory events: The home study revealed a presence of 105 obstructive apneas and 6 mixed and central apneas. There were 214 hypopneas resulting in a combined apnea/hypopnea index [AHI] of 39.5 events per hour.  AHI was 37 per hour supine, - per hour prone, 48 per hour on left side, and 31 per hour on right side.   Pattern: Excluding events noted above, respiratory rate and pattern was Normal.     Position: Percent of time spent: supine - 39%, prone - -%, on left - 35%, on right - 2%.     Heart Rate: By pulse oximetry normal rate was noted.      Assessment:   Severe obstructive sleep apnea.  Sleep associated hypoxemia was present.    CPAP compliance data:  ResMed   Auto-PAP 5.0 - 15.0 cmH2O 30 day usage data:    86% of days with > 4 hours of use. 0/30 days with no use.   Average use 445 minutes per day.   95%ile Leak 9.52 L/min.   CPAP 95% pressure 9.1 cm.   AHI 0.72 events per hour.     Allergies:    Allergies   Allergen Reactions     Penicillins Rash       Medications:    Current Outpatient Medications   Medication Sig Dispense Refill     albuterol (PROAIR HFA/PROVENTIL HFA/VENTOLIN HFA) 108 (90 Base) MCG/ACT inhaler Inhale 2 puffs into the lungs every 6 hours as needed for shortness of breath / dyspnea or wheezing 18 g 3     busPIRone (BUSPAR) 15 MG tablet TAKE 1 TABLET(15 MG) BY MOUTH TWICE DAILY 180 tablet 3     diclofenac (VOLTAREN) 1 % topical gel Apply 4 g topically 4 times daily as needed for moderate pain (mid-lower back) 350 g 2     doxycycline hyclate (VIBRA-TABS) 100 MG tablet TK 1 T PO BID FOR 7 DAYS       etonogestrel (NEXPLANON) 68 MG IMPL 1 each (68 mg) by Subdermal route once       FLUoxetine (PROZAC)  40 MG capsule TAKE 1 CAPSULE BY MOUTH EVERY DAY 90 capsule 0     fluticasone (FLONASE) 50 MCG/ACT nasal spray        hydrocortisone 2.5 % cream        ipratropium (ATROVENT) 0.03 % nasal spray Spray 2 sprays into both nostrils every 12 hours 1 Box 1     ketoconazole (NIZORAL) 2 % external cream DARLENE EXT AA BID FOR UP TO 14 DAYS       levothyroxine (SYNTHROID/LEVOTHROID) 75 MCG tablet Take 1 tablet (75 mcg) by mouth daily 90 tablet 1     methocarbamol (ROBAXIN) 500 MG tablet Take 1 tablet (500 mg) by mouth 3 times daily as needed for muscle spasms 90 tablet 0     montelukast (SINGULAIR) 10 MG tablet Take 1 tablet (10 mg) by mouth At Bedtime 90 tablet 3     naproxen (NAPROSYN) 500 MG tablet Take 1 tablet (500 mg) by mouth 2 times daily (with meals) 30 tablet 2     norgestim-eth estrad triphasic (TRI-SPRINTEC) 0.18/0.215/0.25 MG-35 MCG tablet Take 1 tablet by mouth daily 84 tablet 4     Phenylephrine-APAP-guaiFENesin (TYLENOL SINUS SEVERE) 5-325-200 MG TABS Take two tablets every 4-6 hours (maximum: 10 tablets [acetaminophen 3,250 mg/guaifenesin 2,000 mg/phenylephrine 50 mg]/24 hours). 20 tablet 0     tacrolimus (PROTOPIC) 0.1 % external ointment APPLY TOPICALLY BID ON FACE UTD         Problem List:  Patient Active Problem List    Diagnosis Date Noted     Low back pain 02/25/2021     Priority: Medium     Neck pain 02/25/2021     Priority: Medium     Alcohol dependence (H) 09/29/2020     Priority: Medium     Acquired hypothyroidism      Priority: Medium     Lump in armpit, left 01/28/2019     Priority: Medium     Rosacea, acne      Priority: Medium     Cervical high risk HPV (human papillomavirus) test positive 06/29/2016     Priority: Medium     2005: cervical laser treatment for dysplasia and cryotherapy.  6/29/16: NIL Pap, + HR HPV (Neg 16/18). Plan cotest in 1 year.   7/5/17 LSIL Pap, + HR HPV (Not 16/18). Plan colp  08/08/17: [facility] Bx and ECC normal. Plan cotest in 1 year per provider.   8/22/18 NIL Pap, Neg HPV.  Plan cotest in 3 years.        Perioral dermatitis 05/03/2016     Priority: Medium     Mild persistent asthma without complication 05/03/2016     Priority: Medium     Anxiety 05/03/2016     Priority: Medium     Major depressive disorder, recurrent episode, mild (H) 05/03/2016     Priority: Medium     Multiple environmental allergies 05/03/2016     Priority: Medium     Obesity (BMI 30-39.9) 05/03/2016     Priority: Medium     Hay fever 06/30/2015     Priority: Medium     Overview:   Allergic to pollen (trees - especially oak, grasses, weeds), dust, guinea pigs, apples          Past Medical/Surgical History:  Past Medical History:   Diagnosis Date     Acquired hypothyroidism      Alcohol abuse      Allergic rhinitis      Alopecia      Cervical dysplasia      Cervical high risk HPV (human papillomavirus) test positive 6/29/16, 7/5/17    neg 16/18     Depressive disorder      H/O colposcopy with cervical biopsy 08/08/2017 08/08/17: [facility] Bx and ECC normal.      Papanicolaou smear of cervix with low grade squamous intraepithelial lesion (LGSIL) 07/05/2017     Rosacea, acne      Uncomplicated asthma      Past Surgical History:   Procedure Laterality Date     LASER [place], CERVICAL  2005       Social History:  Social History     Socioeconomic History     Marital status: Single     Spouse name: Not on file     Number of children: 0     Years of education: 16     Highest education level: Not on file   Occupational History     Occupation:       Employer: OTHER     Employer: [employer]   Social Needs     Financial resource strain: Not on file     Food insecurity     Worry: Not on file     Inability: Not on file     Transportation needs     Medical: Not on file     Non-medical: Not on file   Tobacco Use     Smoking status: Never Smoker     Smokeless tobacco: Never Used   Substance and Sexual Activity     Alcohol use: Yes     Alcohol/week: 35.0 standard drinks     Drug use: No     Sexual activity: Not  "Currently     Partners: Male     Birth control/protection: Condom, Pill   Lifestyle     Physical activity     Days per week: Not on file     Minutes per session: Not on file     Stress: Not on file   Relationships     Social connections     Talks on phone: Not on file     Gets together: Not on file     Attends Jainism service: Not on file     Active member of club or organization: Not on file     Attends meetings of clubs or organizations: Not on file     Relationship status: Not on file     Intimate partner violence     Fear of current or ex partner: Not on file     Emotionally abused: Not on file     Physically abused: Not on file     Forced sexual activity: Not on file   Other Topics Concern     Parent/sibling w/ CABG, MI or angioplasty before 65F 55M? No   Social History Narrative     Not on file       Family History:  Family History   Problem Relation Age of Onset     Osteoporosis Mother      Diabetes Father      Other Cancer Father         Pancreatic     Substance Abuse Father         alcoholic: stopped when I was in grade school     Prostate Cancer Maternal Grandfather      Prostate Cancer Other      Depression Brother      Alcoholism Brother      Anxiety Disorder Brother      Coronary Artery Disease No family hx of          Physical Examination:  Vitals: Ht 1.702 m (5' 7\")   Wt 93 kg (205 lb)   BMI 32.11 kg/m    BMI= Body mass index is 32.11 kg/m .    [place] Total Score 5/24/2021   Total score - [place] 7     General: No apparent distress, appropriately groomed  Head: Normocephalic, atraumatic  Chest: No cough, no audible wheezing, able to talk in full sentences  Psych: coherent speech, normal rate and volume, able to articulate logical thoughts, able   to abstract reason, no tangential thoughts, no hallucinations   or delusions   Her affect is normal  Neuro:  Mental status: Alert and  Oriented X 3  Speech: normal      Impression/Plan:  Obstructive sleep apnea: Pt reports adequate compliance with PAP " "therapy and reports positive benefits with PAP use.   IRINA is adequately controlled with Auto CPAP at the current settings per compliance DL.   Prescription provided for renewal of PAP supplies.  Recommended her to continue using the CPAP regularly during sleep and instructed   to get  the supplies for the PAP replaced regularly.    Patient instructed to remember to bring PAP with her if hospitalized and if anticipating procedure that requires sedation/surgery to be sure to discuss with the provider/surgeon that she has sleep apnea and uses PAP therapy.    Obesity: We discussed weight management with healthy diet, and exercise.    Patient was strongly advised to avoid driving, operating any heavy machinery or other hazardous situations while drowsy or sleepy.  Patient was counseled on the importance of driving while alert, to pull over if drowsy, or nap before getting into the vehicle if sleepy.      Plan is to follow up in 12 months or sooner if any concerns.    CC: No ref. provider found    The above note was dictated using voice recognition software. Although reviewed after completion, some word and grammatical error may remain . Please contact the author for any clarifications.    \"I spent a total of 20  minutes  with Karrie Zhang during today's video  Visit, most  of this time was spent counseling the patient and  coordinating care regarding  IRINA,CPAP therapy, weight management , going over PAP download/sleep study , chart review  including documentation and further activities as noted above.\"      MD TASNEEM Barkley [facility]   [address], [address]   [address] [place] [address]   Appointments: [phone]             "

## 2021-05-25 NOTE — PATIENT INSTRUCTIONS
Your BMI is Body mass index is 32.11 kg/m .  Weight management is a personal decision.  If you are interested in exploring weight loss strategies, the following discussion covers the approaches that may be successful. Body mass index (BMI) is one way to tell whether you are at a healthy weight, overweight, or obese. It measures your weight in relation to your height.  A BMI of 18.5 to 24.9 is in the healthy range. A person with a BMI of 25 to 29.9 is considered overweight, and someone with a BMI of 30 or greater is considered obese. More than two-thirds of American adults are considered overweight or obese.  Being overweight or obese increases the risk for further weight gain. Excess weight may lead to heart disease and diabetes.  Creating and following plans for healthy eating and physical activity may help you improve your health.  Weight control is part of healthy lifestyle and includes exercise, emotional health, and healthy eating habits. Careful eating habits lifelong are the mainstay of weight control. Though there are significant health benefits from weight loss, long-term weight loss with diet alone may be very difficult to achieve- studies show long-term success with dietary management in less than 10% of people. Attaining a healthy weight may be especially difficult to achieve in those with severe obesity. In some cases, medications, devices and surgical management might be considered.  What can you do?  If you are overweight or obese and are interested in methods for weight loss, you should discuss this with your provider.     Consider reducing daily calorie intake by 500 calories.     Keep a food journal.     Avoiding skipping meals, consider cutting portions instead.    Diet combined with exercise helps maintain muscle while optimizing fat loss. Strength training is particularly important for building and maintaining muscle mass. Exercise helps reduce stress, increase energy, and improves fitness.  Increasing exercise without diet control, however, may not burn enough calories to loose weight.       Start walking three days a week 10-20 minutes at a time    Work towards walking thirty minutes five days a week     Eventually, increase the speed of your walking for 1-2 minutes at time    In addition, we recommend that you review healthy lifestyles and methods for weight loss available through the National Institutes of Health patient information sites:  http://win.niddk.nih.gov/publications/index.htm    And look into health and wellness programs that may be available through your health insurance provider, employer, local community center, or yordan club.

## 2021-05-27 ENCOUNTER — OFFICE VISIT - HEALTHEAST (OUTPATIENT)
Dept: PHYSICAL THERAPY | Facility: CLINIC | Age: 36
End: 2021-05-27

## 2021-05-27 VITALS — BODY MASS INDEX: 33.12 KG/M2 | HEIGHT: 67 IN | WEIGHT: 211 LBS

## 2021-05-27 DIAGNOSIS — G89.29 CHRONIC BILATERAL LOW BACK PAIN WITHOUT SCIATICA: ICD-10-CM

## 2021-05-27 DIAGNOSIS — M79.18 MYOFASCIAL PAIN: ICD-10-CM

## 2021-05-27 DIAGNOSIS — M54.50 CHRONIC BILATERAL LOW BACK PAIN WITHOUT SCIATICA: ICD-10-CM

## 2021-05-27 DIAGNOSIS — M54.2 CERVICAL PAIN: ICD-10-CM

## 2021-05-27 DIAGNOSIS — M54.6 PAIN IN THORACIC SPINE: ICD-10-CM

## 2021-05-27 ASSESSMENT — PATIENT HEALTH QUESTIONNAIRE - PHQ9: SUM OF ALL RESPONSES TO PHQ QUESTIONS 1-9: 14

## 2021-05-28 ASSESSMENT — ANXIETY QUESTIONNAIRES: GAD7 TOTAL SCORE: 7

## 2021-05-28 NOTE — TELEPHONE ENCOUNTER
Patient was called and the appropriate script for Doxycycline 100 mg twice per day for 10 days was sent in to the [facility] on [facility].

## 2021-05-28 NOTE — TELEPHONE ENCOUNTER
Pt seen at [facility] and Rx sent to Pharmacy for Doxycycline, take two capsules one time dose for Lyme disease prophylaxis, however pt seen/dx Acute non-recurrent frontal sinusitis.  Pt calling to see if Rx sig intended to be one time dose only.    Please call pt and verify.  Thank you  Pt can be reached at:  [phone]    Thank you  Rosemary Sparks RN, MA  [facility] RN Triage Nurse Advisor

## 2021-06-03 VITALS — WEIGHT: 223.1 LBS | BODY MASS INDEX: 34.94 KG/M2

## 2021-06-04 ENCOUNTER — HOSPITAL ENCOUNTER (OUTPATIENT)
Dept: PHYSICAL MEDICINE AND REHAB | Facility: CLINIC | Age: 36
Discharge: HOME | End: 2021-06-04
Attending: PHYSICAL MEDICINE & REHABILITATION
Payer: COMMERCIAL

## 2021-06-04 ENCOUNTER — COMMUNICATION - HEALTHEAST (OUTPATIENT)
Dept: PHYSICAL MEDICINE AND REHAB | Facility: CLINIC | Age: 36
End: 2021-06-04

## 2021-06-04 DIAGNOSIS — M25.50 POLYARTHRALGIA: ICD-10-CM

## 2021-06-04 DIAGNOSIS — M50.30 DDD (DEGENERATIVE DISC DISEASE), CERVICAL: ICD-10-CM

## 2021-06-04 DIAGNOSIS — R29.2 HYPER REFLEXIA: ICD-10-CM

## 2021-06-04 DIAGNOSIS — M54.6 THORACIC SPINE PAIN: ICD-10-CM

## 2021-06-04 DIAGNOSIS — R19.7 DIARRHEA, UNSPECIFIED TYPE: ICD-10-CM

## 2021-06-04 DIAGNOSIS — M54.2 CERVICALGIA: ICD-10-CM

## 2021-06-04 DIAGNOSIS — M79.18 MYOFASCIAL PAIN: ICD-10-CM

## 2021-06-04 DIAGNOSIS — M54.50 LUMBAR SPINE PAIN: ICD-10-CM

## 2021-06-04 DIAGNOSIS — M35.3 POLYMYALGIA (H): ICD-10-CM

## 2021-06-08 ENCOUNTER — AMBULATORY - HEALTHEAST (OUTPATIENT)
Dept: LAB | Facility: HOSPITAL | Age: 36
End: 2021-06-08

## 2021-06-08 DIAGNOSIS — M35.3 POLYMYALGIA (H): ICD-10-CM

## 2021-06-08 DIAGNOSIS — M25.50 POLYARTHRALGIA: ICD-10-CM

## 2021-06-08 DIAGNOSIS — R19.7 DIARRHEA, UNSPECIFIED TYPE: ICD-10-CM

## 2021-06-08 LAB
C REACTIVE PROTEIN LHE: 0.3 MG/DL (ref 0–0.8)
ERYTHROCYTE [SEDIMENTATION RATE] IN BLOOD BY WESTERGREN METHOD: 2 MM/HR (ref 0–20)
RHEUMATOID FACT SERPL-ACNC: <15 IU/ML (ref 0–30)

## 2021-06-08 NOTE — PROGRESS NOTES
Mental Health Visit Note    Patient: Karrie Zhang  (Belkis)  : 1985 MRN: [id number]    6/15/2020    Start time: 1710   Stop Time:    Session # 1    Session Type: Patient is presenting for an Individual session.    Karrie Zhang is a 35 y.o. female.    Telemedicine Visit: The patient's condition can be safely assessed and treated via synchronous audio and visual telemedicine encounter.      Reason for Telemedicine Visit: Patient has requested telehealth visit    Originating Site (Patient Location): Patient's home    Distant Site (Provider Location): Provider Remote Setting    Consent:  The patient/guardian has verbally consented to: the potential risks and benefits of telemedicine (video visit) versus in person care; bill my insurance or make self-payment for services provided; and responsibility for payment of non-covered services. (Yes)    Mode of Communication:  Video Conference via [employer]    As the provider I attest to compliance with applicable laws and regulations related to telemedicine.    Those present for this visit Patient and Therapist    New symptoms or complaints: Decreased energy, increased appetite, distrust of others, loss of interest in activities, decreased social activity, depressed mood, irritability, boredom, increased sleepiness, feelings of guilt and inferiority problems.    Functional Impairment:   Personal: 4  Family: 1  Social: 4  Work: 2    Clinical assessment of mental status:   Karrie Zhang presented on time.   She was oriented x3, open and cooperative, and dressed appropriately for this session and weather. Her memory was Normal cognitive functioning .  Her speech was  Within normal.  Language was clear.  Concentration and focus is Within normal. Psychosis is not noted or reported. She reports her mood is Congruent w/content of speech.  Affect is congruent with speech and is Congruent w/content of speech.  Fund of knowledge is adequate. Insight is adequate  "for therapy.    Suicidal/Homicidal Ideation present: Patient denies suicidal and homicidal ideations/means or plans.     Patient's impression of their current status: Patient's of her current status is that she has been feeling more depressed primarily since the onset of the coronavirus pandemic.  She acknowledges that she is very introverted and the residing in place order by the governor as negatively affected her mood and disposition.     Therapist impression of patients current state: This 35 y.o. White or  female presents with    is being seen today per referral from her PCP.  Patient indicated that it has been very difficult to adjust working from home since the onset of the coronavirus pandemic.  She is reporting feeling isolated and depressed.  \"At times I do not want get out of bed but I know I have to do work.\"  Patient works for a [employer] company.  Is single and has a pet rabbit.  Patient indicated that she considers herself introverted and it is very difficult to meet individuals.  She has attempted to walk couple times a week for exercise and is considering buying a either kayak or canoe in the near future.  Patient is hoping to establish some type of long-term friendship or relationship and is aware that to do so,  she will need to increase her level of socialization and began understanding more specifically what she is wanting in her relationship.      Assessment tools used today include: PHQ 9 scoring 14 indicating moderate depression    Type of psychotherapeutic technique provided: Insight oriented and Solution-focused    Progress toward short term goals: Not yet established    Review of long term goals: Not established    Diagnosis Adjustment disorder with depressed mood    Plan and Follow-up:  1. Patient plans to continue taking the medication as prescribed.   2. Patient plans to follow up with therapy in two weeks.   3. Patient will attempt to increase social activity by either " beginning to learn how to continue her kayak.  4.  Patient will reflect back to previous relationships looking at that they were positive or negative in nature.    Discharge Criteria/Planning: Patient will continue with follow-up until therapy can be discontinued without return of signs and symptoms.    I have reviewed the note as documented above.  This accurately captures the substance of my conversation with the patient. As the provider I attest to compliance with applicable laws and regulations related to telemedicine.    Performed and documented by   Sarah Beth Ramsay [facility]  6/15/20

## 2021-06-10 ENCOUNTER — OFFICE VISIT - HEALTHEAST (OUTPATIENT)
Dept: PHYSICAL THERAPY | Facility: CLINIC | Age: 36
End: 2021-06-10

## 2021-06-10 DIAGNOSIS — M54.6 PAIN IN THORACIC SPINE: ICD-10-CM

## 2021-06-10 DIAGNOSIS — M54.50 CHRONIC BILATERAL LOW BACK PAIN WITHOUT SCIATICA: ICD-10-CM

## 2021-06-10 DIAGNOSIS — G89.29 CHRONIC BILATERAL LOW BACK PAIN WITHOUT SCIATICA: ICD-10-CM

## 2021-06-10 DIAGNOSIS — M54.2 CERVICAL PAIN: ICD-10-CM

## 2021-06-10 DIAGNOSIS — M79.18 MYOFASCIAL PAIN: ICD-10-CM

## 2021-06-10 LAB — CCP AB SER IA-ACNC: 0.5 U/ML

## 2021-06-10 NOTE — PROGRESS NOTES
Mental Health Visit Note    Patient: Karrie Zhang    : 1985 MRN: [id number]    2020    Start time: 1700    Stop Time: 175   Session # 2    Session Type: Patient is presenting for an Individual session.    Karrie Zhang is a 35 y.o. female is being seen today for  No chief complaint on file.  .     Telemedicine Visit: The patient's condition can be safely assessed and treated via synchronous audio and visual telemedicine encounter.      Reason for Telemedicine Visit: Patient has requested telehealth visit    Originating Site (Patient Location): Patient's home    Distant Site (Provider Location): Provider Remote Setting- Home Office    Consent:  The patient/guardian has verbally consented to: the potential risks and benefits of telemedicine (video visit) versus in person care; bill my insurance or make self-payment for services provided; and responsibility for payment of non-covered services.     Mode of Communication:  Video Conference via [employer] website    As the provider I attest to compliance with applicable laws and regulations related to telemedicine.    Those present for this visit Pt. And Therapist    Follow up Intake form returned.  Patient indicated that she is now on a 14 day quarantine whether or not due to coming in contact with a fellow employee with COVID-19 virus.  At this time she is uncertain as to whether or not she will be pay for this quarantine time or needing to take PTO.     New symptoms or complaints: Lonely, isolated, increased sleeping, increased eating and decreased exercise.    Functional Impairment:   Personal: 4  Family: 0  Social: 3  Work: 3    Clinical assessment of mental status:   Karrie Zhang presented on time.   She was oriented x3, open and cooperative, and dressed appropriately for this session and weather. Her memory was Normal cognitive functioning .  Her speech was  Within normal.  Language was clear.  Concentration and focus is Within normal. Psychosis  "is not noted or reported. She reports her mood is Congruent w/content of speech.  Affect is congruent with speech and is Congruent w/content of speech.  Fund of knowledge is adequate. Insight is adequate for therapy.    Suicidal/Homicidal Ideation present: Patient denies suicidal and homicidal ideations/means or plans.     Patient's impression of their current status: Patient stated, that she had been feeling much better up until being recently quarantine due to exposure of the virus from a fellow employee.  \"Just end up spending all her time sleeping and 1 day runs into another.\"    Therapist impression of patients current state: This 35 y.o. White or  female presents with increased symptoms due to recent quarantine.  She appeared less optimistic of being able to meet new people and establish new relationships.  During the session we focused on step thought process and ways in which she can substitute those negative thoughts into positive actions.  Patient indicated that the older she gets the more difficult it is needing individuals that have common interests and goals.    Assessment tools used today include: Global assessment (can be found documented in Doc Flowsheets.)    Type of psychotherapeutic technique provided: Insight oriented and Solution-focused    Progress toward short term goals: Patient completed the intake form    Review of long term goals: Not yet established    Diagnosis: Adjustment reaction with anxiety and depressed mood    Plan and Follow-up:   1.  Patient plans to follow up with therapy in 3 weeks due to wanting a 5:00 appointment and no openings till then.  2.  Patient will return to a structured day which includes exercise. (Considering paddle boarding)  3.  We will reestablish contact with past friends via phone or Zoom.  4.  Writer to complete DA at next session.    Discharge Criteria/Planning: Patient will continue with follow-up until therapy can be discontinued without return " of signs and symptoms.  .  I have reviewed the note as documented above.  This accurately captures the substance of my conversation with the patient. As the provider I attest to compliance with applicable laws and regulations related to telemedicine.    Performed and documented by   Sarah Beth GARCIA  7/27/20

## 2021-06-10 NOTE — PROGRESS NOTES
Mental Health Visit Note    Patient: Karrie Zhang : 1985 MRN: [id number]    2020    Start time: 1700    Stop Time: 1753   Session # 3    Session Type: Patient is presenting for an Individual session.    Karrie Zhang is a 35 y.o. female is being seen today for  No chief complaint on file.    Psychosocial stressors and mood instability    Patient is related to feeling very stressed primarily because of her job environment.  She is concerned that individuals at her work are not abiding by that social distancing in mask wearing.  Telemedicine Visit: The patient's condition can be safely assessed and treated via synchronous audio and visual telemedicine encounter.      Reason for Telemedicine Visit: Patient has requested telehealth visit    Originating Site (Patient Location): Patient's home    Distant Site (Provider Location): Provider Remote Setting- Home Office    Consent:  The patient/guardian has verbally consented to: the potential risks and benefits of telemedicine (video visit) versus in person care; bill my insurance or make self-payment for services provided; and responsibility for payment of non-covered services.     Mode of Communication:  Video Conference via [employer]    As the provider I attest to compliance with applicable laws and regulations related to telemedicine.    Those present for this visit Patient and Therapist    Follow up Patient is related to feeling very stressed primarily because of her job environment.  She is concerned that individuals at her work are not abiding by that social distancing in mask wearing.  She indicated that she is trying to get out more although again, very cautious regarding social distancing protocol..    New symptoms or complaints: Anxiety, worry, uncertainty regarding job security, periods of isolation and irritability.    Functional Impairment:   Personal: 3  Family: 2  Social: 3  Work: 4    Clinical assessment of mental status:   Karrie BOATENG  "Vicente presented on time.   She was oriented x3, open and cooperative, and dressed appropriately for this session and weather. Her memory was Normal cognitive functioning .  Her speech was  Within normal.  Language was clear  Concentration and focus is Within normal. Psychosis is not noted or reported. She reports her mood is Congruent w/content of speech  Affect is congruent with speech and is Congruent w/content of speech.  Fund of knowledge is adequate. Insight is adequate for therapy.    ASSESSMENT: Current Emotional / Mental Status (status of significant symptoms):              Risk status (Self / Other harm or suicidal ideation)              Patient denies any personal safety issues              Patient denies current or recent suicidal ideation or behaviors.              Patient denies current or recent homicidal ideation or behaviors.              Patient denies current or recent self injurious behavior or ideation.              Patient denies other safety concerns.                                         Attitude:                                   Cooperative               Orientation:                              Oriented x3              Speech              Rate / Production:       Normal/ Responsive Normal                           Volume:                       Normal               Mood:                                      Normal              Thought Content:                    Clear               Thought Form:                        Coherent  Logical               Insight:                                     Good     Patient's impression of their current status: Patient indicated that she is just \"hanging in there.\"  Is not sleeping as much and trying to force herself to get out and at least walk on a daily basis.     Therapist impression of patients current state: This 35 y.o. White or  female presents with depressed mood and anxiety related to the limitations as result of the pandemic.  During " the session we discussed assertiveness techniques and strategies particularly when she is feeling intimidated by fellow employees and her work environment.  Also insight oriented strategies were developed in order for her to start setting specific goals of what she would like to accomplish in her life mother that is finding a different job or pursuing a relationship.    Assessment tools used today include: global assessment (can be found documented in Doc Flowsheets.)     Type of psychotherapeutic technique provided: Insight oriented and Solution-focused    Progress toward short term goals: Not yet established    Review of long term goals Not yet established    Diagnosis: Adjustment reaction with anxiety and depressed mood    Plan and Follow-up:   1.  Patient plans to explore the opportunity of finding employment.   2.  To consider reconnecting with friends within social distancing protocol.  3.  Utilize stop thought processing techniques when negative thoughts occur.   4.  Complete DA  At next session.  5.  Patient plans to follow up with therapy in 2 weeks.     Discharge Criteria/Planning: Patient will continue with follow-up until therapy can be discontinued without return of signs and symptoms.    I have reviewed the note as documented above.  This accurately captures the substance of my conversation with the patient.    As the provider I attest to compliance with applicable laws and regulations related to telemedicine.  Performed and documented by  Sarah Beth Ramsay [facility]  8/17/20

## 2021-06-11 ENCOUNTER — COMMUNICATION - HEALTHEAST (OUTPATIENT)
Dept: PHYSICAL MEDICINE AND REHAB | Facility: CLINIC | Age: 36
End: 2021-06-11

## 2021-06-11 LAB
ANA SER QL: 0.7 U
GLIADIN IGA SER-ACNC: 0.3 U/ML
GLIADIN IGG SER-ACNC: <0.4 U/ML
IGA SERPL-MCNC: 109 MG/DL (ref 65–400)
TTG IGA SER-ACNC: 0.2 U/ML
TTG IGG SER-ACNC: <0.6 U/ML

## 2021-06-11 NOTE — PROGRESS NOTES
"Mental Health Visit Note    Patient: Karrie Zhang    : 1985 MRN: [id number]      Date 20  Start time: 1700    Stop Time:    Session # 5    Session Type: Patient is presenting for an Individual session.    Karrie Zhang is a 35 y.o. female is being seen today for follow-up visit.  Patient stated that what led her to seek help with low self-esteem, body image and self deprecation.  She believes the issues began about 5 years ago when she gained weight.  Patient has attempted to resolve these concerns in the past through Through cognitive Therapy approximately 12 years ago and it \"sort of helped.\".    Telemedicine Visit: The patient's condition can be safely assessed and treated via synchronous audio and visual telemedicine encounter.      Reason for Telemedicine Visit: Patient has requested telehealth visit    Originating Site (Patient Location): Patient's home    Distant Site (Provider Location): Provider Remote Setting- Home Office    Consent:  The patient/guardian has verbally consented to: the potential risks and benefits of telemedicine (video visit) versus in person care; bill my insurance or make self-payment for services provided; and responsibility for payment of non-covered services.     Mode of Communication:  Video Conference via [employer]    As the provider I attest to compliance with applicable laws and regulations related to telemedicine.    Those present for this visit Patient and Therapist    Follow up Patient indicated that she would for a job interview and although she did not get the job she stated it was \"an okay experience.\"  She stated that she is feeling about the same and is at times difficult to follow through with exercise on a daily basis.    New symptoms or complaints: Decreased energy, increased appetite, distrust of others, loss of interest in activities, decreased social activity, depressed mood, irritability, boredom, increased sleepiness, feelings of guilt " "and inferiority problems.    Functional Impairment:   Personal: 3  Family: 1  Social: 3  Work: 0    Clinical assessment of mental status:   Karrie Zhang presented on time.   She was oriented x3, open and cooperative, and dressed appropriately for this session and weather. Her memory was Normal cognitive functioning .  Her speech was  Within normal.  Language was clear.  Concentration and focus is Within normal. Psychosis is not noted or reported. She reports her mood is Congruent w/content of speech.  Affect is congruent with speech and is Congruent w/content of speech.  Fund of knowledge is adequate. Insight is adequate for therapy.    ASSESSMENT: Current Emotional / Mental Status (status of significant symptoms):              Risk status (Self / Other harm or suicidal ideation)              Patient denies any personal safety issues              Patient denies current or recent suicidal ideation or behaviors.              Patient denies current or recent homicidal ideation or behaviors.              Patient denies current or recent self injurious behavior or ideation.              Patient denies other safety concerns.              Patient denies any risk factors              Patient family                              Attitude:                                   Cooperative               Orientation:                             Oriented x3              Speech                                    Clear                          Rate / Production:       Normal/ Responsive Normal                           Volume:                       Normal               Mood:                                      Normal              Thought Content:                    Clear               Thought Form:                        Coherent  Logical               Insight:                                     Good     Patient's impression of their current status: Patient stated, \"I feel about the same and trying to be positive and optimistic " "but it is hard at times.\"    Therapist impression of patients current state: This 35 y.o. White or  female who who indicated that she is making attempts to make changes in her life but at times it is a struggle.  She did have people over to her house and is willing to consider doing that again in the near future.  During the session we discussed those elements of hesitation when he is contemplating trying something out of her comfort zone.  She appears to understand the need to challenge herself in order to improve her mood.    Assessment tools used today include: Global assessment (can be found documented in Doc Flowsheets.)     Type of psychotherapeutic technique provided: Insight oriented and Solution-focused    Progress toward short term goals: In process of establishing treatment goals    Review of long term goals: In process of establishing treatment goals     Diagnosis: Adjustment reaction with anxiety and depressed mood     Plan and Follow-up:   1.  Continue to attempt to participate in exercise on a regular basis   2.  Be willing to initiate contact with others.  3.  Continue seeking other employment opportunities.  3.  Patient plans to follow up with therapy in ntwo weeks.     Discharge Criteria/Planning: Patient will continue with follow-up until therapy can be discontinued without return of signs and symptoms.    I have reviewed the note as documented above.  This accurately captures the substance of my conversation with the patient.    As the provider I attest to compliance with applicable laws and regulations related to telemedicine.  Performed and documented by   Sarah Beth GARCIA  9/28/20    "

## 2021-06-11 NOTE — PROGRESS NOTES
"  Evaluator Name:  Sarah Beth Ramsay LICSW        PATIENT'S NAME: Karrie Zhang  PREFERRED NAME: Karrie  PREFERRED PRONOUNS:       MRN:   [id number]  :   1985   ACCT. NUMBER: [id number]  DATE OF SERVICE: 2020  START TIME:  1700  END TIME:1752  PREFERRED PHONE:   May we leave a program related message: Yes    STANDARD ADULT DIAGNOSTIC ASSESSMENT    Telemedicine Visit: The patient's condition can be safely assessed and treated via synchronous audio and visual telemedicine encounter.      Reason for Telemedicine Visit: Patient has requested telehealth visit    Originating Site (Patient Location): Patient's home    Distant Site (Provider Location): Provider Remote Setting- Home Office    Consent:  The patient/guardian has verbally consented to: the potential risks and benefits of telemedicine (video visit) versus in person care; bill my insurance or make self-payment for services provided; and responsibility for payment of non-covered services.     Mode of Communication:  Video Conference via [employer]    As the provider I attest to compliance with applicable laws and regulations related to telemedicine.    Identifying Information:  Patient is a 35 y.o., .  The pronoun use throughout this assessment reflects the patient's chosen pronoun.  Patient was referred for an assessment by PCP.  Patient attended the session alone.     Chief Complaint:   Patient stated that what led her to seek help with low self-esteem, body image and self deprecation.  She believes the issues began about 5 years ago when she gained weight.  Patient has attempted to resolve these concerns in the past through Through cognitive Therapy approximately 12 years ago and it \"sort of helped.\".    Does the client have any condition that is currently presenting as a potential to harm themselves or others (severe withdrawal, serious medical condition, severe emotional/behavioral problem)? No.  Proceed with " "assessment.    Social/Family History:  Patient reported they grew up in [place], [place].  They were raised by biological parents.   .   Patient reported that childhood was \"happy, semi-strict conservative\".  Patient described their current relationships with family of origin as good. \"  I am more adventurous.\"    The patient did not describes their cultural background. Cultural influences and impact on patient's life structure, values, norms, and healthcare: .  Contextual influences on patient's health include:t Western medicine.    These factors will be addressed in the Preliminary Treatment plan.  Patient identified their preferred language to be English. Patient reported they does not need the assistance of an  or other support involved in therapy.     Patient reported had no significant delays in developmental tasks.   Patient's highest education level was college graduate. Patient identified the following learning problems: none reported.  Modifications will not be used to assist communication in therapy.  Patient reports they are  able to understand written materials.    Patient reported the following relationship history not currently in a relationship.  Last 1 was in her late 20s for 1 to 3 years off-and-on.  Patient indicated they fight a lot and somehow she lives about blamed.  Patient's current relationship status is single.   Patient identified their sexual orientation as heterosexual.  Patient reported having zero child(melissa).     Patient's current living/housing situation involves staying in own home/apartment.  They live with alone and they report that housing is stable. Patient identified parents, siblings and friends as part of their support system.  Patient identified the quality of these relationships as good.      Patient is currently employed full time and reports they are able to function appropriately at work..  Patient reports their finances are obtained through employment .  " Patient does not identify finances as a current stressor.      Patient reported that they have not been involved with the legal system.     Patient's Strengths and Limitations:  Patient identified the following strengths or resources that will help them succeed in treatment: friends / good social support. Things that may interfere with the patient's success in treatment include: She would like to find other employment that can be more challenging than what she is currently.  _______________________________________________  Personal and Family Medical History:  Patient indicated she has a history of sleep apnea and hypothyroidism.  Uses a CPAP night..  Patient did not indicate any family medical history information    Substance Use:  Patient indicated that her father brother and herself have a history of alcohol dependency.    Significant Losses / Trauma / Abuse / Neglect Issues:   Patient did not serve in the .  There are indications or report of significant loss, trauma, abuse or neglect issues related to:  It indicated the loss of her father was very traumatic as well as the loss of her niece from SIDS, brothers divorce and constant abandonment from men.  Concerns for possible neglect..  Denies any history of neglect    Safety Assessment:   Current Safety Concerns:  [place] Suicide Severity Rating Scale (Short Version)No flowsheet data found.  Patient denies current homicidal ideation and behaviors.  Patient denies current self-injurious ideation and behaviors.    Patient denied risk behaviors associated with substance use.  Patient denies any high risk behaviors associated with mental health symptoms.  Patient reports the following current concerns for their personal safety: None.  Patient reports there are no firearms in her home..     History of Safety Concerns:  Patient denied a history of homicidal ideation.    Patient denied a history of personal safety concerns.    Patient denied a history of  "assaultive behaviors.   Patient denied a history of assaultive behaviors.     Patient denied a history of risk behaviors associated with substance use.  Patient denies any history of high risk behaviors associated with mental health symptoms.  Patient reports the following protective factors: sense of meaning, positive social skills and sense of personal control or determination    Risk Plan:  See Preliminary Treatment Plan for Safety and Risk Management Plan    Review of Symptoms per patient report:  Depression: No symptoms  [place]:  No Symptoms  Psychosis: No Symptoms  Anxiety: No Symptoms and Nervousness  Panic:  No symptoms  Post Traumatic Stress Disorder:  No Symptoms   Eating Disorder: No Symptoms  ADD / ADHD:  No symptoms  Conduct Disorder: No symptoms  Autism Spectrum Disorder: No symptoms  Obsessive Compulsive Disorder: No Symptoms    Patient reports the following compulsive behaviors and treatment.  None indicated by patient    Diagnostic Criteria:   Adjustment reaction with anxiety and depressed mood    Functional Status:  Patient reports the following functional impairments: None indicated by patient     WHODAS: WHODAS 2.0 12-item version: 0.2%      Scores presented in qualifiers to represent level of disability.   NO Problem (none, absent, negligible,...) 0-4%    H1= 0  H2= 0  H3= 1     RICKY-7 scoring 7 indicating mild anxiety  PHQ-9 scoring 14 indicating moderate depression    Clinical Summary:   Patient is a 29-year-old female who was referred by her primary physician in order to help with mood stabilization and psychosocial stressors.  Patient stated \"I have low self-esteem and body image and at times self deprecation.  It started about 5 years ago when I gained weight.\"  Patient is also in the process of considering finding other employment that will be more challenging and give her opportunity to to improve her skills.  Patient indicated that she currently is in a work environment that can at times " be challenging and biased.  Patient appears close to her brother and mother as well as nephews. Patient expressed desire to improve her sense of self which she believes will attract healthier men in her life. Patient stated, that the pandemic has impacted her ability to participate in social activities although, is willing to venture out and challenge herself.  Symptoms include feeling down and depressed, decreased interest in doing things, trouble falling asleep, increased weight gain, feeling tired, trouble concentrating, anxiousness, worry regarding the possibility of not finding a mate in the future, easily annoyed and irritable and decreased social activity.  Based on patient's symptomology and current adjustment to the pandemic restrictions the supports criteria for DSM-V diagnosis of Adjustment reaction with Anxiety and Depressed Mood.    Recommendations:       1. Plan for Safety and Risk Management:Recommended that patient call 911 or go to the local ED should there be a change in any of these risk factors..  Report to child / adult protection services was NA.     2. Patient identified issues with dating in the future as problematic    3. Initial Treatment will focus on: Relationship issues, anxiety related to possibility of finding a new job and increasing socialization and activity.     4. Resources/Service Plan:       services are not indicated.     Modifications to assist communication are not indicated.     Additional disability accommodations are not indicated.      5. Collaboration:  Collaboration / coordination of treatment will be initiated with the following support professionals: Patient's PCP     6.  Referrals:  The following referral(s) will be initiated: None indicated  Next Scheduled Appointment: 2 weeks.  A Release of Information has been obtained for the following: None needed    7. CAMPOS: CAMPOS:  Discussed . Provider gave patient printed information about the effects of chemical use  on their health and well being. Recommendations: He to be cognizant of overuse of alcohol.     8. Records were reviewed at time of assessment.  Information in this assessment was obtained from the medical record and provided by patient who is a good historian.   Patient will have open access to their mental health medical record..      Eval type:  Mental Health    Staff Name/Credentials:    Sarah Beth Ramsay [facility]  8/31/20

## 2021-06-12 NOTE — PROGRESS NOTES
Outpatient Mental Health Treatment Plan    Name:  Karrie Zhang  :  [birth year]  MRN:  [id number]    Treatment Plan:  Initial Treatment Plan  Intake/initial treatment plan date:  10/19/20  Benefit and risks and alternatives have been discussed: Yes  Is this treatment appropriate with minimal intrusion/restrictions: Yes  Estimated duration of treatment:  8-10   Anticipated frequency of services:  Every 2 weeks  Necessity for frequency: This frequency is needed to establish therapeutic goals and for continuity of care in order to monitor progress.  Necessity for treatment: To address cognitive, behavioral, and/or emotional barriers in order to work toward goals and to improve quality of life.    Session Type: Patient is presenting for an Individual session.    Plan:           ?   ? Anxiety    Goals           Reduce overall level, frequency and intensity of the anxiety so that daily functioning is not impaired   Strategies: ? [x]Learn and practice relaxation techniques and other coping strategies (e.g., thought stopping, reframing, meditation)     ? [x] Increase involvement in meaningful activities     ? [x] Discuss sleep hygiene     ? [x] Explore thoughts and expectations about self and others     ? [x] Identify and monitor triggers for panic/anxiety symptoms     ? [x] Implement physical activity routine (with physician approval)     ? [x] Consider introduction of bibliotherapy and/or videos     ? [x] Continue compliance with medical treatment plan (or explore  barriers)     Degree to which this is a problem: 3  Degree to which goal is met: 2  Date of Review: 20                                         ? Depression    Goals               Develop healthy cognitive patterns and beliefs about self in the world that lead to alleviation of depression symptoms.   Strategies:    ?[x] Decrease social isolation     [x] Increase involvement in meaningful activities     ?[x] Discuss sleep hygiene     ?[] Explore thoughts and  expectations about self and others     ?[x] Process grief (loss of significant person, independence, role,  etc.)     ?[x] Assess for suicide risk     ?[x] Implement physical activity routine (with physician approval)     [x] Consider introduction of bibliotherapy and/or videos     [x] Continue compliance with medical treatment plan (or explore  barriers)    Degree to which this is a problem:3  Degree to which goal is met: 3  Date of Review: 12/19/20       ?  Substance use  Goal:                         Withdraw from mood altering substance, stabilized physically and emotionally, and then establish a supportive recovery plan.   Strategies: ? [x] Consider referral for chemical dependency evaluation     ? [x] Discuss barriers to participating in AA or other peer-facilitated    groups         [x] Address environmental factors which may interfere with sobriety     ? [x] Explore short-term versus long-term consequences of use     ? [x] Continue compliance with medical treatment plan (or exp  Alexander    Degree to which this is a problem: 4  Degree to which goal is met: 4  Date of Review: 12/19/20    Functional Impairment:  1=Not at all/Rarely  2=Some days  3=Most Days  4=Every Day    Personal : 3  Family : 2  Social : 3   Work/school : 2    Diagnosis:     1 .Adjustment disorder with anxiety and depressed mood    2. R/O Alcohol abuse      WHODAS 2.0 12-item version   3.0%      Scores presented in qualifiers to represent level of disability.   NO Problem (none, absent, negligible,...) 0-4%    H1= 1  H2= 1  H3= 2    Clinical assessments and measures completed:.  RICKY-7, PHQ Q-9,CAGE  and C-SSRS     Strengths:  Interest in making positive changes  Limitations:  Social isolation due to pandemic  Cultural Considerations: Middleclass family    Persons responsible for this plan: Patient        Sarah Beth Ramsay [facility]    10/19/20    Psychotherapist Signature         Reviewed with patient.  Verbal signature obtained.  Patient Signature:               Guardian Signature

## 2021-06-12 NOTE — PROGRESS NOTES
Mental Health Visit Note    Patient: Karrie Zhang    : 1985 MRN: [id number]    10/19/2020    Start time: 1700    Stop Time: 175   Session # 6    Session Type: Patient is presenting for an Individual session.    Karrie Zhang is a 35 y.o. female is being seen today for follow-up therapy appointment for mood instability and psychosocial stressors..     Telemedicine Visit: The patient's condition can be safely assessed and treated via synchronous audio and visual telemedicine encounter.      Reason for Telemedicine Visit: Patient has requested telehealth visit    Originating Site (Patient Location): Patient's home    Distant Site (Provider Location): Provider Remote Setting- Home Office    Consent:  The patient/guardian has verbally consented to: the potential risks and benefits of telemedicine (video visit) versus in person care; bill my insurance or make self-payment for services provided; and responsibility for payment of non-covered services.     Mode of Communication:  Video Conference via [employer]    As the provider I attest to compliance with applicable laws and regulations related to telemedicine.    Those present for this visit Patient and Therapist    Follow up Patient indicated that she followed up with her primary physician and is currently experiencing a sinus infection.  She indicated that at work there have been a number of people who have experienced COVID virus thus her employer is taking more precautions.    New symptoms or complaints: Irritability and increased alcohol use, depressed mood.    Functional Impairment:   Personal: 3  Family: 2  Social: 3  Work: 2    Clinical assessment of mental status:   Karrie Zhang presented on time.   She was oriented x3, open and cooperative, and dressed appropriately for this session and weather. Her memory was Normal cognitive functioning .  Her speech was  Within normal.  Language was clear.  Concentration and focus is Within normal.  "Psychosis is not noted or reported. She reports her mood is Congruent w/content of speech.  Affect is congruent with speech and is Congruent w/content of speech.  Fund of knowledge is adequate. Insight is adequate for therapy.    ASSESSMENT: Current Emotional / Mental Status (status of significant symptoms):              Risk status (Self / Other harm or suicidal ideation)              Patient denies any personal safety issues              Patient denies current or recent suicidal ideation or behaviors.              Patient denies current or recent homicidal ideation or behaviors.              Patient denies current or recent self injurious behavior or ideation.              Patient denies other safety concerns.              Patient denies any risk factors                          Patient's brother is protective of her needs                              Attitude:                                   Cooperative               Orientation:                             Oriented x3              Speech                                    clear                          Rate / Production:       Normal/ Responsive Normal                           Volume:                       Normal               Mood:                                      Irritable  Sad               Thought Content:                    Clear               Thought Form:                        Coherent  Logical               Insight:                                     Good     Patient's impression of their current status: Patient stated, that I am realizing that I need to make changes in my life and that I do not want to end up with any diabetes so I need to stop drinking.\"    Therapist impression of patients current state: This 35 y.o. White or  female presents for follow-up visit.  Patient indicated during today's visit that she went in for a doctor appointment and was notified that her A1c was elevated.  She is aware that the amount of alcohol she " consumes on a daily basis may be contributing to that elevation of her A1c.  She verbalized the fact that she knows alcohol is not the best solution to manage her issues and would like to get to the root of her alcohol usage.  During today's session we discussed the option of a Rule 25 Assessment in order to fully evaluate her alcohol consumption.  Although, she did not eliminate that option, she had asked if there was a possibility of transferring to a Therapist who specialized in alcohol abuse along with therapy..  Plan is for writer to explore that option for patient.    Assessment tools used today include: Global assessment and review of RICKY-7 and PHQ-9 can be found documented in Doc Flowsheets.     Type of psychotherapeutic technique provided: Insight oriented and Solution-focused    Progress toward short term goals Updated today    Review of long term goals Updated today    Diagnosis: Adjustment disorder with anxiety and depressed mood    Plan and Follow-up  1.   Per conversation with patient, considering transferring patient to therapist who specializes in Trauma             Therapy.    2.   Patient to consider possibility of Rule 25 Assessment   3.   Patient to be more cognizant of the amount of alcohol she uses on a daily basis on finding alternate healthy          means of relieving stress.    4.  Patient plans to continue taking the medication as prescribed.   5.  Patient to schedule return visit until alternate Therapist is arranged.    Discharge Criteria/Planning: Patient will continue with follow-up until therapy can be discontinued without return of signs and symptoms.    I have reviewed the note as documented above.  This accurately captures the substance of my conversation with the patient. As the provider I attest to compliance with applicable laws and regulations related to telemedicine.    Performed and documented by   Sarah Beth Ramsay [facility]  10/19/20

## 2021-06-15 ENCOUNTER — OFFICE VISIT - HEALTHEAST (OUTPATIENT)
Dept: PHYSICAL THERAPY | Facility: CLINIC | Age: 36
End: 2021-06-15

## 2021-06-15 DIAGNOSIS — M79.18 MYOFASCIAL PAIN: ICD-10-CM

## 2021-06-15 DIAGNOSIS — M54.2 CERVICAL PAIN: ICD-10-CM

## 2021-06-15 DIAGNOSIS — M54.6 PAIN IN THORACIC SPINE: ICD-10-CM

## 2021-06-15 DIAGNOSIS — G89.29 CHRONIC BILATERAL LOW BACK PAIN WITHOUT SCIATICA: ICD-10-CM

## 2021-06-15 DIAGNOSIS — M54.50 CHRONIC BILATERAL LOW BACK PAIN WITHOUT SCIATICA: ICD-10-CM

## 2021-06-16 DIAGNOSIS — F33.0 MAJOR DEPRESSIVE DISORDER, RECURRENT EPISODE, MILD (H): ICD-10-CM

## 2021-06-16 DIAGNOSIS — F41.9 ANXIETY: ICD-10-CM

## 2021-06-17 RX ORDER — FLUOXETINE 40 MG/1
CAPSULE ORAL
Qty: 30 CAPSULE | Refills: 0 | Status: SHIPPED | OUTPATIENT
Start: 2021-06-17 | End: 2021-07-20

## 2021-06-17 NOTE — TELEPHONE ENCOUNTER
----- Message from Tapan Rose DO sent at 5/21/2021  3:59 PM CDT -----  MRI of the cervical spine was reviewed.  There is some mild to moderate degenerative changes to the cervical spine.  No high-grade spinal cord compression.    Continue plan of physical therapy.  We can add physical therapy for the cervical spine if she wishes otherwise can follow-up as planned to discuss imaging and treatment options

## 2021-06-17 NOTE — TELEPHONE ENCOUNTER
Routing refill request to provider for review/approval because:  PHQ    PHQ-2 Score:     PHQ-2 ( 1999 Pf[employer]) 9/29/2020 9/30/2019   Q1: Little interest or pleasure in doing things 1 1   Q2: Feeling down, depressed or hopeless 2 1   PHQ-2 Score 3 2   Q1: Little interest or pleasure in doing things - Several days   Q2: Feeling down, depressed or hopeless - Several days   PHQ-2 Score - 2             Pending Prescriptions:                       Disp   Refills    FLUoxetine (PROZAC) 40 MG capsule [Pharmac*90 cap*0        Sig: TAKE 1 CAPSULE BY MOUTH EVERY DAY        Power Jeronimo RN
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
## 2021-06-17 NOTE — PROGRESS NOTES
Assessment/Plan:      Diagnoses and all orders for this visit:    Lumbar spine pain  -     Ambulatory referral to Physical Therapy    Thoracic spine pain  -     Ambulatory referral to Physical Therapy    Myofascial pain  -     baclofen (LIORESAL) 10 MG tablet; Take 0.5-1 tablets (5-10 mg total) by mouth 3 (three) times a day as needed (for muscle spasms).  Dispense: 30 tablet; Refill: 0    Transitional vertebra  -     Ambulatory referral to Physical Therapy    Hyper reflexia  -     MR Cervical Spine Without Contrast; Future; Expected date: 05/07/2021    Cervicalgia  -     MR Cervical Spine Without Contrast; Future; Expected date: 05/07/2021        Assessment: Pleasant 36 y.o. female with a history of thyroid disorder depression, sleep apnea and asthma with:    1.  Chronic lumbar spine pain over the past several years at the lumbosacral junction.  She has a transitional L5 vertebrae with excess reticulation with the sacrum seen on plain films likely responsible for her intermittent back pain.    2.  15 to 20 years of chronic intermittent thoracic spine pain thoracolumbar junction and mid thoracic spine consistent with myofascial pain    3.  Chronic lower cervical spine pain which accompanies the thoracic spine pain with mild hyperreflexia bilaterally.      Discussion:    1.  I discussed the diagnosis and treatment options.  With regards to the lumbar spine, we discussed the options of physical therapy along with further imaging injections and medications.  We also discussed the need for further imaging of the cervical spine.    2.  MRI cervical spine evaluate for any spinal cord compression resulting in hyperreflexia.    3.  Start physical therapy for the thoracic and lumbar spine.  MedX will be quite helpful for aggressive strengthening of the stabilizing muscles.    4.  Trial baclofen as a muscle relaxant for times of pain flares.    5.  Follow-up with me in 1 month.  May need imaging of the lumbar spine such as  MRI of lumbar spine does not improve with physical therapy.      It was our pleasure caring for your patient today, if there any questions or concerns please do not hesitate to contact us.      Subjective:   Patient ID: Karrie Zhang is a 36 y.o. female.    History of Present Illness:  patient presents at the request of Dr. Gupta for evaluation of lumbar spine pain but also a chronic thoracic and cervical spine pain.  She has a dull achy pain across the lumbosacral junction which has been present for the past several years intermittently.  She has intermittent flares which are the result of unknown etiology.  She has not had an injury.  She will have a pain flare radiating even around to her anterior hips occasionally.  Any movement causes severe pain nothing improves her pain.  No radiation down the legs paresthesias or weakness.  Had 1 visit of physical therapy at [facility] in [place] without benefit recently in February of this year.  She has tried naproxen without benefit.    She also has chronic thoracolumbar and mid thoracic spine pain in the parascapular muscles which radiates up to the lower cervical spine.  This is for 15 to 20 years.  No injury.  No radiation down the arms other than some into the right deltoid at times.  No numbness tingling or weakness in the arms.  She feels sometimes this is worse with certain sleeping positions.  Sitting at the desk is also painful for her.  Better with heat or ice.      Imaging: Lumbar plain film imaging personally reviewed and discussed with the patient.  Normal alignment.  She does have L5 transitional vertebrae with accessory reticulations to the sacrum with potential fusion.  Remainder of disc heights and vertebral body heights are unremarkable with no significant degenerative changes.  Normal hips and pelvis plain films.         Review of Systems: She has had some weight loss intentionally.  She has headaches, cough and wheeze related to allergies,  occasional leakage of bladder without full loss of bladder control.  This is only occasionally.  She complains of joint pain muscle pain and some skin itching with allergies eczema and psoriasis.  She bruises easily and is has excessive tiredness.  Denies fevers, ringing in the ears, change in vision, chest pain, abdominal pain, nausea, vomiting, bowel  incontinence  Remainder of 12 point review systems negative unless listed above.    Past Medical History:   Diagnosis Date     Asthma      Depression      Disease of thyroid gland      Sleep apnea        Family History   Problem Relation Age of Onset     Cancer Father      Diabetes Father          Social History     Socioeconomic History     Marital status: Single     Spouse name: None     Number of children: None     Years of education: None     Highest education level: None   Occupational History     None   Social Needs     Financial resource strain: None     Food insecurity     Worry: None     Inability: None     Transportation needs     Medical: None     Non-medical: None   Tobacco Use     Smoking status: Never Smoker     Smokeless tobacco: Never Used   Substance and Sexual Activity     Alcohol use: Yes     Drug use: None     Sexual activity: None   Lifestyle     Physical activity     Days per week: None     Minutes per session: None     Stress: None   Relationships     Social connections     Talks on phone: None     Gets together: None     Attends Sikhism service: None     Active member of club or organization: None     Attends meetings of clubs or organizations: None     Relationship status: None     Intimate partner violence     Fear of current or ex partner: None     Emotionally abused: None     Physically abused: None     Forced sexual activity: None   Other Topics Concern     None   Social History Narrative     None     Social history: Single.  Works as an .  Has a pet rabbit.  No tobacco.  Does drink alcohol daily.      The following  portions of the patient's history were reviewed and updated as appropriate: allergies, current medications, past family history, past medical history, past social history, past surgical history and problem list.      WHO 5: 18    CICI Score: 16  NDI Score: 18      Objective:   Physical Exam:    Vitals:    05/07/21 1244   BP: 138/90   Pulse: 65     Body mass index is 33.05 kg/m .      General:  Well-appearing female in no acute distress.  Overweight, pleasant, cooperative, and interactive throughout the examination and interview.  CV: No lower extremity edema on inspection or paltation.  Lymphatics: No cervical lymphadenopathy palpated. Eyes: sclera clear. Skin: No rashes or lesions seen over the head/neck, hairline, arms, legs, trunk.  Respirations unlabored.  MSK: Gait is   nonantalgic  .  Able to heel-toe walk without difficulty.  Negative Romberg.  Spine: normal AP curves of the C, T, and L spine.  Full range of motion in the Lumbar spine in all planes.  Full range of the cervical spine in all planes.  Palpation: Mild tenderness/equivocal tenderness over the thoracic paraspinals in the mid thoracic spine and rhomboids as well as lumbosacral junction at the PSIS.  Extremities: Full range of motion of the elbows, and wrists with no effusions or tenderness to palpation.   Full range of motion of the hips, knees, and ankles with no effusions or tenderness to palpation.  Negative arm drop empty can and speeds test bilaterally.  Full range of motion of the shoulders in abduction elbows and wrists.  No hypermobility of the upper or lower extremities.  Neurologic exam: Mental status: Patient is alert and oriented with normal affect.  Attention, knowledge, memory, and language are intact.  Normal coordination throughout the examination.  Reflexes are 2+ and symmetric biceps, triceps, brachioradialis, patellar, and Achilles with down-going toes and mild positive Didi's bilaterally right slightly worse than left.   Sensation is intact to light touch throughout the upper and lower extremities bilaterally.  Manual muscle testing reveals 5 out of 5 in the hip flexors, knee flexors/extensors, ankle plantar flexors, ankle  dorsiflexors, and EHL.  Upper extremities: Grossly normal strength . Normal muscle bulk and tone in the arms and legs.    Negative seated   straight leg raise bilaterally.

## 2021-06-21 ENCOUNTER — RECORDS - HEALTHEAST (OUTPATIENT)
Dept: PHYSICAL MEDICINE AND REHAB | Facility: CLINIC | Age: 36
End: 2021-06-21
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
## 2021-06-26 DIAGNOSIS — E03.9 ACQUIRED HYPOTHYROIDISM: ICD-10-CM
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
## 2021-06-26 NOTE — PATIENT INSTRUCTIONS - HE
1. Nabumetone (which is an anti-inflammatory) medication is prescribed today. Take 1-2 tablets 2 times a day as needed for pain. This medication should be taken with food and water to prevent any stomach upset. Do not take ibuprofen/Advil/Motrin/Aleve/naproxen while you take Nabumetone. Please call if you have any side effects.    2. Continue with physical therapy    3. Blood work today
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
## 2021-06-26 NOTE — PROGRESS NOTES
Assessment/Plan:      Diagnoses and all orders for this visit:    Cervicalgia  -     nabumetone (RELAFEN) 500 MG tablet; Take 1-2 tablets (500-1,000 mg total) by mouth 2 (two) times a day as needed for pain.  Dispense: 90 tablet; Refill: 2    DDD (degenerative disc disease), cervical  -     nabumetone (RELAFEN) 500 MG tablet; Take 1-2 tablets (500-1,000 mg total) by mouth 2 (two) times a day as needed for pain.  Dispense: 90 tablet; Refill: 2    Lumbar spine pain  -     nabumetone (RELAFEN) 500 MG tablet; Take 1-2 tablets (500-1,000 mg total) by mouth 2 (two) times a day as needed for pain.  Dispense: 90 tablet; Refill: 2    Polyarthralgia  -     Erythrocyte Sedimentation Rate; Future; Expected date: 06/04/2021  -     C-Reactive Protein; Future; Expected date: 06/04/2021  -     Antinuclear Antibody (TIFFANIE) Cascade; Future; Expected date: 06/04/2021  -     Rheumatoid factor; Future; Expected date: 06/04/2021  -     CCP Antibodies; Future; Expected date: 06/04/2021  -     Celiac(Gluten)Antibody Panel; Future; Expected date: 06/04/2021  -     nabumetone (RELAFEN) 500 MG tablet; Take 1-2 tablets (500-1,000 mg total) by mouth 2 (two) times a day as needed for pain.  Dispense: 90 tablet; Refill: 2    Hyper reflexia    Myofascial pain    Thoracic spine pain    Polymyalgia (H)  -     Erythrocyte Sedimentation Rate; Future; Expected date: 06/04/2021  -     C-Reactive Protein; Future; Expected date: 06/04/2021  -     Antinuclear Antibody (TIFFANIE) Cascade; Future; Expected date: 06/04/2021  -     Rheumatoid factor; Future; Expected date: 06/04/2021  -     CCP Antibodies; Future; Expected date: 06/04/2021  -     Celiac(Gluten)Antibody Panel; Future; Expected date: 06/04/2021    Diarrhea, unspecified type  -     Celiac(Gluten)Antibody Panel; Future; Expected date: 06/04/2021        Assessment: Pleasant 36 y.o. female  with a history of thyroid disorder depression, sleep apnea and asthma with:     1.    Persistent cervical spine upper  thoracic lumbar spine pain with hyperreflexia in the upper extremities.  She has multilevel cervical degenerative disc disease which is mild and she has moderate facet arthropathy C4-5 through C6-7 which could be responsible for some of her neck pain but also some moderate foraminal stenosis most significant at C5-6 bilaterally.  Her symptoms are consistent with facet arthropathy and myofascial pain.    2.  Persistent chronic lumbar spine pain over the past several years at the lumbosacral junction.  She has a transitional L5 vertebrae with accessory articulation with the sacrum seen on plain films likely responsible for her intermittent back pain.  Feels that she is having some improvement  with 3 visits of physical therapy with [facility]      3.  Polymyalgias and arthralgias with erythema and a joint effusion of the left fifth PIP joint today.     4.  15 to 20 years of chronic intermittent thoracic spine pain thoracolumbar junction and mid thoracic spine consistent with myofascial pain    5.  Has some intermittent diarrhea.    Discussion:    1.  I discussed the diagnosis and treatment options.  We discussed her edema and sense of inflammation throughout her body with polymyalgias and arthralgias.  She has edema and effusion of the left fifth PIP joint today.  We discussed options of getting lab work today along with  changing medications and continue with physical therapy.  I did review her cervical MRI.    2.  Trial nabumetone in place of naproxen as needed for pain.    3.  Continue baclofen as needed for myofascial pain.    4.  Lab work is ordered above to evaluate for inflammatory condition connective tissue disorder rheumatoid arthritis as well as celiac disease.    5.  Continue physical therapy and may add [facility] for the cervical spine and continue [facility] for the low back.    6.  Follow-up with me in 1 month.  Can consider steroid injection if lab work is negative otherwise, if lab work is positive will refer to  rheumatology.    It was our pleasure caring for your patient today, if there any questions or concerns please do not hesitate to contact us.      Subjective:   Patient ID: Karrie Zhang is a 36 y.o. female.    History of Present Illness:  Patient presents for follow-up of cervical spine pain after MRI along with low back pain and thoracic pain.  Has been doing physical therapy.    The cervical spine pain is unchanged of the cervical spine and upper thoracic spine parascapular region upper trapezius.  She does get some pain in the anterior shoulders at times and she gets pain up into her head.  She feels swollen and actually has some new symptoms of left greater than right hand swelling in her hands when she wakes up in the morning and has had some swelling of the left fifth finger.  Her pain is a 9/10 at worst 6/10 today 2/10 at best.    She also has back pain which has improved with physical therapy doing [facility] program.  Has had 3 visits.  [facility] has not been started for cervical spine.  Low back and neck is worse with sitting for long time and slouching.  Does use Epsom salt soaks along with ice CBD oil.  IcyHot.  Uses baclofen which helps her sleep.  Taking naproxen which does not help.  Feels that she has systemic inflammation.      Imaging: MRI report and images were personally reviewed and discussed with the patient.  A plastic model was utilized during the discussion.  MRI of the cervical spine personally reviewed.  Moderate spinal stenosis C5-6 with mild disc height loss multiple levels moderate to severe bilateral foraminal stenosis C5-6 moderate right and mild left foraminal stenosis C4-5 and moderate left mild right foraminal stenosis C6-7.    Review of Systems: Pertinent positives: Occasional loss of bladder.  Daily headaches.  Feels that she gets rashes with eczema intermittently..  Pertinent negatives: No numbness, tingling or weakness.  No bowel or   incontinence.  No urinary retention.  No fevers,  unintentional weight loss, balance changes,   frequent falling, difficulty swallowing, or coordination difficulties.  All others reviewed are negative.         Past Medical History:   Diagnosis Date     Asthma      Depression      Disease of thyroid gland      Sleep apnea        The following portions of the patient's history were reviewed and updated as appropriate: allergies, current medications, past family history, past medical history, past social history, past surgical history and problem list.           Objective:   Physical Exam:    Vitals:    06/04/21 1524   BP: 124/81   Pulse: 76     There is no height or weight on file to calculate BMI.      General: Alert and oriented with normal affect.  Overweight, attention, knowledge, memory, and language are intact. No acute distress.   Eyes: Sclerae are clear.  Respirations: Unlabored. CV: No lower extremity edema.  Skin: No rashes seen.    Gait:  Nonantalgic  Tenderness palpation upper trapezius bilaterally.  Sensation is intact to light touch throughout the upper and lower extremities.  Reflexes are 2+ and symmetric in the biceps triceps and brachioradialis with positive bilateral Hoffmans. 2+ patellar and Achilles   Effusion with mild erythema left upper extremity digit 5 PIP.    Manual muscle testing reveals:  Right /Left out of 5     5/5 elbow flexors  5/5 elbow extensors  5/5 wrist extensors  5/5 interosseus  5/5 finger flexors  5/5 hip flexors  5/5 knee flexors  5/5 knee extensors  5/5 ankle plantar flexors  5/5 ankle dorsiflexors  5/5   ankle evertors

## 2021-06-27 RX ORDER — LEVOTHYROXINE SODIUM 75 UG/1
TABLET ORAL
Qty: 90 TABLET | Refills: 1 | Status: SHIPPED | OUTPATIENT
Start: 2021-06-27 | End: 2022-01-12

## 2021-06-27 NOTE — PROGRESS NOTES
Progress Notes by Nam Sullivan PA-C at 5/11/2019  8:10 AM     Author: Nam Sullivan PA-C Service: -- Author Type: Physician Assistant    Filed: 5/11/2019  4:42 PM Encounter Date: 5/11/2019 Status: Signed    : Nam Sullivan PA-C (Physician Assistant)       Subjective:      Patient ID: Poppy Zhang is a 34 y.o. female.    Chief Complaint:    HPI  Poppy Zhang is a 34 y.o. female who presents today complaining of four day of acute onset facial  frontal and maxillary pain and pressure that is radiating to the teeth and to the jaw.  Patient has a headache that is made worse with dependency.  Postnasal drainage.      Denies fever chills night sweats epistaxis or other constitutional symptoms.      Patient was recently seen at another walk-in care clinic and had a rapid strep test which was read as negative.  This was done 2 days ago but she does not know the results of the confirmatory culture as she did not have the results returned yet.  She ostensibly is here right now because she is having continued sore throat and odynophagia and states that the sore throat is speaking in very painful and a 4-5 of her symptoms.  Morning she also states that her right eye was mattered and grouped and crusted shut.  She cleaned the eye this morning and there is been no discharge either watery or mattering subsequently.  No redness or no photosensitivity no visual field cuts or visual disturbances reported.  He denies foreign body sensation or sense sensation of something in the eye.  She is a noncontact lens wear.     Past Medical History:  Patient is otherwise healthy and has no chronic illnesses except for asthma.  Sleep apnea treated with CPAP.    Past Surgical History:  Reviewed and patient has no surgeries to report except a gynecological surgery, states she had her cervix cauterized.    Family History:  Hypertension: No.    Early coronary artery disease/cardiovascular disease before the age of 50 and females 40 in  males: No.    Diabetes: Father with a history of diabetes mellitus.    Cancers: Father  of pancreatic cancer.    Social History     Tobacco Use   ? Smoking status: Never Smoker   ? Smokeless tobacco: Never Used   Substance Use Topics   ? Alcohol use: Not on file   ? Drug use: Not on file       Review of Systems  As above in HPI, otherwise balance of Review of Systems are negative.    Objective:     /79   Pulse 86   Temp 97.7  F (36.5  C) (Oral)   Wt (!) 223 lb 1.6 oz (101.2 kg)   SpO2 96%     Physical Exam  General: Patient is resting comfortably no acute distress is afebrile  HEENT: Head is normocephalic atraumatic   Frontal and maxillary sinuses are tender to percussion  eyes are PERRL   EOMI sclera anicteric   TMs are clear bilaterally  Throat is with clear without erythema or exudate.  No cervical lymphadenopathy present  Lungs: Clear to auscultation bilaterally  Heart: Regular rate and rhythm  Skin: Without rash non-diaphoretic  Psych: Oriented x 3  Musculoskeletal: Upper and lower extremities are with full strength and she is ambulating freely into the office encounter.  Neuro: No focal neurologic deficits appreciated.  Patient is able to arise from a seated position walk to the exam table.  Speaking in full sentences.    Assessment:     Procedures    The primary encounter diagnosis was Acute non-recurrent frontal sinusitis. Diagnoses of Acute pharyngitis, unspecified etiology and Dysfunction of both eustachian tubes were also pertinent to this visit.    Plan:     1. Acute non-recurrent frontal sinusitis  predniSONE (DELTASONE) 20 MG tablet    doxycycline (MONODOX) 100 MG capsule    DISCONTINUED: doxycycline (MONODOX) 100 MG capsule   2. Acute pharyngitis, unspecified etiology  predniSONE (DELTASONE) 20 MG tablet   3. Dysfunction of both eustachian tubes  fluticasone propionate (FLONASE ALLERGY RELIEF) 50 mcg/actuation nasal spray       Had a long conversation with the patient stating that her  throat did not look particularly red and had no exudate, however she did not have results returned on her culture.  I will give her prednisone to help with pain and inflammation.  She is instructed to not use over-the-counter NSAIDs as this will reduce the effectiveness of the prednisone.  She can use the fluticasone nasal spray to help with postnasal drainage and eustachian tube dysfunction and drainage from the middle ear.  Over-the-counter Zyrtec may also help with decongestion and postnasal drainage.  She is given a course of doxycycline to help with sinus congestion symptoms.  Questions were answered to patient's satisfaction before discharge.    Patient Instructions     Over-the-counter nasal steroid spray, follow packaging directions  Over-the-counter Mucinex, follow packaging directions  Hot packs 3 times per day to the forehead and face over the tender sinuses  Nasal saline salt water or saline irrigation with Mare Mccartney  Antibiotic as written below.  Risks and benefits of the medication were gone over.  Indication for return to see urgent care or family practice provider for reevaluation and treatment.      Acute Bacterial Rhinosinusitis (ABRS)  Acute bacterial rhinosinusitis (ABRS) is an infection of your nasal cavity and sinuses. Its caused by bacteria. Acute means that youve had symptoms for less than 12 weeks.  Understanding your sinuses  The nasal cavity is the large air-filled space behind your nose. The sinuses are a group of spaces formed by the bones of your face. They connect with your nasal cavity. ABRS causes the tissue lining these spaces to become inflamed. Mucus may not drain normally. This leads to facial pain and other symptoms.  What causes ABRS?  ABRS most often follows an upper respiratory infection caused by a virus. Bacteria then infect the lining of your nasal cavity and sinuses. But you can also get ABRS if you have:    Nasal allergies    Long-term nasal swelling and congestion  not caused by allergies    Blockage in the nose  Symptoms of ABRS  The symptoms of ABRS may be different for each person, and can include:    Nasal congestion    Runny nose    Fluid draining from the nose down the throat (postnasal drip)    Headache    Cough    Pain in the sinuses    Thick, colored fluid from the nose (mucus)    Fever  Diagnosing ABRS  ABRS may be diagnosed if youve had an upper respiratory infection like a cold and cough for longer than 10 to 14 days. Your health care provider will ask about your symptoms and your medical history. The provider will check your vital signs, including your temperature. Youll have a physical exam. The health care provider will check your ears, nose, and throat. You likely wont need any tests. If ABRS comes back, you may have a culture or other tests.  Treatment for ABRS  Treatment may include:    Antibiotic medicine. This is for symptoms that last for at least 10 to 14 days.    Nasal corticosteroid medicine. Drops or spray used in the nose can lessen swelling and congestion.    Over-the-counter pain medicine. This is to lessen sinus pain and pressure.    Nasal decongestant medicine. Spray or drops may help to lessen congestion. Do not use them for more than a few days.    Salt wash (saline irrigation). This can help to loosen mucus.  Possible complications of ABRS  ABRS may come back or become long-term (chronic).  In rare cases, ABRS may cause complications such as:     Inflamed tissue around the brain and spinal cord (meningitis)    Inflamed tissue around the eyes (orbital cellulitis)    Inflamed bones around the sinuses (osteitis)  These problems may need to be treated in a hospital with intravenous (IV) antibiotic medicine or surgery.  When to call the health care provider  Call your health care provider if you have any of the following:    Symptoms that dont get better, or get worse    Symptoms that dont get better after 3 to 5 days on antibiotics    Trouble  seeing    Swelling around your eyes    Confusion or trouble staying awake   Date Last Reviewed: 3/3/2015    6981-5239 [employer]. [address] [place] [address], [place] [address]. All rights reserved. This information is not intended as a substitute for professional medical care. Always follow your healthcare professional's instructions.          Use of over-the-counter Zyrtec.  Follow packaging directions  Use of over-the-counter Flonase  Frequent swallowing drinking and chewing gum to help create low-grade suction on the eustachian tube.  Elevate head at nighttime so it does not increase pressure  Use of over-the-counter Tylenol as needed for comfort.  Return to primary care provider for reevaluation treatment if any complication or new symptoms should present.        Patient Education     Earache, No Infection (Adult)  Earaches can happen without an infection. This occurs when air and fluid build up behind the eardrum causing a feeling of fullness and discomfort and reduced hearing. This is called otitis media with effusion (OME) or serous otitis media. It means there is fluid in the middle ear. It is not the same as acute otitis media, which is typically from infection.  OME can happen when you have a cold if congestion blocks the passage that drains the middle ear. This passage is called the eustachian tube. OME may also occur with nasal allergies or after a bacterial middle ear infection.    The pain or discomfort may come and go. You may hear clicking or popping sounds when you chew or swallow. You may feel that your balance is off. Or you may hear ringing in the ear.  It often takes from several weeks up to 3 months for the fluid to clear on its own. Oral pain relievers and ear drops help if there is pain. Decongestants and antihistamines sometimes help. Antibiotics don't help since there is no infection. Your doctor may prescribe a nasal spray to help reduce swelling in the nose and eustachian tube.  This can allow the ear to drain.  If your OME doesn't improve after 3 months, surgery may be used to drain the fluid and insert a small tube in the eardrum to allow continued drainage.  Because the middle ear fluid can become infected, it is important to watch for signs of an ear infection which may develop later. These signs include increased ear pain, fever, or drainage from the ear.  Home care  The following guidelines will help you care for yourself at home:    You may use over-the-counter medicine as directed to control pain, unless another medicine was prescribed. If you have chronic liver or kidney disease or ever had a stomach ulcer or GI bleeding, talk with your doctor before using these medicines. Aspirin should never be used in anyone under 18 years of age who is ill with a fever. It may cause severe liver damage.    You may use over-the-counter decongestants such as phenylephrine or pseudoephedrine. But they are not always helpful. Don't use nasal spray decongestants more than 3 days. Longer use can make congestion worse. Prescription nasal sprays from your doctor don't typically have those restrictions.    Antihistamines may help if you are also having allergy symptoms.    You may use medicines such as guaifenesin to thin mucus and promote drainage.  Follow-up care  Follow up with your healthcare provider or as advised if you are not feeling better after 3 days.  When to seek medical advice  Call your healthcare provider right away if any of the following occur:    Your ear pain gets worse or does not start to improve     Fever of 100.4 F (38 C) or higher, or as directed by your healthcare provider    Fluid or blood draining from the ear    Headache or sinus pain    Stiff neck    Unusual drowsiness or confusion  Date Last Reviewed: 10/1/2016    [id number] [employer]. [address] [place] [address], [place] [address]. All rights reserved. This information is not intended as a substitute for  professional medical care. Always follow your healthcare professional's instructions.

## 2021-06-29 ENCOUNTER — COMMUNICATION - HEALTHEAST (OUTPATIENT)
Dept: PHYSICAL THERAPY | Facility: CLINIC | Age: 36
End: 2021-06-29

## 2021-07-02 ENCOUNTER — TRANSFERRED RECORDS (OUTPATIENT)
Dept: HEALTH INFORMATION MANAGEMENT | Facility: CLINIC | Age: 36
End: 2021-07-02

## 2021-07-08 ENCOUNTER — TRANSFERRED RECORDS (OUTPATIENT)
Dept: HEALTH INFORMATION MANAGEMENT | Facility: CLINIC | Age: 36
End: 2021-07-08

## 2021-07-08 ENCOUNTER — HOSPITAL ENCOUNTER (OUTPATIENT)
Dept: PHYSICAL MEDICINE AND REHAB | Facility: CLINIC | Age: 36
Discharge: HOME | End: 2021-07-08
Attending: PHYSICAL MEDICINE & REHABILITATION
Payer: COMMERCIAL

## 2021-07-08 DIAGNOSIS — M54.50 LUMBAR SPINE PAIN: ICD-10-CM

## 2021-07-08 DIAGNOSIS — M35.3 POLYMYALGIA (H): ICD-10-CM

## 2021-07-08 DIAGNOSIS — M50.30 DDD (DEGENERATIVE DISC DISEASE), CERVICAL: ICD-10-CM

## 2021-07-08 DIAGNOSIS — M54.6 THORACIC SPINE PAIN: ICD-10-CM

## 2021-07-08 DIAGNOSIS — M54.2 CERVICALGIA: ICD-10-CM

## 2021-07-08 ASSESSMENT — MIFFLIN-ST. JEOR: SCORE: 1669.72

## 2021-07-08 NOTE — PROGRESS NOTES
Progress Notes by Tapan Rose DO at 7/8/2021  7:40 AM     Author: Tapan Rose DO Service: -- Author Type: Physician    Filed: 7/8/2021  8:36 AM Date of Service: 7/8/2021  7:40 AM Status: Signed    : Tapan Rose DO (Physician)       Assessment/Plan:      Diagnoses and all orders for this visit:    Polymyalgia (H)  -     Ambulatory referral to Physical Therapy    Cervicalgia  -     Ambulatory referral to Physical Therapy    DDD (degenerative disc disease), cervical  -     Ambulatory referral to Physical Therapy    Lumbar spine pain  -     MR Lumbar Spine Without Contrast; Future; Expected date: 07/08/2021  -     Ambulatory referral to Physical Therapy    Thoracic spine pain  -     Ambulatory referral to Physical Therapy        Assessment: Pleasant 36 y.o. female  with a history of thyroid disorder depression, sleep apnea and asthma with:     1.      Persistent cervical spine upper thoracic lumbar spine pain with hyperreflexia in the upper extremities.    She has mild multilevel cervical degenerative disc disease with   moderate facet arthropathy C4-5 through C6-7.  She also has moderate foraminal stenosis most significant C5-6 bilaterally. Her symptoms are consistent with myofascial pain and potential component from facet arthropathy.  No benefit with MedX physical therapy or nabumetone.     2.    Persistent chronic lumbar spine pain over the past several years at the lumbosacral junction and SI region.  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  Back pain persist despite MedX  physical therapy     3.  Polymyalgias and arthralgias with erythema and a joint effusion of the left fifth PIP joint today.  Rheumatology work-up pending.  On prednisone trial for the past 2 days without any benefit in fact she feels more tired.     4.  15 to 20 years of chronic intermittent thoracic spine pain thoracolumbar junction and mid thoracic spine  consistent with myofascial pain         Discussion:    1.  We discussed the diagnosis and treatment options.  At this point we are somewhat limited with medications as she has tried Cymbalta in the past and she was recently started on prednisone burst and I do not want to confuse the prednisone trial.  She agrees.  We also discussed changing physical therapy and further imaging.    2.  MRI lumbar spine to evaluate for significant facet arthropathy as well as the accessory articulation versus SI inflammation.  Order placed.    3.  Change physical therapy to a postural restoration approach for whole body treatment.  Order placed.    4.  Continue medications to rheumatology.    5.  Can consider cervical epidural however she has no specific radicular symptoms.     6.  Follow-up with phone with results of imaging and further recommendations.    It was our pleasure caring for your patient today, if there any questions or concerns please do not hesitate to contact us.      Subjective:   Patient ID: Karrie Zhang is a 36 y.o. female.    History of Present Illness: Patient presents for follow-up of cervical thoracic and lumbar spine pain.  No change in pain since last visit.  She has cervical and upper thoracic spine pain worse with prolonged sitting at the end of the day at work better with heat and ice.  No radiation down arms paresthesias or weakness.  Started MedX with physical therapy without any improvement after couple of sessions.  She has mid thoracic spine pain as well.  Pain is an 8/10 at worst 2/10 today and at best.  Nabumetone was not helpful.  Methocarbamol not helpful.  Taking baclofen.    She also has low back pain lumbosacral junction.  Has been doing MedX 9 visits without any benefit with regards to pain.  No radiation of the legs paresthesias or weakness.    Recently was seen by rheumatology, Dr Blount.  Note was reviewed.  Started on prednisone trial and she is only on the first couple of days.  She  tells me this makes her drowsy in the morning but again has only taken this a couple of times.    Labs negative/normal including CCP, rheumatoid factor, CRP, ESR, celiac.  Screen.    Imaging: Cervical spine MRI reviewed showing moderate spinal stenosis C5-6 with least moderate to severe bilateral foraminal stenosis C5-6 moderate right mild left foraminal stenosis C4-5 and moderate left mild right foraminal stenosis C6-7.    Plain films lumbar spine personally reviewed show transitional L5 segment accessory articulation.  No significant degenerative disc height loss    Review of Systems: Pertinent positives: Does get headaches..  Pertinent negatives: No numbness, tingling or weakness.  No bowel or bladder incontinence.  No urinary retention.  No fevers, unintentional weight loss, balance changes,  frequent falling, difficulty swallowing, or coordination difficulties.  All others reviewed are negative.           Past Medical History:   Diagnosis Date   ? Asthma    ? Depression    ? Disease of thyroid gland    ? Sleep apnea        The following portions of the patient's history were reviewed and updated as appropriate: allergies, current medications, past family history, past medical history, past social history, past surgical history and problem list.           Objective:   Physical Exam:    Vitals:    07/08/21 0754   BP: 120/61   Pulse: 63     Body mass index is 33.05 kg/m .      General: Alert and oriented with mildly flat  affect. Attention, knowledge, memory, and language are intact. No acute distress.   Eyes: Sclerae are clear.  Respirations: Unlabored. CV: No lower extremity edema.  Skin: No rashes seen.    Gait:  Nonantalgic  Tenderness palpation cervical thoracic lumbar paraspinals PSIS SI region.  Sensation is intact to light touch throughout the upper and lower extremities.  Hypertonic tissue tissues upper trapezius.  Reflexes are 2+ and symmetric in the biceps triceps and brachioradialis with mild positive  bilateral Hoffmans. 2+ patellar and Achilles      Manual muscle testing reveals:  Right /Left out of 5     5/5 elbow flexors  5/5 elbow extensors  5/5 wrist extensors  5/5 interosseus  5/5 finger flexors     5/5 knee flexors  5/5 knee extensors  5/5 ankle plantar flexors  5/5 ankle dorsiflexors  5/5  EHL

## 2021-07-08 NOTE — PATIENT INSTRUCTIONS - HE
Patient Instructions by Tapan Rose DO at 7/8/2021  7:40 AM     Author: Tapan Rose DO Service: -- Author Type: Physician    Filed: 7/8/2021  8:13 AM Date of Service: 7/8/2021  7:40 AM Status: Signed    : Tapan Rose DO (Physician)       1. Change in physical therapy approach. A physical therapy order was provided for you today.  You can schedule physical therapy before you leave today or will be contacted by physical therapy.  If nobody contacts you within 3 to 5 days, please contact the clinic at [phone].  It will be very important for you to do your physical therapy exercises on a regular basis to decrease your pain and prevent future pain flares.    2. An MRI was ordered for you today.  You will be contacted by scheduling within 3 days.    If you are not contacted, please call Radiology at [phone].

## 2021-07-10 VITALS — WEIGHT: 211 LBS | BODY MASS INDEX: 33.12 KG/M2 | HEIGHT: 67 IN

## 2021-07-13 ENCOUNTER — HOSPITAL ENCOUNTER (OUTPATIENT)
Dept: PHYSICAL THERAPY | Facility: CLINIC | Age: 36
End: 2021-07-13
Payer: COMMERCIAL

## 2021-07-13 DIAGNOSIS — M54.2 NECK PAIN: ICD-10-CM

## 2021-07-13 DIAGNOSIS — M54.50 LOW BACK PAIN, UNSPECIFIED BACK PAIN LATERALITY, UNSPECIFIED CHRONICITY, UNSPECIFIED WHETHER SCIATICA PRESENT: ICD-10-CM

## 2021-07-13 PROCEDURE — 97110 THERAPEUTIC EXERCISES: CPT | Mod: GP | Performed by: PHYSICAL THERAPIST

## 2021-07-13 PROCEDURE — 97140 MANUAL THERAPY 1/> REGIONS: CPT | Mod: GP | Performed by: PHYSICAL THERAPIST

## 2021-07-13 NOTE — PROGRESS NOTES
"Date    Lumbar Parameters    Top Dead Center (TDC)    Counterbalance (CB)    Seat Pad    Femur Restraint    Week/Visit    Enter Week/Visit #    Weight (lbs)    Reps (#)    Time    ROM (degrees)    Pain    Flex:Ext ratio    Cervical Parameters    Top Dead Center (TDC) 60   Counterbalance (CB) 1.0   Seat Height 455   Week/Visit    Enter Week/Visit # WK 3 v 2   Weight (lbs) 142# max 87# ex   Reps (#) 26   Time --   ROM (degrees) 12-84   Pain    Flex:Ext ratio      Date 7/13/21   Exercise    Nustep  X 5 min   Rotary torso 90\" 40#, Started R   Hamstring Stretch    Quad Stretch    Supine LTR    Supine Cervical Retraction    Scapular Retraction    Median Nerve Glide                        "

## 2021-07-15 ENCOUNTER — HOSPITAL ENCOUNTER (OUTPATIENT)
Dept: PHYSICAL THERAPY | Facility: CLINIC | Age: 36
End: 2021-07-15
Payer: COMMERCIAL

## 2021-07-15 DIAGNOSIS — M54.2 NECK PAIN: ICD-10-CM

## 2021-07-15 DIAGNOSIS — M54.50 LOW BACK PAIN, UNSPECIFIED BACK PAIN LATERALITY, UNSPECIFIED CHRONICITY, UNSPECIFIED WHETHER SCIATICA PRESENT: ICD-10-CM

## 2021-07-15 PROCEDURE — 97110 THERAPEUTIC EXERCISES: CPT | Mod: GP | Performed by: PHYSICAL THERAPIST

## 2021-07-15 PROCEDURE — 97535 SELF CARE MNGMENT TRAINING: CPT | Mod: GP | Performed by: PHYSICAL THERAPIST
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
## 2021-07-19 ENCOUNTER — HOSPITAL ENCOUNTER (OUTPATIENT)
Dept: MRI IMAGING | Facility: HOSPITAL | Age: 36
Discharge: HOME | End: 2021-07-19
Attending: PHYSICAL MEDICINE & REHABILITATION | Admitting: PHYSICAL MEDICINE & REHABILITATION
Payer: COMMERCIAL

## 2021-07-19 DIAGNOSIS — M54.50 LUMBAR SPINE PAIN: ICD-10-CM

## 2021-07-19 DIAGNOSIS — F33.0 MAJOR DEPRESSIVE DISORDER, RECURRENT EPISODE, MILD (H): ICD-10-CM

## 2021-07-19 DIAGNOSIS — F41.9 ANXIETY: ICD-10-CM

## 2021-07-19 PROCEDURE — 72148 MRI LUMBAR SPINE W/O DYE: CPT

## 2021-07-19 RX ORDER — FLUOXETINE 40 MG/1
CAPSULE ORAL
Start: 2021-07-19

## 2021-07-19 NOTE — TELEPHONE ENCOUNTER
Routing refill request to provider for review/approval because:  PHQ    PHQ-9 score:    PHQ 9/29/2020   PHQ-9 Total Score 9   Q9: Thoughts of better off dead/self-harm past 2 weeks Not at all   Some encounter information is confidential and restricted. Go to Review Flowsheets activity to see all data.                      Pending Prescriptions:                       Disp   Refills    FLUoxetine (PROZAC) 40 MG capsule [Pharmac*30 cap*0        Sig: TAKE 1 CAPSULE BY MOUTH EVERY DAY        Power Jeronimo RN

## 2021-07-20 ENCOUNTER — MYC MEDICAL ADVICE (OUTPATIENT)
Dept: FAMILY MEDICINE | Facility: CLINIC | Age: 36
End: 2021-07-20

## 2021-07-20 DIAGNOSIS — F41.9 ANXIETY: ICD-10-CM

## 2021-07-20 DIAGNOSIS — F33.0 MAJOR DEPRESSIVE DISORDER, RECURRENT EPISODE, MILD (H): ICD-10-CM

## 2021-07-20 RX ORDER — FLUOXETINE 40 MG/1
40 CAPSULE ORAL DAILY
Qty: 30 CAPSULE | Refills: 0 | Status: SHIPPED | OUTPATIENT
Start: 2021-07-20 | End: 2021-07-22

## 2021-07-21 ENCOUNTER — MYC MEDICAL ADVICE (OUTPATIENT)
Dept: PHYSICAL MEDICINE AND REHAB | Facility: CLINIC | Age: 36
End: 2021-07-21

## 2021-07-21 ENCOUNTER — TELEPHONE (OUTPATIENT)
Dept: PHYSICAL MEDICINE AND REHAB | Facility: CLINIC | Age: 36
End: 2021-07-21

## 2021-07-21 DIAGNOSIS — M54.6 PAIN IN THORACIC SPINE: Primary | ICD-10-CM

## 2021-07-21 NOTE — TELEPHONE ENCOUNTER
----- Message from Tapan Rose DO sent at 7/20/2021  2:30 PM CDT -----  MRI of the lumbar spine reviewed which confirms the accessory articulation of L5 with the sacrum bilaterally.  There is also some mild degenerative change at L4-5 but no nerve impingement or central stenosis of significance at any level.    If she would like, we can attempt facet injections/steroid injections of the accessory articulations at L5 where she can return for follow-up to review imaging and discuss.

## 2021-07-21 NOTE — TELEPHONE ENCOUNTER
"Patient returned call. Results given and discussed the offered injections. \"I have pain in a lot of areas that I would like to get treatment for.\" Explained that this will happen, but typically the area of worst pain is treated first. She reports her mid back is the worst at this time. Explained this should be reviewed and discussed with her PSP. Recommend she come back to clinic for this. Patient in agreement. Transferred to scheduling to make appointment.    "

## 2021-07-21 NOTE — TELEPHONE ENCOUNTER
Phone call to patient to review results and provider's recommendations. Unable to leave message. Will try again.

## 2021-07-22 ENCOUNTER — VIRTUAL VISIT (OUTPATIENT)
Dept: FAMILY MEDICINE | Facility: CLINIC | Age: 36
End: 2021-07-22
Payer: COMMERCIAL

## 2021-07-22 DIAGNOSIS — F33.0 MAJOR DEPRESSIVE DISORDER, RECURRENT EPISODE, MILD (H): ICD-10-CM

## 2021-07-22 DIAGNOSIS — F41.9 ANXIETY: ICD-10-CM

## 2021-07-22 PROCEDURE — 96127 BRIEF EMOTIONAL/BEHAV ASSMT: CPT | Mod: 59 | Performed by: PHYSICIAN ASSISTANT

## 2021-07-22 PROCEDURE — 99213 OFFICE O/P EST LOW 20 MIN: CPT | Mod: TEL | Performed by: PHYSICIAN ASSISTANT

## 2021-07-22 RX ORDER — FLUOXETINE 40 MG/1
40 CAPSULE ORAL DAILY
Qty: 90 CAPSULE | Refills: 1 | Status: SHIPPED | OUTPATIENT
Start: 2021-07-22 | End: 2023-09-06

## 2021-07-22 RX ORDER — LAMOTRIGINE 25 MG/1
87.5 TABLET ORAL DAILY
COMMUNITY
Start: 2021-07-19

## 2021-07-22 ASSESSMENT — PATIENT HEALTH QUESTIONNAIRE - PHQ9: SUM OF ALL RESPONSES TO PHQ QUESTIONS 1-9: 3

## 2021-07-22 NOTE — TELEPHONE ENCOUNTER
Phone call to patient to discuss the x-ray order. She can either schedule them or get them as a walk-in at hospital. She will walk in at [facility]. No other questions from patient.

## 2021-07-22 NOTE — PROGRESS NOTES
Liz is a 36 year old who is being evaluated via a billable telephone visit.      What phone number would you like to be contacted at? [phone]  How would you like to obtain your AVS? MyChart    Assessment & Plan     Major depressive disorder, recurrent episode, mild (H)  - FLUoxetine (PROZAC) 40 MG capsule; Take 1 capsule (40 mg) by mouth daily  -Patient will follow up per Psychiatry    Anxiety  - FLUoxetine (PROZAC) 40 MG capsule; Take 1 capsule (40 mg) by mouth daily      Return in about 3 months (around 10/22/2021) for Routine preventive.    Solis Hutson PA-C  [facility]    Kostas Hill is a 36 year old who presents for the following health issues  accompanied by her self:    HPI     Depression Followup    How are you doing with your depression since your last visit? Improved     Are you having other symptoms that might be associated with depression? No    Have you had a significant life event?  No     Are you feeling anxious or having panic attacks?   Yes:  sometimes    Do you have any concerns with your use of alcohol or other drugs? No    Social History     Tobacco Use     Smoking status: Never Smoker     Smokeless tobacco: Never Used   Vaping Use     Vaping Use: Never used   Substance Use Topics     Alcohol use: Yes     Alcohol/week: 35.0 standard drinks     Drug use: No     PHQ 5/27/2020 9/29/2020 7/22/2021   PHQ-9 Total Score 14 9 3   Q9: Thoughts of better off dead/self-harm past 2 weeks Not at all Not at all Not at all   Some encounter information is confidential and restricted. Go to Review Flowsheets activity to see all data.     RICKY-7 SCORE 7/5/2017 12/12/2017 3/27/2018   Total Score - 10 (moderate anxiety) -   Total Score 5 10 2   Some encounter information is confidential and restricted. Go to Review Flowsheets activity to see all data.         Suicide Assessment Five-step Evaluation and Treatment (SAFE-T)      How many servings of fruits and vegetables do  you eat daily?  0-1    On average, how many sweetened beverages do you drink each day (Examples: soda, juice, sweet tea, etc.  Do NOT count diet or artificially sweetened beverages)?   0    How many days per week do you exercise enough to make your heart beat faster? 3 or less    How many minutes a day do you exercise enough to make your heart beat faster? 60 or more    How many days per week do you miss taking your medication? 0    Patient notes she is doing well on the addition of Lamictal.  She has psychiatry follow up in 2 months and will continue under their care for now.     Review of Systems   Constitutional, HEENT, cardiovascular, pulmonary, gi and gu systems are negative, except as otherwise noted.      Objective           Vitals:  No vitals were obtained today due to virtual visit.    Physical Exam   healthy, alert and no distress  PSYCH: Alert and oriented times 3; coherent speech, normal   rate and volume, able to articulate logical thoughts, able   to abstract reason, no tangential thoughts, no hallucinations   or delusions  Her affect is normal  RESP: No cough, no audible wheezing, able to talk in full sentences  Remainder of exam unable to be completed due to telephone visits                Phone call duration: 10 minutes

## 2021-07-28 ENCOUNTER — HOSPITAL ENCOUNTER (OUTPATIENT)
Dept: RADIOLOGY | Facility: HOSPITAL | Age: 36
Discharge: HOME | End: 2021-07-28
Attending: PHYSICAL MEDICINE & REHABILITATION | Admitting: PHYSICAL MEDICINE & REHABILITATION
Payer: COMMERCIAL

## 2021-07-28 DIAGNOSIS — M54.6 PAIN IN THORACIC SPINE: ICD-10-CM

## 2021-07-28 PROCEDURE — 72070 X-RAY EXAM THORAC SPINE 2VWS: CPT

## 2021-08-06 PROBLEM — M54.50 LOW BACK PAIN: Status: RESOLVED | Noted: 2021-02-25 | Resolved: 2021-08-06

## 2021-08-06 PROBLEM — M54.2 NECK PAIN: Status: RESOLVED | Noted: 2021-02-25 | Resolved: 2021-08-06

## 2021-08-06 NOTE — ADDENDUM NOTE
Encounter addended by: Mary Anne Ding, PT on: 8/6/2021 7:09 AM   Actions taken: Episode resolved, Problem List modified, Clinical Note Signed

## 2021-08-06 NOTE — PROGRESS NOTES
[facility] Rehabilitation Discharge Summary  Patient Name: Karrie Zhang  Date: 8/6/2021  Referral Diagnosis: mid back pain  Referring provider: Dr. Rose  Visit Diagnosis: Mid back/low back pain      Patient was seen for 12 visits for physical therapy of neck and back pain from 2/25/21 to 7/15/21 with no follow up appointments.   The patient was instructed to follow up with physician's clinic.  Patient will be going to [facility] physical therapy    Therapy will be discontinued at this time.  The patient will need a new referral to resume physical therapy treatment. Please see below for patient's current status.    Thank you for your referral.  Mary Anne Ding, PT, DPT  8/6/2021  7:07 AM

## 2021-08-11 ENCOUNTER — MYC MEDICAL ADVICE (OUTPATIENT)
Dept: RHEUMATOLOGY | Facility: CLINIC | Age: 36
End: 2021-08-11

## 2021-08-11 DIAGNOSIS — M25.50 POLYARTHRALGIA: Primary | ICD-10-CM

## 2021-08-11 RX ORDER — PREDNISONE 2.5 MG/1
7.5 TABLET ORAL DAILY
Qty: 30 TABLET | Refills: 0 | Status: SHIPPED | OUTPATIENT
Start: 2021-08-11 | End: 2021-08-21

## 2021-08-11 NOTE — TELEPHONE ENCOUNTER
Refilled per Rheum RN refill protocol  Per Dr Blount pt can take prednisone 7.5mg dialy for 10 days, keep f/u appt as scheduled. rx sent to pharmacy, replied to [employer] message

## 2021-08-16 ENCOUNTER — MYC MEDICAL ADVICE (OUTPATIENT)
Dept: FAMILY MEDICINE | Facility: CLINIC | Age: 36
End: 2021-08-16

## 2021-08-19 ENCOUNTER — OFFICE VISIT (OUTPATIENT)
Dept: RHEUMATOLOGY | Facility: CLINIC | Age: 36
End: 2021-08-19
Payer: COMMERCIAL

## 2021-08-19 ENCOUNTER — TELEPHONE (OUTPATIENT)
Dept: PHYSICAL MEDICINE AND REHAB | Facility: CLINIC | Age: 36
End: 2021-08-19

## 2021-08-19 VITALS
WEIGHT: 211.2 LBS | SYSTOLIC BLOOD PRESSURE: 110 MMHG | HEART RATE: 84 BPM | BODY MASS INDEX: 33.08 KG/M2 | DIASTOLIC BLOOD PRESSURE: 70 MMHG

## 2021-08-19 DIAGNOSIS — M54.6 PAIN IN THORACIC SPINE: Primary | ICD-10-CM

## 2021-08-19 DIAGNOSIS — M25.50 POLYARTHRALGIA: Primary | ICD-10-CM

## 2021-08-19 DIAGNOSIS — L40.9 PSORIASIS: ICD-10-CM

## 2021-08-19 DIAGNOSIS — M47.812 CERVICAL SPONDYLOSIS: ICD-10-CM

## 2021-08-19 PROCEDURE — 99214 OFFICE O/P EST MOD 30 MIN: CPT | Performed by: INTERNAL MEDICINE

## 2021-08-19 RX ORDER — BUSPIRONE HYDROCHLORIDE 30 MG/1
TABLET ORAL
COMMUNITY
Start: 2021-08-12

## 2021-08-19 NOTE — TELEPHONE ENCOUNTER
Phone call to patient to discuss next steps in care as presented by PSP. Transferred to radiology scheduling to make the MRI appointment. She is aware to call back and make a follow up with PSP for a couple days after the MRI completed. The results of this MRI as well as treatment options will be discussed then.

## 2021-08-19 NOTE — TELEPHONE ENCOUNTER
This encounter would not allow me to send her a [employer] message.  Given that she has ongoing pain and thoracic pain I will order the MRI thoracic spine and please have her return for follow-up with me.

## 2021-08-19 NOTE — PROGRESS NOTES
"      Rheumatology follow-up visit note     Karrie is a 36 year old female presents today for follow-up.    Karrie was seen today for recheck.    Diagnoses and all orders for this visit:    Polyarthralgia    Psoriasis    Cervical spondylosis             As needed     HPI    Karrie Zhang is a 36 year old female is here for follow-up of  evaluation of painful joints.    This is in the background of psoriasis, that she she has in the [facility].  She follows up with dermatology for this.  She was evaluated for painful joints especially in her hands, neck and back, interscapular area.  She was given a course of prednisone that has done little for her at this time around though in the past she recalls that it used to be more effective.  She has had multiple images including that of the C-spine lumbar spine both MRIs and plain x-rays.  Those are reviewed and consistent with quite significant particularly for her age degenerative changes, spinal spondylosis.  She had noted \"swelling\" in her fifth digit.  As noted in the examination findings this is asymmetrical bony enlargement close to the PIP and not only just on the left fifth digit but also on the right where it is not tender.  The likelihood that she has psoriatic arthritis appears to be low.  There is little to suggest enthesitis or dactylitis.  I have given her literature on psoriatic arthritis to review in case she develops new findings going forward and will meet here to further evaluate as of now the key objective would be to control her symptoms such as with duloxetine have we discussed with her psychiatrist, though in the past she has had difficulty tolerating that drug.  She is to follow-up at spine clinic.  She will follow up here on as-needed basis.  Should she develop findings of dactylitis which I explained to her she will take pictures with the opposite hand or foot in the frame.        Following is the excerpt from a previous note: " "  And this is affected her fingers hands, she feels at times these are swollen, worse in the morning, moderately severe, going on for the past several months, in addition he has had low back pain.  She has tried a variety of nonsteroidals without significant help, she has tried muscle relaxant which made her dizzy and lost consciousness.  She has tried topicals and regularly wears IcyHot patches.  She has of several skin issues including psoriasis, rosacea.  Her dermatologist gives her topicals for flareup of psoriasis.  She reports no personal or family history of inflammatory bowel disease, rheumatoid arthritis or lupus.  She does not have significant pain in the lower extremity joints.  She is an , she has never been pregnant no history of miscarriages therefore, negative for DVT PE, seizure disorder, photosensitivity, mouth ulcers, pleuritic symptoms, Raynaud's.  She understands that her primary physician has checked her for \"lupus and rheumatoid arthritis, and the blood tests were negative\".  This was done in [employer] currently we do not have access to those information.    EXAM: MR CERVICAL SPINE WO CONTRAST  LOCATION: [facility]  DATE/TIME: 5/20/2021 5:58 PM     INDICATION: Neck pain with hyper reflexia, eval for stenosis  COMPARISON: None.  TECHNIQUE: MRI Cervical Spine without IV contrast.     FINDINGS: Straightening of the normal cervical lordosis. Normal sagittal alignment. Vertebral body heights are maintained. Nonpathologic marrow signal. No abnormal marrow edema. The visualized posterior fossa structures are within normal limits. No   abnormal cord signal. The posterior paraspinal soft tissues are grossly within normal limits. The flow-voids of the vertebral arteries are present.     C2-C3: Normal disc height. No herniation. Normal facets. No spinal canal or neural foraminal stenosis.      C3-C4: Mild intervertebral disc height loss. Small broad-based " disc bulge. Mild bilateral facet arthropathy. Mild spinal canal narrowing. Mild to moderate right neuroforaminal narrowing. Mild left neuroforaminal narrowing.      C4-C5: Mild intervertebral disc height loss. There is a broad-based disc bulge with superimposed right foraminal disc protrusion. Moderate bilateral facet arthropathy. Mild to moderate spinal canal narrowing. Moderate right neuroforaminal narrowing.   Mild   left neuroforaminal narrowing      C5-C6: Mild intervertebral disc height loss. Broad-based disc bulge. Moderate bilateral facet arthropathy. Moderate spinal canal narrowing. Moderate to severe bilateral neuroforaminal narrowing.      C6-C7: Mild intervertebral disc height loss. Broad-based disc bulge with superimposed small central disc protrusion. Moderate bilateral facet arthropathy. Mild to moderate spinal canal narrowing. Mild right neuroforaminal narrowing. Moderate left   neuroforaminal narrowing.      C7-T1: Normal intervertebral disc height. Small broad-based disc bulge. Mild bilateral facet arthropathy. No spinal canal narrowing. Mild right neuroforaminal narrowing. No left neuroforaminal narrowing.     IMPRESSION:  1.  Moderate spinal canal narrowing at C5-C6.  2.  Mild to moderate spinal canal narrowing at C4-C5 and C6-C7.  3.  Mild spinal canal narrowing at C3-C4.  4.  Moderate to severe bilateral neuroforaminal narrowing at C5-C6.  5.  Moderate right and mild left neuroforaminal narrowing at C4-C5.  6.  Moderate left and mild right neuroforaminal narrowing at C6-C7.       DETAILED EXAMINATION  08/19/21  :    Vitals:    08/19/21 0840   BP: 110/70   Pulse: 84   Weight: 95.8 kg (211 lb 3.2 oz)     Alert oriented. Head including the face is examined for malar rash, heliotropes, scarring, lupus pernio. Eyes examined for redness such as in episcleritis/scleritis, periorbital lesions.   Neck/ Face examined for parotid gland swelling, range of motion of neck.  Left upper and lower and right  upper and lower extremities examined for tenderness, swelling, warmth of the appendicular joints, range of motion, edema, rash.  Some of the important findings included: she does not have evidence of synovitis in the palpable joints of the upper extremities.  No significant deformities of the digits.   No  Heberden nodes.  Range of motion of the shoulders  show full abduction.  No JLT effusion or warmth of the knees.  she does not have dactylitis of the digits.  She does not have dactylitis of the toes.  There is no enthesitis.  She has a bony swelling at the medial aspect of the PA around the PIP of both her fifth digits the one on the left is slightly larger than the right and is marginally tender.  Her nails do not show pitting or onycholysis.     Patient Active Problem List    Diagnosis Date Noted     Alcohol dependence (H) 09/29/2020     Priority: Medium     Acquired hypothyroidism      Priority: Medium     Lump in armpit, left 01/28/2019     Priority: Medium     Rosacea, acne      Priority: Medium     Cervical high risk HPV (human papillomavirus) test positive 06/29/2016     Priority: Medium     2005: cervical laser treatment for dysplasia and cryotherapy.  6/29/16: NIL Pap, + HR HPV (Neg 16/18). Plan cotest in 1 year.   7/5/17 LSIL Pap, + HR HPV (Not 16/18). Plan colp  08/08/17: [place] Bx and ECC normal. Plan cotest in 1 year per provider.   8/22/18 NIL Pap, Neg HPV. Plan cotest in 3 years.        Perioral dermatitis 05/03/2016     Priority: Medium     Mild persistent asthma without complication 05/03/2016     Priority: Medium     Anxiety 05/03/2016     Priority: Medium     Major depressive disorder, recurrent episode, mild (H) 05/03/2016     Priority: Medium     Multiple environmental allergies 05/03/2016     Priority: Medium     Obesity (BMI 30-39.9) 05/03/2016     Priority: Medium     Hay fever 06/30/2015     Priority: Medium     Overview:   Allergic to pollen (trees - especially oak, grasses, weeds), dust,  guinea pigs, apples       Past Surgical History:   Procedure Laterality Date     LASER TX, CERVICAL  2005      Past Medical History:   Diagnosis Date     Acquired hypothyroidism      Alcohol abuse      Allergic rhinitis      Alopecia      Asthma      Cervical dysplasia      Cervical high risk HPV (human papillomavirus) test positive 6/29/16, 7/5/17    neg 16/18     Depression      Depressive disorder      Disease of thyroid gland      H/O colposcopy with cervical biopsy 08/08/2017 08/08/17: [place] Bx and ECC normal.      Papanicolaou smear of cervix with low grade squamous intraepithelial lesion (LGSIL) 07/05/2017     Rosacea, acne      Sleep apnea      Uncomplicated asthma      Allergies   Allergen Reactions     Penicillins Rash     Current Outpatient Medications   Medication Sig Dispense Refill     albuterol (PROAIR HFA/PROVENTIL HFA/VENTOLIN HFA) 108 (90 Base) MCG/ACT inhaler Inhale 2 puffs into the lungs every 6 hours as needed for shortness of breath / dyspnea or wheezing 18 g 3     busPIRone (BUSPAR) 15 MG tablet TAKE 1 TABLET(15 MG) BY MOUTH TWICE DAILY 180 tablet 3     diclofenac (VOLTAREN) 1 % topical gel Apply 4 g topically 4 times daily as needed for moderate pain (mid-lower back) 350 g 2     doxycycline hyclate (VIBRA-TABS) 100 MG tablet TK 1 T PO BID FOR 7 DAYS (Patient not taking: Reported on 7/22/2021)       etonogestrel (NEXPLANON) 68 MG IMPL 1 each (68 mg) by Subdermal route once       FLUoxetine (PROZAC) 40 MG capsule Take 1 capsule (40 mg) by mouth daily 90 capsule 1     fluticasone (FLONASE) 50 MCG/ACT nasal spray        hydrocortisone 2.5 % cream        ipratropium (ATROVENT) 0.03 % nasal spray Spray 2 sprays into both nostrils every 12 hours 1 Box 1     ketoconazole (NIZORAL) 2 % external cream DARLENE EXT AA BID FOR UP TO 14 DAYS       lamoTRIgine (LAMICTAL) 25 MG tablet Take 2 tablets by mouth daily       levothyroxine (SYNTHROID/LEVOTHROID) 75 MCG tablet TAKE 1 TABLET(75 MCG) BY MOUTH DAILY 90  tablet 1     methocarbamol (ROBAXIN) 500 MG tablet Take 1 tablet (500 mg) by mouth 3 times daily as needed for muscle spasms (Patient not taking: Reported on 7/22/2021) 90 tablet 0     montelukast (SINGULAIR) 10 MG tablet Take 1 tablet (10 mg) by mouth At Bedtime 90 tablet 3     naproxen (NAPROSYN) 500 MG tablet Take 1 tablet (500 mg) by mouth 2 times daily (with meals) 30 tablet 2     predniSONE (DELTASONE) 2.5 MG tablet Take 3 tablets (7.5 mg) by mouth daily for 10 days 30 tablet 0     tacrolimus (PROTOPIC) 0.1 % external ointment APPLY TOPICALLY BID ON FACE UTD       family history includes Alcoholism in her brother; Anxiety Disorder in her brother; Cancer in her father; Depression in her brother; Diabetes in her father; Osteoporosis in her mother; Other Cancer in her father; Prostate Cancer in her maternal grandfather and another family member; Substance Abuse in her father.     Social Connections:      Frequency of Communication with Friends and Family:      Frequency of Social Gatherings with Friends and Family:      Attends [employer] Services:      Active Member of Clubs or Organizations:      Attends Club or Organization Meetings:      Marital Status:           WBC   Date Value Ref Range Status   03/27/2018 9.3 4.0 - 11.0 10e9/L Final     RBC Count   Date Value Ref Range Status   03/27/2018 4.34 3.8 - 5.2 10e12/L Final     Hemoglobin   Date Value Ref Range Status   03/27/2018 13.4 11.7 - 15.7 g/dL Final     Hematocrit   Date Value Ref Range Status   03/27/2018 39.2 35.0 - 47.0 % Final     MCV   Date Value Ref Range Status   03/27/2018 90 78 - 100 fl Final     MCH   Date Value Ref Range Status   03/27/2018 30.9 26.5 - 33.0 pg Final     Platelet Count   Date Value Ref Range Status   03/27/2018 280 150 - 450 10e9/L Final     % Lymphocytes   Date Value Ref Range Status   03/27/2018 27.8 % Final     AST   Date Value Ref Range Status   10/03/2020 20 0 - 40 U/L Final   10/03/2020 20 0 - 40 U/L Final     ALT   Date  Value Ref Range Status   10/03/2020 17 0 - 45 U/L Final   10/03/2020 17 0 - 45 U/L Final     Albumin   Date Value Ref Range Status   10/03/2020 3.8 3.5 - 5.0 g/dL Final     Alkaline Phosphatase   Date Value Ref Range Status   10/03/2020 76 45 - 120 U/L Final     Creatinine   Date Value Ref Range Status   10/03/2020 0.77 0.60 - 1.10 mg/dL Final   10/03/2020 0.77 0.60 - 1.10 mg/dL Final     GFR Estimate   Date Value Ref Range Status   10/03/2020 >60 >60 mL/min/1.73m2 Final   10/03/2020 >60 >60 ml/min/1.73m2 Final     GFR Estimate If Black   Date Value Ref Range Status   10/03/2020 >60 >60 mL/min/1.73m2 Final   10/03/2020 >60 >60 ml/min/1.73m2 Final     Erythrocyte Sedimentation Rate   Date Value Ref Range Status   06/08/2021 2 0 - 20 mm/hr Final     CRP   Date Value Ref Range Status   06/08/2021 0.3 0.0 - 0.8 mg/dL Final

## 2021-08-19 NOTE — TELEPHONE ENCOUNTER
"PSP:  Dr. Rose  Last clinic visit: 7/8/21   Reason for call: Trying to give Dr. Rose update on her status; unable to send a message through [employer] as PSP not an option for her.  Clinical information:  Reports she saw Dr. Blount today. \"I don't have SA. He says my next options are cortisone injections which my insurance does not cover OR take Cymbalta. I would prefer the injections due to the side effects on Cymbalta before. I don't want to go down that road again. I want to know if the injections he would order would cover sciatica and how much they would cost.\" Patient does report the \"majority of my pain is in middle of my back. I am now having right sciatica pain though too. It is right sided, mostly in the right buttock and down back of thigh to the knee. There are times when it will go all the way down to bottom of my heel. My rheumatologist has prescrfibed Prednisone. I am on 2nd round of it and it is not working as well as 1st time.\" Chart review shows she was on 10 mg x 21 days with 1st Rx. Now on 7.5 mg daily for 10 days. Has 2 days left. States starts PT in September; \"that's the soonest I could get in.\"   Advice given to patient: PSP will be updated with states and her questions.   Provider to address: Status update. Could send a response to patient with your recommendations through [employer] so you are part of her providers again.    "

## 2021-08-25 ENCOUNTER — HOSPITAL ENCOUNTER (OUTPATIENT)
Dept: MRI IMAGING | Facility: HOSPITAL | Age: 36
Discharge: HOME | End: 2021-08-25
Attending: PHYSICAL MEDICINE & REHABILITATION | Admitting: PHYSICAL MEDICINE & REHABILITATION
Payer: COMMERCIAL

## 2021-08-25 DIAGNOSIS — M54.6 PAIN IN THORACIC SPINE: ICD-10-CM

## 2021-08-25 PROCEDURE — A9585 GADOBUTROL INJECTION: HCPCS | Performed by: PHYSICAL MEDICINE & REHABILITATION

## 2021-08-25 PROCEDURE — 255N000002 HC RX 255 OP 636: Performed by: PHYSICAL MEDICINE & REHABILITATION

## 2021-08-25 PROCEDURE — 72157 MRI CHEST SPINE W/O & W/DYE: CPT

## 2021-08-25 RX ORDER — GADOBUTROL 604.72 MG/ML
9 INJECTION INTRAVENOUS ONCE
Status: COMPLETED | OUTPATIENT
Start: 2021-08-25 | End: 2021-08-25

## 2021-08-25 RX ADMIN — GADOBUTROL 9 ML: 604.72 INJECTION INTRAVENOUS at 16:54

## 2021-08-26 ENCOUNTER — TELEPHONE (OUTPATIENT)
Dept: PHYSICAL MEDICINE AND REHAB | Facility: CLINIC | Age: 36
End: 2021-08-26

## 2021-08-26 DIAGNOSIS — M25.50 POLYARTHRALGIA: ICD-10-CM

## 2021-08-26 DIAGNOSIS — M54.6 PAIN IN THORACIC SPINE: ICD-10-CM

## 2021-08-26 DIAGNOSIS — R29.2 HYPER REFLEXIA: ICD-10-CM

## 2021-08-26 DIAGNOSIS — G95.0 SYRINX OF SPINAL CORD (H): Primary | ICD-10-CM

## 2021-08-26 NOTE — TELEPHONE ENCOUNTER
----- Message from Deborah Weeks CNP sent at 8/26/2021  3:27 PM CDT -----  Please call patient and notify her that I did review her thoracic spine imaging as Dr. Rose is out of the clinic today.  It does show a syrinx which is usually a chronic finding within the spinal cord of the thoracic spine.  With this it would be best for her to be evaluated with a neurosurgeon.  This is not an urgent referral but I did place referral today with [facility] neurosurgery and they will be calling her to schedule a consult.  She can also follow-up as scheduled with Dr. Rose next week but we wanted to get the process started on the neurosurgical referral.Thanks, Deborah

## 2021-08-26 NOTE — TELEPHONE ENCOUNTER
"Phone call to patient to review results and provider's recommendations. Results given and explained. Answered some of her questions. Encouraged her to write further questions down so that they may be addressed by PSP and then neurosurgeon. Contact information provided for [facility].      Asked if she would like to move her follow up with PSP sooner if there was availability. Patient even wondering if she needs to come in \"if he's just going to tell me the same stuff.\" Asked patient if she would prefer a video appointment with PSP to review findings and discuss treatment plan. She would like to do a video visit instead of in person. She will be called back with how PSP wants to see patient. She is aware this will be next week.   "

## 2021-08-31 NOTE — TELEPHONE ENCOUNTER
Call placed to pt with provider's response. Pt stated understanding. Cancelled her appointment with Dr. Rose.     Pt is scheduled with neurosurgery on 9/3. She inquires if she should attend her postural therapy appointment on 9/2 or if she should postpone this until after she sees the surgeon.     Detailed message ok upon call back.

## 2021-08-31 NOTE — TELEPHONE ENCOUNTER
I have also reviewed the MRI of the thoracic spine.  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  I agree that given the location, she should be seen by neurosurgery.  She does not have to return to see me if she does not want to prior to the surgical evaluation.

## 2021-09-03 ENCOUNTER — OFFICE VISIT (OUTPATIENT)
Dept: NEUROSURGERY | Facility: CLINIC | Age: 36
End: 2021-09-03
Payer: COMMERCIAL

## 2021-09-03 VITALS
HEART RATE: 78 BPM | HEIGHT: 67 IN | DIASTOLIC BLOOD PRESSURE: 80 MMHG | WEIGHT: 211 LBS | SYSTOLIC BLOOD PRESSURE: 136 MMHG | BODY MASS INDEX: 33.12 KG/M2 | OXYGEN SATURATION: 98 %

## 2021-09-03 DIAGNOSIS — G95.0 SYRINX OF SPINAL CORD (H): Primary | ICD-10-CM

## 2021-09-03 PROCEDURE — 99203 OFFICE O/P NEW LOW 30 MIN: CPT | Performed by: SURGERY

## 2021-09-03 ASSESSMENT — MIFFLIN-ST. JEOR: SCORE: 1669.72

## 2021-09-03 NOTE — NURSING NOTE
Neurosurgery consultation was requested by: Deborah Weeks CNP  Pain:whole back pain   Radicular Pain is present: sciatic pain down right leg   Lhermitte sign:   Motor complaints: denies weakness    Sensory complaints: denies numbness or tingling   Gait and balance issues: yes   Bowel or bladder issues: occasional   Duration of SX is:had back pain whole life, but has progressed in last year or two  The symptoms are worse with:interchangeable   The symptoms are better with: hot bath   Injury:denies   Severity is: chronic   Patient has tried the following conservative measures: She did two rounds of PT but did not help.   CICI score is:  JAYDEN Beckham
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
## 2021-09-10 ENCOUNTER — VIRTUAL VISIT (OUTPATIENT)
Dept: PHYSICAL MEDICINE AND REHAB | Facility: CLINIC | Age: 36
End: 2021-09-10
Payer: COMMERCIAL

## 2021-09-10 DIAGNOSIS — M54.6 PAIN IN THORACIC SPINE: Primary | ICD-10-CM

## 2021-09-10 DIAGNOSIS — M35.3 POLYMYALGIA (H): ICD-10-CM

## 2021-09-10 DIAGNOSIS — M51.369 DDD (DEGENERATIVE DISC DISEASE), LUMBAR: ICD-10-CM

## 2021-09-10 DIAGNOSIS — G95.0 SYRINX OF SPINAL CORD (H): ICD-10-CM

## 2021-09-10 DIAGNOSIS — M50.30 DDD (DEGENERATIVE DISC DISEASE), CERVICAL: ICD-10-CM

## 2021-09-10 PROCEDURE — 99214 OFFICE O/P EST MOD 30 MIN: CPT | Mod: GT | Performed by: PHYSICAL MEDICINE & REHABILITATION

## 2021-09-10 RX ORDER — GABAPENTIN 100 MG/1
100 CAPSULE ORAL 3 TIMES DAILY
Qty: 90 CAPSULE | Refills: 1 | Status: SHIPPED | OUTPATIENT
Start: 2021-09-10 | End: 2022-01-12

## 2021-09-10 NOTE — LETTER
September 10, 2021      Karrie Zhang  [address]  [place] [address]        To Whom It May Concern:    Karrie Zhang was seen in our clinic.  Please provide ergonomic assessment for workspace.      Sincerely,        Tapan Rose, DO

## 2021-09-10 NOTE — PATIENT INSTRUCTIONS
1. A physical therapy order was provided for you today.  You will be contacted by physical therapy.  If nobody contacts you within 3 to 5 days, please contact the clinic at [phone] to discuss with our nurses. It will be very important for you to do your physical therapy exercises on a regular basis to decrease your pain and prevent future pain flares.    2.  Letter for ergonomic assessment has been provided.    3. A bilateral L4-5 lumbar epidural has been ordered today. Please schedule this injection at least   2 weeks from now to allow time for insurance prior authorization. On the day of your injection, you cannot be sick or taking antibiotics. If you become sick and are prescribed, please call the clinic so your injection can be rescheduled for once you have completed your antibiotics. You will need to bring a  with you for your injection. If you have any questions or concerns prior to your injection, please do not hesitate to call the nurse navigation line at [phone].    4. Prescribed Gabapentin today, 100 mg tablets, to be titrated up to 1 tablet  3 times a day as tolerated for your nerve pain. Please follow Gabapentin dosing chart below.    Gabapentin 100mg Dosing Chart    DATE  MORNING AFTERNOON BEDTIME    Day 1 0 0 1    Day 2 0 0 1    Day 3 0 0 1    Day 4 1 0 1    Day 5 1 0 1    Day 6 1 0 1    Day 7 1 1 1    Day 8 1 1 1    Day 9 1 1 1    Day 10        Day 11       Day 12       Day 13       Day 14       Day 15       Day 16       Day 17       Day 18       Day 19       Day 20       Day 21       Day 22       Day 23       Day 24       Day 25       Day 26       Day 27        Continue medication, taking 1 capsule  three times daily    Please call if you have any questions regarding how to take your medication  Clinic Phone # [phone]

## 2021-09-10 NOTE — LETTER
9/10/2021         RE: Karrie Zhang  [address]  [facility] [address]        Dear Colleague,    Thank you for referring your patient, Karrie Zhang, to the [facility] [place]. Please see a copy of my visit note below.    Karrie Zhang  is a 36 year old  female who is being evaluated via a billable video visit.      How would you like to obtain your AVS? [employer]  If the video visit is dropped, the invitation should be resent by: Other e-mail: Find That File  Will anyone else be joining your video visit? No      Video Start Time: 9:33 AM          Assessment/Plan:      Diagnoses and all orders for this visit:    Pain in thoracic spine  -     gabapentin (NEURONTIN) 100 MG capsule; Take 1 capsule (100 mg) by mouth 3 times daily  -     Physical Therapy Referral; Future    Polymyalgia (H)  -     gabapentin (NEURONTIN) 100 MG capsule; Take 1 capsule (100 mg) by mouth 3 times daily  -     Physical Therapy Referral; Future    Syrinx of spinal cord (H)  -     Physical Therapy Referral; Future    DDD (degenerative disc disease), lumbar  -     Physical Therapy Referral; Future  -     PAIN Transforaminal STEPHANIE Inj Lumbosacral One Level Bilateral; Future    DDD (degenerative disc disease), cervical  -     Physical Therapy Referral; Future         Assessment: Pleasant 36 year old female with a history of thyroid disorder depression, sleep apnea and asthma with:     1.      Persistent cervical spine upper thoracic lumbar spine pain with hyperreflexia in the upper extremities.    She has mild multilevel cervical degenerative disc disease with   moderate facet arthropathy C4-5 through C6-7.  She also has moderate foraminal stenosis most significant C5-6 bilaterally. Her symptoms are consistent with myofascial pain and potential component from facet arthropathy.  No benefit with MedX physical therapy or nabumetone.    2.  15 to 20 years of chronic intermittent thoracic spine pain  thoracolumbar junction and mid thoracic spine consistent with myofascial pain.  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  measuring 7.4 cm in length extending from T6-7 through T9-10.  Following with neurosurgery nonsurgical at this time.Thoracic spinal cord syringomyelia T6-T10.  Quite large 7.4 mm.  Not surgical candidate per  at this time they are monitoring for any increased size and they are going to obtain an MRI of her head to ensure no hydrocephalus.     3.     Persistent chronic lumbar spine pain over the past several years at the lumbosacral junction and SI region.  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  Back pain persist despite MedX  physical therapy she also has L4-5 degenerative disc disease broad-based disc bulge.  Minimal lateral recess stenosis with some newer right lower extremity pain into the gluteal region.     4.  Polymyalgias and arthralgias with erythema and a joint effusion of the left fifth PIP joint today.  Rheumatology work-up negative.  On prednisone trial  without any benefit         Discussion:    1.  I discussed the diagnosis and treatment options.  We discussed the options of surgical second opinion for the syrinx.  I am not sure if she has myofascial pain or if the syrinx is playing into her whole body neuropathic and myofascial pain process.  At this point she is nonsurgical and wants to avoid potential surgery if possible.    2.  She is concerned about the amount of pain that she is in and will try low-dose of neuropathic pain medication and gabapentin 100 mg at bedtime increasing to 3 times daily.  We discussed side effects.    3.  Trial aqua physical therapy at [facility].  Orders placed.    4.  Given the low back pain which is increasing in some new right lower extremity pain into the gluteal region recommend bilateral L4-5 transforaminal epidural steroid injection.    5.  We will provide a letter for her  to have a ergonomic assessment for her desk as well.    6.  Can consider pain clinic evaluation for more options regarding medication management longer-term as I believe her interventional options are limited and she is not surgical at this time.    7.  Follow-up with me 2 to 4 weeks after injection.    39 minutes were spent on the date of the encounter performing chart review, patient visit and documentation in addition to any procedure.    It was our pleasure caring for your patient today, if there any questions or concerns please do not hesitate to contact us.      Subjective:   Patient ID: Karrie Zhang is a 36 year old female.    History of Present Illness: Patient presents for follow-up of cervical thoracic and lumbar spine pain with worsening symptoms or waxing and waning symptoms in a minimum.  Since her last visit she has been seen by neurosurgery to evaluate the large syringomyelia in her thoracic spine.  She was seen by Dr. Mishra.  Note was reviewed and I have spoken with him directly regarding this patient.  Although she has a large syringomyelia, this is of unknown etiology and difficult to know the duration of its presence.  Surgery is not ideal at this time is he is unsure if this is symptomatic and unsure if she would have any improvement in her symptoms after the surgery.  There is also potential risk regarding spinal cord injury with this procedure.  At this time he is going to monitor her syrinx with another MRI and also MRI her head to evaluate for hydrocephalus.    Her pain waxes and wanes and she is having increased low back pain with some new right lower extremity pain into the gluteal region occasionally to the knee.  Pain is an 8/10 at worst 2/10 today 2/10 at best.  Seems to be worse with standing and walking but sitting is the worst in the low back.  This is flared for the couple of weeks without any new injury.  He typically helps as does drinking alcohol.  She wants to avoid  alcohol if possible but if she is going to have this much pain she is going to need some sort of treatment plan or medication.    She also has thoracic pain and cervical spine pain without significant pain in her arms.  She has been working with her cousin who is a occupational therapist with some [employer].  She has failed physical therapy along with MedX.  Had postural restoration therapy plan but it was canceled.  She has questions regarding potential ergonomic assessment if that would be helpful.  Also has seen rheumatology.     Imaging: MRI cervical lumbar and thoracic spine personally reviewed.  MRI thoracic spine shows 7.4 mm syringomyelia from T6-7 through T9-10.    MRI lumbar spineShows normal disc heights throughout the lumbar spine with small broad-based disc bulge L4-5 and mild facet arthropathy mild to moderate no high-grade central stenosis.    MRI cervical spine reveals some mild to moderate spine stenosis at C4-5 and C6-7 with generally moderate foraminal stenosis through those levels bilateral foraminal stenosis     Labs normal/negative including rheumatoid factor, CCP, CRP, ESR, TIFFANIE, celiac screen.      Review of Systems: Pertinent positives: Has headaches.  Pertinent negatives: No numbness, tingling or weakness.  No bowel or bladder incontinence.  No urinary retention.  No fevers, unintentional weight loss, balance changes,    frequent falling, difficulty swallowing, or coordination difficulties.  All others reviewed are negative.           Past Medical History:   Diagnosis Date     Acquired hypothyroidism      Alcohol abuse      Allergic rhinitis      Alopecia      Asthma      Cervical dysplasia      Cervical high risk HPV (human papillomavirus) test positive 6/29/16, 7/5/17    neg 16/18     Depression      Depressive disorder      Disease of thyroid gland      H/O colposcopy with cervical biopsy 08/08/2017 08/08/17: [place] Bx and ECC normal.      Papanicolaou smear of cervix with low grade  squamous intraepithelial lesion (LGSIL) 07/05/2017     Rosacea, acne      Sleep apnea      Uncomplicated asthma        The following portions of the patient's history were reviewed and updated as appropriate: allergies, current medications, past family history, past medical history, past social history, past surgical history and problem list.               Objective:   Physical Exam:    Vitals:  No vitals were taken for this visit    General:  Well-appearing female in no acute distress.  Pleasant,   cooperative, and interactive throughout the examination and interview.  HEENT: Head atraumatic normocephalic, sclera clear.  Skin: No rashes or lesions seen over the face.  Respirations unlabored.       this video visit was spent in discussion regarding her multiple diagnoses and treatment plan and  the exam was deferred.        Video-Visit Details    Type of service:  Video Visit    Video End Time:10:03AM    Originating Location (pt. Location): Home    Distant Location (provider location):  [facility]     Platform used for Video Visit: [employer]               Again, thank you for allowing me to participate in the care of your patient.        Sincerely,        Tapan Rose, DO

## 2021-09-10 NOTE — PROGRESS NOTES
Karrie Zhang  is a 36 year old  female who is being evaluated via a billable video visit.      How would you like to obtain your AVS? [employer]  If the video visit is dropped, the invitation should be resent by: Other e-mail: Tony  Will anyone else be joining your video visit? No      Video Start Time: 9:33 AM          Assessment/Plan:      Diagnoses and all orders for this visit:    Pain in thoracic spine  -     gabapentin (NEURONTIN) 100 MG capsule; Take 1 capsule (100 mg) by mouth 3 times daily  -     Physical Therapy Referral; Future    Polymyalgia (H)  -     gabapentin (NEURONTIN) 100 MG capsule; Take 1 capsule (100 mg) by mouth 3 times daily  -     Physical Therapy Referral; Future    Syrinx of spinal cord (H)  -     Physical Therapy Referral; Future    DDD (degenerative disc disease), lumbar  -     Physical Therapy Referral; Future  -     PAIN Transforaminal STEPHANIE Inj Lumbosacral One Level Bilateral; Future    DDD (degenerative disc disease), cervical  -     Physical Therapy Referral; Future         Assessment: Pleasant 36 year old female with a history of thyroid disorder depression, sleep apnea and asthma with:     1.      Persistent cervical spine upper thoracic lumbar spine pain with hyperreflexia in the upper extremities.    She has mild multilevel cervical degenerative disc disease with   moderate facet arthropathy C4-5 through C6-7.  She also has moderate foraminal stenosis most significant C5-6 bilaterally. Her symptoms are consistent with myofascial pain and potential component from facet arthropathy.  No benefit with MedX physical therapy or nabumetone.    2.  15 to 20 years of chronic intermittent thoracic spine pain thoracolumbar junction and mid thoracic spine consistent with myofascial pain.  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  measuring 7.4 cm in length extending from T6-7 through T9-10.  Following with neurosurgery nonsurgical at this time.Thoracic spinal cord syringomyelia T6-T10.   Quite large 7.4 mm.  Not surgical candidate per  at this time they are monitoring for any increased size and they are going to obtain an MRI of her head to ensure no hydrocephalus.     3.     Persistent chronic lumbar spine pain over the past several years at the lumbosacral junction and SI region.  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  Back pain persist despite MedX  physical therapy she also has L4-5 degenerative disc disease broad-based disc bulge.  Minimal lateral recess stenosis with some newer right lower extremity pain into the gluteal region.     4.  Polymyalgias and arthralgias with erythema and a joint effusion of the left fifth PIP joint today.  Rheumatology work-up negative.  On prednisone trial  without any benefit         Discussion:    1.  I discussed the diagnosis and treatment options.  We discussed the options of surgical second opinion for the syrinx.  I am not sure if she has myofascial pain or if the syrinx is playing into her whole body neuropathic and myofascial pain process.  At this point she is nonsurgical and wants to avoid potential surgery if possible.    2.  She is concerned about the amount of pain that she is in and will try low-dose of neuropathic pain medication and gabapentin 100 mg at bedtime increasing to 3 times daily.  We discussed side effects.    3.  Trial [facility] therapy at [facility].  Orders placed.    4.  Given the low back pain which is increasing in some new right lower extremity pain into the gluteal region recommend bilateral L4-5 transforaminal epidural steroid injection.    5.  We will provide a letter for her to have a ergonomic assessment for her desk as well.    6.  Can consider pain clinic evaluation for more options regarding medication management longer-term as I believe her interventional options are limited and she is not surgical at this time.    7.  Follow-up with me 2 to 4  weeks after injection.    39 minutes were spent on the date of the encounter performing chart review, patient visit and documentation in addition to any procedure.    It was our pleasure caring for your patient today, if there any questions or concerns please do not hesitate to contact us.      Subjective:   Patient ID: Karrie Zhang is a 36 year old female.    History of Present Illness: Patient presents for follow-up of cervical thoracic and lumbar spine pain with worsening symptoms or waxing and waning symptoms in a minimum.  Since her last visit she has been seen by neurosurgery to evaluate the large syringomyelia in her thoracic spine.  She was seen by Dr. Mishra.  Note was reviewed and I have spoken with him directly regarding this patient.  Although she has a large syringomyelia, this is of unknown etiology and difficult to know the duration of its presence.  Surgery is not ideal at this time is he is unsure if this is symptomatic and unsure if she would have any improvement in her symptoms after the surgery.  There is also potential risk regarding spinal cord injury with this procedure.  At this time he is going to monitor her syrinx with another MRI and also MRI her head to evaluate for hydrocephalus.    Her pain waxes and wanes and she is having increased low back pain with some new right lower extremity pain into the gluteal region occasionally to the knee.  Pain is an 8/10 at worst 2/10 today 2/10 at best.  Seems to be worse with standing and walking but sitting is the worst in the low back.  This is flared for the couple of weeks without any new injury.  He typically helps as does drinking alcohol.  She wants to avoid alcohol if possible but if she is going to have this much pain she is going to need some sort of treatment plan or medication.    She also has thoracic pain and cervical spine pain without significant pain in her arms.  She has been working with her cousin who is a occupational  therapist with some [employer].  She has failed physical therapy along with MedX.  Had postural restoration therapy plan but it was canceled.  She has questions regarding potential ergonomic assessment if that would be helpful.  Also has seen rheumatology.     Imaging: MRI cervical lumbar and thoracic spine personally reviewed.  MRI thoracic spine shows 7.4 mm syringomyelia from T6-7 through T9-10.    MRI lumbar spineShows normal disc heights throughout the lumbar spine with small broad-based disc bulge L4-5 and mild facet arthropathy mild to moderate no high-grade central stenosis.    MRI cervical spine reveals some mild to moderate spine stenosis at C4-5 and C6-7 with generally moderate foraminal stenosis through those levels bilateral foraminal stenosis     Labs normal/negative including rheumatoid factor, CCP, CRP, ESR, TIFFANIE, celiac screen.      Review of Systems: Pertinent positives: Has headaches.  Pertinent negatives: No numbness, tingling or weakness.  No bowel or bladder incontinence.  No urinary retention.  No fevers, unintentional weight loss, balance changes,    frequent falling, difficulty swallowing, or coordination difficulties.  All others reviewed are negative.           Past Medical History:   Diagnosis Date     Acquired hypothyroidism      Alcohol abuse      Allergic rhinitis      Alopecia      Asthma      Cervical dysplasia      Cervical high risk HPV (human papillomavirus) test positive 6/29/16, 7/5/17    neg 16/18     Depression      Depressive disorder      Disease of thyroid gland      H/O colposcopy with cervical biopsy 08/08/2017 08/08/17: [place] Bx and ECC normal.      Papanicolaou smear of cervix with low grade squamous intraepithelial lesion (LGSIL) 07/05/2017     Rosacea, acne      Sleep apnea      Uncomplicated asthma        The following portions of the patient's history were reviewed and updated as appropriate: allergies, current medications, past family history, past medical  history, past social history, past surgical history and problem list.               Objective:   Physical Exam:    Vitals:  No vitals were taken for this visit    General:  Well-appearing female in no acute distress.  Pleasant,   cooperative, and interactive throughout the examination and interview.  HEENT: Head atraumatic normocephalic, sclera clear.  Skin: No rashes or lesions seen over the face.  Respirations unlabored.       this video visit was spent in discussion regarding her multiple diagnoses and treatment plan and  the exam was deferred.        Video-Visit Details    Type of service:  Video Visit    Video End Time:10:03AM    Originating Location (pt. Location): Home    Distant Location (provider location):  [facility]     Platform used for Video Visit: Randy

## 2021-09-17 ENCOUNTER — HOSPITAL ENCOUNTER (OUTPATIENT)
Dept: MRI IMAGING | Facility: HOSPITAL | Age: 36
End: 2021-09-17
Attending: SURGERY
Payer: COMMERCIAL

## 2021-09-17 DIAGNOSIS — G95.0 SYRINX OF SPINAL CORD (H): ICD-10-CM

## 2021-09-17 PROCEDURE — 70553 MRI BRAIN STEM W/O & W/DYE: CPT

## 2021-09-17 PROCEDURE — A9585 GADOBUTROL INJECTION: HCPCS | Performed by: SURGERY

## 2021-09-17 PROCEDURE — 255N000002 HC RX 255 OP 636: Performed by: SURGERY

## 2021-09-17 PROCEDURE — 72157 MRI CHEST SPINE W/O & W/DYE: CPT

## 2021-09-17 RX ORDER — GADOBUTROL 604.72 MG/ML
10 INJECTION INTRAVENOUS ONCE
Status: COMPLETED | OUTPATIENT
Start: 2021-09-17 | End: 2021-09-17

## 2021-09-17 RX ADMIN — GADOBUTROL 10 ML: 604.72 INJECTION INTRAVENOUS at 16:04

## 2021-09-20 ENCOUNTER — MYC MEDICAL ADVICE (OUTPATIENT)
Dept: PHYSICAL MEDICINE AND REHAB | Facility: CLINIC | Age: 36
End: 2021-09-20

## 2021-09-20 ENCOUNTER — TELEPHONE (OUTPATIENT)
Dept: PHYSICAL MEDICINE AND REHAB | Facility: CLINIC | Age: 36
End: 2021-09-20

## 2021-09-20 DIAGNOSIS — G89.29 OTHER CHRONIC PAIN: Primary | ICD-10-CM

## 2021-09-24 ENCOUNTER — OFFICE VISIT (OUTPATIENT)
Dept: NEUROSURGERY | Facility: CLINIC | Age: 36
End: 2021-09-24
Payer: COMMERCIAL

## 2021-09-24 VITALS
HEART RATE: 68 BPM | OXYGEN SATURATION: 97 % | HEIGHT: 67 IN | WEIGHT: 211 LBS | DIASTOLIC BLOOD PRESSURE: 61 MMHG | BODY MASS INDEX: 33.12 KG/M2 | SYSTOLIC BLOOD PRESSURE: 117 MMHG

## 2021-09-24 DIAGNOSIS — G95.0 SYRINX (H): Primary | ICD-10-CM

## 2021-09-24 PROCEDURE — 99213 OFFICE O/P EST LOW 20 MIN: CPT | Performed by: SURGERY

## 2021-09-24 ASSESSMENT — MIFFLIN-ST. JEOR: SCORE: 1669.72

## 2021-09-24 NOTE — NURSING NOTE
Patient is here to discuss results of scans. She states symptoms are unchanged since last seen.   JAYDEN Beckham

## 2021-09-24 NOTE — LETTER
9/24/2021         RE: Karrie Zhang  [address]  [facility] [address]        Dear Colleague,    Thank you for referring your patient, Karrie Zhang, to the [facility]. Please see a copy of my visit note below.    The patient's head MRI does not show hydrocephalus or Chiari malformation.  She does have vascular disease.  Plan referral to a neurologist to address that issue plus this pain issue.  Her thoracic MRI is unchanged.  Plan follow-up MRI in 6 months with contrast to be sure there is no tumor.  She is being seen at the spine center.  We could consider referral to the pain clinic.  For now the plan is no surgery. The patient and her knowledgeable cousin agree.  Total time 10 minutes, more than 50% spent counseling and/or coordinating care.      Again, thank you for allowing me to participate in the care of your patient.        Sincerely,        Kehinde Mishra MD

## 2021-09-24 NOTE — PROGRESS NOTES
The patient's head MRI does not show hydrocephalus or Chiari malformation.  She does have vascular disease.  Plan referral to a neurologist to address that issue plus this pain issue.  Her thoracic MRI is unchanged.  Plan follow-up MRI in 6 months with contrast to be sure there is no tumor.  She is being seen at the spine center.  We could consider referral to the pain clinic.  For now the plan is no surgery. The patient and her knowledgeable cousin agree.  Total time 10 minutes, more than 50% spent counseling and/or coordinating care.

## 2021-09-27 ENCOUNTER — ANCILLARY PROCEDURE (OUTPATIENT)
Dept: PHYSICAL MEDICINE AND REHAB | Facility: CLINIC | Age: 36
End: 2021-09-27
Attending: PHYSICAL MEDICINE & REHABILITATION
Payer: COMMERCIAL

## 2021-09-27 VITALS
TEMPERATURE: 98.7 F | HEART RATE: 64 BPM | SYSTOLIC BLOOD PRESSURE: 114 MMHG | OXYGEN SATURATION: 97 % | DIASTOLIC BLOOD PRESSURE: 82 MMHG

## 2021-09-27 DIAGNOSIS — M51.369 DDD (DEGENERATIVE DISC DISEASE), LUMBAR: ICD-10-CM

## 2021-09-27 PROCEDURE — 64483 NJX AA&/STRD TFRM EPI L/S 1: CPT | Mod: 50 | Performed by: PAIN MEDICINE

## 2021-09-27 RX ORDER — DEXAMETHASONE SODIUM PHOSPHATE 10 MG/ML
INJECTION, SOLUTION INTRAMUSCULAR; INTRAVENOUS
Status: COMPLETED | OUTPATIENT
Start: 2021-09-27 | End: 2021-09-27

## 2021-09-27 RX ORDER — LIDOCAINE HYDROCHLORIDE 10 MG/ML
INJECTION, SOLUTION EPIDURAL; INFILTRATION; INTRACAUDAL; PERINEURAL
Status: COMPLETED | OUTPATIENT
Start: 2021-09-27 | End: 2021-09-27

## 2021-09-27 RX ADMIN — DEXAMETHASONE SODIUM PHOSPHATE 20 MG: 10 INJECTION, SOLUTION INTRAMUSCULAR; INTRAVENOUS at 15:35

## 2021-09-27 RX ADMIN — LIDOCAINE HYDROCHLORIDE 1 ML: 10 INJECTION, SOLUTION EPIDURAL; INFILTRATION; INTRACAUDAL; PERINEURAL at 15:34

## 2021-09-27 ASSESSMENT — PAIN SCALES - GENERAL
PAINLEVEL: MILD PAIN (2)
PAINLEVEL: MILD PAIN (2)

## 2021-09-27 NOTE — PATIENT INSTRUCTIONS
DISCHARGE INSTRUCTIONS    During office hours (8:00 a.m.- 4:00 p.m.) questions or concerns may be answered  by calling Spine Center Navigation Nurses at  [phone].  Messages received after hours will be returned the following business day.      In the case of an emergency, please dial 911 or seek assistance at the nearest Emergency Room/Urgent Care facility.     All Patients:  ? You may experience an increase in your symptoms for the first 2 days (It may take anywhere between 2 days- 2 weeks for the steroid to have maximum effect).    ? You may use ice on the injection site, as frequently as 20 minutes each hour if needed.    ? You may take your pain medicine.    ? You may continue taking your regular medication.    ? You may shower. No swimming, tub bath or hot tub for 48 hours.  You may remove your bandaid/bandage as soon as you are home.    ? You may resume light activities, as tolerated.    ? Resume your usual diet as tolerated.    ? It is strongly advised that you do not drive for 1-3 hours post injection.    ? If you have had oral sedation:  Do not drive for 8 hours post injection.      ? If you have had IV sedation:  Do not drive for 24 hours post injection.  Do not operate hazardous machinery or make important personal/business decisions for 24 hours.    POSSIBLE STEROID SIDE EFFECTS (If steroid/cortisone was used for your procedure)    -If you experience these symptoms, it should only last for a short period      Swelling of the legs                Skin redness (flushing)       Mouth (oral) irritation     Blood sugar (glucose) levels              Sweats                     Mood changes    Headache    Weakened immune system for up to 14 days, which could increase the risk of koby the COVID-19 virus and/or experiencing more severe symptoms of the disease, if exposed.         POSSIBLE PROCEDURE SIDE EFFECTS    -Call the Spine Center if you are concerned      Increased Pain             Increased  numbness/tingling        Nausea/Vomiting            Bruising/bleeding at site        Redness or swelling                                                Difficulty walking        Weakness            Fever greater than 100.5    *In the event of a severe headache after an epidural steroid injection that is relieved by lying down, please call the [facility] to speak with a clinical staff member*

## 2021-10-09 ENCOUNTER — HEALTH MAINTENANCE LETTER (OUTPATIENT)
Age: 36
End: 2021-10-09

## 2021-10-27 ENCOUNTER — VIRTUAL VISIT (OUTPATIENT)
Dept: PHYSICAL MEDICINE AND REHAB | Facility: CLINIC | Age: 36
End: 2021-10-27
Payer: COMMERCIAL

## 2021-10-27 DIAGNOSIS — G95.0 SYRINX OF SPINAL CORD (H): ICD-10-CM

## 2021-10-27 DIAGNOSIS — M54.50 LUMBAR SPINE PAIN: Primary | ICD-10-CM

## 2021-10-27 DIAGNOSIS — M51.369 DDD (DEGENERATIVE DISC DISEASE), LUMBAR: ICD-10-CM

## 2021-10-27 DIAGNOSIS — G89.29 OTHER CHRONIC PAIN: ICD-10-CM

## 2021-10-27 PROCEDURE — 99213 OFFICE O/P EST LOW 20 MIN: CPT | Mod: GT | Performed by: PHYSICAL MEDICINE & REHABILITATION

## 2021-10-27 ASSESSMENT — PAIN SCALES - GENERAL: PAINLEVEL: NO PAIN (1)

## 2021-10-27 NOTE — PROGRESS NOTES
Karrie Zhang  is a 36 year old  female who is being evaluated via a billable video visit.      How would you like to obtain your AVS? MyChart  If the video visit is dropped, the invitation should be resent by: Text to cell phone: [phone]  Will anyone else be joining your video visit? No      Video Start Time: 11:33 AM        Assessment/Plan:      Karrie was seen today for back pain.    Diagnoses and all orders for this visit:    Lumbar spine pain    DDD (degenerative disc disease), lumbar    Syrinx of spinal cord (H)    Other chronic pain         Assessment: Pleasant 36 year old female with a history of thyroid disorder depression, sleep apnea and asthma with:     1.  Improved chronic lumbar spine pain over the past several years at the lumbosacral junction and SI region.  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  Had persistent pain persist despite [facility]  physical therapy she also has L4-5 degenerative disc disease broad-based disc bulge.  Minimal lateral recess stenosis with some newer right lower extremity pain into the gluteal region.  Pain is up to 70% improved following bilateral L4-5 transforaminal epidural steroid injection.      2.  15 to 20 years of chronic intermittent thoracic spine pain thoracolumbar junction and mid thoracic spine consistent with myofascial pain.  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  measuring 7.4 cm in length extending from T6-7 through T9-10.  Following with neurosurgery nonsurgical at this time.Thoracic spinal cord syringomyelia T6-T10.  Quite large 7.4 mm.  Not surgical candidate per  at this time they are monitoring for any increased size and they are going to obtain an MRI of her head to ensure no hydrocephalus.  Monitoring.     3.     Persistent cervical spine upper thoracic lumbar spine pain with hyperreflexia in the upper extremities.    She has mild multilevel cervical degenerative disc disease  with moderate facet arthropathy C4-5 through C6-7.  She also has moderate foraminal stenosis most significant C5-6 bilaterally. Her symptoms are consistent with myofascial pain and potential component from facet arthropathy.  No benefit with MedX physical therapy or nabumetone.     4.  Polymyalgias and arthralgias with erythema and a joint effusion of the left fifth PIP joint today.  Rheumatology work-up negative.  prednisone trial  without any benefit          Discussion:    1.  She continues to have whole body pain but overall her low back pain is much improved and she is off all medications.  We discussed options of restarting physical therapy aqua therapy or postural restoration.  We also discussed the options of continuing with plan to see pain clinic.  I am not sure whether injections which can be done at this time and the last injection was fairly painful for her and she does not want any injections at this time.    2.  Continue plan to see pain clinic for their opinion regarding her diffuse pain for pain management.    3.  I would encourage her to consider aqua therapy versus postural restoration therapy but at this time she is not interested in further therapy and will monitor.    4.  Follow-up with me as needed.      It was our pleasure caring for your patient today, if there any questions or concerns please do not hesitate to contact us.    Over 20 minutes were spent on the date of the encounter performing chart review, patient visit and documentation in addition to any procedure.    Subjective:   Patient ID: Karrie Zhang is a 36 year old female.    History of Present Illness: Patient presents for follow-up of lumbar spine pain thoracic spine pain cervical spine pain.  Underwent lumbar epidural steroid injection bilateral L4-5.  Injection is very painful for her during the procedure and had some pain down the leg but overall she is improved.  70% improvement following lumbar epidural.  Pain is a 2/10  today.  Still some pain on the right side of the low back with prolonged standing along with inactivity or standing on hard surfaces.  Better with changing position and moving around.  Does get some pain down the back of the leg to the knee at times on the right.    She also continues to have thoracic spine pain and cervical spine pain.  Has been seen by neurology for second opinion regarding her MRI of the brain.  Was seen by Dr. Telles who said these were small vessel changes and to keep a healthy lifestyle.    Has pain clinic consultation pending in December.  Has not yet started water therapy or postural restoration.  No current pain meds.      Imaging: Thoracic spine and lumbar spine MRIs personally reviewed.  Thoracic spine shows the 7.4 cm T6-T10 syrinx.  Lumbar spine MRI shows degenerative changes L4-5L 3-4 with transitional L5 segment.  Broad-based disc bulge L4-5 some mild lateral recess stenosis.  And mild to moderate bilateral foraminal stenosis    Review of Systems: Pertinent positives: She gets headaches.  Pertinent negatives: No numbness, tingling or weakness.  No bowel or bladder incontinence.  No urinary retention.  No fevers, unintentional weight loss, balance changes , frequent falling, difficulty swallowing, or coordination difficulties.  All others reviewed are negative.    Prior interventions:  1.  Bilateral L4-5 transforaminal epidural steroid injection Dr. Watts.    Past Medical History:   Diagnosis Date     Acquired hypothyroidism      Alcohol abuse      Allergic rhinitis      Alopecia      Asthma      Cervical dysplasia      Cervical high risk HPV (human papillomavirus) test positive 6/29/16, 7/5/17    neg 16/18     Depression      Depressive disorder      Disease of thyroid gland      H/O colposcopy with cervical biopsy 08/08/2017 08/08/17: [place] Bx and ECC normal.      Papanicolaou smear of cervix with low grade squamous intraepithelial lesion (LGSIL) 07/05/2017     Rosacea, acne       Sleep apnea      Uncomplicated asthma        The following portions of the patient's history were reviewed and updated as appropriate: allergies, current medications, past family history, past medical history, past social history, past surgical history and problem list.               Objective:   Physical Exam:    Vitals:  No vitals were taken for this visit    General:  Well-appearing female in no acute distress.  Pleasant,   cooperative, and interactive throughout the examination and interview.  HEENT: Head atraumatic normocephalic, sclera clear.  Skin: No rashes or lesions seen over the face.  Respirations unlabored.  MSK: Gait is nonantalgic.  Able to heel-toe walk without difficulty.    Range of motion: Appears to have full range of motion of the lumbar spine in flexion   Neurologic exam: Mental status: Patient is alert and oriented with normal affect.  Attention, knowledge, memory, and language are intact.  Normal coordination throughout the examination.     Manual muscle testing :  .  Appears to have at least antigravity strength in the bilateral ankle dorsiflexors, EHL and ankle plantar flexors.          Video-Visit Details    Type of service:  Video Visit    Video End Time:11:47 AM    Originating Location (pt. Location): Home    Distant Location (provider location):  [facility] [place]     Platform used for Video Visit: [employer]Well

## 2021-10-27 NOTE — LETTER
10/27/2021         RE: Karrie Zhang  [address]  [facility] [address]        Dear Colleague,    Thank you for referring your patient, Karrie Zhang, to the [facility] [place]. Please see a copy of my visit note below.    Karrie Zhang  is a 36 year old  female who is being evaluated via a billable video visit.      How would you like to obtain your AVS? MyChart  If the video visit is dropped, the invitation should be resent by: Text to cell phone: [phone]  Will anyone else be joining your video visit? No      Video Start Time: 11:33 AM        Assessment/Plan:      Karrie was seen today for back pain.    Diagnoses and all orders for this visit:    Lumbar spine pain    DDD (degenerative disc disease), lumbar    Syrinx of spinal cord (H)    Other chronic pain         Assessment: Pleasant 36 year old female with a history of thyroid disorder depression, sleep apnea and asthma with:     1.  Improved chronic lumbar spine pain over the past several years at the lumbosacral junction and SI region.  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  Had persistent pain persist despite [facility]  physical therapy she also has L4-5 degenerative disc disease broad-based disc bulge.  Minimal lateral recess stenosis with some newer right lower extremity pain into the gluteal region.  Pain is up to 70% improved following bilateral L4-5 transforaminal epidural steroid injection.      2.  15 to 20 years of chronic intermittent thoracic spine pain thoracolumbar junction and mid thoracic spine consistent with myofascial pain.  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  measuring 7.4 cm in length extending from T6-7 through T9-10.  Following with neurosurgery nonsurgical at this time.Thoracic spinal cord syringomyelia T6-T10.  Quite large 7.4 mm.  Not surgical candidate per  at this time they are monitoring for any  increased size and they are going to obtain an MRI of her head to ensure no hydrocephalus.  Monitoring.     3.     Persistent cervical spine upper thoracic lumbar spine pain with hyperreflexia in the upper extremities.    She has mild multilevel cervical degenerative disc disease with moderate facet arthropathy C4-5 through C6-7.  She also has moderate foraminal stenosis most significant C5-6 bilaterally. Her symptoms are consistent with myofascial pain and potential component from facet arthropathy.  No benefit with MedX physical therapy or nabumetone.     4.  Polymyalgias and arthralgias with erythema and a joint effusion of the left fifth PIP joint today.  Rheumatology work-up negative.  prednisone trial  without any benefit          Discussion:    1.  She continues to have whole body pain but overall her low back pain is much improved and she is off all medications.  We discussed options of restarting physical therapy aqua therapy or postural restoration.  We also discussed the options of continuing with plan to see pain clinic.  I am not sure whether injections which can be done at this time and the last injection was fairly painful for her and she does not want any injections at this time.    2.  Continue plan to see pain clinic for their opinion regarding her diffuse pain for pain management.    3.  I would encourage her to consider aqua therapy versus postural restoration therapy but at this time she is not interested in further therapy and will monitor.    4.  Follow-up with me as needed.      It was our pleasure caring for your patient today, if there any questions or concerns please do not hesitate to contact us.    Over 20 minutes were spent on the date of the encounter performing chart review, patient visit and documentation in addition to any procedure.    Subjective:   Patient ID: Karrie Zhang is a 36 year old female.    History of Present Illness: Patient presents for follow-up of lumbar spine  pain thoracic spine pain cervical spine pain.  Underwent lumbar epidural steroid injection bilateral L4-5.  Injection is very painful for her during the procedure and had some pain down the leg but overall she is improved.  70% improvement following lumbar epidural.  Pain is a 2/10 today.  Still some pain on the right side of the low back with prolonged standing along with inactivity or standing on hard surfaces.  Better with changing position and moving around.  Does get some pain down the back of the leg to the knee at times on the right.    She also continues to have thoracic spine pain and cervical spine pain.  Has been seen by neurology for second opinion regarding her MRI of the brain.  Was seen by Dr. Telles who said these were small vessel changes and to keep a healthy lifestyle.    Has pain clinic consultation pending in December.  Has not yet started water therapy or postural restoration.  No current pain meds.      Imaging: Thoracic spine and lumbar spine MRIs personally reviewed.  Thoracic spine shows the 7.4 cm T6-T10 syrinx.  Lumbar spine MRI shows degenerative changes L4-5L 3-4 with transitional L5 segment.  Broad-based disc bulge L4-5 some mild lateral recess stenosis.  And mild to moderate bilateral foraminal stenosis    Review of Systems: Pertinent positives: She gets headaches.  Pertinent negatives: No numbness, tingling or weakness.  No bowel or bladder incontinence.  No urinary retention.  No fevers, unintentional weight loss, balance changes , frequent falling, difficulty swallowing, or coordination difficulties.  All others reviewed are negative.    Prior interventions:  1.  Bilateral L4-5 transforaminal epidural steroid injection Dr. Watts.    Past Medical History:   Diagnosis Date     Acquired hypothyroidism      Alcohol abuse      Allergic rhinitis      Alopecia      Asthma      Cervical dysplasia      Cervical high risk HPV (human papillomavirus) test positive 6/29/16, 7/5/17    neg  16/18     Depression      Depressive disorder      Disease of thyroid gland      H/O colposcopy with cervical biopsy 08/08/2017 08/08/17: [place] Bx and ECC normal.      Papanicolaou smear of cervix with low grade squamous intraepithelial lesion (LGSIL) 07/05/2017     Rosacea, acne      Sleep apnea      Uncomplicated asthma        The following portions of the patient's history were reviewed and updated as appropriate: allergies, current medications, past family history, past medical history, past social history, past surgical history and problem list.               Objective:   Physical Exam:    Vitals:  No vitals were taken for this visit    General:  Well-appearing female in no acute distress.  Pleasant,   cooperative, and interactive throughout the examination and interview.  HEENT: Head atraumatic normocephalic, sclera clear.  Skin: No rashes or lesions seen over the face.  Respirations unlabored.  MSK: Gait is nonantalgic.  Able to heel-toe walk without difficulty.    Range of motion: Appears to have full range of motion of the lumbar spine in flexion   Neurologic exam: Mental status: Patient is alert and oriented with normal affect.  Attention, knowledge, memory, and language are intact.  Normal coordination throughout the examination.     Manual muscle testing :  .  Appears to have at least antigravity strength in the bilateral ankle dorsiflexors, EHL and ankle plantar flexors.          Video-Visit Details    Type of service:  Video Visit    Video End Time:11:47 AM    Originating Location (pt. Location): Home    Distant Location (provider location):  [facility]     Platform used for Video Visit: AmWell               Again, thank you for allowing me to participate in the care of your patient.        Sincerely,        Tapan Rose DO

## 2021-10-27 NOTE — PATIENT INSTRUCTIONS
1.  Continue to be as active as possible and monitor your lumbar spine pain.    2.  Continue with the plan to see pain clinic for an opinion.    3.  Follow-up with me as needed.

## 2021-11-08 ENCOUNTER — VIRTUAL VISIT (OUTPATIENT)
Dept: FAMILY MEDICINE | Facility: CLINIC | Age: 36
End: 2021-11-08
Payer: COMMERCIAL

## 2021-11-08 DIAGNOSIS — F10.20 UNCOMPLICATED ALCOHOL DEPENDENCE (H): Primary | ICD-10-CM

## 2021-11-08 DIAGNOSIS — F33.0 MAJOR DEPRESSIVE DISORDER, RECURRENT EPISODE, MILD (H): ICD-10-CM

## 2021-11-08 PROBLEM — R22.32 LUMP IN ARMPIT, LEFT: Status: RESOLVED | Noted: 2019-01-28 | Resolved: 2021-11-08

## 2021-11-08 PROBLEM — G95.0 SYRINX OF SPINAL CORD (H): Status: ACTIVE | Noted: 2021-11-08

## 2021-11-08 PROCEDURE — 99214 OFFICE O/P EST MOD 30 MIN: CPT | Mod: GT | Performed by: FAMILY MEDICINE

## 2021-11-08 NOTE — PROGRESS NOTES
"Liz is a 36 year old who is being evaluated via a billable video visit.      How would you like to obtain your AVS? MyChart  If the video visit is dropped, the invitation should be resent by: Text to cell phone: [phone]  Will anyone else be joining your video visit? No      Video Start Time: 5:47 pm    Assessment & Plan     Uncomplicated alcohol dependence (H)  Major depressive disorder, recurrent episode, mild (H)  -under care of psychiatrist Olya Corley at [facility]  -variable results with past therapy trials   -she is scheduled for intake at an outside Addiction recovery program this week, and I've applauded this great step                BMI:   Estimated body mass index is 33.05 kg/m  as calculated from the following:    Height as of 9/24/21: 1.702 m (5' 7\").    Weight as of 9/24/21: 95.7 kg (211 lb).   Weight management plan: Patient was referred to their PCP to discuss a diet and exercise plan.    See Patient Instructions    Return in about 1 month (around 12/8/2021) for physical.    Vanessa Granados MD  [facility]    Subjective   Liz is a 36 year old who presents for the following health issues     HPI     Concern - Alcohol withdrawl  Onset: Has not quit drinking at this time  Description: shakes, irritable, insomnia, sweating  Intensity: moderate, severe  Progression of Symptoms:  same  Accompanying Signs & Symptoms:   Previous history of similar problem:   Precipitating factors:        Worsened by:   Alleviating factors:        Improved by:   Therapies tried and outcome:  none         Review of Systems         Objective           Vitals:  No vitals were obtained today due to virtual visit.    Physical Exam   GENERAL: alert, no distress and obese  EYES: Eyes grossly normal to inspection.  No discharge or erythema, or obvious scleral/conjunctival abnormalities.  RESP: No audible wheeze, cough, or visible cyanosis.  No visible retractions or increased work of " breathing.    PSYCH: affect flat, judgement and insight intact and appearance well groomed    Ambulatory - [facility] on 06/08/2021   Component Date Value Ref Range Status     Antinuclear Antibodies (TIFFANIE) 06/08/2021 0.7  <=2.9 U Final     Deamidated Gliadin Antibody IgA 06/08/2021 0.3  0.0-<7.0 U/mL Final     Deamidated Gliadin Antibody IgG 06/08/2021 <0.4  0.0-<7.0 U/mL Final     Tissue Transglutaminase Antibody I* 06/08/2021 <0.6  0.0-<7.0 U/mL Final     Tissue Transglutaminase Antibody I* 06/08/2021 0.2  0.0-<7.0 U/mL Final     Immunoglobulin A 06/08/2021 109  65 - 400 mg/dL Final     Erythrocyte Sedimentation Rate 06/08/2021 2  0 - 20 mm/hr Final     CRP 06/08/2021 0.3  0.0 - 0.8 mg/dL Final     Cyclic Citrullinated Peptide Antib* 06/08/2021 0.5  <=4.9 U/mL Final     Rheumatoid Factor Quantitative 06/08/2021 <15.0  0 - 30 IU/mL Final               Video-Visit Details    Type of service:  Video Visit    Video End Time:6:07 pm    Originating Location (pt. Location): Home    Distant Location (provider location):  [facility]     Platform used for Video Visit: OpenDrive

## 2021-11-15 ENCOUNTER — MYC MEDICAL ADVICE (OUTPATIENT)
Dept: FAMILY MEDICINE | Facility: CLINIC | Age: 36
End: 2021-11-15
Payer: COMMERCIAL

## 2021-11-15 DIAGNOSIS — F33.0 MAJOR DEPRESSIVE DISORDER, RECURRENT EPISODE, MILD (H): ICD-10-CM

## 2021-11-15 DIAGNOSIS — F10.20 UNCOMPLICATED ALCOHOL DEPENDENCE (H): Primary | ICD-10-CM

## 2021-11-28 ENCOUNTER — HEALTH MAINTENANCE LETTER (OUTPATIENT)
Age: 36
End: 2021-11-28

## 2021-11-29 ENCOUNTER — MYC MEDICAL ADVICE (OUTPATIENT)
Dept: FAMILY MEDICINE | Facility: CLINIC | Age: 36
End: 2021-11-29
Payer: COMMERCIAL

## 2021-12-02 ENCOUNTER — E-VISIT (OUTPATIENT)
Dept: FAMILY MEDICINE | Facility: CLINIC | Age: 36
End: 2021-12-02
Payer: COMMERCIAL

## 2021-12-02 DIAGNOSIS — J01.90 ACUTE BACTERIAL SINUSITIS: Primary | ICD-10-CM

## 2021-12-02 DIAGNOSIS — B96.89 ACUTE BACTERIAL SINUSITIS: Primary | ICD-10-CM

## 2021-12-02 PROCEDURE — 99421 OL DIG E/M SVC 5-10 MIN: CPT | Performed by: FAMILY MEDICINE

## 2021-12-02 RX ORDER — DOXYCYCLINE HYCLATE 100 MG
100 TABLET ORAL 2 TIMES DAILY
Qty: 14 TABLET | Refills: 0 | Status: SHIPPED | OUTPATIENT
Start: 2021-12-02 | End: 2021-12-09

## 2021-12-02 RX ORDER — CETIRIZINE HYDROCHLORIDE 10 MG/1
10 TABLET ORAL DAILY
Qty: 90 TABLET | Refills: 3 | Status: SHIPPED | OUTPATIENT
Start: 2021-12-02 | End: 2023-09-06

## 2021-12-02 RX ORDER — FLUTICASONE PROPIONATE 50 MCG
1 SPRAY, SUSPENSION (ML) NASAL DAILY
Qty: 16 G | Refills: 0 | Status: SHIPPED | OUTPATIENT
Start: 2021-12-02

## 2021-12-02 NOTE — PATIENT INSTRUCTIONS
Sinusitis (Antibiotic Treatment)    The sinuses are air-filled spaces within the bones of the face. They connect to the inside of the nose. Sinusitis is an inflammation of the tissue that lines the sinuses. Sinusitis can occur during a cold. It can also happen due to allergies to pollens and other particles in the air. Sinusitis can cause symptoms of sinus congestion and a feeling of fullness. A sinus infection causes fever, headache, and facial pain. There is often green or yellow fluid draining from the nose or into the back of the throat (post-nasal drip). You have been given antibiotics to treat this condition.   Home care    Take the full course of antibiotics as instructed. Don't stop taking them, even when you feel better.    Drink plenty of water, hot tea, and other liquids as directed by the healthcare provider. This may help thin nasal mucus. It also may help your sinuses drain fluids.    Heat may help soothe painful areas of your face. Use a towel soaked in hot water. Or,  the shower and direct the warm spray onto your face. Using a vaporizer along with a menthol rub at night may also help soothe symptoms.     An expectorant with guaifenesin may help thin nasal mucus and help your sinuses drain fluids. Talk with your provider or pharmacists before taking an over-the-counter (OTC) medicine if you have any questions about it or its side effects..    You can use an OTC decongestant, unless a similar medicine was prescribed to you. Nasal sprays work the fastest. Use one that contains phenylephrine or oxymetazoline. First blow your nose gently. Then use the spray. Don't use these medicines more often than directed on the label. If you do, your symptoms may get worse. You may also take pills that contain pseudoephedrine. Don t use products that combine multiple medicines. This is because side effects may be increased. Read labels. You can also ask the pharmacist for help. (People with high blood  pressure should not use decongestants. They can raise blood pressure.) Talk with your provider or pharmacist if you have any questions about the medicine..    OTC antihistamines may help if allergies contributed to your sinusitis. Talk with your provider or pharmacist if you have any questions about the medicine..    Don't use nasal rinses or irrigation during an acute sinus infection, unless your healthcare provider tells you to. Rinsing may spread the infection to other areas in your sinuses.    Use acetaminophen or ibuprofen to control pain, unless another pain medicine was prescribed to you. If you have chronic liver or kidney disease or ever had a stomach ulcer, talk with your healthcare provider before using these medicines. Never give aspirin to anyone under age 18 who is ill with a fever. It may cause severe liver damage.    Don't smoke. This can make symptoms worse.    Follow-up care  Follow up with your healthcare provider, or as advised.   When to seek medical advice  Call your healthcare provider if any of these occur:     Facial pain or headache that gets worse    Stiff neck    Unusual drowsiness or confusion    Swelling of your forehead or eyelids    Symptoms don't go away in 10 days    Vision problems, such as blurred or double vision    Fever of 100.4 F (38 C) or higher, or as directed by your healthcare provider  Call 911  Call 911 if any of these occur:     Seizure    Trouble breathing    Feeling dizzy or faint    Fingernails, skin or lips look blue, purple , or gray  Prevention  Here are steps you can take to help prevent an infection:     Keep good hand washing habits.    Don t have close contact with people who have sore throats, colds, or other upper respiratory infections.    Don t smoke, and stay away from secondhand smoke.    Stay up to date with of your vaccines.  [employer] last reviewed this educational content on 12/1/2019 2000-2021 [employer]. All rights reserved. This  information is not intended as a substitute for professional medical care. Always follow your healthcare professional's instructions.        Dear Karrie Zhang    After reviewing your responses, I've been able to diagnose you with?a sinus infection caused by bacteria.?     Based on your responses and diagnosis, I have prescribed doxycycline to treat your symptoms. I have sent this to your pharmacy.?     It is also important to stay well hydrated, get lots of rest and take over-the-counter decongestants,?tylenol?or ibuprofen if you?are able to?take those medications per your primary care provider to help relieve discomfort.?     It is important that you take?all of?your prescribed medication even if your symptoms are improving after a few doses.? Taking?all of?your medicine helps prevent the symptoms from returning.?     If your symptoms worsen, you develop severe headache, vomiting, high fever (>102), or are not improving in 7 days, please contact your primary care provider for an appointment or visit any of our convenient Walk-in Care or Urgent Care Centers to be seen which can be found on our website?here.?     Thanks again for choosing?us?as your health care partner,?   ?  Rosa Isela Nelson MD?

## 2021-12-14 DIAGNOSIS — J45.30 MILD PERSISTENT ASTHMA WITHOUT COMPLICATION: ICD-10-CM

## 2021-12-14 RX ORDER — MONTELUKAST SODIUM 10 MG/1
TABLET ORAL
Qty: 90 TABLET | Refills: 1 | Status: SHIPPED | OUTPATIENT
Start: 2021-12-14 | End: 2022-06-24

## 2022-01-09 NOTE — PROGRESS NOTES
[facility]    VIDEO VISIT   How would you like to obtain your AVS? MyChart  Will anyone else be joining your video visit? No  Is patient CURRENTLY in [place]? yes  If patient encounters technical issues they should call [phone]    Video-Visit Details  Type of service:  Video Visit  Video Start Time: 9:45 AM  Video End Time: 10:35 AM  Total Face to Face Time: 50 minutes   Originating Location (pt. Location): Home  Distant Location (provider location):  [facility]   Platform used for Video Visit: [employer] Monitoring    This patient is being seen in consultation at the request of her primary care provider Tapan Rose DO,  for evaluation of her pain issues and recommendations for management, with specific emphasis on: Chronic pain.  Syringoma kasi:     Primary Care Provider is Vanessa Granados    Current controlled substance medications are being prescribed by: N/A    CHIEF COMPLAINT:  Low back pain     HISTORY OF PRESENT ILLNESS:  Karrie Zhang is a 36 year old female with history of low back pain  Onset/Progression:  SI joint, low back pain, bilateral radicular pain to knee Always had middle back pain from breast size, low back started a couple of years ago, started slowly achy pain, then progressed to not being able to walk/stand without pain. Walking helps but can also makes it worse. Icy Hot helps middle back pain.   Pain quality: Aching and Sharp   Pain timing: Constant    Pain rating: intensity ranges from 1-2/10 to 8/10, and averages 4/10 on a 0-10 scale.  Aggravating factors include: walking, standing, poor posture, bending over, getting up off of the floor, sitting for a long time.    Relieving factors include: Very hot bath, icy hot patches.     Past Pain Treatments:   Pain Clinic:   No    PT: Yes  [facility] which was helpful but doesn't last long enough. Does not do HEP that was given   Chiropractor: Yes [facility]  Acupuncture: Yes [facility]  "Psychologist: No   Pharmacotherapy:    Opioids: No     Non-opioids:  Yes   TENs Unit/Electric Stim:Yes H at the time   Injections: Yes  10/2021: Lumbar Transforaminal STEPHANIE Bilateral L4-5. Good pain relief for 1 month.  Surgeries related to pain: No     Current Pain Relevant Medications:    NONE    Previous Pain Relevant Medications: (H--helped; HI--Helped initially; SWH--Somewhat helpful; NH--No help; W--worse; SE--side effects; ?--Unsure if helpful)   NOTE: This medication information taken from patient's intake form, not medical records.   Opiates: None  NSAIDS: Naproxen: NH  Anti-migraine medications: Imitrex: H  Muscle Relaxants: Robaxin:NH, Baclofen:SE  Neuropathics: Gabapentin: NH, SE  Anti-depressants: not for pain    Anxiety medications: Buspar: H   Topicals: Icy Hot patches, roll on CBD  Sleep medications: Ambien: H (for sleep study)   Other medications not covered above: none   This will be added at a later date when new patient packet is available.     CURRENT FAMILY/SOCIAL SITUATION:  Past/Present occupation: , full time.  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     Illicit Substance Use: none  ETOH: Drink 3 drink per day  Tobacco: none  Caffeine use: 1 per day    THE 4 As OF OPIOID MAINTENANCE ANALGESIA    Analgesia: Is pain relief clinically significant? N/A   Activity: Is patient functional and able to perform Activities of Daily Living? N/A   Adverse effects: Is patient free from adverse side effects from opiates? N/A   Adherence to Rx protocol: Is patient adhering to Controlled Substance Agreement and taking medications ONLY as ordered? N/A    Is Narcan prescribed for opiate use >50 MME daily or concurrent use of opiates and benzodiazepines? N/A    [place] Board " of Pharmacy Data Base Reviewed:    YES; No concern for abuse or misuse of controlled medications based on this report. Reviewed [place] [facility] January 9, 2022- no concerning fills.       PAST MEDICAL HISTORY:   Past Medical History:   Diagnosis Date     Depressive disorder 16 years ago    No longer an issue     Hyperlipidemia LDL goal <160      Hypertension goal BP (blood pressure) < 140/90      Mild persistent asthma 4/9/2014         PHYSICAL EXAM: Exam is not complete due to the nature of a virtual evaluation    Appearance:     A&O. Patient is appropriate.   Patient is in NAD.     DIRE Score for ongoing opioid management is calculated as follows:    Diagnosis = 2 pts (slowly progressive; moderate pain/objective findings)    Intractability = 1 pt (few therapies tried; passive patient role)    Risk        Psych = 2 pts (personality dysfunction/mental illness that moderately interferes with care)         Chem Hlth = 1 pt (active or very recent use of illicit drugs; excessive alcohol/drug abuse)       Reliability = 2 pts (occasional difficulties with compliance; generally reliable)       Social = 3 pts (supportive family/close relationships; involved in work/school; no isolation)       (Psych + Chem hlth + Reliability + Social) = 11    Efficacy = 2 pts (moderate benefit/function; low med dose; too early/not tried meds)    DIRE Score = 13        7-13: likely NOT suitable candidate for long-term opioid analgesia       14-21: may be a suitable candidate for long-term opioid analgesia    DIAGNOSTIC RESULTS:  MR THORACIC SPINE W/O and W CONTRAST 9/17/21:   IMPRESSION:  1.  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 The maximum diameter of the syrinx is at the T8 level measuring 5 x 6 mm, unchanged. No abnormal contrast enhancement within the thoracic   spinal cord.  2.  At the T7/T8 level, there is a small focal right central disc protrusion without significant  spinal canal narrowing.    MR LUMBAR SPINE W/O CONTRAST 7/19/2021   CONCLUSION:  1.  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 Recommend close attention to numbering scheme for any future planned intervention.  2.  Multilevel degenerative changes of the lumbar spine as described in detail above, greatest at L4-L5, there is mild spinal canal stenosis and mild to moderate bilateral neuroforaminal stenosis.  3.  At L3-L4, there is mild bilateral neuroforaminal stenosis.    PAIN RELATED CONDITIONS:   1.  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  Chronic low back pain with bilateral LE radiating pain to mid thigh.   3.  Active alcoholism   4.  Depression    ASSESSMENT/PLAN:    (M48.062) Spinal stenosis of lumbar region with neurogenic claudication  (primary encounter diagnosis)  (G89.29) Other chronic pain  (M54.6,  G89.29) Chronic midline thoracic back pain  (M35.3) Polymyalgia (H)  Comment: Liz has widespread chronic pain related to degenerative spinal disease.   Plan: Physical Therapy Referral  Plan: Vitamin D deficiency screening    (Z79.891) Encounter for long-term use of opiate analgesic  (F10.20) Alcoholism (H)  Comment: Labs related to chronic health issues and UDS per clinic policy before prescribing.   Plan: Comprehensive metabolic panel, Magnesium,   Alcohol ethyl Drug Confirmation Panel Urine with Creat,   Barbiturates qualitative urine           PATIENT INSTRUCTIONS:     Diagnosis reviewed, treatment option addressed, and risk/benefits discussed.  Self-care instructions given.  I am recommending a multidisciplinary treatment plan to help this patient better manage pain.    Remember to request ALL medication refills 5 days before you run out.     1. Physical therapy: YES With Benjy Jauregui in Chronic Pain PT.   2. Follow-up with JOHNNIE Foreman, NP-C in 1 weeks/months.  3. Labs: Metabolic panel, drug screen, vit D level.   4. Referrals: [facility]  Health/Chemical Dependency day program if desired.   5. Medication Management : Will discuss after labs are completed.     I have reviewed the note as documented above.  This accurately captures the substance of my conversation with the patient.    BILLING TIME DOCUMENTATION:     TOTAL TIME includes:   Time spent preparing to see the patient: 5 minutes (reviewing records and tests)  Time spend face to face with the patient: 50 minutes  Time spent ordering tests, medications, procedures and referrals: 0 minutes  Time spent Referring and communicating with other healthcare professionals: 0 minutes  Documenting clinical information in Epic: 15 minutes  The total TIME spent on this patient on the day of the appointment was 70 minutes.     JOHNNIE Pal, NP-C  [facility]

## 2022-01-10 ENCOUNTER — VIRTUAL VISIT (OUTPATIENT)
Dept: PALLIATIVE MEDICINE | Facility: OTHER | Age: 37
End: 2022-01-10
Attending: PHYSICAL MEDICINE & REHABILITATION
Payer: COMMERCIAL

## 2022-01-10 DIAGNOSIS — G89.29 OTHER CHRONIC PAIN: ICD-10-CM

## 2022-01-10 DIAGNOSIS — M48.062 SPINAL STENOSIS OF LUMBAR REGION WITH NEUROGENIC CLAUDICATION: Primary | ICD-10-CM

## 2022-01-10 DIAGNOSIS — M35.3 POLYMYALGIA (H): ICD-10-CM

## 2022-01-10 DIAGNOSIS — E03.9 ACQUIRED HYPOTHYROIDISM: ICD-10-CM

## 2022-01-10 DIAGNOSIS — M54.6 CHRONIC MIDLINE THORACIC BACK PAIN: ICD-10-CM

## 2022-01-10 DIAGNOSIS — F10.20 ALCOHOLISM (H): ICD-10-CM

## 2022-01-10 DIAGNOSIS — G89.29 CHRONIC MIDLINE THORACIC BACK PAIN: ICD-10-CM

## 2022-01-10 DIAGNOSIS — Z79.891 ENCOUNTER FOR LONG-TERM USE OF OPIATE ANALGESIC: ICD-10-CM

## 2022-01-10 PROCEDURE — 99417 PROLNG OP E/M EACH 15 MIN: CPT | Performed by: NURSE PRACTITIONER

## 2022-01-10 PROCEDURE — 99215 OFFICE O/P EST HI 40 MIN: CPT | Mod: GT | Performed by: NURSE PRACTITIONER

## 2022-01-10 ASSESSMENT — PAIN SCALES - GENERAL: PAINLEVEL: MILD PAIN (3)

## 2022-01-10 NOTE — PROGRESS NOTES
Patient presents to the clinic today for a follow up with JOHNNIE Daniel CNP  regarding Pain Management.     Liz is a 36 year old who is being evaluated via a billable video visit.      How would you like to obtain your AVS? MyChart  If the video visit is dropped, the invitation should be resent by: Text to cell phone: [phone]  Will anyone else be joining your video visit? No      Video Start Time:   Video-Visit Details    Type of service:  Video Visit    Video End Time:    Originating Location (pt. Location):    Distant Location (provider location):  [facility] [place]     Platform used for Video Visit: Randy         Is Pt currently in [place]? Yes    NOTE:  If Pt is not in [place], Appointment needs to be canceled and rescheduled         PEG Score 1/10/2022   PEG Total Score 3.67          QUESTIONS:    Lower back- Mid back pain- Whole back. Sometimes the pain wraps around the front of pelvis and shoots down the leg.     Betzy Soriano MA  [facility]

## 2022-01-12 RX ORDER — LEVOTHYROXINE SODIUM 75 UG/1
TABLET ORAL
Qty: 90 TABLET | Refills: 0 | Status: SHIPPED | OUTPATIENT
Start: 2022-01-12 | End: 2022-05-02

## 2022-01-12 NOTE — TELEPHONE ENCOUNTER
Routing refill request to provider for review/approval because:  Labs out of date:    TSH   Date Value Ref Range Status   12/11/2020 1.16 0.30 - 5.00 uIU/mL Final   12/11/2020 1.16 0.30 - 5.00 uIU/mL Final     Марина Kasper, RN, BSN, PHN  [facility]: [place]

## 2022-01-13 ENCOUNTER — MYC MEDICAL ADVICE (OUTPATIENT)
Dept: FAMILY MEDICINE | Facility: CLINIC | Age: 37
End: 2022-01-13
Payer: COMMERCIAL

## 2022-01-13 DIAGNOSIS — E03.9 ACQUIRED HYPOTHYROIDISM: Primary | ICD-10-CM

## 2022-01-19 ENCOUNTER — LAB (OUTPATIENT)
Dept: LAB | Facility: HOSPITAL | Age: 37
End: 2022-01-19
Payer: COMMERCIAL

## 2022-01-19 DIAGNOSIS — Z79.891 ENCOUNTER FOR LONG-TERM USE OF OPIATE ANALGESIC: ICD-10-CM

## 2022-01-19 DIAGNOSIS — F10.20 ALCOHOLISM (H): ICD-10-CM

## 2022-01-19 DIAGNOSIS — E03.9 ACQUIRED HYPOTHYROIDISM: ICD-10-CM

## 2022-01-19 DIAGNOSIS — G89.29 OTHER CHRONIC PAIN: ICD-10-CM

## 2022-01-19 LAB
ALBUMIN SERPL-MCNC: 4.1 G/DL (ref 3.5–5)
ALP SERPL-CCNC: 77 U/L (ref 45–120)
ALT SERPL W P-5'-P-CCNC: 15 U/L (ref 0–45)
ANION GAP SERPL CALCULATED.3IONS-SCNC: 12 MMOL/L (ref 5–18)
AST SERPL W P-5'-P-CCNC: 15 U/L (ref 0–40)
BARBITURATES UR QL: NORMAL
BILIRUB SERPL-MCNC: 0.9 MG/DL (ref 0–1)
BUN SERPL-MCNC: 9 MG/DL (ref 8–22)
CALCIUM SERPL-MCNC: 9.1 MG/DL (ref 8.5–10.5)
CANNABINOIDS UR QL SCN: NORMAL
CHLORIDE BLD-SCNC: 102 MMOL/L (ref 98–107)
CO2 SERPL-SCNC: 21 MMOL/L (ref 22–31)
CREAT SERPL-MCNC: 0.81 MG/DL (ref 0.6–1.1)
CREAT UR-MCNC: 43 MG/DL
DEPRECATED CALCIDIOL+CALCIFEROL SERPL-MC: 35 UG/L (ref 30–80)
ETHANOL SERPL-MCNC: <10 MG/DL
GFR SERPL CREATININE-BSD FRML MDRD: >90 ML/MIN/1.73M2
GLUCOSE BLD-MCNC: 99 MG/DL (ref 70–125)
MAGNESIUM SERPL-MCNC: 1.9 MG/DL (ref 1.8–2.6)
POTASSIUM BLD-SCNC: 4.3 MMOL/L (ref 3.5–5)
PROT SERPL-MCNC: 6.9 G/DL (ref 6–8)
SODIUM SERPL-SCNC: 135 MMOL/L (ref 136–145)
TSH SERPL DL<=0.005 MIU/L-ACNC: 1.14 UIU/ML (ref 0.3–5)

## 2022-01-19 PROCEDURE — 80053 COMPREHEN METABOLIC PANEL: CPT

## 2022-01-19 PROCEDURE — 83735 ASSAY OF MAGNESIUM: CPT

## 2022-01-19 PROCEDURE — 36415 COLL VENOUS BLD VENIPUNCTURE: CPT

## 2022-01-19 PROCEDURE — 82306 VITAMIN D 25 HYDROXY: CPT

## 2022-01-19 PROCEDURE — 80307 DRUG TEST PRSMV CHEM ANLYZR: CPT

## 2022-01-19 PROCEDURE — 84443 ASSAY THYROID STIM HORMONE: CPT

## 2022-01-19 PROCEDURE — 82077 ASSAY SPEC XCP UR&BREATH IA: CPT

## 2022-01-19 NOTE — TELEPHONE ENCOUNTER
Sejal with [facility] lab called the clinic.  Patient was at [facility] lab today for labs ordered by Mikaela Lerma APRN CNP.    Patient informed that lab that PCP was going to place orders for TSH.  The lab collected extra blood for the test to be completed.    Sejal ([facility] lab) is requesting lab orders to be placed as soon as possible.

## 2022-01-25 ENCOUNTER — MYC MEDICAL ADVICE (OUTPATIENT)
Dept: PALLIATIVE MEDICINE | Facility: OTHER | Age: 37
End: 2022-01-25
Payer: COMMERCIAL

## 2022-01-25 DIAGNOSIS — M54.6 CHRONIC MIDLINE THORACIC BACK PAIN: ICD-10-CM

## 2022-01-25 DIAGNOSIS — M48.062 SPINAL STENOSIS OF LUMBAR REGION WITH NEUROGENIC CLAUDICATION: Primary | ICD-10-CM

## 2022-01-25 DIAGNOSIS — G89.29 CHRONIC MIDLINE THORACIC BACK PAIN: ICD-10-CM

## 2022-01-25 DIAGNOSIS — M35.3 POLYMYALGIA (H): ICD-10-CM

## 2022-01-30 PROBLEM — M35.3 POLYMYALGIA (H): Status: ACTIVE | Noted: 2022-01-30

## 2022-01-30 NOTE — PATIENT INSTRUCTIONS
After Visit Instructions:     Thank you for coming to [facility] for your care. It is my goal to partner with you to help you reach your optimal state of health.   Continue daily self-care, identifying contributing factors, and monitoring variations in pain level. Continue to integrate self-care into your life.      Be sure to request ALL medication refills 5 days prior to the due date unless you will see your medical provider in an appointment before the due date.       1. Physical therapy: YES With Benjy Jauregui in Chronic Pain PT.   2. Follow-up with JOHNNIE Foreman, MARY BETH in 1 weeks/months.  3. Labs: Metabolic panel, drug screen, vit D level.   4. Referrals: [facility] Mental Health/Chemical Dependency day program if desired.   5. Medication Management : Will discuss after labs are completed.       JOHNNIE Pal, NP-C  [facility] - Monday and Friday  [place] ([facility]) - Tuesday  [place] - Thursday    [facility] Scheduling/Clinic Telephone Number:  [phone]    [place] Scheduling/Clinic Telephone Number: [phone]  After Hours On-Call Service:  [phone]      Call with any questions about your care and for scheduling assistance.     Calls are returned Monday through Friday between 8 AM and 4:00 PM. We usually get back to you within 2 business days depending on the issue/request.    If we are prescribing your medications:    For opioid medication refills, call the clinic or send a [employer] message 7 days in advance.  Please include:    Your name and date of birth.     Name of requested medication    Name of the pharmacy.    For non-opioid medications, call your pharmacy directly to request a refill. Please allow 3-4 days to be processed.     Per [place] State Law:    All controlled substance prescriptions must be filled within 30 days of being written.      For those controlled substances allowing refills, pickup must occur  within 30 days of last fill.      We believe regular attendance is key to your success in our program!      Any time you are unable to keep your appointment we ask that you call us at least 24 hours in advance to cancel.This will allow us to offer the appointment time to another patient.     Multiple missed appointments may lead to dismissal from the clinic.     -

## 2022-02-01 ENCOUNTER — MYC MEDICAL ADVICE (OUTPATIENT)
Dept: FAMILY MEDICINE | Facility: CLINIC | Age: 37
End: 2022-02-01
Payer: COMMERCIAL

## 2022-02-02 ENCOUNTER — TRANSFERRED RECORDS (OUTPATIENT)
Dept: HEALTH INFORMATION MANAGEMENT | Facility: CLINIC | Age: 37
End: 2022-02-02
Payer: COMMERCIAL

## 2022-02-02 RX ORDER — CELECOXIB 100 MG/1
100 CAPSULE ORAL 2 TIMES DAILY
Qty: 180 CAPSULE | Refills: 0 | Status: SHIPPED | OUTPATIENT
Start: 2022-02-02 | End: 2022-06-17

## 2022-02-11 ENCOUNTER — MEDICAL CORRESPONDENCE (OUTPATIENT)
Dept: NEUROSURGERY | Facility: CLINIC | Age: 37
End: 2022-02-11
Payer: COMMERCIAL

## 2022-02-16 ENCOUNTER — OFFICE VISIT (OUTPATIENT)
Dept: MIDWIFE SERVICES | Facility: CLINIC | Age: 37
End: 2022-02-16
Payer: COMMERCIAL

## 2022-02-16 ENCOUNTER — MYC MEDICAL ADVICE (OUTPATIENT)
Dept: MIDWIFE SERVICES | Facility: CLINIC | Age: 37
End: 2022-02-16

## 2022-02-16 VITALS
WEIGHT: 215 LBS | HEART RATE: 71 BPM | DIASTOLIC BLOOD PRESSURE: 67 MMHG | BODY MASS INDEX: 33.67 KG/M2 | SYSTOLIC BLOOD PRESSURE: 127 MMHG | TEMPERATURE: 97.9 F

## 2022-02-16 DIAGNOSIS — Z12.4 ROUTINE CERVICAL SMEAR: ICD-10-CM

## 2022-02-16 DIAGNOSIS — Z30.46 ENCOUNTER FOR NEXPLANON REMOVAL: Primary | ICD-10-CM

## 2022-02-16 DIAGNOSIS — Z30.015 ENCOUNTER FOR INITIAL PRESCRIPTION OF VAGINAL RING HORMONAL CONTRACEPTIVE: ICD-10-CM

## 2022-02-16 DIAGNOSIS — Z11.3 SCREEN FOR STD (SEXUALLY TRANSMITTED DISEASE): ICD-10-CM

## 2022-02-16 LAB
CLUE CELLS: PRESENT
TRICHOMONAS, WET PREP: ABNORMAL
WBC'S/HIGH POWER FIELD, WET PREP: ABNORMAL
YEAST, WET PREP: ABNORMAL

## 2022-02-16 PROCEDURE — 86803 HEPATITIS C AB TEST: CPT | Performed by: ADVANCED PRACTICE MIDWIFE

## 2022-02-16 PROCEDURE — 36415 COLL VENOUS BLD VENIPUNCTURE: CPT | Performed by: ADVANCED PRACTICE MIDWIFE

## 2022-02-16 PROCEDURE — G0145 SCR C/V CYTO,THINLAYER,RESCR: HCPCS | Performed by: ADVANCED PRACTICE MIDWIFE

## 2022-02-16 PROCEDURE — 87210 SMEAR WET MOUNT SALINE/INK: CPT | Performed by: ADVANCED PRACTICE MIDWIFE

## 2022-02-16 PROCEDURE — 87624 HPV HI-RISK TYP POOLED RSLT: CPT | Performed by: ADVANCED PRACTICE MIDWIFE

## 2022-02-16 PROCEDURE — 11982 REMOVE DRUG IMPLANT DEVICE: CPT | Performed by: ADVANCED PRACTICE MIDWIFE

## 2022-02-16 PROCEDURE — 87491 CHLMYD TRACH DNA AMP PROBE: CPT | Performed by: ADVANCED PRACTICE MIDWIFE

## 2022-02-16 PROCEDURE — 87591 N.GONORRHOEAE DNA AMP PROB: CPT | Performed by: ADVANCED PRACTICE MIDWIFE

## 2022-02-16 PROCEDURE — 86780 TREPONEMA PALLIDUM: CPT | Performed by: ADVANCED PRACTICE MIDWIFE

## 2022-02-16 PROCEDURE — 87389 HIV-1 AG W/HIV-1&-2 AB AG IA: CPT | Performed by: ADVANCED PRACTICE MIDWIFE

## 2022-02-16 RX ORDER — ETONOGESTREL AND ETHINYL ESTRADIOL VAGINAL RING .015; .12 MG/D; MG/D
1 RING VAGINAL
Qty: 3 EACH | Refills: 3 | Status: SHIPPED | OUTPATIENT
Start: 2022-02-16 | End: 2022-09-07

## 2022-02-16 NOTE — PROGRESS NOTES
Nexplanon Removal:     Is a pregnancy test required: No.  Was a consent obtained?  Yes    Karrie Zhang is here for removal of etonogestrel implant Nexplanon/Implanon    Indication: Liz feels like the nexplanon has cause mood changes, worsening her depression and anxiety. She was on the pills in the past for many years. Over the last couple of years of use she had frequent yeast infections, monthly. She was told that birth control can cause those infections so she stopped the pill and switched to the Nexplanon a year ago. She would like it removed today to help with her mental health. She is unsure about starting pills again because of the yeast infections. We discussed that sometimes birth control improves yeast and bacterial vaginosis infections and sometimes women get frequent infections for a period of time in their live and then it goes away so worth trying again if she feels like that is the best option for her. We discussed not using the depo shot since it is associated with mood changes. We discussed the patch and nuva ring as another alternative option of the pills but still getting both estrogen and progesterone hormones which she believes helps improve her mood. We also discussed the IUD. Sometimes the Mirena has been seen to worsen more severe anxiety but not change mild or no anxiety. The Kyleena would be a good option for her with lower hormone levels but still good control of her periods. She is nervous about the IUD, she has heard bad thinks about it and does not like the idea of something being inside her uterus. She has never had kids so placement might be more uncomfortable. She notes her cervix is very sensitive and she has had procedures like colposcopes that cause significant pain for her. She plans to try the nuva ring first and if that does not work for her we discussed getting a cervical block to try and get the IUD placed without so much discomfort for her. We discussed the ring,  risks, benefits, what to expect afterwards, and missing taking it out or putting it in on time. We discussed skipping periods with it as well. She is due for a pap smear this summer, would like to get it out of the way today. Would like STI testing with it. No other questions or concerns today.     Preoperative Diagnosis: etonogestrel implant  Postoperative Diagnosis: etonogestrel implant removed    Technique: On the right arm  Skin prep Betadine  Anesthesia 1% lidocaine  Procedure: Small incision (<5mm) was made at distal end of palpable implant, curved hemostat or mosquito forceps was used to isolate the implant and bring it to the incision, the fibrous capsule containing the implant  was incised and the Implant was removed intact.    EBL: minimal  Complications:  No  Tolerance:  Pt tolerated procedure well and was in stable condition.   Dressing:    A pressure bandage was placed for the next 12-24 hours.    Contraception was discussed and patient chose the following method ring      Follow up: Pt was instructed to call if bleeding, severe pain or foul smell.     JOHNNIE Euceda CNM

## 2022-02-17 LAB
C TRACH DNA SPEC QL NAA+PROBE: NEGATIVE
HCV AB SERPL QL IA: NONREACTIVE
HIV 1+2 AB+HIV1 P24 AG SERPL QL IA: NONREACTIVE
N GONORRHOEA DNA SPEC QL NAA+PROBE: NEGATIVE
T PALLIDUM AB SER QL: NONREACTIVE

## 2022-02-20 ENCOUNTER — MYC MEDICAL ADVICE (OUTPATIENT)
Dept: MIDWIFE SERVICES | Facility: CLINIC | Age: 37
End: 2022-02-20
Payer: COMMERCIAL

## 2022-02-20 DIAGNOSIS — B96.89 BV (BACTERIAL VAGINOSIS): Primary | ICD-10-CM

## 2022-02-20 DIAGNOSIS — N76.0 BV (BACTERIAL VAGINOSIS): Primary | ICD-10-CM

## 2022-02-20 RX ORDER — METRONIDAZOLE 7.5 MG/G
1 GEL VAGINAL DAILY
Qty: 70 G | Refills: 0 | Status: SHIPPED | OUTPATIENT
Start: 2022-02-20 | End: 2023-05-04

## 2022-02-21 LAB
BKR LAB AP GYN ADEQUACY: NORMAL
BKR LAB AP GYN INTERPRETATION: NORMAL
BKR LAB AP HPV REFLEX: NORMAL
BKR LAB AP PREVIOUS ABNL DX: NORMAL
BKR LAB AP PREVIOUS ABNORMAL: NORMAL
PATH REPORT.COMMENTS IMP SPEC: NORMAL
PATH REPORT.COMMENTS IMP SPEC: NORMAL
PATH REPORT.RELEVANT HX SPEC: NORMAL

## 2022-02-23 LAB
HUMAN PAPILLOMA VIRUS 16 DNA: NEGATIVE
HUMAN PAPILLOMA VIRUS 18 DNA: NEGATIVE
HUMAN PAPILLOMA VIRUS FINAL DIAGNOSIS: NORMAL
HUMAN PAPILLOMA VIRUS OTHER HR: NEGATIVE

## 2022-03-09 ENCOUNTER — THERAPY VISIT (OUTPATIENT)
Dept: PHYSICAL THERAPY | Facility: CLINIC | Age: 37
End: 2022-03-09
Attending: NURSE PRACTITIONER
Payer: COMMERCIAL

## 2022-03-09 DIAGNOSIS — M54.6 CHRONIC BILATERAL THORACIC BACK PAIN: ICD-10-CM

## 2022-03-09 DIAGNOSIS — M48.062 SPINAL STENOSIS OF LUMBAR REGION WITH NEUROGENIC CLAUDICATION: ICD-10-CM

## 2022-03-09 DIAGNOSIS — M54.6 CHRONIC MIDLINE THORACIC BACK PAIN: ICD-10-CM

## 2022-03-09 DIAGNOSIS — M54.50 LUMBAGO: ICD-10-CM

## 2022-03-09 DIAGNOSIS — G89.29 CHRONIC BILATERAL THORACIC BACK PAIN: ICD-10-CM

## 2022-03-09 DIAGNOSIS — G89.29 CHRONIC MIDLINE THORACIC BACK PAIN: ICD-10-CM

## 2022-03-09 PROCEDURE — 97112 NEUROMUSCULAR REEDUCATION: CPT | Mod: GP | Performed by: PHYSICAL THERAPIST

## 2022-03-09 PROCEDURE — 97161 PT EVAL LOW COMPLEX 20 MIN: CPT | Mod: GP | Performed by: PHYSICAL THERAPIST

## 2022-03-09 PROCEDURE — 97110 THERAPEUTIC EXERCISES: CPT | Mod: GP | Performed by: PHYSICAL THERAPIST

## 2022-03-11 PROBLEM — M54.50 LUMBAGO: Status: ACTIVE | Noted: 2022-03-11

## 2022-03-11 PROBLEM — M54.6 BILATERAL THORACIC BACK PAIN: Status: ACTIVE | Noted: 2022-03-11

## 2022-03-18 ENCOUNTER — MYC MEDICAL ADVICE (OUTPATIENT)
Dept: MIDWIFE SERVICES | Facility: CLINIC | Age: 37
End: 2022-03-18
Payer: COMMERCIAL

## 2022-03-18 NOTE — TELEPHONE ENCOUNTER
Patient MyChart to clinic to inquire about her endometriosis. She states that she has had endometriosis since she was a teenager and that s why she was put on birth control. She states that she's recently read that endometriosis can lead to chronic lower back pain if it grows outside the uterus. She is wondering if this is accurate information and if she needs an MRI of her pelvis. Routed to provider for recommendations.   Rosa Adams RN

## 2022-03-23 ENCOUNTER — HOSPITAL ENCOUNTER (OUTPATIENT)
Dept: MRI IMAGING | Facility: HOSPITAL | Age: 37
Discharge: HOME | End: 2022-03-23
Attending: SURGERY | Admitting: SURGERY
Payer: COMMERCIAL

## 2022-03-23 DIAGNOSIS — G95.0 SYRINX (H): ICD-10-CM

## 2022-03-23 PROCEDURE — 72146 MRI CHEST SPINE W/O DYE: CPT

## 2022-04-06 ENCOUNTER — OFFICE VISIT (OUTPATIENT)
Dept: NEUROSURGERY | Facility: CLINIC | Age: 37
End: 2022-04-06
Payer: COMMERCIAL

## 2022-04-06 VITALS
HEIGHT: 67 IN | WEIGHT: 215 LBS | HEART RATE: 70 BPM | BODY MASS INDEX: 33.74 KG/M2 | OXYGEN SATURATION: 95 % | SYSTOLIC BLOOD PRESSURE: 111 MMHG | DIASTOLIC BLOOD PRESSURE: 71 MMHG

## 2022-04-06 DIAGNOSIS — G95.0 SYRINX (H): Primary | ICD-10-CM

## 2022-04-06 PROCEDURE — 99213 OFFICE O/P EST LOW 20 MIN: CPT | Performed by: SURGERY

## 2022-04-06 NOTE — NURSING NOTE
Pt is a follow up for MRI and Neurology. Pt has a little bit pain in the mid and lower back and a little bit balance issue. Pt denies any other symptoms.   Johnnie Enciso MA

## 2022-04-06 NOTE — LETTER
4/6/2022         RE: Karrie Zhang  [address]  [facility] [address]        Dear Colleague,    Thank you for referring your patient, Karrie Zhang, to the [facility]. Please see a copy of my visit note below.    The patient is a 37-year-old female.  She complains of pain mid thoracic and low back.  She had a follow-up thoracic MRI.  It is unchanged.  No evidence of tumor.  She says that she did see a neurologist and was seen at the spine center and went to a pain clinic.  She said nothing is helped.  Plan referral to the [facility] to see if they have any ideas.  Plan follow-up MRI 1 year, sooner as needed.  Total time 15 minutes, more than 50% spent counseling and/or coordinating care.      Again, thank you for allowing me to participate in the care of your patient.        Sincerely,        Kehinde Mishra MD

## 2022-04-06 NOTE — PROGRESS NOTES
The patient is a 37-year-old female.  She complains of pain mid thoracic and low back.  She had a follow-up thoracic MRI.  It is unchanged.  No evidence of tumor.  She says that she did see a neurologist and was seen at the spine center and went to a pain clinic.  She said nothing is helped.  Plan referral to the [facility] to see if they have any ideas.  Plan follow-up MRI 1 year, sooner as needed.  Total time 15 minutes, more than 50% spent counseling and/or coordinating care.

## 2022-04-28 ENCOUNTER — TELEPHONE (OUTPATIENT)
Dept: NEUROSURGERY | Facility: CLINIC | Age: 37
End: 2022-04-28
Payer: COMMERCIAL

## 2022-04-28 ENCOUNTER — OFFICE VISIT (OUTPATIENT)
Dept: OBGYN | Facility: CLINIC | Age: 37
End: 2022-04-28
Payer: COMMERCIAL

## 2022-04-28 VITALS
WEIGHT: 215 LBS | DIASTOLIC BLOOD PRESSURE: 71 MMHG | HEART RATE: 74 BPM | BODY MASS INDEX: 33.67 KG/M2 | TEMPERATURE: 98.6 F | SYSTOLIC BLOOD PRESSURE: 110 MMHG

## 2022-04-28 DIAGNOSIS — Z30.41 ORAL CONTRACEPTIVE PILL SURVEILLANCE: ICD-10-CM

## 2022-04-28 DIAGNOSIS — Z01.419 ENCOUNTER FOR GYNECOLOGICAL EXAMINATION WITHOUT ABNORMAL FINDING: Primary | ICD-10-CM

## 2022-04-28 DIAGNOSIS — K64.4 EXTERNAL HEMORRHOIDS: ICD-10-CM

## 2022-04-28 DIAGNOSIS — F33.0 MAJOR DEPRESSIVE DISORDER, RECURRENT EPISODE, MILD (H): ICD-10-CM

## 2022-04-28 PROCEDURE — 99385 PREV VISIT NEW AGE 18-39: CPT | Performed by: OBSTETRICS & GYNECOLOGY

## 2022-04-28 ASSESSMENT — ANXIETY QUESTIONNAIRES
2. NOT BEING ABLE TO STOP OR CONTROL WORRYING: SEVERAL DAYS
GAD7 TOTAL SCORE: 6
6. BECOMING EASILY ANNOYED OR IRRITABLE: MORE THAN HALF THE DAYS
IF YOU CHECKED OFF ANY PROBLEMS ON THIS QUESTIONNAIRE, HOW DIFFICULT HAVE THESE PROBLEMS MADE IT FOR YOU TO DO YOUR WORK, TAKE CARE OF THINGS AT HOME, OR GET ALONG WITH OTHER PEOPLE: SOMEWHAT DIFFICULT
5. BEING SO RESTLESS THAT IT IS HARD TO SIT STILL: NOT AT ALL
7. FEELING AFRAID AS IF SOMETHING AWFUL MIGHT HAPPEN: NOT AT ALL
3. WORRYING TOO MUCH ABOUT DIFFERENT THINGS: SEVERAL DAYS
1. FEELING NERVOUS, ANXIOUS, OR ON EDGE: SEVERAL DAYS

## 2022-04-28 ASSESSMENT — ASTHMA QUESTIONNAIRES
QUESTION_5 LAST FOUR WEEKS HOW WOULD YOU RATE YOUR ASTHMA CONTROL: COMPLETELY CONTROLLED
QUESTION_2 LAST FOUR WEEKS HOW OFTEN HAVE YOU HAD SHORTNESS OF BREATH: NOT AT ALL
ACT_TOTALSCORE: 25
ACT_TOTALSCORE: 25
QUESTION_3 LAST FOUR WEEKS HOW OFTEN DID YOUR ASTHMA SYMPTOMS (WHEEZING, COUGHING, SHORTNESS OF BREATH, CHEST TIGHTNESS OR PAIN) WAKE YOU UP AT NIGHT OR EARLIER THAN USUAL IN THE MORNING: NOT AT ALL
QUESTION_4 LAST FOUR WEEKS HOW OFTEN HAVE YOU USED YOUR RESCUE INHALER OR NEBULIZER MEDICATION (SUCH AS ALBUTEROL): NOT AT ALL
ACUTE_EXACERBATION_TODAY: NO
QUESTION_1 LAST FOUR WEEKS HOW MUCH OF THE TIME DID YOUR ASTHMA KEEP YOU FROM GETTING AS MUCH DONE AT WORK, SCHOOL OR AT HOME: NONE OF THE TIME

## 2022-04-28 ASSESSMENT — PATIENT HEALTH QUESTIONNAIRE - PHQ9
SUM OF ALL RESPONSES TO PHQ QUESTIONS 1-9: 6
5. POOR APPETITE OR OVEREATING: SEVERAL DAYS

## 2022-04-28 NOTE — PROGRESS NOTES
Karrie is a 37 year old  female who presents for annual exam.     Menses are irregular due to Birth control and normal lasting 4 days.  Menses flow: normal.  No LMP recorded. (Menstrual status: Birth Control).. Using cervical cap for contraception.  She is not currently considering pregnancy.  Besides routine health maintenance,  she would like to discuss Hemorrhoids, birth control .  Has an external hemorrhoid. Uses OTC measures but feels like she still has symptoms.   PHQ 2020   PHQ-9 Total Score 9 3 6   Q9: Thoughts of better off dead/self-harm past 2 weeks Not at all Not at all Not at all   Some encounter information is confidential and restricted. Go to Review Flowsheets activity to see all data.       GYNECOLOGIC HISTORY:  Menarche: 14  Age at first intercourse: 16 Number of lifetime partners: more than 10  Karrie is not sexually active with 0male partner(s) and is not currently in monogamous relationship .    History sexually transmitted infections:Chlamydia and HPV  STI testing offered?  Declined  TOMEKA exposure: No  History of abnormal Pap smear: NO - age 30- 65 PAP every 3 years recommended  Family history of breast CA: No  Family history of uterine/ovarian CA: No    Family history of colon CA: No    HEALTH MAINTENANCE:  Cholesterol: (  Cholesterol   Date Value Ref Range Status   2019 181 <200 mg/dL Final   2019 182 <200 mg/dL Final    History of abnormal lipids: No  Mammo: no . History of abnormal Mammo: Not applicable.  Regular Self Breast Exams: No  Calcium/Vitamin D intake: source:  dairy, dietary supplement(s) Adequate? Yes  TSH: (  TSH   Date Value Ref Range Status   2022 1.14 0.30 - 5.00 uIU/mL Final   2020 1.16 0.30 - 5.00 uIU/mL Final    )  Pap; (  Lab Results   Component Value Date    PAP NIL 2018    PAP LSIL 2017    PAP NIL 2016    )    HISTORY:  OB History    Para Term  AB Living   0 0 0 0 0 0   SAB IAB  Ectopic Multiple Live Births   0 0 0 0 0     Past Medical History:   Diagnosis Date     Acquired hypothyroidism      Alcohol abuse      Allergic rhinitis      Alopecia      Asthma      Cervical dysplasia      Cervical high risk HPV (human papillomavirus) test positive 6/29/16, 7/5/17    neg 16/18     Depression      Disease of thyroid gland      H/O colposcopy with cervical biopsy 08/08/2017 08/08/17: [place] Bx and ECC normal.      Papanicolaou smear of cervix with low grade squamous intraepithelial lesion (LGSIL) 07/05/2017     Rosacea, acne      Sleep apnea      Syrinx of spinal cord (H)      Uncomplicated asthma      Past Surgical History:   Procedure Laterality Date     LASER [place], CERVICAL  2005     Family History   Problem Relation Age of Onset     Osteoporosis Mother      Diabetes Father      Other Cancer Father         Pancreatic     Substance Abuse Father         alcoholic: stopped when I was in grade school     Prostate Cancer Maternal Grandfather      Prostate Cancer Other      Depression Brother      Alcoholism Brother      Anxiety Disorder Brother      Coronary Artery Disease No family hx of      Cancer Father      Social History     Socioeconomic History     Marital status: Single     Spouse name: None     Number of children: 0     Years of education: 16     Highest education level: None   Occupational History     Occupation:       Employer: OTHER     Employer: [employer]   Tobacco Use     Smoking status: Never Smoker     Smokeless tobacco: Never Used   Vaping Use     Vaping Use: Never used   Substance and Sexual Activity     Alcohol use: Yes     Alcohol/week: 42.0 standard drinks     Types: 42 Shots of liquor per week     Drug use: No     Sexual activity: Not Currently     Partners: Male     Birth control/protection: Condom, Implant   Other Topics Concern     Parent/sibling w/ CABG, MI or angioplasty before 65F 55M? No       Current Outpatient Medications:      albuterol  (PROAIR HFA/PROVENTIL HFA/VENTOLIN HFA) 108 (90 Base) MCG/ACT inhaler, Inhale 2 puffs into the lungs every 6 hours as needed for shortness of breath / dyspnea or wheezing, Disp: 18 g, Rfl: 3     busPIRone HCl (BUSPAR) 30 MG tablet, TAKE 1 TABLET BY MOUTH TWICE DAILY AS NEEDED FOR ANXIETY, Disp: , Rfl:      celecoxib (CELEBREX) 100 MG capsule, Take 1 capsule (100 mg) by mouth 2 times daily If tolerating well, ok to increase to 2 capsules twice a day., Disp: 180 capsule, Rfl: 0     cetirizine (ZYRTEC) 10 MG tablet, Take 1 tablet (10 mg) by mouth daily, Disp: 90 tablet, Rfl: 3     etonogestrel-ethinyl estradiol (NUVARING) 0.12-0.015 MG/24HR vaginal ring, Place 1 each vaginally every 28 days, Disp: 3 each, Rfl: 3     FLUoxetine (PROZAC) 40 MG capsule, Take 1 capsule (40 mg) by mouth daily, Disp: 90 capsule, Rfl: 1     fluticasone (FLONASE) 50 MCG/ACT nasal spray, Spray 1 spray into both nostrils daily, Disp: 16 g, Rfl: 0     fluticasone (FLONASE) 50 MCG/ACT nasal spray, , Disp: , Rfl:      hydrocortisone 2.5 % cream, , Disp: , Rfl:      lamoTRIgine (LAMICTAL) 25 MG tablet, Take 2 tablets by mouth daily, Disp: , Rfl:      levothyroxine (SYNTHROID/LEVOTHROID) 75 MCG tablet, TAKE 1 TABLET(75 MCG) BY MOUTH DAILY, Disp: 90 tablet, Rfl: 0     metroNIDAZOLE (METROGEL) 0.75 % vaginal gel, Place 1 applicator (5 g) vaginally daily, Disp: 70 g, Rfl: 0     montelukast (SINGULAIR) 10 MG tablet, TAKE 1 TABLET(10 MG) BY MOUTH AT BEDTIME, Disp: 90 tablet, Rfl: 1     tacrolimus (PROTOPIC) 0.1 % external ointment, APPLY TOPICALLY BID ON FACE UTD, Disp: , Rfl:      Allergies   Allergen Reactions     Penicillins Rash       Past medical, surgical, social and family history were reviewed and updated in EPIC.    ROS:   C:     NEGATIVE for fever, chills, change in weight  I:       NEGATIVE for worrisome rashes, moles or lesions  E:     NEGATIVE for vision changes or irritation  E/M: NEGATIVE for ear, mouth and throat problems  R:     NEGATIVE  for significant cough or SOB  CV:   NEGATIVE for chest pain, palpitations or peripheral edema  GI:     NEGATIVE for nausea, abdominal pain, heartburn, or change in bowel habits  :   NEGATIVE for frequency, dysuria, hematuria, vaginal discharge, or irregular bleeding  M:     NEGATIVE for significant arthralgias or myalgia  N:      NEGATIVE for weakness, dizziness or paresthesias  E:      NEGATIVE for temperature intolerance, skin/hair changes  P:      NEGATIVE for changes in mood or affect.    EXAM:  /71   Pulse 74   Temp 98.6  F (37  C)   Wt 97.5 kg (215 lb)   Breastfeeding No   BMI 33.67 kg/m     BMI: Body mass index is 33.67 kg/m .  Constitutional: healthy, alert and no distress  Head: Normocephalic. No masses, lesions, tenderness or abnormalities  Neck: Neck supple. Trachea midline. No adenopathy. Thyroid symmetric, normal size.   Cardiovascular: RRR.   Respiratory: Negative.   Breast: Breasts reveal mild symmetric fibrocystic densities, but there are no dominant, discrete, fixed or suspicious masses found.  Gastrointestinal: Abdomen soft, non-tender, non-distended. No masses, organomegaly.  :  Vulva:  No external lesions, normal female hair distribution, no inguinal adenopathy.    Urethra:  Midline, non-tender, well supported, no discharge  Vagina:  Moist, pink, no abnormal discharge, no lesions  Uterus:  Normal size, anteverted , non-tender, freely mobile  Ovaries:  No masses appreciated, non-tender, mobile  Rectal Exam: external hemorrhoids noted versus skin tag at 12:00  Musculoskeletal: extremities normal  Skin: no suspicious lesions or rashes  Psychiatric: Affect appropriate, cooperative,mentation appears normal.     COUNSELING:   Reviewed preventive health counseling, as reflected in patient instructions       Regular exercise       Healthy diet/nutrition   reports that she has never smoked. She has never used smokeless tobacco.    Body mass index is 33.67 kg/m .  Weight management plan:  Discussed healthy diet and exercise guidelines  FRAX Risk Assessment    ASSESSMENT:  37 year old female with satisfactory annual exam  (Z01.419) Encounter for gynecological examination without abnormal finding  (primary encounter diagnosis)  Comment:   Plan: Recommend flu vaccine next season  Asthma is controlled.       (F33.0) Major depressive disorder, recurrent episode, mild (H)  Comment:   Plan: Stable on meds    (K64.4) External hemorrhoids  Comment:   Plan: Reviewed OTC meds and other home treatments. Colorectal clinic referral entered at patient request.

## 2022-04-28 NOTE — TELEPHONE ENCOUNTER
Writer ALO for pt regarding neurosurgery referral    Please schedule a new, in person or virtual visit with Dr. Astudillo for next available    Carmina Davis

## 2022-04-29 ASSESSMENT — ANXIETY QUESTIONNAIRES: GAD7 TOTAL SCORE: 6

## 2022-05-03 RX ORDER — NORGESTIMATE AND ETHINYL ESTRADIOL 0.25-0.035
1 KIT ORAL DAILY
Qty: 84 TABLET | Refills: 3 | Status: SHIPPED | OUTPATIENT
Start: 2022-05-03 | End: 2022-09-07

## 2022-05-03 NOTE — TELEPHONE ENCOUNTER
Diagnosis, Referred by & from: Hemorrhoids   Appt date: 7/26/2022   NOTES STATUS DETAILS   OFFICE NOTE from referring provider Internal [facility]:  4/28/22 - OBTJ OV with Dr. Kasper   OFFICE NOTE from other specialist N/A    DISCHARGE SUMMARY from hospital N/A    DISCHARGE REPORT from the ER N/A    OPERATIVE REPORT N/A    MEDICATION LIST Internal    LABS N/A    DIAGNOSTIC PROCEDURES N/A    IMAGING (DISC & REPORT) N/A

## 2022-05-04 ENCOUNTER — THERAPY VISIT (OUTPATIENT)
Dept: PHYSICAL THERAPY | Facility: CLINIC | Age: 37
End: 2022-05-04
Payer: COMMERCIAL

## 2022-05-04 DIAGNOSIS — G89.29 CHRONIC BILATERAL THORACIC BACK PAIN: ICD-10-CM

## 2022-05-04 DIAGNOSIS — M54.50 LUMBAGO: Primary | ICD-10-CM

## 2022-05-04 DIAGNOSIS — M54.6 CHRONIC BILATERAL THORACIC BACK PAIN: ICD-10-CM

## 2022-05-04 PROCEDURE — 97112 NEUROMUSCULAR REEDUCATION: CPT | Mod: GP | Performed by: PHYSICAL THERAPIST

## 2022-05-04 PROCEDURE — 97110 THERAPEUTIC EXERCISES: CPT | Mod: GP | Performed by: PHYSICAL THERAPIST

## 2022-05-31 ENCOUNTER — LAB (OUTPATIENT)
Dept: LAB | Facility: HOSPITAL | Age: 37
End: 2022-05-31
Payer: COMMERCIAL

## 2022-05-31 DIAGNOSIS — F33.1 MAJOR DEPRESSIVE DISORDER, RECURRENT EPISODE, MODERATE (H): Primary | ICD-10-CM

## 2022-05-31 DIAGNOSIS — G25.81 RESTLESS LEGS SYNDROME (RLS): ICD-10-CM

## 2022-05-31 DIAGNOSIS — F10.20 ACUTE ALCOHOLISM (H): ICD-10-CM

## 2022-05-31 LAB
ALBUMIN SERPL-MCNC: 3.6 G/DL (ref 3.5–5)
ALP SERPL-CCNC: 68 U/L (ref 45–120)
ALT SERPL W P-5'-P-CCNC: 12 U/L (ref 0–45)
ANION GAP SERPL CALCULATED.3IONS-SCNC: 7 MMOL/L (ref 5–18)
AST SERPL W P-5'-P-CCNC: 12 U/L (ref 0–40)
BASOPHILS # BLD AUTO: 0 10E3/UL (ref 0–0.2)
BASOPHILS NFR BLD AUTO: 0 %
BILIRUB DIRECT SERPL-MCNC: 0.1 MG/DL
BILIRUB SERPL-MCNC: 0.3 MG/DL (ref 0–1)
BUN SERPL-MCNC: 9 MG/DL (ref 8–22)
CALCIUM SERPL-MCNC: 9 MG/DL (ref 8.5–10.5)
CHLORIDE BLD-SCNC: 105 MMOL/L (ref 98–107)
CO2 SERPL-SCNC: 24 MMOL/L (ref 22–31)
CREAT SERPL-MCNC: 0.71 MG/DL (ref 0.6–1.1)
EOSINOPHIL # BLD AUTO: 0.3 10E3/UL (ref 0–0.7)
EOSINOPHIL NFR BLD AUTO: 6 %
ERYTHROCYTE [DISTWIDTH] IN BLOOD BY AUTOMATED COUNT: 13.1 % (ref 10–15)
GFR SERPL CREATININE-BSD FRML MDRD: >90 ML/MIN/1.73M2
GGT SERPL-CCNC: 21 U/L (ref 0–50)
GLUCOSE BLD-MCNC: 95 MG/DL (ref 70–125)
HCT VFR BLD AUTO: 39.4 % (ref 35–47)
HGB BLD-MCNC: 13.3 G/DL (ref 11.7–15.7)
IMM GRANULOCYTES # BLD: 0 10E3/UL
IMM GRANULOCYTES NFR BLD: 0 %
LYMPHOCYTES # BLD AUTO: 2 10E3/UL (ref 0.8–5.3)
LYMPHOCYTES NFR BLD AUTO: 36 %
MCH RBC QN AUTO: 31.1 PG (ref 26.5–33)
MCHC RBC AUTO-ENTMCNC: 33.8 G/DL (ref 31.5–36.5)
MCV RBC AUTO: 92 FL (ref 78–100)
MONOCYTES # BLD AUTO: 0.4 10E3/UL (ref 0–1.3)
MONOCYTES NFR BLD AUTO: 8 %
NEUTROPHILS # BLD AUTO: 2.9 10E3/UL (ref 1.6–8.3)
NEUTROPHILS NFR BLD AUTO: 50 %
NRBC # BLD AUTO: 0 10E3/UL
NRBC BLD AUTO-RTO: 0 /100
PLATELET # BLD AUTO: 256 10E3/UL (ref 150–450)
POTASSIUM BLD-SCNC: 3.9 MMOL/L (ref 3.5–5)
PROT SERPL-MCNC: 6.8 G/DL (ref 6–8)
RBC # BLD AUTO: 4.28 10E6/UL (ref 3.8–5.2)
SODIUM SERPL-SCNC: 136 MMOL/L (ref 136–145)
TSH SERPL DL<=0.005 MIU/L-ACNC: 1.38 UIU/ML (ref 0.3–5)
WBC # BLD AUTO: 5.7 10E3/UL (ref 4–11)

## 2022-05-31 PROCEDURE — 80053 COMPREHEN METABOLIC PANEL: CPT

## 2022-05-31 PROCEDURE — 82977 ASSAY OF GGT: CPT

## 2022-05-31 PROCEDURE — 85025 COMPLETE CBC W/AUTO DIFF WBC: CPT

## 2022-05-31 PROCEDURE — 84443 ASSAY THYROID STIM HORMONE: CPT

## 2022-05-31 PROCEDURE — 36415 COLL VENOUS BLD VENIPUNCTURE: CPT

## 2022-05-31 PROCEDURE — 83550 IRON BINDING TEST: CPT

## 2022-05-31 PROCEDURE — 82248 BILIRUBIN DIRECT: CPT

## 2022-05-31 PROCEDURE — 82306 VITAMIN D 25 HYDROXY: CPT

## 2022-06-01 LAB
DEPRECATED CALCIDIOL+CALCIFEROL SERPL-MC: 48 UG/L (ref 20–75)
IRON SATN MFR SERPL: 19 % (ref 15–46)
IRON SERPL-MCNC: 76 UG/DL (ref 35–180)
TIBC SERPL-MCNC: 395 UG/DL (ref 240–430)

## 2022-06-16 NOTE — PROGRESS NOTES
Assessment/Plan:      Karrie was seen today for back pain.    Diagnoses and all orders for this visit:    Pain in thoracic spine  -     PAIN Interlaminar Epidural Steroid Injection Thoracic; Future  -     Pain Management Referral; Future  -     Neurosurgery Referral; Future    Syrinx of spinal cord (H)  -     Pain Management Referral; Future    DDD (degenerative disc disease), lumbar  -     Pain Management Referral; Future    DDD (degenerative disc disease), thoracic  -     PAIN Interlaminar Epidural Steroid Injection Thoracic; Future  -     Pain Management Referral; Future    Thoracic disc herniation  -     PAIN Interlaminar Epidural Steroid Injection Thoracic; Future  -     Pain Management Referral; Future         Assessment: Pleasant 37 year old female with a history of thyroid disorder depression, sleep apnea and asthma with:     1.   Worsening of chronic thoracic spine pain in the mid thoracic spine right greater than left.  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  Pain has progressed over the past 15 to 20 years.  The large syringomyelia measures 7.4 cm.  She also has myofascial pain.  Was seen by Dr. Mishra not a surgical candidate.  Not having good pain control at this time and has not had success at the pain clinic.    2.  chronic lumbar spine pain over the past several years at the lumbosacral junction and SI region.  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  Had persistent pain persist despite MedX  physical therapy she also has L4-5 degenerative disc disease broad-based disc bulge.  Minimal lateral recess stenosis.  Pain was up to 70% improved following bilateral L4-5 transforaminal epidural steroid injection   In the past.                Discussion:    1.  We discussed the diagnosis and treatment options.  She has been through numerous medications including Lyrica gabapentin baclofen tizanidine and  methocarbamol, physical therapy, pain clinic, physical therapy and MedX, and continues to have significant issues most significant pain is in the thoracic spine.  We discussed injections versus further surgical and pain clinic second opinion.    2.  We will refer to the [facility] neurosurgery department for evaluation of syrinx for second opinion.    3.  Trial a T7-8 interlaminar epidural steroid injection with Dr. Watts    4.  We will refer to [facility] pain clinic for second opinion regarding chronic pain management.    5.  Follow-up 2 to 4 weeks after injection.    It was our pleasure caring for your patient today, if there any questions or concerns please do not hesitate to contact us.      Subjective:   Patient ID: Karrie Zhang is a 37 year old female.    History of Present Illness:Patient presents for follow-up of thoracic spine pain lumbar spine pain.  She has had worsening of her symptoms since last visit.  Most significant pain is in the mid thoracic spine to the right parascapular region lumbar spine and left knee.  Pain is worse first thing in the morning and with prolonged sitting.  8/10 at worst 2/10 today 2/10 at best.  She gets some radiation along her ribs bilaterally.  Better with heat and ice but she does have constant pain.    She was seen at the pain clinic and referred to pain physical therapy and she is quite frustrated with both interactions.  Pain clinic did not provide any significant medications or other options and she missed an appointment as she had not yet been to pain PT and that limited her choices/interaction at the pain clinic as well.  She was told to walk and deep breathing physical therapy.  This was all per her report.  She is wondering what other options she has for this pain.    Imaging: Lumbar spine MRI personally reviewed showing normal disc heights throughout the lumbar spine transitional L5 segment/sacralized.  L4-5 small broad-based disc bulge with  "mild to moderate bilateral foraminal stenosis and L3-4 mild bilateral foraminal stenosis.  No disc herniations noted.  Thoracic spine MRI shows a syringomyelia 7 cm in the thoracic cord from T 6-7 through T9-10.  With mild degenerative disc disease at T7-8.      Thoracic MRI from March 2022 images personally reviewed medical decision-making purposes showing the small mid thoracic syringomyelia without pathologic enhancement in the small T7-8 disc herniation appears stable.    Review of Systems: Pertinent positives: Numbness and tingling left knee.  She has headaches.  Pertinent negatives: No   weakness.  No bowel or bladder incontinence.  No urinary retention.  No fevers, unintentional weight loss, balance changes,   frequent falling, difficulty swallowing, or coordination difficulties.  All others reviewed are negative.           Past Medical History:   Diagnosis Date     Acquired hypothyroidism      Alcohol abuse      Allergic rhinitis      Alopecia      Asthma      Cervical dysplasia      Cervical high risk HPV (human papillomavirus) test positive 6/29/16, 7/5/17    neg 16/18     Depression      Disease of thyroid gland      H/O colposcopy with cervical biopsy 08/08/2017 08/08/17: [place] Bx and ECC normal.      Papanicolaou smear of cervix with low grade squamous intraepithelial lesion (LGSIL) 07/05/2017     Rosacea, acne      Sleep apnea      Syrinx of spinal cord (H)      Uncomplicated asthma        The following portions of the patient's history were reviewed and updated as appropriate: allergies, current medications, past family history, past medical history, past social history, past surgical history and problem list.           Objective:   Physical Exam:    /85 (BP Location: Left arm, Patient Position: Sitting, Cuff Size: Adult Regular)   Pulse 68   Ht 5' 7\" (1.702 m)   Wt 215 lb (97.5 kg)   BMI 33.67 kg/m    Body mass index is 33.67 kg/m .      General: Alert and oriented with normal affect. " Attention, knowledge, memory, and language are intact. No acute distress.   Eyes: Sclerae are clear.  Respirations: Unlabored. CV: No lower extremity edema.  Skin: No rashes seen.    Gait:  Nonantalgic  Hypertonic tissue textures mid thoracic paraspinals record of the left.  Sensation is intact to light touch throughout the upper and lower extremities.  Reflexes are 2+ and symmetric in the biceps triceps and brachioradialis with equivocal right negative left Hoffmans.3+ patellar and Achilles with downgoing toes.    Manual muscle testing reveals:  Right /Left out of 5     5/5 finger flexors  5/5 hip flexors  5/5 knee flexors  5/5 knee extensors  5/5 ankle plantar flexors  5/5 ankle dorsiflexors  5/5  EHL and ankle evertors

## 2022-06-17 ENCOUNTER — OFFICE VISIT (OUTPATIENT)
Dept: PHYSICAL MEDICINE AND REHAB | Facility: CLINIC | Age: 37
End: 2022-06-17
Payer: COMMERCIAL

## 2022-06-17 VITALS
HEART RATE: 68 BPM | HEIGHT: 67 IN | DIASTOLIC BLOOD PRESSURE: 85 MMHG | BODY MASS INDEX: 33.74 KG/M2 | SYSTOLIC BLOOD PRESSURE: 130 MMHG | WEIGHT: 215 LBS

## 2022-06-17 DIAGNOSIS — M51.24 THORACIC DISC HERNIATION: ICD-10-CM

## 2022-06-17 DIAGNOSIS — M51.369 DDD (DEGENERATIVE DISC DISEASE), LUMBAR: ICD-10-CM

## 2022-06-17 DIAGNOSIS — M51.34 DDD (DEGENERATIVE DISC DISEASE), THORACIC: ICD-10-CM

## 2022-06-17 DIAGNOSIS — G95.0 SYRINX OF SPINAL CORD (H): ICD-10-CM

## 2022-06-17 DIAGNOSIS — M54.6 PAIN IN THORACIC SPINE: Primary | ICD-10-CM

## 2022-06-17 PROCEDURE — 99214 OFFICE O/P EST MOD 30 MIN: CPT | Performed by: PHYSICAL MEDICINE & REHABILITATION

## 2022-06-17 ASSESSMENT — PAIN SCALES - GENERAL: PAINLEVEL: MILD PAIN (2)

## 2022-06-17 NOTE — PATIENT INSTRUCTIONS
A Thoracic epidural has been ordered today. Please schedule this injection at least  2 weeks from now to allow time for insurance prior authorization. On the day of your injection, you cannot be sick or taking antibiotics. If you become sick and are prescribed, please call the clinic so your injection can be rescheduled for once you have completed your antibiotics. You will need to bring a  with you for your injection. If you have any questions or concerns prior to your injection, please do not hesitate to call the nurse navigation line at [phone].   2. Pain clinic referral placed to [facility] pain  3. Neurosurgery second opinion from the [facility]

## 2022-06-17 NOTE — LETTER
6/17/2022         RE: Karrie Zhang  [address]  [place] [id number]        Dear Colleague,    Thank you for referring your patient, Karrie Zhang, to the [facility] SP[facility]ERY. Please see a copy of my visit note below.    Assessment/Plan:      Karrie was seen today for back pain.    Diagnoses and all orders for this visit:    Pain in thoracic spine  -     PAIN Interlaminar Epidural Steroid Injection Thoracic; Future  -     Pain Management Referral; Future  -     Neurosurgery Referral; Future    Syrinx of spinal cord (H)  -     Pain Management Referral; Future    DDD (degenerative disc disease), lumbar  -     Pain Management Referral; Future    DDD (degenerative disc disease), thoracic  -     PAIN Interlaminar Epidural Steroid Injection Thoracic; Future  -     Pain Management Referral; Future    Thoracic disc herniation  -     PAIN Interlaminar Epidural Steroid Injection Thoracic; Future  -     Pain Management Referral; Future         Assessment: Pleasant 37 year old female with a history of thyroid disorder depression, sleep apnea and asthma with:     1.   Worsening of chronic thoracic spine pain in the mid thoracic spine right greater than left.  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  Pain has progressed over the past 15 to 20 years.  The large syringomyelia measures 7.4 cm.  She also has myofascial pain.  Was seen by Dr. Mishra not a surgical candidate.  Not having good pain control at this time and has not had success at the pain clinic.    2.  chronic lumbar spine pain over the past several years at the lumbosacral junction and SI region.  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  Had persistent pain persist despite MedX  physical therapy she also has L4-5 degenerative disc disease broad-based disc bulge.  Minimal lateral recess stenosis.  Pain was up to 70%  improved following bilateral L4-5 transforaminal epidural steroid injection   In the past.                Discussion:    1.  We discussed the diagnosis and treatment options.  She has been through numerous medications including Lyrica gabapentin baclofen tizanidine and methocarbamol, physical therapy, pain clinic, physical therapy and MedX, and continues to have significant issues most significant pain is in the thoracic spine.  We discussed injections versus further surgical and pain clinic second opinion.    2.  We will refer to the [facility] neurosurgery department for evaluation of syrinx for second opinion.    3.  Trial a T7-8 interlaminar epidural steroid injection with Dr. Watts    4.  We will refer to [facility] pain clinic for second opinion regarding chronic pain management.    5.  Follow-up 2 to 4 weeks after injection.    It was our pleasure caring for your patient today, if there any questions or concerns please do not hesitate to contact us.      Subjective:   Patient ID: Karrie Zhang is a 37 year old female.    History of Present Illness:Patient presents for follow-up of thoracic spine pain lumbar spine pain.  She has had worsening of her symptoms since last visit.  Most significant pain is in the mid thoracic spine to the right parascapular region lumbar spine and left knee.  Pain is worse first thing in the morning and with prolonged sitting.  8/10 at worst 2/10 today 2/10 at best.  She gets some radiation along her ribs bilaterally.  Better with heat and ice but she does have constant pain.    She was seen at the pain clinic and referred to pain physical therapy and she is quite frustrated with both interactions.  Pain clinic did not provide any significant medications or other options and she missed an appointment as she had not yet been to pain PT and that limited her choices/interaction at the pain clinic as well.  She was told to walk and deep breathing physical therapy.   This was all per her report.  She is wondering what other options she has for this pain.    Imaging: Lumbar spine MRI personally reviewed showing normal disc heights throughout the lumbar spine transitional L5 segment/sacralized.  L4-5 small broad-based disc bulge with mild to moderate bilateral foraminal stenosis and L3-4 mild bilateral foraminal stenosis.  No disc herniations noted.  Thoracic spine MRI shows a syringomyelia 7 cm in the thoracic cord from T 6-7 through T9-10.  With mild degenerative disc disease at T7-8.      Thoracic MRI from March 2022 images personally reviewed medical decision-making purposes showing the small mid thoracic syringomyelia without pathologic enhancement in the small T7-8 disc herniation appears stable.    Review of Systems: Pertinent positives: Numbness and tingling left knee.  She has headaches.  Pertinent negatives: No   weakness.  No bowel or bladder incontinence.  No urinary retention.  No fevers, unintentional weight loss, balance changes,   frequent falling, difficulty swallowing, or coordination difficulties.  All others reviewed are negative.           Past Medical History:   Diagnosis Date     Acquired hypothyroidism      Alcohol abuse      Allergic rhinitis      Alopecia      Asthma      Cervical dysplasia      Cervical high risk HPV (human papillomavirus) test positive 6/29/16, 7/5/17    neg 16/18     Depression      Disease of thyroid gland      H/O colposcopy with cervical biopsy 08/08/2017 08/08/17: [place] Bx and ECC normal.      Papanicolaou smear of cervix with low grade squamous intraepithelial lesion (LGSIL) 07/05/2017     Rosacea, acne      Sleep apnea      Syrinx of spinal cord (H)      Uncomplicated asthma        The following portions of the patient's history were reviewed and updated as appropriate: allergies, current medications, past family history, past medical history, past social history, past surgical history and problem list.           Objective:  "  Physical Exam:    /85 (BP Location: Left arm, Patient Position: Sitting, Cuff Size: Adult Regular)   Pulse 68   Ht 5' 7\" (1.702 m)   Wt 215 lb (97.5 kg)   BMI 33.67 kg/m    Body mass index is 33.67 kg/m .      General: Alert and oriented with normal affect. Attention, knowledge, memory, and language are intact. No acute distress.   Eyes: Sclerae are clear.  Respirations: Unlabored. CV: No lower extremity edema.  Skin: No rashes seen.    Gait:  Nonantalgic  Hypertonic tissue textures mid thoracic paraspinals record of the left.  Sensation is intact to light touch throughout the upper and lower extremities.  Reflexes are 2+ and symmetric in the biceps triceps and brachioradialis with equivocal right negative left Hoffmans.3+ patellar and Achilles with downgoing toes.    Manual muscle testing reveals:  Right /Left out of 5     5/5 finger flexors  5/5 hip flexors  5/5 knee flexors  5/5 knee extensors  5/5 ankle plantar flexors  5/5 ankle dorsiflexors  5/5  EHL and ankle evertors       Again, thank you for allowing me to participate in the care of your patient.        Sincerely,        Tapan Rose, DO    "

## 2022-06-21 DIAGNOSIS — G47.33 OSA ON CPAP: Primary | ICD-10-CM

## 2022-06-23 DIAGNOSIS — M54.6 PAIN IN THORACIC SPINE: Primary | ICD-10-CM

## 2022-06-23 DIAGNOSIS — J45.30 MILD PERSISTENT ASTHMA WITHOUT COMPLICATION: ICD-10-CM

## 2022-06-24 RX ORDER — MONTELUKAST SODIUM 10 MG/1
TABLET ORAL
Qty: 90 TABLET | Refills: 0 | Status: SHIPPED | OUTPATIENT
Start: 2022-06-24 | End: 2022-09-26

## 2022-06-24 NOTE — TELEPHONE ENCOUNTER
"Sending adelita refill. Pt will need appointment for future refills. Routing to team to assist with notifying pt and scheduling appointment.    Requested Prescriptions   Pending Prescriptions Disp Refills     montelukast (SINGULAIR) 10 MG tablet [Pharmacy Med Name: MONTELUKAST 10MG TABLETS] 90 tablet 1     Sig: TAKE 1 TABLET(10 MG) BY MOUTH AT BEDTIME       Leukotriene Inhibitors Protocol Failed - 6/23/2022  6:42 PM        Failed - Recent (6 mo) or future (30 days) visit within the authorizing provider's specialty     Patient had office visit in the last 6 months or has a visit in the next 30 days with authorizing provider or within the authorizing provider's specialty.  See \"Patient Info\" tab in inbasket, or \"Choose Columns\" in Meds & Orders section of the refill encounter.            Passed - Patient is age 12 or older     If patient is under 16, ok to refill using age based dosing.           Passed - Asthma control assessment score within normal limits in last 6 months     Please review ACT score.           Passed - Medication is active on med list             Eli JOHNSON RN, BSN  [facility]    "

## 2022-06-27 NOTE — TELEPHONE ENCOUNTER
Called Liz and informed her that a 90 day supply for montelukast was sent to the pharmacy.  Juliane Bose CMA

## 2022-07-06 ENCOUNTER — DOCUMENTATION ONLY (OUTPATIENT)
Dept: SLEEP MEDICINE | Facility: CLINIC | Age: 37
End: 2022-07-06

## 2022-07-06 DIAGNOSIS — G47.33 OSA ON CPAP: Primary | ICD-10-CM

## 2022-07-06 NOTE — PROGRESS NOTES
STM Recheck:  Called patient she states she she have been feeling a little more tired and was wondering if her data looks normal.  Patient stated she has been in a little more of a fog since having covid last year. Will request pressure change to see if that helps fatigue.

## 2022-07-20 ENCOUNTER — E-VISIT (OUTPATIENT)
Dept: OBGYN | Facility: CLINIC | Age: 37
End: 2022-07-20
Payer: COMMERCIAL

## 2022-07-20 ENCOUNTER — MYC MEDICAL ADVICE (OUTPATIENT)
Dept: FAMILY MEDICINE | Facility: CLINIC | Age: 37
End: 2022-07-20

## 2022-07-20 DIAGNOSIS — Z53.9 ERRONEOUS ENCOUNTER--DISREGARD: Primary | ICD-10-CM

## 2022-07-20 DIAGNOSIS — E03.9 ACQUIRED HYPOTHYROIDISM: Primary | ICD-10-CM

## 2022-07-24 NOTE — LETTER
1/10/2022         RE: Karrie Zhang  [address]  [facility] [address]        Dear Colleague,    Thank you for referring your patient, Karrie Zhang, to the [facility]. Please see a copy of my visit note below.     [facility]    VIDEO VISIT   How would you like to obtain your AVS? MyChart  Will anyone else be joining your video visit? No  Is patient CURRENTLY in [place]? yes  If patient encounters technical issues they should call [phone]    Video-Visit Details  Type of service:  Video Visit  Video Start Time: 9:45 AM  Video End Time: 10:35 AM  Total Face to Face Time: 50 minutes   Originating Location (pt. Location): Home  Distant Location (provider location):  [facility]   Platform used for Video Visit: Virtualtwo    This patient is being seen in consultation at the request of her primary care provider Tapan Rose DO,  for evaluation of her pain issues and recommendations for management, with specific emphasis on: Chronic pain.  Syringoma kasi:     Primary Care Provider is Vanessa Granados    Current controlled substance medications are being prescribed by: N/A    CHIEF COMPLAINT:  Low back pain     HISTORY OF PRESENT ILLNESS:  Karrie Zhang is a 36 year old female with history of low back pain  Onset/Progression:  SI joint, low back pain, bilateral radicular pain to knee Always had middle back pain from breast size, low back started a couple of years ago, started slowly achy pain, then progressed to not being able to walk/stand without pain. Walking helps but can also makes it worse. Icy Hot helps middle back pain.   Pain quality: Aching and Sharp   Pain timing: Constant    Pain rating: intensity ranges from 1-2/10 to 8/10, and averages 4/10 on a 0-10 scale.  Aggravating factors include: walking, standing, poor posture, bending over, getting up off of the floor, sitting for a long time.  "   Relieving factors include: Very hot bath, icy hot patches.     Past Pain Treatments:   Pain Clinic:   No    PT: Yes  [facility] which was helpful but doesn't last long enough. Does not do HEP that was given   Chiropractor: Yes [facility]  Acupuncture: Yes NH  Health/Pain Psychologist: No   Pharmacotherapy:    Opioids: No     Non-opioids:  Yes   TENs Unit/Electric Stim:Yes H at the time   Injections: Yes  10/2021: Lumbar Transforaminal STEPHANIE Bilateral L4-5. Good pain relief for 1 month.  Surgeries related to pain: No     Current Pain Relevant Medications:    NONE    Previous Pain Relevant Medications: (H--helped; HI--Helped initially; [facility]--Somewhat helpful; NH--No help; W--worse; SE--side effects; ?--Unsure if helpful)   NOTE: This medication information taken from patient's intake form, not medical records.   Opiates: None  NSAIDS: Naproxen: NH  Anti-migraine medications: Imitrex: H  Muscle Relaxants: Robaxin:NH, Baclofen:SE  Neuropathics: Gabapentin: NH, SE  Anti-depressants: not for pain    Anxiety medications: Buspar: H   Topicals: Icy Hot patches, roll on CBD  Sleep medications: Ambien: H (for sleep study)   Other medications not covered above: none   This will be added at a later date when new patient packet is available.     CURRENT FAMILY/SOCIAL SITUATION:  Past/Present occupation: , full time.  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     Illicit Substance Use: none  ETOH: Drink 3 drink per day  Tobacco: none  Caffeine use: 1 per day    THE 4 As OF OPIOID MAINTENANCE ANALGESIA    Analgesia: Is pain relief clinically significant? N/A   Activity: Is patient functional and able to perform Activities of Daily Living? N/A   Adverse effects: Is patient free from adverse side effects from " opiates? N/A   Adherence to Rx protocol: Is patient adhering to Controlled Substance Agreement and taking medications ONLY as ordered? N/A    Is Narcan prescribed for opiate use >50 MME daily or concurrent use of opiates and benzodiazepines? N/A    [place] Board of Pharmacy Data Base Reviewed:    YES; No concern for abuse or misuse of controlled medications based on this report. Reviewed [facility] January 9, 2022- no concerning fills.       PAST MEDICAL HISTORY:   Past Medical History:   Diagnosis Date     Depressive disorder 16 years ago    No longer an issue     Hyperlipidemia LDL goal <160      Hypertension goal BP (blood pressure) < 140/90      Mild persistent asthma 4/9/2014         PHYSICAL EXAM: Exam is not complete due to the nature of a virtual evaluation    Appearance:     A&O. Patient is appropriate.   Patient is in NAD.     DIRE Score for ongoing opioid management is calculated as follows:    Diagnosis = 2 pts (slowly progressive; moderate pain/objective findings)    Intractability = 1 pt (few therapies tried; passive patient role)    Risk        Psych = 2 pts (personality dysfunction/mental illness that moderately interferes with care)         Chem Hlth = 1 pt (active or very recent use of illicit drugs; excessive alcohol/drug abuse)       Reliability = 2 pts (occasional difficulties with compliance; generally reliable)       Social = 3 pts (supportive family/close relationships; involved in work/school; no isolation)       (Psych + Chem hlth + Reliability + Social) = 11    Efficacy = 2 pts (moderate benefit/function; low med dose; too early/not tried meds)    DIRE Score = 13        7-13: likely NOT suitable candidate for long-term opioid analgesia       14-21: may be a suitable candidate for long-term opioid analgesia    DIAGNOSTIC RESULTS:  MR THORACIC SPINE W/O and W CONTRAST 9/17/21:   IMPRESSION:  1.  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 The maximum diameter of the syrinx is at the T8 level measuring 5 x 6 mm, unchanged. No abnormal contrast enhancement within the thoracic   spinal cord.  2.  At the T7/T8 level, there is a small focal right central disc protrusion without significant spinal canal narrowing.    MR LUMBAR SPINE W/O CONTRAST 7/19/2021   CONCLUSION:  1.  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 Recommend close attention to numbering scheme for any future planned intervention.  2.  Multilevel degenerative changes of the lumbar spine as described in detail above, greatest at L4-L5, there is mild spinal canal stenosis and mild to moderate bilateral neuroforaminal stenosis.  3.  At L3-L4, there is mild bilateral neuroforaminal stenosis.    PAIN RELATED CONDITIONS:   1.  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  Chronic low back pain with bilateral LE radiating pain to mid thigh.   3.  Active alcoholism   4.  Depression    ASSESSMENT/PLAN:    (M48.062) Spinal stenosis of lumbar region with neurogenic claudication  (primary encounter diagnosis)  (G89.29) Other chronic pain  (M54.6,  G89.29) Chronic midline thoracic back pain  (M35.3) Polymyalgia (H)  Comment: iLz has widespread chronic pain related to degenerative spinal disease.   Plan: Physical Therapy Referral  Plan: Vitamin D deficiency screening    (Z79.891) Encounter for long-term use of opiate analgesic  (F10.20) Alcoholism (H)  Comment: Labs related to chronic health issues and UDS per clinic policy before prescribing.   Plan: Comprehensive metabolic panel, Magnesium,   Alcohol ethyl Drug Confirmation Panel Urine with Creat,   Barbiturates qualitative urine           PATIENT INSTRUCTIONS:     Diagnosis reviewed, treatment option addressed, and risk/benefits discussed.  Self-care instructions given.  I am recommending a multidisciplinary treatment plan to help this patient better manage pain.    Remember to request ALL medication  refills 5 days before you run out.     1. Physical therapy: YES With Benjy Jauregui in Chronic Pain PT.   2. Follow-up with JOHNNIE Foreman, NP-C in 1 weeks/months.  3. Labs: Metabolic panel, drug screen, vit D level.   4. Referrals: [facility] day program if desired.   5. Medication Management : Will discuss after labs are completed.     I have reviewed the note as documented above.  This accurately captures the substance of my conversation with the patient.    BILLING TIME DOCUMENTATION:     TOTAL TIME includes:   Time spent preparing to see the patient: 5 minutes (reviewing records and tests)  Time spend face to face with the patient: 50 minutes  Time spent ordering tests, medications, procedures and referrals: 0 minutes  Time spent Referring and communicating with other healthcare professionals: 0 minutes  Documenting clinical information in Epic: 15 minutes  The total TIME spent on this patient on the day of the appointment was 70 minutes.     JOHNNIE Pal, NP-C  [facility]        Patient presents to the clinic today for a follow up with JOHNNIE Daniel CNP  regarding Pain Management.     Liz is a 36 year old who is being evaluated via a billable video visit.      How would you like to obtain your AVS? MyChart  If the video visit is dropped, the invitation should be resent by: Text to cell phone: [phone]  Will anyone else be joining your video visit? No      Video Start Time:   Video-Visit Details    Type of service:  Video Visit    Video End Time:    Originating Location (pt. Location):    Distant Location (provider location):  [facility] [place]     Platform used for Video Visit: Randy         Is Pt currently in [place]? Yes    NOTE:  If Pt is not in [place], Appointment needs to be canceled and rescheduled         PEG Score 1/10/2022   PEG Total Score 3.67          QUESTIONS:    Lower back- Mid back  pain- Whole back. Sometimes the pain wraps around the front of pelvis and shoots down the leg.     Betzy Soriano MA  [facility]       Again, thank you for allowing me to participate in the care of your patient.        Sincerely,        JOHNNIE Daniel CNP     No

## 2022-07-25 ASSESSMENT — ENCOUNTER SYMPTOMS
CHILLS: 0
WEIGHT GAIN: 0
INCREASED ENERGY: 1
ABDOMINAL PAIN: 0
POLYPHAGIA: 0
DECREASED LIBIDO: 1
ALTERED TEMPERATURE REGULATION: 0
STIFFNESS: 1
MUSCLE CRAMPS: 0
MUSCLE WEAKNESS: 0
NAIL CHANGES: 0
MYALGIAS: 1
HOT FLASHES: 0
BACK PAIN: 1
HALLUCINATIONS: 0
BLOOD IN STOOL: 0
PANIC: 0
DIARRHEA: 0
DEPRESSION: 1
HEARTBURN: 0
POLYDIPSIA: 0
CONSTIPATION: 1
POOR WOUND HEALING: 0
DECREASED CONCENTRATION: 0
VOMITING: 0
BLOATING: 0
JAUNDICE: 0
DECREASED APPETITE: 0
BOWEL INCONTINENCE: 0
NERVOUS/ANXIOUS: 0
NECK PAIN: 1
INSOMNIA: 0
SKIN CHANGES: 0
RECTAL PAIN: 0
NAUSEA: 0
JOINT SWELLING: 0
WEIGHT LOSS: 0
FEVER: 0
NIGHT SWEATS: 0
FATIGUE: 1
ARTHRALGIAS: 0

## 2022-07-26 ENCOUNTER — OFFICE VISIT (OUTPATIENT)
Dept: SURGERY | Facility: CLINIC | Age: 37
End: 2022-07-26
Attending: OBSTETRICS & GYNECOLOGY
Payer: COMMERCIAL

## 2022-07-26 ENCOUNTER — PRE VISIT (OUTPATIENT)
Dept: SURGERY | Facility: CLINIC | Age: 37
End: 2022-07-26

## 2022-07-26 VITALS
SYSTOLIC BLOOD PRESSURE: 131 MMHG | HEART RATE: 72 BPM | OXYGEN SATURATION: 99 % | WEIGHT: 218.3 LBS | HEIGHT: 67 IN | DIASTOLIC BLOOD PRESSURE: 79 MMHG | BODY MASS INDEX: 34.26 KG/M2

## 2022-07-26 DIAGNOSIS — L29.0 PERIANAL IRRITATION: Primary | ICD-10-CM

## 2022-07-26 PROCEDURE — 99202 OFFICE O/P NEW SF 15 MIN: CPT | Performed by: NURSE PRACTITIONER

## 2022-07-26 ASSESSMENT — PAIN SCALES - GENERAL: PAINLEVEL: NO PAIN (0)

## 2022-07-26 NOTE — LETTER
"2022       RE: Karrie Zhang  [address]  [facility] [address]     Dear Colleague,    Thank you for referring your patient, Karrie Zhang, to the [facility] [place] [facility]. Please see a copy of my visit note below.    Colon and Rectal Surgery Consult Clinic Note    Date: 2022     Referring provider:  Brooke Kasper MD  [address]  [place] [address]     RE: Karrie Zhang  : 1985  GARCIA: 2022    Karrie Zhang is a very pleasant 37 year old female here for external hemorrhoids.    Liz was referred here by her OBGYN Dr. Kasper for an external hemorrhoid that was noticed on exam in April of this year. Liz reports that she only experiences occasional irritation on the hemorrhoid that results in light bleeding and itching. She uses Preparation H as needed. She denies any rectal pain or bleeding otherwise. She denies a family history of colorectal cancer.    Physical Examination:  /79 (BP Location: Left arm, Patient Position: Sitting, Cuff Size: Adult Large)   Pulse 72   Ht 5' 7\"   Wt 218 lb 4.8 oz   SpO2 99%   BMI 34.19 kg/m    General: alert, oriented, in no acute distress, sitting comfortably  HEENT: moist mucous membranes    Perianal external examination: Exam was chaperoned by Jesica Wise NP   Perianal skin: Intact with mild excoriation.  Lesions: No evidence of an external lesion, nodularity, or induration in the perianal region.  Eversion of buttocks: There was not evidence of an anal fissure. Details: N/A.  Skin tags or external hemorrhoids: anterior perineal skin fold that does not appear to be a true skin tag.    Digital rectal examination: Was deferred    Anoscopy: Was deferred    Assessment/Plan: Karrie Zhang is a 37 year old female with intermittent perianal irritation and itching. She does not have a " true anal skin tag on exam. Discussed with her that her irritation is likely due to excess moisture. Recommended that she start a daily fiber supplement to decrease moisture. Also recommended topical Calmoseptine for irritation as needed. Return to clinic as needed if symptoms return. Patient's questions were answered to her stated satisfaction and she is in agreement with this plan.     Medical history:  Past Medical History:   Diagnosis Date     Acquired hypothyroidism      Alcohol abuse      Allergic rhinitis      Alopecia      Asthma      Cervical dysplasia      Cervical high risk HPV (human papillomavirus) test positive 6/29/16, 7/5/17    neg 16/18     Depression      Disease of thyroid gland      H/O colposcopy with cervical biopsy 08/08/2017 08/08/17: Farner Bx and ECC normal.      Papanicolaou smear of cervix with low grade squamous intraepithelial lesion (LGSIL) 07/05/2017     Rosacea, acne      Sleep apnea      Syrinx of spinal cord (H)      Uncomplicated asthma        Surgical history:  Past Surgical History:   Procedure Laterality Date     LASER TX, CERVICAL  2005       Problem list:    Patient Active Problem List    Diagnosis Date Noted     Lumbago 03/11/2022     Priority: Medium     Bilateral thoracic back pain 03/11/2022     Priority: Medium     Polymyalgia (H) 01/30/2022     Priority: Medium     Syrinx of spinal cord (H) 11/08/2021     Priority: Medium     Alcohol dependence (H) 09/29/2020     Priority: Medium     Acquired hypothyroidism      Priority: Medium     Rosacea, acne      Priority: Medium     Cervical high risk HPV (human papillomavirus) test positive 06/29/2016     Priority: Medium     2005: cervical laser treatment for dysplasia and cryotherapy.  6/29/16: NIL Pap, + HR HPV (Neg 16/18). Plan cotest in 1 year.   7/5/17 LSIL Pap, + HR HPV (Not 16/18). Plan colp  08/08/17: Farner Bx and ECC normal. Plan cotest in 1 year per provider.   8/22/18 NIL Pap, Neg HPV. Plan cotest in 3 years.    2/24/22 NIL pap, Neg HPV. Plan cotest in 3 years.        Perioral dermatitis 05/03/2016     Priority: Medium     Mild persistent asthma without complication 05/03/2016     Priority: Medium     Anxiety 05/03/2016     Priority: Medium     Major depressive disorder, recurrent episode, mild (H) 05/03/2016     Priority: Medium     Multiple environmental allergies 05/03/2016     Priority: Medium     Obesity (BMI 30-39.9) 05/03/2016     Priority: Medium     Hay fever 06/30/2015     Priority: Medium     Overview:   Allergic to pollen (trees - especially oak, grasses, weeds), dust, guinea pigs, apples         Medications:  Current Outpatient Medications   Medication Sig Dispense Refill     albuterol (PROAIR HFA/PROVENTIL HFA/VENTOLIN HFA) 108 (90 Base) MCG/ACT inhaler Inhale 2 puffs into the lungs every 6 hours as needed for shortness of breath / dyspnea or wheezing 18 g 3     busPIRone HCl (BUSPAR) 30 MG tablet TAKE 1 TABLET BY MOUTH TWICE DAILY AS NEEDED FOR ANXIETY       cetirizine (ZYRTEC) 10 MG tablet Take 1 tablet (10 mg) by mouth daily 90 tablet 3     diclofenac (VOLTAREN) 50 MG EC tablet Take 1 tablet (50 mg) by mouth 2 times daily 60 tablet 1     FLUoxetine (PROZAC) 40 MG capsule Take 1 capsule (40 mg) by mouth daily (Patient taking differently: Take 10 mg by mouth daily) 90 capsule 1     fluticasone (FLONASE) 50 MCG/ACT nasal spray Spray 1 spray into both nostrils daily 16 g 0     fluticasone (FLONASE) 50 MCG/ACT nasal spray        hydrocortisone 2.5 % cream        lamoTRIgine (LAMICTAL) 25 MG tablet Take 100 mg by mouth daily       levonorgestrel-ethinyl estrad (LETY) 90-20 MCG tablet Take 1 tablet by mouth daily 90 tablet 3     levothyroxine (SYNTHROID/LEVOTHROID) 75 MCG tablet TAKE 1 TABLET(75 MCG) BY MOUTH DAILY 90 tablet 0     metroNIDAZOLE (METROGEL) 0.75 % vaginal gel Place 1 applicator (5 g) vaginally daily 70 g 0     montelukast (SINGULAIR) 10 MG tablet TAKE 1 TABLET(10 MG) BY MOUTH AT BEDTIME 90  "tablet 0     tacrolimus (PROTOPIC) 0.1 % external ointment APPLY TOPICALLY BID ON FACE UTD       etonogestrel-ethinyl estradiol (NUVARING) 0.12-0.015 MG/24HR vaginal ring Place 1 each vaginally every 28 days 3 each 3     norgestim-eth estrad triphasic (ORTHO TRI-CYCLEN LO) 0.18/0.215/0.25 MG-25 MCG tablet Take 1 tablet by mouth daily 84 tablet 3     norgestimate-ethinyl estradiol (ORTHO-CYCLEN) 0.25-35 MG-MCG tablet Take 1 tablet by mouth daily 84 tablet 3       Allergies:  Allergies   Allergen Reactions     Penicillins Rash       Family history:  Family History   Problem Relation Age of Onset     Osteoporosis Mother      Diabetes Father      Other Cancer Father         Pancreatic     Substance Abuse Father         alcoholic: stopped when I was in grade school     Prostate Cancer Maternal Grandfather      Prostate Cancer Other      Depression Brother      Alcoholism Brother      Anxiety Disorder Brother      Coronary Artery Disease No family hx of      Cancer Father        Social history:  Social History     Tobacco Use     Smoking status: Never Smoker     Smokeless tobacco: Never Used   Substance Use Topics     Alcohol use: Yes     Alcohol/week: 42.0 standard drinks     Types: 42 Shots of liquor per week    Marital status: single.  Occupation: .    Nursing Notes:   Marce Finley, EMT  7/26/2022  3:24 PM  Signed  Chief Complaint   Patient presents with     New Patient     Hemorrhoids       Vitals:    07/26/22 1521   BP: 131/79   BP Location: Left arm   Patient Position: Sitting   Cuff Size: Adult Large   Pulse: 72   SpO2: 99%   Weight: 218 lb 4.8 oz   Height: 5' 7\"       Body mass index is 34.19 kg/m .                          Marce Finley, EMT         15 minutes spent on the date of encounter (excluding time performing procedures) performing chart review, history and exam, documentation and further activities as noted above.     -----------------------------------------------------  Joseline " JAMIE Clark-KARISHMA  Colon and Rectal Surgery   [facility]    Attestation signed by Jesica Fernandez APRN CNP at 7/26/2022  4:01 PM:  Patient seen today, 07/26/22, with JAMIE Olivo.  I agree with the dictation and have made any necessary changes.       JOHNNIE Beasley, NP-C  Colon and Rectal Surgery  [facility]

## 2022-07-26 NOTE — PATIENT INSTRUCTIONS
Start a daily fiber supplement such as Citrucel, Benefiber, or Metamucil. Start with once a day and slowly increase up to three times a day, if needed, over the next 4-6 weeks.  Use Calmoseptine topically as needed for irritation.  Return to clinic if symptoms return.

## 2022-07-26 NOTE — PROGRESS NOTES
"Colon and Rectal Surgery Consult Clinic Note    Date: 2022     Referring provider:  Brooke Kasper MD  [address] [address]     RE: Karrie Zhang  : 1985  GARCIA: 2022    Karrie Zhang is a very pleasant 37 year old female here for external hemorrhoids.    Liz was referred here by her OBGYN Dr. Kasper for an external hemorrhoid that was noticed on exam in April of this year. Liz reports that she only experiences occasional irritation on the hemorrhoid that results in light bleeding and itching. She uses Preparation H as needed. She denies any rectal pain or bleeding otherwise. She denies a family history of colorectal cancer.    Physical Examination:  /79 (BP Location: Left arm, Patient Position: Sitting, Cuff Size: Adult Large)   Pulse 72   Ht 5' 7\"   Wt 218 lb 4.8 oz   SpO2 99%   BMI 34.19 kg/m    General: alert, oriented, in no acute distress, sitting comfortably  HEENT: moist mucous membranes    Perianal external examination: Exam was chaperoned by Jesica Wise NP   Perianal skin: Intact with mild excoriation.  Lesions: No evidence of an external lesion, nodularity, or induration in the perianal region.  Eversion of buttocks: There was not evidence of an anal fissure. Details: N/A.  Skin tags or external hemorrhoids: anterior perineal skin fold that does not appear to be a true skin tag.    Digital rectal examination: Was deferred    Anoscopy: Was deferred    Assessment/Plan: Karrie Zhang is a 37 year old female with intermittent perianal irritation and itching. She does not have a true anal skin tag on exam. Discussed with her that her irritation is likely due to excess moisture. Recommended that she start a daily fiber supplement to decrease moisture. Also recommended topical Calmoseptine for irritation as needed. Return to clinic as needed if symptoms return. Patient's questions were answered to her stated satisfaction " and she is in agreement with this plan.     Medical history:  Past Medical History:   Diagnosis Date     Acquired hypothyroidism      Alcohol abuse      Allergic rhinitis      Alopecia      Asthma      Cervical dysplasia      Cervical high risk HPV (human papillomavirus) test positive 6/29/16, 7/5/17    neg 16/18     Depression      Disease of thyroid gland      H/O colposcopy with cervical biopsy 08/08/2017 08/08/17: [place] Bx and ECC normal.      Papanicolaou smear of cervix with low grade squamous intraepithelial lesion (LGSIL) 07/05/2017     Rosacea, acne      Sleep apnea      Syrinx of spinal cord (H)      Uncomplicated asthma        Surgical history:  Past Surgical History:   Procedure Laterality Date     LASER TX, CERVICAL  2005       Problem list:    Patient Active Problem List    Diagnosis Date Noted     Lumbago 03/11/2022     Priority: Medium     Bilateral thoracic back pain 03/11/2022     Priority: Medium     Polymyalgia (H) 01/30/2022     Priority: Medium     Syrinx of spinal cord (H) 11/08/2021     Priority: Medium     Alcohol dependence (H) 09/29/2020     Priority: Medium     Acquired hypothyroidism      Priority: Medium     Rosacea, acne      Priority: Medium     Cervical high risk HPV (human papillomavirus) test positive 06/29/2016     Priority: Medium     2005: cervical laser treatment for dysplasia and cryotherapy.  6/29/16: NIL Pap, + HR HPV (Neg 16/18). Plan cotest in 1 year.   7/5/17 LSIL Pap, + HR HPV (Not 16/18). Plan colp  08/08/17: [place] Bx and ECC normal. Plan cotest in 1 year per provider.   8/22/18 NIL Pap, Neg HPV. Plan cotest in 3 years.   2/24/22 NIL pap, Neg HPV. Plan cotest in 3 years.        Perioral dermatitis 05/03/2016     Priority: Medium     Mild persistent asthma without complication 05/03/2016     Priority: Medium     Anxiety 05/03/2016     Priority: Medium     Major depressive disorder, recurrent episode, mild (H) 05/03/2016     Priority: Medium     Multiple environmental  allergies 05/03/2016     Priority: Medium     Obesity (BMI 30-39.9) 05/03/2016     Priority: Medium     Hay fever 06/30/2015     Priority: Medium     Overview:   Allergic to pollen (trees - especially oak, grasses, weeds), dust, guinea pigs, apples         Medications:  Current Outpatient Medications   Medication Sig Dispense Refill     albuterol (PROAIR HFA/PROVENTIL HFA/VENTOLIN HFA) 108 (90 Base) MCG/ACT inhaler Inhale 2 puffs into the lungs every 6 hours as needed for shortness of breath / dyspnea or wheezing 18 g 3     busPIRone HCl (BUSPAR) 30 MG tablet TAKE 1 TABLET BY MOUTH TWICE DAILY AS NEEDED FOR ANXIETY       cetirizine (ZYRTEC) 10 MG tablet Take 1 tablet (10 mg) by mouth daily 90 tablet 3     diclofenac (VOLTAREN) 50 MG EC tablet Take 1 tablet (50 mg) by mouth 2 times daily 60 tablet 1     FLUoxetine (PROZAC) 40 MG capsule Take 1 capsule (40 mg) by mouth daily (Patient taking differently: Take 10 mg by mouth daily) 90 capsule 1     fluticasone (FLONASE) 50 MCG/ACT nasal spray Spray 1 spray into both nostrils daily 16 g 0     fluticasone (FLONASE) 50 MCG/ACT nasal spray        hydrocortisone 2.5 % cream        lamoTRIgine (LAMICTAL) 25 MG tablet Take 100 mg by mouth daily       levonorgestrel-ethinyl estrad (LETY) 90-20 MCG tablet Take 1 tablet by mouth daily 90 tablet 3     levothyroxine (SYNTHROID/LEVOTHROID) 75 MCG tablet TAKE 1 TABLET(75 MCG) BY MOUTH DAILY 90 tablet 0     metroNIDAZOLE (METROGEL) 0.75 % vaginal gel Place 1 applicator (5 g) vaginally daily 70 g 0     montelukast (SINGULAIR) 10 MG tablet TAKE 1 TABLET(10 MG) BY MOUTH AT BEDTIME 90 tablet 0     tacrolimus (PROTOPIC) 0.1 % external ointment APPLY TOPICALLY BID ON FACE UTD       etonogestrel-ethinyl estradiol (NUVARING) 0.12-0.015 MG/24HR vaginal ring Place 1 each vaginally every 28 days 3 each 3     norgestim-eth estrad triphasic (ORTHO TRI-CYCLEN LO) 0.18/0.215/0.25 MG-25 MCG tablet Take 1 tablet by mouth daily 84 tablet 3      "norgestimate-ethinyl estradiol (ORTHO-CYCLEN) 0.25-35 MG-MCG tablet Take 1 tablet by mouth daily 84 tablet 3       Allergies:  Allergies   Allergen Reactions     Penicillins Rash       Family history:  Family History   Problem Relation Age of Onset     Osteoporosis Mother      Diabetes Father      Other Cancer Father         Pancreatic     Substance Abuse Father         alcoholic: stopped when I was in grade school     Prostate Cancer Maternal Grandfather      Prostate Cancer Other      Depression Brother      Alcoholism Brother      Anxiety Disorder Brother      Coronary Artery Disease No family hx of      Cancer Father        Social history:  Social History     Tobacco Use     Smoking status: Never Smoker     Smokeless tobacco: Never Used   Substance Use Topics     Alcohol use: Yes     Alcohol/week: 42.0 standard drinks     Types: 42 Shots of liquor per week    Marital status: single.  Occupation: .    Nursing Notes:   Marce Finley, EMT  7/26/2022  3:24 PM  Signed  Chief Complaint   Patient presents with     New Patient     Hemorrhoids       Vitals:    07/26/22 1521   BP: 131/79   BP Location: Left arm   Patient Position: Sitting   Cuff Size: Adult Large   Pulse: 72   SpO2: 99%   Weight: 218 lb 4.8 oz   Height: 5' 7\"       Body mass index is 34.19 kg/m .                          Marce Finley, EMT         15 minutes spent on the date of encounter (excluding time performing procedures) performing chart review, history and exam, documentation and further activities as noted above.     -----------------------------------------------------  Joseline Clark PA-C  Colon and Rectal Surgery   [facility]  "

## 2022-07-26 NOTE — NURSING NOTE
"Chief Complaint   Patient presents with     New Patient     Hemorrhoids       Vitals:    07/26/22 1521   BP: 131/79   BP Location: Left arm   Patient Position: Sitting   Cuff Size: Adult Large   Pulse: 72   SpO2: 99%   Weight: 218 lb 4.8 oz   Height: 5' 7\"       Body mass index is 34.19 kg/m .                          Marce Finley, EMT    "

## 2022-08-02 ENCOUNTER — LAB (OUTPATIENT)
Dept: LAB | Facility: HOSPITAL | Age: 37
End: 2022-08-02
Payer: COMMERCIAL

## 2022-08-02 DIAGNOSIS — E03.9 ACQUIRED HYPOTHYROIDISM: ICD-10-CM

## 2022-08-02 LAB
T3 SERPL-MCNC: 117 NG/DL (ref 85–202)
T3FREE SERPL-MCNC: 2.6 PG/ML (ref 2–4.4)
T4 FREE SERPL-MCNC: 1.18 NG/DL (ref 0.9–1.7)
THYROGLOB AB SERPL IA-ACNC: <20 IU/ML
THYROPEROXIDASE AB SERPL-ACNC: <10 IU/ML
TSH SERPL DL<=0.005 MIU/L-ACNC: 0.83 UIU/ML (ref 0.3–5)

## 2022-08-02 PROCEDURE — 86800 THYROGLOBULIN ANTIBODY: CPT

## 2022-08-02 PROCEDURE — 86376 MICROSOMAL ANTIBODY EACH: CPT

## 2022-08-02 PROCEDURE — 84481 FREE ASSAY (FT-3): CPT

## 2022-08-02 PROCEDURE — 84480 ASSAY TRIIODOTHYRONINE (T3): CPT

## 2022-08-02 PROCEDURE — 84443 ASSAY THYROID STIM HORMONE: CPT

## 2022-08-02 PROCEDURE — 84439 ASSAY OF FREE THYROXINE: CPT

## 2022-08-02 PROCEDURE — 84482 T3 REVERSE: CPT

## 2022-08-02 PROCEDURE — 36415 COLL VENOUS BLD VENIPUNCTURE: CPT

## 2022-08-02 NOTE — RESULT ENCOUNTER NOTE
Your results are normal.  Your final test results are pending.  Please check your chart again within 2 - 3 days. You will receive further instruction when your full test result panel is complete.     Vanessa Granados MD

## 2022-08-05 LAB — T3REVERSE SERPL-MCNC: 15.7 NG/DL

## 2022-08-06 DIAGNOSIS — E03.9 ACQUIRED HYPOTHYROIDISM: ICD-10-CM

## 2022-08-06 RX ORDER — LEVOTHYROXINE SODIUM 75 UG/1
TABLET ORAL
Qty: 90 TABLET | Refills: 0 | Status: SHIPPED | OUTPATIENT
Start: 2022-08-06 | End: 2022-11-15

## 2022-08-16 ENCOUNTER — OFFICE VISIT (OUTPATIENT)
Dept: NEUROSURGERY | Facility: CLINIC | Age: 37
End: 2022-08-16
Attending: NEUROLOGICAL SURGERY
Payer: COMMERCIAL

## 2022-08-16 VITALS
HEART RATE: 80 BPM | DIASTOLIC BLOOD PRESSURE: 84 MMHG | SYSTOLIC BLOOD PRESSURE: 122 MMHG | BODY MASS INDEX: 34.97 KG/M2 | WEIGHT: 217.59 LBS | HEIGHT: 66 IN

## 2022-08-16 DIAGNOSIS — M54.6 PAIN IN THORACIC SPINE: ICD-10-CM

## 2022-08-16 PROCEDURE — G0463 HOSPITAL OUTPT CLINIC VISIT: HCPCS

## 2022-08-16 PROCEDURE — 99213 OFFICE O/P EST LOW 20 MIN: CPT | Performed by: NEUROLOGICAL SURGERY
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
## 2022-08-16 NOTE — PATIENT INSTRUCTIONS
You met with Pediatric Neurosurgery at the [facility]    [address] Dr., NP      Pediatric Appointment Scheduling and Call Center:   [phone]    Nurse Practitioner  [phone]    Mailing Address  [address] [address]    Street Address   [address] [place] [address]    Fax Number  [phone]    For urgent matters that cannot wait until the next business day, occur over a holiday and/or weekend, report directly to your nearest ER or you may call [phone] and ask to page the Pediatric Neurosurgery Resident on call.
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
## 2022-08-16 NOTE — NURSING NOTE
"Chief Complaint   Patient presents with     Consult     Pain in thoracic spine       /84 (BP Location: Right arm, Patient Position: Sitting, Cuff Size: Adult Large)   Pulse 80   Ht 5' 5.59\" (166.6 cm)   Wt 217 lb 9.5 oz (98.7 kg)   HC 57.3 cm (22.56\")   BMI 35.56 kg/m      Jolene Jarrett  August 16, 2022  "

## 2022-09-07 ENCOUNTER — OFFICE VISIT (OUTPATIENT)
Dept: PEDIATRICS | Facility: CLINIC | Age: 37
End: 2022-09-07
Payer: COMMERCIAL

## 2022-09-07 ENCOUNTER — MYC MEDICAL ADVICE (OUTPATIENT)
Dept: PEDIATRICS | Facility: CLINIC | Age: 37
End: 2022-09-07

## 2022-09-07 VITALS
RESPIRATION RATE: 16 BRPM | HEART RATE: 81 BPM | DIASTOLIC BLOOD PRESSURE: 64 MMHG | OXYGEN SATURATION: 99 % | WEIGHT: 214.4 LBS | BODY MASS INDEX: 35.04 KG/M2 | TEMPERATURE: 98.1 F | SYSTOLIC BLOOD PRESSURE: 100 MMHG

## 2022-09-07 DIAGNOSIS — M54.12 CERVICAL RADICULOPATHY: Primary | ICD-10-CM

## 2022-09-07 PROCEDURE — 99214 OFFICE O/P EST MOD 30 MIN: CPT | Performed by: PHYSICIAN ASSISTANT

## 2022-09-07 RX ORDER — CYCLOBENZAPRINE HCL 10 MG
10 TABLET ORAL 3 TIMES DAILY PRN
Qty: 15 TABLET | Refills: 0 | Status: SHIPPED | OUTPATIENT
Start: 2022-09-07 | End: 2022-10-04 | Stop reason: SINTOL

## 2022-09-07 RX ORDER — METHYLPREDNISOLONE 4 MG
TABLET, DOSE PACK ORAL
Qty: 21 TABLET | Refills: 0 | Status: SHIPPED | OUTPATIENT
Start: 2022-09-07 | End: 2022-10-04

## 2022-09-07 ASSESSMENT — ASTHMA QUESTIONNAIRES
ACT_TOTALSCORE: 25
QUESTION_3 LAST FOUR WEEKS HOW OFTEN DID YOUR ASTHMA SYMPTOMS (WHEEZING, COUGHING, SHORTNESS OF BREATH, CHEST TIGHTNESS OR PAIN) WAKE YOU UP AT NIGHT OR EARLIER THAN USUAL IN THE MORNING: NOT AT ALL
QUESTION_5 LAST FOUR WEEKS HOW WOULD YOU RATE YOUR ASTHMA CONTROL: COMPLETELY CONTROLLED
ACT_TOTALSCORE: 25
QUESTION_4 LAST FOUR WEEKS HOW OFTEN HAVE YOU USED YOUR RESCUE INHALER OR NEBULIZER MEDICATION (SUCH AS ALBUTEROL): NOT AT ALL
QUESTION_1 LAST FOUR WEEKS HOW MUCH OF THE TIME DID YOUR ASTHMA KEEP YOU FROM GETTING AS MUCH DONE AT WORK, SCHOOL OR AT HOME: NONE OF THE TIME
QUESTION_2 LAST FOUR WEEKS HOW OFTEN HAVE YOU HAD SHORTNESS OF BREATH: NOT AT ALL

## 2022-09-07 ASSESSMENT — PATIENT HEALTH QUESTIONNAIRE - PHQ9
SUM OF ALL RESPONSES TO PHQ QUESTIONS 1-9: 10
SUM OF ALL RESPONSES TO PHQ QUESTIONS 1-9: 10
10. IF YOU CHECKED OFF ANY PROBLEMS, HOW DIFFICULT HAVE THESE PROBLEMS MADE IT FOR YOU TO DO YOUR WORK, TAKE CARE OF THINGS AT HOME, OR GET ALONG WITH OTHER PEOPLE: SOMEWHAT DIFFICULT

## 2022-09-07 ASSESSMENT — PAIN SCALES - GENERAL: PAINLEVEL: MILD PAIN (2)

## 2022-09-07 NOTE — PROGRESS NOTES
Assessment & Plan     Cervical radiculopathy  Heat at site, begin medrol dose wang and flexeril PRN. If symptoms persist, call for physical therapy referral.   - cyclobenzaprine (FLEXERIL) 10 MG tablet; Take 1 tablet (10 mg) by mouth 3 times daily as needed  - methylPREDNISolone (MEDROL DOSEPAK) 4 MG tablet therapy pack; Follow Package Directions    HUSSAIN [facility] REBECCA Hill is a 37 year old  presenting for the following health issues:  Musculoskeletal Problem      Musculoskeletal Problem    History of Present Illness       Back Pain:  She presents for follow up of back pain. Patient's back pain is a recurring problem.  Location of back pain:  Right upper back, right side of neck, right shoulder and other  Description of back pain: sharp and shooting  Back pain spreads: right shoulder and right side of neck    Since patient first noticed back pain, pain is: gradually improving  Does back pain interfere with her job:  Yes      She eats 2-3 servings of fruits and vegetables daily.She consumes 1 sweetened beverage(s) daily.She exercises with enough effort to increase her heart rate 9 or less minutes per day.  She exercises with enough effort to increase her heart rate 3 or less days per week.   She is taking medications regularly.    Today's PHQ-9         PHQ-9 Total Score: 10    PHQ-9 Q9 Thoughts of better off dead/self-harm past 2 weeks :   Not at all    How difficult have these problems made it for you to do your work, take care of things at home, or get along with other people: Somewhat difficult       Pain History:  When did you first notice your pain? - Acute Pain   Have you seen anyone else for your pain? No  Where in your body do you have pain? 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     Review of Systems   Constitutional, HEENT, cardiovascular, pulmonary, gi and gu systems are negative, except as otherwise noted.      Objective    /64 (BP Location: Right arm, Patient Position: Sitting, Cuff Size: Adult Large)   Pulse 81   Temp 98.1  F (36.7  C) (Temporal)   Resp 16   Wt 97.3 kg (214 lb 6.4 oz)   SpO2 99%   BMI 35.04 kg/m    Body mass index is 35.04 kg/m .  Physical Exam   GENERAL: alert and no distress  EYES: Eyes grossly normal to inspection, PERRL and conjunctivae and sclerae normal  NECK: tender over the right paraspinal muscles and along the right medial scapula. Pain with rotation to left and flexion. Full strength and ROM.

## 2022-09-08 NOTE — TELEPHONE ENCOUNTER
Called and left message for patient requesting a call back.     Sunita Canales RN on 9/8/2022 at 8:20 AM

## 2022-09-08 NOTE — TELEPHONE ENCOUNTER
Called and spoke with patient. Patient states that she has concerns about taking the prednisone at night. She struggles with insomnia and is concerned that she will not be able to function at work the next day. Attempted to provide reassurance to patient but patient is still concerned.     Patient would like to take a reduced amount of prednisone if possible. She stated she would not take prednisone at night.     Sunita Canales RN on 9/8/2022 at 11:56 AM

## 2022-09-09 NOTE — TELEPHONE ENCOUNTER
Called and spoke with patient. Advised of provider's recommendations below. She will take it in the morning. No other questions at this time.  Esperanza ROMAN RN on 9/9/2022 at 9:43 AM

## 2022-09-09 NOTE — TELEPHONE ENCOUNTER
Take the medicine in the AM with food.   Is she concerned about the dose being too high?   She can start with 5 tabs daily and reduce from there if she would like.   Gaudencio Woodward PA-C

## 2022-09-11 ENCOUNTER — HEALTH MAINTENANCE LETTER (OUTPATIENT)
Age: 37
End: 2022-09-11

## 2022-09-24 DIAGNOSIS — J45.30 MILD PERSISTENT ASTHMA WITHOUT COMPLICATION: ICD-10-CM

## 2022-09-26 RX ORDER — MONTELUKAST SODIUM 10 MG/1
TABLET ORAL
Qty: 90 TABLET | Refills: 0 | Status: SHIPPED | OUTPATIENT
Start: 2022-09-26 | End: 2022-12-27

## 2022-09-30 ENCOUNTER — MYC MEDICAL ADVICE (OUTPATIENT)
Dept: PEDIATRICS | Facility: CLINIC | Age: 37
End: 2022-09-30

## 2022-09-30 DIAGNOSIS — M54.12 CERVICAL RADICULOPATHY: Primary | ICD-10-CM

## 2022-09-30 NOTE — TELEPHONE ENCOUNTER
Call placed to pt   She already has a spine doctor   She is willing to try PT    Thank you  Brandon Jo RN on 9/30/2022 at 1:54 PM

## 2022-09-30 NOTE — TELEPHONE ENCOUNTER
Lauryn,    Please see  message and advise    LOV: 9/7/22 Cervical radiculopathy Notes    Cervical radiculopathy  Heat at site, begin medrol dose wang and flexeril PRN. If symptoms persist, call for physical therapy referral.   - cyclobenzaprine (FLEXERIL) 10 MG tablet; Take 1 tablet (10 mg) by mouth 3 times daily as needed  - methylPREDNISolone (MEDROL DOSEPAK) 4 MG tablet therapy pack; Follow Package Directions     Gaudencio Woodward PA-C  [facility] REBECCA     Referral pended- please review    Thank you  Brandon Jo RN on 9/30/2022 at 12:49 PM

## 2022-09-30 NOTE — TELEPHONE ENCOUNTER
This could definitely be from your neck. If you would like to try physical therapy, that's an option. Otherwise, I could refer you to a spine doctor.   .Gaudencio Woodward PA-C

## 2022-10-04 ENCOUNTER — MYC MEDICAL ADVICE (OUTPATIENT)
Dept: FAMILY MEDICINE | Facility: CLINIC | Age: 37
End: 2022-10-04

## 2022-10-04 ENCOUNTER — OFFICE VISIT (OUTPATIENT)
Dept: PHYSICAL MEDICINE AND REHAB | Facility: CLINIC | Age: 37
End: 2022-10-04
Payer: COMMERCIAL

## 2022-10-04 VITALS — SYSTOLIC BLOOD PRESSURE: 118 MMHG | DIASTOLIC BLOOD PRESSURE: 60 MMHG

## 2022-10-04 DIAGNOSIS — R20.2 NUMBNESS AND TINGLING IN BOTH HANDS: ICD-10-CM

## 2022-10-04 DIAGNOSIS — M54.12 CERVICAL RADICULOPATHY: Primary | ICD-10-CM

## 2022-10-04 DIAGNOSIS — M50.30 DDD (DEGENERATIVE DISC DISEASE), CERVICAL: ICD-10-CM

## 2022-10-04 DIAGNOSIS — R20.0 NUMBNESS AND TINGLING IN BOTH HANDS: ICD-10-CM

## 2022-10-04 PROCEDURE — 99204 OFFICE O/P NEW MOD 45 MIN: CPT | Performed by: NURSE PRACTITIONER

## 2022-10-04 ASSESSMENT — PAIN SCALES - GENERAL: PAINLEVEL: NO PAIN (0)

## 2022-10-04 NOTE — PROGRESS NOTES
Assessment:     Diagnoses and all orders for this visit:  Cervical radiculopathy  -     Physical Therapy Referral; Future  Numbness and tingling in both hands  -     Physical Therapy Referral; Future  DDD (degenerative disc disease), cervical  -     Physical Therapy Referral; Future     Karrie Zhang is a 37 year old y.o. female with past medical history significant for thyroid disorder, depression, sleep apnea, asthma who presents today for follow-up regarding:    -Right neck pain 1 month prior with resolution with ongoing bilateral hand numbness and tingling C8 greater than C7 dermatomal pattern.  Patient is neurologically intact on exam which is very reassuring.  No clear ulnar nerve irritation on exam.    -Left knee pain, advised patient to follow-up with Dr. Rose since she brought this up at the end of the visit.    *Primary spine provider Dr. Rose    *Patient was seen for pain clinic evaluation 1/10/2022 for chronic pain.  Patient did not follow-up.     Plan:     A shared decision making plan was used. The patient's values and choices were respected. Prior medical records were reviewed today. The following represents what was discussed and decided upon by the provider and the patient.        -DIAGNOSTIC TESTS: Images were personally reviewed and interpreted.   --Did discuss that if her hand numbness and tingling does not improve we could consider updated MRI however I would recommend trialing physical therapy first which she is open to as well.  --Cervical spine MRI 2021 with multilevel degenerative changes C6-7 mild disc height loss with disc protrusion with facet arthropathy with mild right and moderate left foraminal stenosis.  Mild right foraminal stenosis at C7-T1.  Moderate to severe foraminal stenosis at C5-6    Patient was referred by Dr. Rose to the [facility] neurosurgery department 6/17/2022 for evaluation of syrinx for second opinion.  Patient reports that she saw  neurosurgery and they did not recommend surgery.    -INTERVENTIONS: Could consider injections down the road if symptoms or not improving however currently gets more numbness and tingling therefore she is not interested in this option.    -MEDICATIONS: No changes in medications today  Discussed side effects of medications and proper use. Patient verbalized understanding.    -PHYSICAL THERAPY: Referral to physical therapy in [place] specifically to trial cervical traction and cervical nerve glides.  Discussed the importance of core strengthening, ROM, stretching exercises with the patient and how each of these entities is important in decreasing pain.  Explained to the patient that the purpose of physical therapy is to teach the patient a home exercise program.  These exercises need to be performed every day in order to decrease pain and prevent future occurrences of pain.        -PATIENT EDUCATION:  Total time of 32 minutes, on the day of service, spent with the patient, reviewing the chart, placing orders, and documenting.   -Today we also discussed the issues related to the current COVID-19 pandemic, the pros and cons of the current treatment plan, the CDC guidelines such as social distancing, washing the hands, and covering the cough.    -FOLLOW UP: Follow-up as needed with Dr. Rose primary spine provider  Advised to contact clinic if symptoms worsen or change.    Subjective:     Karrie Zhang is a 37 year old female who presents today for follow-up regarding right-sided neck pain that she describes as a pinched nerve that occurred about 1 month ago that did resolve with Medrol Dosepak however today she is here for ongoing numbness and tingling into her fingers bilaterally that is greater in the fourth and fifth fingers.  She reports that during the daytime this is less intense but more bothersome at bedtime.  She otherwise denies any upper extremity weakness, denies any recent trips or falls or balance  changes.    Patient does report left knee irritation as well at the end of the visit today.    Treatment to Date:   Physical therapy x12 sessions neck and back pain, last 2/25/2021    Patient Active Problem List   Diagnosis     Perioral dermatitis     Mild persistent asthma without complication     Anxiety     Major depressive disorder, recurrent episode, mild (H)     Multiple environmental allergies     Obesity (BMI 30-39.9)     Cervical high risk HPV (human papillomavirus) test positive     Rosacea, acne     Acquired hypothyroidism     Hay fever     Alcohol dependence (H)     Syrinx of spinal cord (H)     Polymyalgia (H)     Lumbago     Bilateral thoracic back pain       Current Outpatient Medications   Medication     albuterol (PROAIR HFA/PROVENTIL HFA/VENTOLIN HFA) 108 (90 Base) MCG/ACT inhaler     busPIRone HCl (BUSPAR) 30 MG tablet     cetirizine (ZYRTEC) 10 MG tablet     FLUoxetine (PROZAC) 40 MG capsule     fluticasone (FLONASE) 50 MCG/ACT nasal spray     hydrocortisone 2.5 % cream     lamoTRIgine (LAMICTAL) 25 MG tablet     levonorgestrel-ethinyl estrad (LETY) 90-20 MCG tablet     levothyroxine (SYNTHROID/LEVOTHROID) 75 MCG tablet     metroNIDAZOLE (METROGEL) 0.75 % vaginal gel     montelukast (SINGULAIR) 10 MG tablet     tacrolimus (PROTOPIC) 0.1 % external ointment     No current facility-administered medications for this visit.       Allergies   Allergen Reactions     Penicillins Rash       Past Medical History:   Diagnosis Date     Acquired hypothyroidism      Alcohol abuse      Allergic rhinitis      Alopecia      Asthma      Cervical dysplasia      Cervical high risk HPV (human papillomavirus) test positive 6/29/16, 7/5/17    neg 16/18     Depression      Disease of thyroid gland      H/O colposcopy with cervical biopsy 08/08/2017 08/08/17: [place] B[place] and ECC normal.      Papanicolaou smear of cervix with low grade squamous intraepithelial lesion (LGSIL) 07/05/2017     Rosacea, acne      Sleep  apnea      Syrinx of spinal cord (H)      Uncomplicated asthma         Review of Systems  ROS:  Specifically negative for bowel/bladder dysfunction, balance changes, headache, dizziness, foot drop, fevers, chills, appetite changes, nausea/vomiting, unexplained weight loss. Otherwise 13 systems reviewed are negative. Please see the patient's intake questionnaire from today for details.    Reviewed Social, Family, Past Medical and Past Surgical history with patient, no significant changes noted since prior visit.     Objective:     /60 (BP Location: Right arm, Patient Position: Sitting)     PHYSICAL EXAMINATION:    --CONSTITUTIONAL: Well developed, well nourished, healthy appearing individual.  --PSYCHIATRIC: Appropriate mood and affect. No difficulty interacting due to temper, social withdrawal, or memory issues.  --SKIN: Lumbar region is dry and intact.   --RESPIRATORY: Normal rhythm and effort. No abnormal accessory muscle breathing patterns noted.   --MUSCULOSKELETAL:  Normal lumbar lordosis noted, no lateral shift.  --GROSS MOTOR: Easily arises from a seated position. Gait is non-antalgic  CERVICAL:   --HEENT:  Sclera are non-injected.  Extraocular muscles are intact.  Thyroid moves easily upon swallowing.  Moist oral mucosa.  --SKIN:  Skin over the face, bilateral upper extremities, and posterior torso is clean, dry, intact without rashes.  --UPPER EXTREMITY MOTOR TESTING:  Wrist flexion left 5/5, right 5/5  Wrist extension left 5/5, right 5/5  Pronators left 5/5, right 5/5  Biceps left 5/5, right 5/5   Triceps left 5/5, right 5/5   Shoulder abduction left 5/5, right 5/5   left 5/5, right 5/5  --NEUROLOGIC:  CN III-XII are grossly intact.  Bilateral patellar and achilles reflexes are physiologic and symmetric. 2/4 symmetric biceps, brachioradialis, triceps reflexes bilaterally.  Sensation to upper extremities is intact.  Negative Pearson's bilaterally.    --VASCULAR:  2/4 radial pulses bilaterally.   Warm upper limbs bilaterally.  Capillary refill in the upper extremities is less than 1 second. No obvious lower extremity swelling or varicosities.   --CERVICAL SPINE: Inspection reveals no evidence of deformity. Range of motion is not limited in cervical flexion, extension, or lateral rotation. No tenderness to palpation. Spurlings maneuver is negative to radicular pain.    --SHOULDERS: Full range of motion bilaterally. Negative empty can.  -- Wrist: Phalen and Tinel testing negative  -- Elbow: No irritation noted with ulnar palpation or ulnar nerve glides    RESULTS:   Imaging: Spine imaging was reviewed today. The images were shown to the patient and the findings were explained using a spine model.    Reviewed cervical spine MRI from 2021

## 2022-10-04 NOTE — LETTER
10/4/2022         RE: Karrie Zhang  [address]  [facility] [address]        Dear Colleague,    Thank you for referring your patient, Karrie Zhang, to the [facility]. Please see a copy of my visit note below.      Assessment:     Diagnoses and all orders for this visit:  Cervical radiculopathy  -     Physical Therapy Referral; Future  Numbness and tingling in both hands  -     Physical Therapy Referral; Future  DDD (degenerative disc disease), cervical  -     Physical Therapy Referral; Future     Karrie Zhang is a 37 year old y.o. female with past medical history significant for thyroid disorder, depression, sleep apnea, asthma who presents today for follow-up regarding:    -Right neck pain 1 month prior with resolution with ongoing bilateral hand numbness and tingling C8 greater than C7 dermatomal pattern.  Patient is neurologically intact on exam which is very reassuring.  No clear ulnar nerve irritation on exam.    -Left knee pain, advised patient to follow-up with Dr. Rose since she brought this up at the end of the visit.    *Primary spine provider Dr. Rose    *Patient was seen for pain clinic evaluation 1/10/2022 for chronic pain.  Patient did not follow-up.     Plan:     A shared decision making plan was used. The patient's values and choices were respected. Prior medical records were reviewed today. The following represents what was discussed and decided upon by the provider and the patient.        -DIAGNOSTIC TESTS: Images were personally reviewed and interpreted.   --Did discuss that if her hand numbness and tingling does not improve we could consider updated MRI however I would recommend trialing physical therapy first which she is open to as well.  --Cervical spine MRI 2021 with multilevel degenerative changes C6-7 mild disc height loss with disc protrusion with facet arthropathy with mild right and moderate left foraminal stenosis.   Mild right foraminal stenosis at C7-T1.  Moderate to severe foraminal stenosis at C5-6    Patient was referred by Dr. Rose to the [facility] neurosurgery department 6/17/2022 for evaluation of syrinx for second opinion.  Patient reports that she saw neurosurgery and they did not recommend surgery.    -INTERVENTIONS: Could consider injections down the road if symptoms or not improving however currently gets more numbness and tingling therefore she is not interested in this option.    -MEDICATIONS: No changes in medications today  Discussed side effects of medications and proper use. Patient verbalized understanding.    -PHYSICAL THERAPY: Referral to physical therapy in [place] specifically to trial cervical traction and cervical nerve glides.  Discussed the importance of core strengthening, ROM, stretching exercises with the patient and how each of these entities is important in decreasing pain.  Explained to the patient that the purpose of physical therapy is to teach the patient a home exercise program.  These exercises need to be performed every day in order to decrease pain and prevent future occurrences of pain.        -PATIENT EDUCATION:  Total time of 32 minutes, on the day of service, spent with the patient, reviewing the chart, placing orders, and documenting.   -Today we also discussed the issues related to the current COVID-19 pandemic, the pros and cons of the current treatment plan, the CDC guidelines such as social distancing, washing the hands, and covering the cough.    -FOLLOW UP: Follow-up as needed with Dr. Rose primary spine provider  Advised to contact clinic if symptoms worsen or change.    Subjective:     Karrie Zhang is a 37 year old female who presents today for follow-up regarding right-sided neck pain that she describes as a pinched nerve that occurred about 1 month ago that did resolve with Medrol Dosepak however today she is here for ongoing numbness and tingling into  her fingers bilaterally that is greater in the fourth and fifth fingers.  She reports that during the daytime this is less intense but more bothersome at bedtime.  She otherwise denies any upper extremity weakness, denies any recent trips or falls or balance changes.    Patient does report left knee irritation as well at the end of the visit today.    Treatment to Date:   Physical therapy x12 sessions neck and back pain, last 2/25/2021    Patient Active Problem List   Diagnosis     Perioral dermatitis     Mild persistent asthma without complication     Anxiety     Major depressive disorder, recurrent episode, mild (H)     Multiple environmental allergies     Obesity (BMI 30-39.9)     Cervical high risk HPV (human papillomavirus) test positive     Rosacea, acne     Acquired hypothyroidism     Hay fever     Alcohol dependence (H)     Syrinx of spinal cord (H)     Polymyalgia (H)     Lumbago     Bilateral thoracic back pain       Current Outpatient Medications   Medication     albuterol (PROAIR HFA/PROVENTIL HFA/VENTOLIN HFA) 108 (90 Base) MCG/ACT inhaler     busPIRone HCl (BUSPAR) 30 MG tablet     cetirizine (ZYRTEC) 10 MG tablet     FLUoxetine (PROZAC) 40 MG capsule     fluticasone (FLONASE) 50 MCG/ACT nasal spray     hydrocortisone 2.5 % cream     lamoTRIgine (LAMICTAL) 25 MG tablet     levonorgestrel-ethinyl estrad (LETY) 90-20 MCG tablet     levothyroxine (SYNTHROID/LEVOTHROID) 75 MCG tablet     metroNIDAZOLE (METROGEL) 0.75 % vaginal gel     montelukast (SINGULAIR) 10 MG tablet     tacrolimus (PROTOPIC) 0.1 % external ointment     No current facility-administered medications for this visit.       Allergies   Allergen Reactions     Penicillins Rash       Past Medical History:   Diagnosis Date     Acquired hypothyroidism      Alcohol abuse      Allergic rhinitis      Alopecia      Asthma      Cervical dysplasia      Cervical high risk HPV (human papillomavirus) test positive 6/29/16, 7/5/17    neg 16/18      Depression      Disease of thyroid gland      H/O colposcopy with cervical biopsy 08/08/2017 08/08/17: [place] Bx and ECC normal.      Papanicolaou smear of cervix with low grade squamous intraepithelial lesion (LGSIL) 07/05/2017     Rosacea, acne      Sleep apnea      Syrinx of spinal cord (H)      Uncomplicated asthma         Review of Systems  ROS:  Specifically negative for bowel/bladder dysfunction, balance changes, headache, dizziness, foot drop, fevers, chills, appetite changes, nausea/vomiting, unexplained weight loss. Otherwise 13 systems reviewed are negative. Please see the patient's intake questionnaire from today for details.    Reviewed Social, Family, Past Medical and Past Surgical history with patient, no significant changes noted since prior visit.     Objective:     /60 (BP Location: Right arm, Patient Position: Sitting)     PHYSICAL EXAMINATION:    --CONSTITUTIONAL: Well developed, well nourished, healthy appearing individual.  --PSYCHIATRIC: Appropriate mood and affect. No difficulty interacting due to temper, social withdrawal, or memory issues.  --SKIN: Lumbar region is dry and intact.   --RESPIRATORY: Normal rhythm and effort. No abnormal accessory muscle breathing patterns noted.   --MUSCULOSKELETAL:  Normal lumbar lordosis noted, no lateral shift.  --GROSS MOTOR: Easily arises from a seated position. Gait is non-antalgic  CERVICAL:   --HEENT:  Sclera are non-injected.  Extraocular muscles are intact.  Thyroid moves easily upon swallowing.  Moist oral mucosa.  --SKIN:  Skin over the face, bilateral upper extremities, and posterior torso is clean, dry, intact without rashes.  --UPPER EXTREMITY MOTOR TESTING:  Wrist flexion left 5/5, right 5/5  Wrist extension left 5/5, right 5/5  Pronators left 5/5, right 5/5  Biceps left 5/5, right 5/5   Triceps left 5/5, right 5/5   Shoulder abduction left 5/5, right 5/5   left 5/5, right 5/5  --NEUROLOGIC:  CN III-XII are grossly intact.   Bilateral patellar and achilles reflexes are physiologic and symmetric. 2/4 symmetric biceps, brachioradialis, triceps reflexes bilaterally.  Sensation to upper extremities is intact.  Negative Pearson's bilaterally.    --VASCULAR:  2/4 radial pulses bilaterally.  Warm upper limbs bilaterally.  Capillary refill in the upper extremities is less than 1 second. No obvious lower extremity swelling or varicosities.   --CERVICAL SPINE: Inspection reveals no evidence of deformity. Range of motion is not limited in cervical flexion, extension, or lateral rotation. No tenderness to palpation. Spurlings maneuver is negative to radicular pain.    --SHOULDERS: Full range of motion bilaterally. Negative empty can.  -- Wrist: Phalen and Tinel testing negative  -- Elbow: No irritation noted with ulnar palpation or ulnar nerve glides    RESULTS:   Imaging: Spine imaging was reviewed today. The images were shown to the patient and the findings were explained using a spine model.    Reviewed cervical spine MRI from 2021                          Again, thank you for allowing me to participate in the care of your patient.        Sincerely,        Deborah Weeks, CNP

## 2022-10-04 NOTE — PATIENT INSTRUCTIONS
~Please call our [facility] Nurse Navigation line [phone] with any questions or concerns about your treatment plan, if symptoms worsen and you would like to be seen urgently, or if you have problems controlling bladder and bowel function.       ~You have been referred for Physical Therapy to [facility]. They will call you to schedule an appointment.      Scheduling phone number is [phone] for [facility], or [place] location.  If you have not heard from the scheduling office within 2 business days, please call [phone] for ALL other locations.    Discussed the importance of core strengthening, ROM, stretching exercises and how each of these entities is important in decreasing pain and improving long term spine health.  The purpose of physical therapy is to teach you an individualized home exercise program.  These exercises need to be performed every day in order to decrease pain and prevent future occurrences of pain.

## 2022-10-05 DIAGNOSIS — G95.0 SYRINX OF SPINAL CORD (H): Primary | ICD-10-CM

## 2022-10-06 NOTE — TELEPHONE ENCOUNTER
I have reached out to the patient to have her contact the clinic to schedule follow-up appointment with me.

## 2022-10-06 NOTE — TELEPHONE ENCOUNTER
"Patient reporting left knee injury from overextension that occurred over a month ago. Patient reports it has not gotten better.  No swelling or bruising present.   Patient able to walk but limping present and has increased pain when kneeling on the knee and when extending knee straight.  Reports feeling \"ping/pop\" when she bends it.     Routing to provider to please advise.      Candi Beckford RN  [facility]    "

## 2022-10-14 ENCOUNTER — OFFICE VISIT (OUTPATIENT)
Dept: PHYSICAL MEDICINE AND REHAB | Facility: CLINIC | Age: 37
End: 2022-10-14
Payer: COMMERCIAL

## 2022-10-14 VITALS — HEART RATE: 87 BPM | SYSTOLIC BLOOD PRESSURE: 127 MMHG | OXYGEN SATURATION: 98 % | DIASTOLIC BLOOD PRESSURE: 77 MMHG

## 2022-10-14 DIAGNOSIS — G95.0 SYRINX OF SPINAL CORD (H): ICD-10-CM

## 2022-10-14 DIAGNOSIS — R20.2 NUMBNESS AND TINGLING IN BOTH HANDS: ICD-10-CM

## 2022-10-14 DIAGNOSIS — R20.0 NUMBNESS AND TINGLING IN BOTH HANDS: ICD-10-CM

## 2022-10-14 DIAGNOSIS — M25.562 ACUTE PAIN OF LEFT KNEE: Primary | ICD-10-CM

## 2022-10-14 PROCEDURE — 99214 OFFICE O/P EST MOD 30 MIN: CPT | Performed by: PHYSICAL MEDICINE & REHABILITATION

## 2022-10-14 ASSESSMENT — PAIN SCALES - GENERAL: PAINLEVEL: MODERATE PAIN (4)

## 2022-10-14 NOTE — PATIENT INSTRUCTIONS
An MRI was ordered for you today.  You will be contacted by scheduling within 3 days.    If you are not contacted, please call Radiology at [phone].     2. EMG of your right hand with Dr Rose

## 2022-10-14 NOTE — LETTER
10/14/2022         RE: Karrie Zhang  [address] [address]  [place] [address]        Dear Colleague,    Thank you for referring your patient, Karrie Zhang, to the [facility]. Please see a copy of my visit note below.    Assessment/Plan:      Karrie was seen today for knee pain.    Diagnoses and all orders for this visit:    Acute pain of left knee  -     MR Knee Left w/o Contrast; Future    Numbness and tingling in both hands  -     EMG; Future    Syrinx of spinal cord (H)         Assessment: Pleasant 37 year old female with past medical history significant for thyroid disorder, depression, sleep apnea, asthma who presents today for follow-up regardin.  Left knee pain: 2 months of significant left lateral knee pain consistent with lateral meniscus injury such as meniscal tear.  She has slight increased laxity with anterior drawer test as well and cannot exclude ACL injury.    2.  Right greater than left hand numbness and tingling digits 4 and 5.  Worse at night when she sleeps.  She does have history of cervical monilia.  Question ulnar neuropathy.      Discussion:    1.  I discussed the diagnosis and treatment options.  Concern regarding the left knee with a left lateral meniscal tear potential ACL injury as well.  We discussed the need for imaging with conservative management versus orthopedics.    2.  MRI of the left knee to evaluate for meniscal tear versus other ligamentous injury.    3.  She may wear a neoprene knee sleeve    4.  Relative rest.    5.  EMG of the right upper extremity to evaluate for ulnar neuropathy at the wrist or elbow.    6.  Follow-up with me at earliest convenience for EMG.  We will contact her with results of MRI if she has a meniscal tear or ACL tear we will need to get to orthopedics.      It was our pleasure caring for your patient today, if there any questions or concerns please do not hesitate to contact  us.      Subjective:   Patient ID: Karrie Zhang is a 37 year old female.    History of Present Illness: Patient presents for evaluation of the left knee pain.  This is starting about 2 months ago.  She was dancing and the next day had severe pain in the left knee.  This is the lateral posterior left knee worse with standing and walking or hiking and swelling in the knee and severe pain through the whole leg thigh down into the calf.  Better with ice and rest.  Bending her knee also hurts.  Sitting during the workday seems to be okay for her.  She has tried an over-the-counter brace.    She also has bilateral hand paresthesias mostly digits 4 and 5 seems to be worsening.  Occurs at night.  She sleeps on her back and only occurs in the hand right worse than left.  She has an MRI of her thoracic and cervical spine pending due to the syringomyelia.   to evaluate if there has been worsening.  Her cervical spine pain is improved with acupuncture.    Review of Systems: Pertinent positives: Paresthesias of the hands..  Pertinent negatives: No   weakness.  No bowel or bladder incontinence.  No urinary retention.  No fevers, unintentional weight loss, balance changes, headaches, frequent falling, difficulty swallowing, or coordination difficulties.  All others reviewed are negative.         Past Medical History:   Diagnosis Date     Acquired hypothyroidism      Alcohol abuse      Allergic rhinitis      Alopecia      Asthma      Cervical dysplasia      Cervical high risk HPV (human papillomavirus) test positive 6/29/16, 7/5/17    neg 16/18     Depression      Disease of thyroid gland      H/O colposcopy with cervical biopsy 08/08/2017 08/08/17: [place] Bx and ECC normal.      Papanicolaou smear of cervix with low grade squamous intraepithelial lesion (LGSIL) 07/05/2017     Rosacea, acne      Sleep apnea      Syrinx of spinal cord (H)      Uncomplicated asthma        The following portions of the patient's history were  reviewed and updated as appropriate: allergies, current medications, past family history, past medical history, past social history, past surgical history and problem list.           Objective:   Physical Exam:    /77 (BP Location: Right arm, Patient Position: Sitting)   Pulse 87   SpO2 98%   There is no height or weight on file to calculate BMI.      General: Alert and oriented with normal affect. Attention, knowledge, memory, and language are intact. No acute distress.   Eyes: Sclerae are clear.  Respirations: Unlabored. CV: No lower extremity edema.  Skin: No rashes seen.    Gait:  Nonantalgic  Left lateral knee tenderness palpation over the joint space posteriorly.  She has increased laxity of the left knee with anterior drawer versus the right although both do have slight give to them.  She has full range of motion of the left knee flexion and extension.  Negative varus and valgus stress test.  Negative Roach's.  Sensation is intact to light touch throughout the upper and lower extremities.  Negative Tinel's at the elbow.  Reflexes are   negative Hoffmans. 2+ patellar and Achilles      Manual muscle testing reveals:  Right /Left out of 5     5/5 elbow flexors  5/5 elbow extensors  5/5 wrist extensors  5/5 interosseus  5/5 finger flexors  5/5 hip flexors  5/5 knee flexors  5/5 knee extensors  5/5 ankle plantar flexors  5/5 ankle dorsiflexors  5/5  [facility]        Again, thank you for allowing me to participate in the care of your patient.        Sincerely,        Tapan Rose DO

## 2022-10-14 NOTE — PROGRESS NOTES
Assessment/Plan:      Karrie was seen today for knee pain.    Diagnoses and all orders for this visit:    Acute pain of left knee  -     MR Knee Left w/o Contrast; Future    Numbness and tingling in both hands  -     EMG; Future    Syrinx of spinal cord (H)         Assessment: Pleasant 37 year old female with past medical history significant for thyroid disorder, depression, sleep apnea, asthma who presents today for follow-up regardin.  Left knee pain: 2 months of significant left lateral knee pain consistent with lateral meniscus injury such as meniscal tear.  She has slight increased laxity with anterior drawer test as well and cannot exclude ACL injury.    2.  Right greater than left hand numbness and tingling digits 4 and 5.  Worse at night when she sleeps.  She does have history of cervical monilia.  Question ulnar neuropathy.      Discussion:    1.  I discussed the diagnosis and treatment options.  Concern regarding the left knee with a left lateral meniscal tear potential ACL injury as well.  We discussed the need for imaging with conservative management versus orthopedics.    2.  MRI of the left knee to evaluate for meniscal tear versus other ligamentous injury.    3.  She may wear a neoprene knee sleeve    4.  Relative rest.    5.  EMG of the right upper extremity to evaluate for ulnar neuropathy at the wrist or elbow.    6.  Follow-up with me at earliest convenience for EMG.  We will contact her with results of MRI if she has a meniscal tear or ACL tear we will need to get to orthopedics.      It was our pleasure caring for your patient today, if there any questions or concerns please do not hesitate to contact us.      Subjective:   Patient ID: Karrie Zhang is a 37 year old female.    History of Present Illness: Patient presents for evaluation of the left knee pain.  This is starting about 2 months ago.  She was dancing and the next day had severe pain in the left knee.  This is the  lateral posterior left knee worse with standing and walking or hiking and swelling in the knee and severe pain through the whole leg thigh down into the calf.  Better with ice and rest.  Bending her knee also hurts.  Sitting during the workday seems to be okay for her.  She has tried an over-the-counter brace.    She also has bilateral hand paresthesias mostly digits 4 and 5 seems to be worsening.  Occurs at night.  She sleeps on her back and only occurs in the hand right worse than left.  She has an MRI of her thoracic and cervical spine pending due to the syringomyelia.   to evaluate if there has been worsening.  Her cervical spine pain is improved with acupuncture.    Review of Systems: Pertinent positives: Paresthesias of the hands..  Pertinent negatives: No   weakness.  No bowel or bladder incontinence.  No urinary retention.  No fevers, unintentional weight loss, balance changes, headaches, frequent falling, difficulty swallowing, or coordination difficulties.  All others reviewed are negative.         Past Medical History:   Diagnosis Date     Acquired hypothyroidism      Alcohol abuse      Allergic rhinitis      Alopecia      Asthma      Cervical dysplasia      Cervical high risk HPV (human papillomavirus) test positive 6/29/16, 7/5/17    neg 16/18     Depression      Disease of thyroid gland      H/O colposcopy with cervical biopsy 08/08/2017 08/08/17: [place] Bx and ECC normal.      Papanicolaou smear of cervix with low grade squamous intraepithelial lesion (LGSIL) 07/05/2017     Rosacea, acne      Sleep apnea      Syrinx of spinal cord (H)      Uncomplicated asthma        The following portions of the patient's history were reviewed and updated as appropriate: allergies, current medications, past family history, past medical history, past social history, past surgical history and problem list.           Objective:   Physical Exam:    /77 (BP Location: Right arm, Patient Position: Sitting)   Pulse  87   SpO2 98%   There is no height or weight on file to calculate BMI.      General: Alert and oriented with normal affect. Attention, knowledge, memory, and language are intact. No acute distress.   Eyes: Sclerae are clear.  Respirations: Unlabored. CV: No lower extremity edema.  Skin: No rashes seen.    Gait:  Nonantalgic  Left lateral knee tenderness palpation over the joint space posteriorly.  She has increased laxity of the left knee with anterior drawer versus the right although both do have slight give to them.  She has full range of motion of the left knee flexion and extension.  Negative varus and valgus stress test.  Negative Roach's.  Sensation is intact to light touch throughout the upper and lower extremities.  Negative Tinel's at the elbow.  Reflexes are   negative Hoffmans. 2+ patellar and Achilles      Manual muscle testing reveals:  Right /Left out of 5     5/5 elbow flexors  5/5 elbow extensors  5/5 wrist extensors  5/5 interosseus  5/5 finger flexors  5/5 hip flexors  5/5 knee flexors  5/5 knee extensors  5/5 ankle plantar flexors  5/5 ankle dorsiflexors  5/5  [facility]

## 2022-10-17 ENCOUNTER — HOSPITAL ENCOUNTER (OUTPATIENT)
Dept: MRI IMAGING | Facility: HOSPITAL | Age: 37
Discharge: HOME | End: 2022-10-17
Attending: PHYSICAL MEDICINE & REHABILITATION | Admitting: PHYSICAL MEDICINE & REHABILITATION
Payer: COMMERCIAL

## 2022-10-17 ENCOUNTER — TELEPHONE (OUTPATIENT)
Dept: FAMILY MEDICINE | Facility: CLINIC | Age: 37
End: 2022-10-17

## 2022-10-17 DIAGNOSIS — M25.562 ACUTE PAIN OF LEFT KNEE: ICD-10-CM

## 2022-10-17 PROCEDURE — 73721 MRI JNT OF LWR EXTRE W/O DYE: CPT | Mod: LT

## 2022-10-17 NOTE — TELEPHONE ENCOUNTER
Patient calling regarding having orthotic inserts evaluated, not sure if they are fitting right.     Patient transferred over to specialty clinic.     Candi Beckford RN  [facility]

## 2022-10-19 ENCOUNTER — TELEPHONE (OUTPATIENT)
Dept: NEUROSURGERY | Facility: CLINIC | Age: 37
End: 2022-10-19

## 2022-10-19 NOTE — TELEPHONE ENCOUNTER
Writer ALO for pt to call back and schedule a follow up    Please schedule a return, video or phone visit, with Марина Vargas for a Wednesday Morning.    Carmina Davis

## 2022-10-19 NOTE — TELEPHONE ENCOUNTER
[facility] Call Center    Phone Message    May a detailed message be left on voicemail: no     Reason for Call: Other: Patient Liz calling in to make a phone appointment for Марина. Writer requested if she would like to make follow up video return appointment with Марина Vargas, and Caller replied no. Writer did not have scheduling instructions to schedule. Please reach out to Liz to schedule phone appointment. Thanks!    Action Taken: Message routed to:  Other: [facility]eds [facility] [place]    Travel Screening: Not Applicable

## 2022-10-20 ENCOUNTER — HOSPITAL ENCOUNTER (OUTPATIENT)
Dept: MRI IMAGING | Facility: HOSPITAL | Age: 37
Discharge: HOME | End: 2022-10-20
Attending: NURSE PRACTITIONER
Payer: COMMERCIAL

## 2022-10-20 DIAGNOSIS — G95.0 SYRINX OF SPINAL CORD (H): ICD-10-CM

## 2022-10-20 DIAGNOSIS — M54.6 PAIN IN THORACIC SPINE: ICD-10-CM

## 2022-10-20 PROCEDURE — 72141 MRI NECK SPINE W/O DYE: CPT

## 2022-10-20 PROCEDURE — 72146 MRI CHEST SPINE W/O DYE: CPT

## 2022-10-25 DIAGNOSIS — G95.0 SYRINX OF SPINAL CORD (H): Primary | ICD-10-CM

## 2022-10-28 ENCOUNTER — OFFICE VISIT (OUTPATIENT)
Dept: PHYSICAL MEDICINE AND REHAB | Facility: CLINIC | Age: 37
End: 2022-10-28
Attending: PHYSICAL MEDICINE & REHABILITATION
Payer: COMMERCIAL

## 2022-10-28 VITALS — DIASTOLIC BLOOD PRESSURE: 68 MMHG | HEART RATE: 60 BPM | SYSTOLIC BLOOD PRESSURE: 122 MMHG

## 2022-10-28 DIAGNOSIS — G95.0 SYRINX OF SPINAL CORD (H): ICD-10-CM

## 2022-10-28 DIAGNOSIS — M79.18 MYOFASCIAL PAIN: ICD-10-CM

## 2022-10-28 DIAGNOSIS — M54.2 CERVICAL SPINE PAIN: Primary | ICD-10-CM

## 2022-10-28 DIAGNOSIS — R20.0 NUMBNESS AND TINGLING IN BOTH HANDS: ICD-10-CM

## 2022-10-28 DIAGNOSIS — G89.29 OTHER CHRONIC PAIN: ICD-10-CM

## 2022-10-28 DIAGNOSIS — M50.30 DDD (DEGENERATIVE DISC DISEASE), CERVICAL: ICD-10-CM

## 2022-10-28 DIAGNOSIS — R20.2 NUMBNESS AND TINGLING IN BOTH HANDS: ICD-10-CM

## 2022-10-28 DIAGNOSIS — M35.3 POLYMYALGIA (H): ICD-10-CM

## 2022-10-28 PROCEDURE — 99214 OFFICE O/P EST MOD 30 MIN: CPT | Mod: 25 | Performed by: PHYSICAL MEDICINE & REHABILITATION

## 2022-10-28 PROCEDURE — 95911 NRV CNDJ TEST 9-10 STUDIES: CPT | Performed by: PHYSICAL MEDICINE & REHABILITATION

## 2022-10-28 PROCEDURE — 95886 MUSC TEST DONE W/N TEST COMP: CPT | Mod: RT | Performed by: PHYSICAL MEDICINE & REHABILITATION

## 2022-10-28 ASSESSMENT — PAIN SCALES - GENERAL: PAINLEVEL: MODERATE PAIN (5)

## 2022-10-28 NOTE — PROGRESS NOTES
Assessment/Plan:      Karrie was seen today for emg.    Diagnoses and all orders for this visit:    Cervical spine pain  -     PAIN Translaminar Epidural Steroid Injection Cervical; Future    Numbness and tingling in both hands  -     EMG  -     PAIN Translaminar Epidural Steroid Injection Cervical; Future  -     IN NEEDLE EMG EA EXTREMTY W/PARASPINAL AREA COMPLETE  -     IN NCS MOTOR W OR W/O F-WAVE, 9 OR 10    Myofascial pain  -     PAIN Translaminar Epidural Steroid Injection Cervical; Future    Other chronic pain  -     Adult Endocrinology  Referral; Future    Syrinx of spinal cord (H)    Polymyalgia (H)  -     Adult Endocrinology  Referral; Future    DDD (degenerative disc disease), cervical  -     PAIN Translaminar Epidural Steroid Injection Cervical; Future         Assessment: Pleasant 37 year old female with past medical history significant for thyroid disorder, depression, sleep apnea, asthma who presents today for follow-up regardin. Right greater than left hand numbness and tingling digits 4 and 5.  Worse at night when she sleeps.    Likely represents myofascial pain    EMG normal today.    2.    Cervical spine.  Multilevel degenerative disc disease broad-based disc bulges no high-grade central canal stenosis.  There is left foraminal stenosis C6/7.      3.  Polymyalgias and arthralgias.  Consistent with widespread myofascial pain.    4. left knee pain: 2 months of significant left lateral knee pain.  MRI of the left knee unremarkable for meniscal tears as well as ligamentous injury.  Mild patellar tendinopathy was present.  Physical therapy scheduled.     5.  History of foot pain seen by orthotics.    Discussion:    1.  I discussed the diagnosis and treatment options.  We reviewed the MRI of the thoracic and lumbar spine.  Reviewed options such as cervical epidural oral medications and therapy.  She has adverse effects to numerous medications.    2.  Recommend cervical  interlaminar epidural steroid injection C7-T1 this is ordered.    3.  She did have significant side effects apparently when coming off of prednisone burst in the past and has widespread pain on the paresthesias heat and cold intolerances.  Will refer to endocrinology for examination such as adrenal insufficiency or cortisol abnormality or other endocrine abnormality causing her widespread myalgias.    4.  Continue plan of physical therapy for her knee.    5.  She tells me she was seen by orthotics and needs an order signed.  I will provide orthotics order for her for her foot pain.    6.  Follow-up with me 2 to 4 weeks after injection.    It was our pleasure caring for your patient today, if there any questions or concerns please do not hesitate to contact us.      Subjective:   Patient ID: Karrie Zhang is a 37 year old female.    History of Present Illness: Patient presents for follow-up of her pain complaints.  She has worsening cervical spine thoracic spine pain with bilateral arm paresthesias mostly digits 4 and 5 can be all the digits worse at night with lying with her hands on her chest.  Has some improvement with acupuncture but slow.  She flared all of her pain recently with no injury.  Pain is an 8/10 at worst 5/10 today 1/10 at best.  Not sure what makes it worse better with heat ice.  Has some heat and cold intolerance.  Her left arm is worsening now her right still worse than the left.  EMG was done today.  She tells me she previously had some left cervical radicular pain.  She was on an oral steroid burst and taper and after she was weaning off of this she had significant increase in whole-body symptoms/withdrawal symptoms.  It did significantly help her pain throughout her entire body when she was on the medication.    She continues to have left knee pain.  Physical therapy scheduled this is ordered after MRI is completed.    Also has bilateral foot pain which is a chronic issue for her.  Has seen  orthotics.  Needs an order signed when it comes through if possible.    EMG: Normal EMG bilateral upper extremities.  There is no electrodiagnostic evidence of cervical radiculopathy, brachial plexopathy, focal neuropathy bilateral upper extremities.    Imaging: MRI of the left knee mages report personally reviewed.  No ligamentous injury no meniscal damage.  There is mild patellar tendinopathy.    MRI of the cervical thoracic spine images personally reviewed along with the report.  Mild degenerative changes C5-6 no high-grade spinal cord signal abnormality.  No Chiari malformation. Broad-based disc osteophyte complex with left sided spurring results in mild left foraminal stenosis. Thoracic spine MRI shows stable syrinx at the T6-10 level.  Measuring 6 x 5 mm.      Review of Systems: Pertinent positives: Paresthesias of both arms.  Headaches..  Pertinent negatives: No   weakness.  No bowel or bladder incontinence.  No urinary retention.  No fevers, unintentional weight loss, balance changes,   frequent falling, difficulty swallowing, or coordination difficulties.  All others reviewed are negative.    Past Medical History:   Diagnosis Date     Acquired hypothyroidism      Alcohol abuse      Allergic rhinitis      Alopecia      Asthma      Cervical dysplasia      Cervical high risk HPV (human papillomavirus) test positive 6/29/16, 7/5/17    neg 16/18     Depression      Disease of thyroid gland      H/O colposcopy with cervical biopsy 08/08/2017 08/08/17: [place] Bx and ECC normal.      Papanicolaou smear of cervix with low grade squamous intraepithelial lesion (LGSIL) 07/05/2017     Rosacea, acne      Sleep apnea      Syrinx of spinal cord (H)      Uncomplicated asthma        The following portions of the patient's history were reviewed and updated as appropriate: allergies, current medications, past family history, past medical history, past social history, past surgical history and problem list.            Objective:   Physical Exam:    /68   Pulse 60   There is no height or weight on file to calculate BMI.      General: Alert and oriented with normal affect. Attention, knowledge, memory, and language are intact. No acute distress.   Eyes: Sclerae are clear.  Respirations: Unlabored.  Gait:  Nonantalgic    Sensation is intact to light touch throughout the upper  extremities.  Reflexes are 2+ and symmetric in the biceps triceps and brachioradialis with negative Hoffmans.      Manual muscle testing reveals:  Right /Left out of 5  5/5 shoulder abductors  5/5 elbow flexors  5/5 elbow extensors  5/5 wrist extensors  5/5 interosseus  5/5 finger flexors

## 2022-10-28 NOTE — PATIENT INSTRUCTIONS
A cervical epidural has been ordered today. Please schedule this injection at least  2 weeks from now to allow time for insurance prior authorization. On the day of your injection, you cannot be sick or taking antibiotics. If you become sick and are prescribed, please call the clinic so your injection can be rescheduled for once you have completed your antibiotics. You will need to bring a  with you for your injection. If you have any questions or concerns prior to your injection, please do not hesitate to call the nurse navigation line at [phone].   Endocrinology consult placed.

## 2022-10-28 NOTE — LETTER
10/28/2022         RE: Karrie Zhang  [address]  [place] [address]        Dear Colleague,    Thank you for referring your patient, Karrie Zhang, to the [facility]. Please see a copy of my visit note below.    Assessment/Plan:      Karrie was seen today for emg.    Diagnoses and all orders for this visit:    Cervical spine pain  -     PAIN Translaminar Epidural Steroid Injection Cervical; Future    Numbness and tingling in both hands  -     EMG  -     PAIN Translaminar Epidural Steroid Injection Cervical; Future  -     NJ NEEDLE EMG EA EXTREMTY W/PARASPINAL AREA COMPLETE  -     NJ NCS MOTOR W OR W/O F-WAVE, 9 OR 10    Myofascial pain  -     PAIN Translaminar Epidural Steroid Injection Cervical; Future    Other chronic pain  -     Adult Endocrinology  Referral; Future    Syrinx of spinal cord (H)    Polymyalgia (H)  -     Adult Endocrinology  Referral; Future    DDD (degenerative disc disease), cervical  -     PAIN Translaminar Epidural Steroid Injection Cervical; Future         Assessment: Pleasant 37 year old female with past medical history significant for thyroid disorder, depression, sleep apnea, asthma who presents today for follow-up regardin. Right greater than left hand numbness and tingling digits 4 and 5.  Worse at night when she sleeps.    Likely represents myofascial pain    EMG normal today.    2.    Cervical spine.  Multilevel degenerative disc disease broad-based disc bulges no high-grade central canal stenosis.  There is left foraminal stenosis C6/7.      3.  Polymyalgias and arthralgias.  Consistent with widespread myofascial pain.    4. left knee pain: 2 months of significant left lateral knee pain.  MRI of the left knee unremarkable for meniscal tears as well as ligamentous injury.  Mild patellar tendinopathy was present.  Physical therapy scheduled.     5.  History of foot pain seen by  orthotics.    Discussion:    1.  I discussed the diagnosis and treatment options.  We reviewed the MRI of the thoracic and lumbar spine.  Reviewed options such as cervical epidural oral medications and therapy.  She has adverse effects to numerous medications.    2.  Recommend cervical interlaminar epidural steroid injection C7-T1 this is ordered.    3.  She did have significant side effects apparently when coming off of prednisone burst in the past and has widespread pain on the paresthesias heat and cold intolerances.  Will refer to endocrinology for examination such as adrenal insufficiency or cortisol abnormality or other endocrine abnormality causing her widespread myalgias.    4.  Continue plan of physical therapy for her knee.    5.  She tells me she was seen by orthotics and needs an order signed.  I will provide orthotics order for her for her foot pain.    6.  Follow-up with me 2 to 4 weeks after injection.    It was our pleasure caring for your patient today, if there any questions or concerns please do not hesitate to contact us.      Subjective:   Patient ID: Karrie Zhang is a 37 year old female.    History of Present Illness: Patient presents for follow-up of her pain complaints.  She has worsening cervical spine thoracic spine pain with bilateral arm paresthesias mostly digits 4 and 5 can be all the digits worse at night with lying with her hands on her chest.  Has some improvement with acupuncture but slow.  She flared all of her pain recently with no injury.  Pain is an 8/10 at worst 5/10 today 1/10 at best.  Not sure what makes it worse better with heat ice.  Has some heat and cold intolerance.  Her left arm is worsening now her right still worse than the left.  EMG was done today.  She tells me she previously had some left cervical radicular pain.  She was on an oral steroid burst and taper and after she was weaning off of this she had significant increase in whole-body symptoms/withdrawal  symptoms.  It did significantly help her pain throughout her entire body when she was on the medication.    She continues to have left knee pain.  Physical therapy scheduled this is ordered after MRI is completed.    Also has bilateral foot pain which is a chronic issue for her.  Has seen orthotics.  Needs an order signed when it comes through if possible.    EMG: Normal EMG bilateral upper extremities.  There is no electrodiagnostic evidence of cervical radiculopathy, brachial plexopathy, focal neuropathy bilateral upper extremities.    Imaging: MRI of the left knee mages report personally reviewed.  No ligamentous injury no meniscal damage.  There is mild patellar tendinopathy.    MRI of the cervical thoracic spine images personally reviewed along with the report.  Mild degenerative changes C5-6 no high-grade spinal cord signal abnormality.  No Chiari malformation. Broad-based disc osteophyte complex with left sided spurring results in mild left foraminal stenosis. Thoracic spine MRI shows stable syrinx at the T6-10 level.  Measuring 6 x 5 mm.      Review of Systems: Pertinent positives: Paresthesias of both arms.  Headaches..  Pertinent negatives: No   weakness.  No bowel or bladder incontinence.  No urinary retention.  No fevers, unintentional weight loss, balance changes,   frequent falling, difficulty swallowing, or coordination difficulties.  All others reviewed are negative.    Past Medical History:   Diagnosis Date     Acquired hypothyroidism      Alcohol abuse      Allergic rhinitis      Alopecia      Asthma      Cervical dysplasia      Cervical high risk HPV (human papillomavirus) test positive 6/29/16, 7/5/17    neg 16/18     Depression      Disease of thyroid gland      H/O colposcopy with cervical biopsy 08/08/2017 08/08/17: Cross Bx and ECC normal.      Papanicolaou smear of cervix with low grade squamous intraepithelial lesion (LGSIL) 07/05/2017     Rosacea, acne      Sleep apnea      Syrinx of  spinal cord (H)      Uncomplicated asthma        The following portions of the patient's history were reviewed and updated as appropriate: allergies, current medications, past family history, past medical history, past social history, past surgical history and problem list.           Objective:   Physical Exam:    /68   Pulse 60   There is no height or weight on file to calculate BMI.      General: Alert and oriented with normal affect. Attention, knowledge, memory, and language are intact. No acute distress.   Eyes: Sclerae are clear.  Respirations: Unlabored.  Gait:  Nonantalgic    Sensation is intact to light touch throughout the upper  extremities.  Reflexes are 2+ and symmetric in the biceps triceps and brachioradialis with negative Hoffmans.      Manual muscle testing reveals:  Right /Left out of 5  5/5 shoulder abductors  5/5 elbow flexors  5/5 elbow extensors  5/5 wrist extensors  5/5 interosseus  5/5 finger flexors       [address] [address]  Office: [phone] Fax: [phone]    Electromyography and Nerve Conduction Study Report        Indication: Patient presents at the request of Dr. Tapan Rose for bilateral upper extremity EMG.  She has bilateral hand numbness and tingling digits 4 and 5 mostly but also now encompassing all of the fingers of both hands.  Right is been worse than left but the left is worsening.  She has neck pain and parascapular pain as well.  History of left cervical radiculopathy.  Exam she has normal sensation to light touch to the upper extremities.  Normal reflexes to the upper extremities and normal muscle strength throughout the major muscle groups of the bilateral upper extremities.      Pt Exam Discussion (Communication Barriers):  Electromyography and nerve conduction testing, including associated discomfort, was discussed with the patient prior to the procedure.  No learning/ communication  barriers; patient verbalized understanding of procedure.  Informed consent was obtained.           Pt Assessment:  Testing was successfully completed; patient tolerated testing well.       Blood Thinners: None Skin Temperature: Warmed  31.5                     EMG/NCS  results:     Nerve Conduction Studies  Motor Sites      Amplitude Segment CV Distal Latency F-Latency F-Estimate Temp Comment   Site (mV)  (m/s) (ms) ms ms  C    Left Median (APB) Motor   Wrist 8.9 Wrist-APB  3.1   23.8    Elbow 8.1 Elbow-Wrist 49 7.7   23.8    Right Median (APB) Motor   Wrist 9.2 Wrist-APB  2.8   23.6    Elbow 7.2 Elbow-Wrist 58 7.0   23.6    Left Ulnar (ADM) Motor   Wrist 13.4   2.4   23.8    Bel Elbow 11.8 Bel Elbow-Wrist 58 6.3   23.9    Ab Elbow 11.4 Ab Elbow-Wrist 60 8.2   23.9    Right Ulnar (ADM) Motor   Wrist 15.8   2.5   23.6    Bel Elbow 13.8 Bel Elbow-Wrist 59 6.2   23.6    Ab Elbow 13.1 Ab Elbow-Wrist 64 7.8   23.6      Sensory Sites      Amplitude CV Onset Lat Peak Lat Temp Comment   Site ( V) (m/s) (ms) (ms)  C    Right Median Anti Sensory   Wrist-Dig II 70 59 2.2 2.7 29.2    Left Median-Ulnar Palmar Sensory        Median   Palm-Wrist 88 64 1.25 1.65 23.7         Ulnar   Palm-Wrist 71 65 1.23 1.70 23.8    Right Median-Ulnar Palmar Sensory        Median   Palm-Wrist 111 59 1.35 1.73 24.1         Ulnar   Palm-Wrist 73 59 1.35 1.68 23.8    Right Ulnar Anti Sensory   Wrist-Dig V 47 52 2.1 2.6 26        NCS Waveforms:    Motor                Sensory                  Electromyography     Side Muscle Nerve Root Ins Act Fibs Psw Amp Dur Poly Recrt Int Pat Comment   Right Deltoid Axillary C5-6 Nml Nml Nml Nml Nml 0 Nml Nml    Right Triceps Radial C6-7-8 Nml Nml Nml Nml Nml 0 Nml Nml    Right PronatorTeres Median C6-7 Nml Nml Nml Nml Nml 0 Nml Nml    Right 1stDorInt Ulnar C8-T1 Nml Nml Nml Nml Nml 0 Nml Nml    Right Abd Poll Brev Median C8-T1 Nml Nml Nml Nml Nml 0 Nml Nml    Left Deltoid Axillary C5-6 Nml Nml Nml Nml Nml 0 Nml  Nml    Left Triceps Radial C6-7-8 Nml Nml Nml Nml Nml 0 Nml Nml    Left PronatorTeres Median C6-7 Nml Nml Nml Nml Nml 0 Nml Nml    Left 1stDorInt Ulnar C8-T1 Nml Nml Nml Nml Nml 0 Nml Nml    Left Abd Poll Brev Median C8-T1 Nml Nml Nml Nml Nml 0 Nml Nml          Comment NCS: Normal study  1.  Normal nerve conduction studies bilateral upper extremity.  This includes normal right median and ulnar SNAPs, bilateral median and ulnar transcarpal studies, and bilateral median and ulnar CMAPs.    Comment EMG: Normal study  1.  Normal needle EMG bilateral upper extremities.     Interpretation: Normal study    1. There is no electrodiagnostic evidence of cervical radiculopathy, brachial plexopathy, or focal neuropathy in the bilateral upper extremities.  Specifically, there is no electrodiagnostic evidence of ulnar neuropathy in the bilateral upper extremities.     The testing was completed in its entirety by the physician.       It was our pleasure caring for your patient today, if there any questions or concerns please do not hesitate to contact us.        Again, thank you for allowing me to participate in the care of your patient.        Sincerely,        Tapan Rose, DO

## 2022-10-28 NOTE — PROGRESS NOTES
[facility]  [address] [place] [address] [address]  [place] [address]  Office: [phone] Fax: [phone]    Electromyography and Nerve Conduction Study Report        Indication: Patient presents at the request of Dr. Tapan Rose for bilateral upper extremity EMG.  She has bilateral hand numbness and tingling digits 4 and 5 mostly but also now encompassing all of the fingers of both hands.  Right is been worse than left but the left is worsening.  She has neck pain and parascapular pain as well.  History of left cervical radiculopathy.  Exam she has normal sensation to light touch to the upper extremities.  Normal reflexes to the upper extremities and normal muscle strength throughout the major muscle groups of the bilateral upper extremities.      Pt Exam Discussion (Communication Barriers):  Electromyography and nerve conduction testing, including associated discomfort, was discussed with the patient prior to the procedure.  No learning/ communication barriers; patient verbalized understanding of procedure.  Informed consent was obtained.           Pt Assessment:  Testing was successfully completed; patient tolerated testing well.       Blood Thinners: None Skin Temperature: Warmed  31.5                     EMG/NCS  results:     Nerve Conduction Studies  Motor Sites      Amplitude Segment CV Distal Latency F-Latency F-Estimate Temp Comment   Site (mV)  (m/s) (ms) ms ms  C    Left Median (APB) Motor   Wrist 8.9 Wrist-APB  3.1   23.8    Elbow 8.1 Elbow-Wrist 49 7.7   23.8    Right Median (APB) Motor   Wrist 9.2 Wrist-APB  2.8   23.6    Elbow 7.2 Elbow-Wrist 58 7.0   23.6    Left Ulnar (ADM) Motor   Wrist 13.4   2.4   23.8    Bel Elbow 11.8 Bel Elbow-Wrist 58 6.3   23.9    Ab Elbow 11.4 Ab Elbow-Wrist 60 8.2   23.9    Right Ulnar (ADM) Motor   Wrist 15.8   2.5   23.6    Bel Elbow 13.8 Bel Elbow-Wrist 59 6.2   23.6    Ab Elbow 13.1 Ab Elbow-Wrist 64 7.8   23.6      Sensory Sites       Amplitude CV Onset Lat Peak Lat Temp Comment   Site ( V) (m/s) (ms) (ms)  C    Right Median Anti Sensory   Wrist-Dig II 70 59 2.2 2.7 29.2    Left Median-Ulnar Palmar Sensory        Median   Palm-Wrist 88 64 1.25 1.65 23.7         Ulnar   Palm-Wrist 71 65 1.23 1.70 23.8    Right Median-Ulnar Palmar Sensory        Median   Palm-Wrist 111 59 1.35 1.73 24.1         Ulnar   Palm-Wrist 73 59 1.35 1.68 23.8    Right Ulnar Anti Sensory   Wrist-Dig V 47 52 2.1 2.6 26        NCS Waveforms:    Motor                Sensory                  Electromyography     Side Muscle Nerve Root Ins Act Fibs Psw Amp Dur Poly Recrt Int Pat Comment   Right Deltoid Axillary C5-6 Nml Nml Nml Nml Nml 0 Nml Nml    Right Triceps Radial C6-7-8 Nml Nml Nml Nml Nml 0 Nml Nml    Right PronatorTeres Median C6-7 Nml Nml Nml Nml Nml 0 Nml Nml    Right 1stDorInt Ulnar C8-T1 Nml Nml Nml Nml Nml 0 Nml Nml    Right Abd Poll Brev Median C8-T1 Nml Nml Nml Nml Nml 0 Nml Nml    Left Deltoid Axillary C5-6 Nml Nml Nml Nml Nml 0 Nml Nml    Left Triceps Radial C6-7-8 Nml Nml Nml Nml Nml 0 Nml Nml    Left PronatorTeres Median C6-7 Nml Nml Nml Nml Nml 0 Nml Nml    Left 1stDorInt Ulnar C8-T1 Nml Nml Nml Nml Nml 0 Nml Nml    Left Abd Poll Brev Median C8-T1 Nml Nml Nml Nml Nml 0 Nml Nml          Comment NCS: Normal study  1.  Normal nerve conduction studies bilateral upper extremity.  This includes normal right median and ulnar SNAPs, bilateral median and ulnar transcarpal studies, and bilateral median and ulnar CMAPs.    Comment EMG: Normal study  1.  Normal needle EMG bilateral upper extremities.     Interpretation: Normal study    1. There is no electrodiagnostic evidence of cervical radiculopathy, brachial plexopathy, or focal neuropathy in the bilateral upper extremities.  Specifically, there is no electrodiagnostic evidence of ulnar neuropathy in the bilateral upper extremities.     The testing was completed in its entirety by the physician.       It was our  pleasure caring for your patient today, if there any questions or concerns please do not hesitate to contact us.

## 2022-11-04 ENCOUNTER — MEDICAL CORRESPONDENCE (OUTPATIENT)
Dept: HEALTH INFORMATION MANAGEMENT | Facility: CLINIC | Age: 37
End: 2022-11-04

## 2022-11-11 ENCOUNTER — THERAPY VISIT (OUTPATIENT)
Dept: PHYSICAL THERAPY | Facility: CLINIC | Age: 37
End: 2022-11-11
Attending: PHYSICAL MEDICINE & REHABILITATION
Payer: COMMERCIAL

## 2022-11-11 DIAGNOSIS — M25.562 ACUTE PAIN OF LEFT KNEE: ICD-10-CM

## 2022-11-11 DIAGNOSIS — M54.2 CERVICAL PAIN: Primary | ICD-10-CM

## 2022-11-11 PROCEDURE — 97140 MANUAL THERAPY 1/> REGIONS: CPT | Mod: GP | Performed by: PHYSICAL THERAPIST

## 2022-11-11 PROCEDURE — 97110 THERAPEUTIC EXERCISES: CPT | Mod: GP | Performed by: PHYSICAL THERAPIST

## 2022-11-11 PROCEDURE — 97163 PT EVAL HIGH COMPLEX 45 MIN: CPT | Mod: GP | Performed by: PHYSICAL THERAPIST

## 2022-11-11 NOTE — PROGRESS NOTES
Physical Therapy Initial Evaluation  Subjective:  The history is provided by the patient. No  was used.   Patient Health History  Karrie Zhang being seen for Left knee and cervical issues.       Problem occurred: Overextending knee and chronic inflammation   Pain is reported as 3/10 on pain scale.  General health as reported by patient is fair.  Pertinent medical history includes: asthma, chemical dependency, depression, mental illness, migraines/headaches, numbness/tingling, overweight, pain at night/rest, sleep disorder/apnea and thyroid problems. Other medical history details: Syrinx cyst.     Medical allergies: none and other. Other medical allergies details: Penicillin.   Surgeries include:  None.    Current medications:  Anti-depressants, anti-inflammatory, muscle relaxants, sleep medication, thyroid medication and other. Other medications details: Anxiety, asthma, mood disorder.    Current occupation is .   Primary job tasks include:  Computer work, driving, lifting/carrying, prolonged sitting, prolonged standing and repetitive tasks.                  Therapist Generated HPI Evaluation  Problem details: Pt went out and did some dancing 3 months ago, and noticed L knee pain the next day.  Pain is located behind L knee and sometimes extends up the back of her L thigh.      Pt also has some low back pain ongoing..         Type of problem:  Left knee.          Patient reports pain:  Posterior.  Pain is described as aching (was previously sharp pain) and is intermittent.  Pain radiates to:  Thigh. Pain is the same all the time.  Since onset symptoms are unchanged.  Associated symptoms:  Loss of strength. Symptoms are exacerbated by ascending stairs, descending stairs and bending/squatting  and relieved by nothing.      Restrictions due to condition include:  Working in normal job without restrictions.  Barriers include:  None as reported by patient.    Therapist Generated  HPI Evaluation  Problem details: Pt describes neck pain over the past 10+ years, no specific injuries that the pt mentions.  .         Type of problem:  Cervical spine.          Patient reports pain:  Upper cervical spine.  Pain is described as aching and is constant.  Pain radiates to:  Shoulder right and shoulder left (B scapulae). Pain is worse in the A.M..  Since onset symptoms are gradually worsening.  Associated with: B hand numbness through last two digits bilaterally. Exacerbated by: lying on side, working, everything, being upright.  Relieved by: supported neck.  Special tests included:  MRI.    Restrictions due to condition include:  Working in normal job without restrictions.  Barriers include:  None as reported by patient.                        Objective:  Standing Alignment:    Cervical/Thoracic:  Forward head and thoracic kyphosis increased          Knee:  Genu recuvatum L and patella squinting R                          Cervical/Thoracic Evaluation    AROM:  AROM Cervical:    Flexion:            Wnl  Extension:       Wnl  Rotation:         Left: min loss erp L     Right: min loss erp R  Side Bend:      Left: wnl     Right:  Wnl                                                            Knee Evaluation:  ROM:  AROM: normal            Strength:     Extension:  Left: 5-/5    Pain:+        Flexion:  Left: 4+/5    Pain:+        Quad Set Left: Good    Pain:   Quad Set Right: Good    Pain:                  General     ROS    Assessment/Plan:    Patient is a 37 year old female with cervical and left side knee complaints.    Patient has the following significant findings with corresponding treatment plan.                Diagnosis 1:  Neck pain      Pain -  hot/cold therapy, manual therapy, self management, education and home program  Decreased ROM/flexibility - manual therapy and therapeutic exercise  Decreased strength - therapeutic exercise and therapeutic activities  Impaired muscle performance - neuro  re-education  Decreased function - therapeutic activities  Diagnosis 2:  L knee pain     Decreased strength - therapeutic exercise and therapeutic activities  Impaired muscle performance - neuro re-education  Decreased function - therapeutic activities    Therapy Evaluation Codes:   1) History comprised of:   Personal factors that impact the plan of care:      Past/current experiences and Time since onset of symptoms.    Comorbidity factors that impact the plan of care are:      Asthma, Chemical Dependency, Depression, Mental illness, Migraines/headaches, Numbness/tingling, Overweight and Sleep disorder/apnea.     Medications impacting care: Anti-depressant, Anti-inflammatory, Muscle relaxant and Sleep.  2) Examination of Body Systems comprised of:   Body structures and functions that impact the plan of care:      Cervical spine, Hip, Knee and Lumbar spine.   Activity limitations that impact the plan of care are:      Lifting, Squatting/kneeling, Stairs, Standing and Walking.  3) Clinical presentation characteristics are:   Unstable/Unpredictable.  4) Decision-Making    High complexity using standardized patient assessment instrument and/or measureable assessment of functional outcome.  Cumulative Therapy Evaluation is: High complexity.    Previous and current functional limitations:  (See Goal Flow Sheet for this information)    Short term and Long term goals: (See Goal Flow Sheet for this information)     Communication ability:  Patient appears to be able to clearly communicate and understand verbal and written communication and follow directions correctly.  Treatment Explanation - The following has been discussed with the patient:   RX ordered/plan of care  Anticipated outcomes  Possible risks and side effects  This patient would benefit from PT intervention to resume normal activities.   Rehab potential is good.    Frequency:  1 X week, once daily  Duration:  for 8 weeks  Discharge Plan:  Achieve all  LTG.  Independent in home treatment program.  Reach maximal therapeutic benefit.    Please refer to the daily flowsheet for treatment today, total treatment time and time spent performing 1:1 timed codes.

## 2022-11-14 ASSESSMENT — ACTIVITIES OF DAILY LIVING (ADL)
PAIN: THE SYMPTOM AFFECTS MY ACTIVITY MODERATELY
STIFFNESS: THE SYMPTOM AFFECTS MY ACTIVITY SLIGHTLY
RISE FROM A CHAIR: ACTIVITY IS MINIMALLY DIFFICULT
KNEE_ACTIVITY_OF_DAILY_LIVING_SCORE: 66.16
SWELLING: I DO NOT HAVE THE SYMPTOM
STAND: ACTIVITY IS SOMEWHAT DIFFICULT
HOW_WOULD_YOU_RATE_THE_OVERALL_FUNCTION_OF_YOUR_KNEE_DURING_YOUR_USUAL_DAILY_ACTIVITIES?: ABNORMAL
WEAKNESS: NOT ANSWERED
LIMPING: THE SYMPTOM AFFECTS MY ACTIVITY SLIGHTLY
AS_A_RESULT_OF_YOUR_KNEE_INJURY,_HOW_WOULD_YOU_RATE_YOUR_CURRENT_LEVEL_OF_DAILY_ACTIVITY?: ABNORMAL
HOW_WOULD_YOU_RATE_THE_CURRENT_FUNCTION_OF_YOUR_KNEE_DURING_YOUR_USUAL_DAILY_ACTIVITIES_ON_A_SCALE_FROM_0_TO_100_WITH_100_BEING_YOUR_LEVEL_OF_KNEE_FUNCTION_PRIOR_TO_YOUR_INJURY_AND_0_BEING_THE_INABILITY_TO_PERFORM_ANY_OF_YOUR_USUAL_DAILY_ACTIVITIES?: 75
GO UP STAIRS: ACTIVITY IS SOMEWHAT DIFFICULT
WALK: ACTIVITY IS SOMEWHAT DIFFICULT
RAW_SCORE: 46.31
GIVING WAY, BUCKLING OR SHIFTING OF KNEE: I DO NOT HAVE THE SYMPTOM
SQUAT: ACTIVITY IS FAIRLY DIFFICULT
GO DOWN STAIRS: ACTIVITY IS SOMEWHAT DIFFICULT
SIT WITH YOUR KNEE BENT: ACTIVITY IS MINIMALLY DIFFICULT
KNEEL ON THE FRONT OF YOUR KNEE: ACTIVITY IS SOMEWHAT DIFFICULT
KNEE_ACTIVITY_OF_DAILY_LIVING_SUM: 43

## 2022-11-16 ENCOUNTER — RADIOLOGY INJECTION OFFICE VISIT (OUTPATIENT)
Dept: PHYSICAL MEDICINE AND REHAB | Facility: CLINIC | Age: 37
End: 2022-11-16
Attending: PHYSICAL MEDICINE & REHABILITATION
Payer: COMMERCIAL

## 2022-11-16 VITALS
OXYGEN SATURATION: 99 % | SYSTOLIC BLOOD PRESSURE: 106 MMHG | HEART RATE: 70 BPM | DIASTOLIC BLOOD PRESSURE: 66 MMHG | TEMPERATURE: 97.9 F | RESPIRATION RATE: 16 BRPM

## 2022-11-16 DIAGNOSIS — R20.2 NUMBNESS AND TINGLING IN BOTH HANDS: ICD-10-CM

## 2022-11-16 DIAGNOSIS — M50.30 DDD (DEGENERATIVE DISC DISEASE), CERVICAL: ICD-10-CM

## 2022-11-16 DIAGNOSIS — M54.2 CERVICAL SPINE PAIN: ICD-10-CM

## 2022-11-16 DIAGNOSIS — M79.18 MYOFASCIAL PAIN: ICD-10-CM

## 2022-11-16 DIAGNOSIS — R20.0 NUMBNESS AND TINGLING IN BOTH HANDS: ICD-10-CM

## 2022-11-16 PROCEDURE — 62321 NJX INTERLAMINAR CRV/THRC: CPT | Performed by: PHYSICAL MEDICINE & REHABILITATION

## 2022-11-16 RX ORDER — METHYLPREDNISOLONE ACETATE 80 MG/ML
INJECTION, SUSPENSION INTRA-ARTICULAR; INTRALESIONAL; INTRAMUSCULAR; SOFT TISSUE
Status: COMPLETED | OUTPATIENT
Start: 2022-11-16 | End: 2022-11-16

## 2022-11-16 RX ORDER — LIDOCAINE HYDROCHLORIDE 10 MG/ML
INJECTION, SOLUTION EPIDURAL; INFILTRATION; INTRACAUDAL; PERINEURAL
Status: COMPLETED | OUTPATIENT
Start: 2022-11-16 | End: 2022-11-16

## 2022-11-16 RX ADMIN — METHYLPREDNISOLONE ACETATE 80 MG: 80 INJECTION, SUSPENSION INTRA-ARTICULAR; INTRALESIONAL; INTRAMUSCULAR; SOFT TISSUE at 15:30

## 2022-11-16 RX ADMIN — LIDOCAINE HYDROCHLORIDE 2.5 ML: 10 INJECTION, SOLUTION EPIDURAL; INFILTRATION; INTRACAUDAL; PERINEURAL at 15:30

## 2022-11-16 ASSESSMENT — PAIN SCALES - GENERAL
PAINLEVEL: MILD PAIN (2)
PAINLEVEL: MILD PAIN (2)

## 2022-11-16 NOTE — PATIENT INSTRUCTIONS
Follow-up visit with Dr. Rose in 2-4 weeks to discuss injection outcome and determine care plan going forward.       DISCHARGE INSTRUCTIONS    During office hours (8:00 a.m.- 4:00 p.m.) questions or concerns may be answered  by calling [facility] Navigation Nurses at  [phone].  Messages received after hours will be returned the following business day.      In the case of an emergency, please dial 911 or seek assistance at the nearest Emergency Room/Urgent Care facility.     All Patients:    You may experience an increase in your symptoms for the first 2 days (It may take anywhere between 2 days- 2 weeks for the steroid to have maximum effect).    You may use ice on the injection site, as frequently as 20 minutes each hour if needed.    You may take your pain medicine.    You may continue taking your regular medication after your injection. If you have had a Medial Branch Block you may resume pain medication once your pain diary is completed.    You may shower. No swimming, tub bath or hot tub for 48 hours.  You may remove your bandaid/bandage as soon as you are home.    You may resume light activities, as tolerated.    Resume your usual diet as tolerated.    It is strongly advised that you do not drive for 1-3 hours post injection.    If you have had oral sedation:  Do not drive for 8 hours post injection.      If you have had IV sedation:  Do not drive for 24 hours post injection.  Do not operate hazardous machinery or make important personal/business decisions for 24 hours.      POSSIBLE STEROID SIDE EFFECTS (If steroid/cortisone was used for your procedure)    -If you experience these symptoms, it should only last for a short period    Swelling of the legs              Skin redness (flushing)     Mouth (oral) irritation   Blood sugar (glucose) levels            Sweats                    Mood changes  Headache  Sleeplessness  Weakened immune system for up to 14 days, which could increase the risk of  koby the COVID-19 virus and/or experiencing more severe symptoms of the disease, if exposed.  Decreased effectiveness of the flu vaccine if given within 2 weeks of the steroid.         POSSIBLE PROCEDURE SIDE EFFECTS  -Call the Spine Center if you are concerned  Increased Pain           Increased numbness/tingling      Nausea/Vomiting          Bruising/bleeding at site      Redness or swelling                                              Difficulty walking      Weakness           Fever greater than 100.5    *In the event of a severe headache after an epidural steroid injection that is relieved by lying down, please call the [facility] to speak with a clinical staff member*

## 2022-12-08 ENCOUNTER — THERAPY VISIT (OUTPATIENT)
Dept: PHYSICAL THERAPY | Facility: CLINIC | Age: 37
End: 2022-12-08
Payer: COMMERCIAL

## 2022-12-08 DIAGNOSIS — M25.562 ACUTE PAIN OF LEFT KNEE: Primary | ICD-10-CM

## 2022-12-08 DIAGNOSIS — M54.2 CERVICAL PAIN: ICD-10-CM

## 2022-12-08 PROCEDURE — 97012 MECHANICAL TRACTION THERAPY: CPT | Mod: GP | Performed by: PHYSICAL THERAPIST

## 2022-12-08 PROCEDURE — 97110 THERAPEUTIC EXERCISES: CPT | Mod: GP | Performed by: PHYSICAL THERAPIST

## 2022-12-08 PROCEDURE — 97112 NEUROMUSCULAR REEDUCATION: CPT | Mod: GP | Performed by: PHYSICAL THERAPIST

## 2022-12-26 ENCOUNTER — E-VISIT (OUTPATIENT)
Dept: FAMILY MEDICINE | Facility: CLINIC | Age: 37
End: 2022-12-26
Payer: COMMERCIAL

## 2022-12-26 DIAGNOSIS — J45.30 MILD PERSISTENT ASTHMA WITHOUT COMPLICATION: ICD-10-CM

## 2022-12-26 DIAGNOSIS — B37.31 CANDIDIASIS OF VAGINA: ICD-10-CM

## 2022-12-26 DIAGNOSIS — J01.90 ACUTE SINUSITIS WITH SYMPTOMS > 10 DAYS: Primary | ICD-10-CM

## 2022-12-26 PROCEDURE — 99421 OL DIG E/M SVC 5-10 MIN: CPT | Performed by: PHYSICIAN ASSISTANT

## 2022-12-26 RX ORDER — DOXYCYCLINE HYCLATE 100 MG
100 TABLET ORAL 2 TIMES DAILY
Qty: 14 TABLET | Refills: 0 | Status: SHIPPED | OUTPATIENT
Start: 2022-12-26 | End: 2023-01-02

## 2022-12-26 RX ORDER — ALBUTEROL SULFATE 90 UG/1
2 AEROSOL, METERED RESPIRATORY (INHALATION) EVERY 6 HOURS PRN
Qty: 18 G | Refills: 3 | Status: SHIPPED | OUTPATIENT
Start: 2022-12-26

## 2022-12-26 RX ORDER — FLUCONAZOLE 150 MG/1
150 TABLET ORAL ONCE
Qty: 1 TABLET | Refills: 0 | Status: SHIPPED | OUTPATIENT
Start: 2022-12-26 | End: 2022-12-26

## 2022-12-26 NOTE — PATIENT INSTRUCTIONS
Dear Karrie Zhang    After reviewing your responses, I've been able to diagnose you with Acute sinusitis with symptoms > 10 days.      Based on your responses and diagnosis, I have prescribed   Orders Placed This Encounter     doxycycline hyclate (VIBRA-TABS) 100 MG tablet    to treat your symptoms. I have sent this to your pharmacy.?     It is also important to stay well hydrated, get lots of rest and take over-the-counter decongestants,?tylenol?or ibuprofen if you?are able to?take those medications per your primary care provider to help relieve discomfort.?     It is important that you take?all of?your prescribed medication even if your symptoms are improving after a few doses.? Taking?all of?your medicine helps prevent the symptoms from returning.?     If your symptoms worsen, you develop severe headache, vomiting, high fever (>102), or are not improving in 7 days, please contact your primary care provider for an appointment or visit any of our convenient Walk-in Care or Urgent Care Centers to be seen which can be found on our website?here.?     Thanks again for choosing?us?as your health care partner,?   ?  Maddy Tijerina PA-C?

## 2023-02-17 NOTE — PROGRESS NOTES
Discharge Note    Progress reporting period is from initial evaluation date (please see noted date below) to Dec 8, 2022.  Linked Episodes   Type: Episode: Status: Noted: Resolved: Last update: Updated by:   PHYSICAL THERAPY L knee/neck 11/11/2022 Active 11/11/2022 12/8/2022  7:58 AM David Charles PT      Comments:       Karrie failed to follow up and current status is unknown.  Please see information below for last relevant information on current status.  Patient seen for 2 visits.    SUBJECTIVE  Subjective changes noted by patient:  Pt notes that her L knee is no longer painful at this time.  Pt also had a cortisone injection in her C5 neck which has made her neck feel much better as well.  Pt is feeling really good overall and is now looking for  HEP to help her build strength to prevent future injury.  .  Current pain level is  .     Previous pain level was   .   Changes in function:  Yes (See Goal flowsheet attached for changes in current functional level)  Adverse reaction to treatment or activity: None    OBJECTIVE  Changes noted in objective findings: CROM: wnl.  core strength: fair.  extensor strength: fair     ASSESSMENT/PLAN  Diagnosis: neck pain   STG/LTGs have been met or progress has been made towards goals:  Yes, please see goal flowsheet for most current information  Assessment of Progress: current status is unknown.    Last current status: Pt is progressing well   Self Management Plans:  HEP  I have re-evaluated this patient and find that the nature, scope, duration and intensity of the therapy is appropriate for the medical condition of the patient.  Karrie continues to require the following intervention to meet STG and LTG's:  HEP.    Recommendations:  Discharge with current home program.  Patient to follow up with MD as needed.    Please refer to the daily flowsheet for treatment today, total treatment time and time spent performing 1:1 timed codes.

## 2023-02-24 ENCOUNTER — TELEPHONE (OUTPATIENT)
Dept: NEUROLOGY | Facility: CLINIC | Age: 38
End: 2023-02-24
Payer: COMMERCIAL
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
## 2023-02-24 DIAGNOSIS — G95.0 SYRINX OF SPINAL CORD (H): Primary | ICD-10-CM

## 2023-03-05 DIAGNOSIS — E03.9 ACQUIRED HYPOTHYROIDISM: ICD-10-CM

## 2023-03-06 RX ORDER — LEVOTHYROXINE SODIUM 75 UG/1
TABLET ORAL
Qty: 90 TABLET | Refills: 0 | Status: SHIPPED | OUTPATIENT
Start: 2023-03-06 | End: 2023-06-12

## 2023-03-18 DIAGNOSIS — J45.30 MILD PERSISTENT ASTHMA WITHOUT COMPLICATION: ICD-10-CM

## 2023-03-20 ENCOUNTER — TRANSFERRED RECORDS (OUTPATIENT)
Dept: HEALTH INFORMATION MANAGEMENT | Facility: CLINIC | Age: 38
End: 2023-03-20

## 2023-03-20 ENCOUNTER — HOSPITAL ENCOUNTER (OUTPATIENT)
Dept: MRI IMAGING | Facility: HOSPITAL | Age: 38
Discharge: HOME | End: 2023-03-20
Attending: NURSE PRACTITIONER | Admitting: NURSE PRACTITIONER
Payer: OTHER MISCELLANEOUS
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
## 2023-03-20 DIAGNOSIS — G95.0 SYRINX OF SPINAL CORD (H): ICD-10-CM

## 2023-03-20 PROCEDURE — 72146 MRI CHEST SPINE W/O DYE: CPT

## 2023-03-20 RX ORDER — MONTELUKAST SODIUM 10 MG/1
TABLET ORAL
Qty: 90 TABLET | Refills: 0 | Status: SHIPPED | OUTPATIENT
Start: 2023-03-20 | End: 2023-06-12

## 2023-04-20 ENCOUNTER — PATIENT OUTREACH (OUTPATIENT)
Dept: CARE COORDINATION | Facility: CLINIC | Age: 38
End: 2023-04-20
Payer: COMMERCIAL

## 2023-05-02 ENCOUNTER — E-VISIT (OUTPATIENT)
Dept: OBGYN | Facility: CLINIC | Age: 38
End: 2023-05-02
Payer: COMMERCIAL

## 2023-05-02 DIAGNOSIS — B96.89 BV (BACTERIAL VAGINOSIS): ICD-10-CM

## 2023-05-02 DIAGNOSIS — N76.0 BACTERIAL VAGINOSIS: ICD-10-CM

## 2023-05-02 DIAGNOSIS — B96.89 BACTERIAL VAGINOSIS: ICD-10-CM

## 2023-05-02 DIAGNOSIS — B37.31 CANDIDAL VULVOVAGINITIS: ICD-10-CM

## 2023-05-02 DIAGNOSIS — N76.0 BV (BACTERIAL VAGINOSIS): ICD-10-CM

## 2023-05-02 PROCEDURE — 99421 OL DIG E/M SVC 5-10 MIN: CPT | Performed by: OBSTETRICS & GYNECOLOGY

## 2023-05-04 RX ORDER — METRONIDAZOLE 7.5 MG/G
1 GEL VAGINAL DAILY
Qty: 70 G | Refills: 0 | Status: SHIPPED | OUTPATIENT
Start: 2023-05-04 | End: 2024-06-13

## 2023-05-04 RX ORDER — FLUCONAZOLE 150 MG/1
150 TABLET ORAL
Qty: 3 TABLET | Refills: 0 | Status: SHIPPED | OUTPATIENT
Start: 2023-05-04 | End: 2024-06-13

## 2023-05-04 NOTE — PATIENT INSTRUCTIONS
Thank you for choosing us for your care. I have placed an order for a prescription so that you can start treatment. View your full visit summary for details by clicking on the link below. Your pharmacist will able to address any questions you may have about the medication.     If you re not feeling better within 2-3 days, please schedule an appointment.  You can schedule an appointment right here in [place], or call [phone]  If the visit is for the same symptoms as your eVisit, we ll refund the cost of your eVisit if seen within seven days.    Thank you for choosing us for your care. Given your symptoms, I would like you to do a lab-only visit to determine what is causing them.  I have placed the orders.  Please schedule an appointment with the lab right here in [place], or call [phone].  I will let you know when the results are back and next steps to take.

## 2023-05-06 ENCOUNTER — LAB (OUTPATIENT)
Dept: LAB | Facility: CLINIC | Age: 38
End: 2023-05-06
Payer: COMMERCIAL

## 2023-05-06 ENCOUNTER — MYC MEDICAL ADVICE (OUTPATIENT)
Dept: OBGYN | Facility: CLINIC | Age: 38
End: 2023-05-06

## 2023-05-06 DIAGNOSIS — N76.0 BV (BACTERIAL VAGINOSIS): ICD-10-CM

## 2023-05-06 DIAGNOSIS — B96.89 BV (BACTERIAL VAGINOSIS): ICD-10-CM

## 2023-05-06 LAB
CLUE CELLS: ABNORMAL
TRICHOMONAS, WET PREP: ABNORMAL
WBC'S/HIGH POWER FIELD, WET PREP: ABNORMAL
YEAST, WET PREP: ABNORMAL

## 2023-05-06 PROCEDURE — 87210 SMEAR WET MOUNT SALINE/INK: CPT

## 2023-05-16 ASSESSMENT — ENCOUNTER SYMPTOMS
WEAKNESS: 0
HEARTBURN: 0
HOT FLASHES: 1
MYALGIAS: 1
BLOATING: 0
MUSCLE WEAKNESS: 0
SPEECH CHANGE: 0
PANIC: 1
NAUSEA: 0
DIARRHEA: 0
SEIZURES: 0
CHILLS: 0
LOSS OF CONSCIOUSNESS: 0
DISTURBANCES IN COORDINATION: 0
NECK PAIN: 1
TREMORS: 0
WEIGHT GAIN: 0
INSOMNIA: 1
DEPRESSION: 1
RECTAL PAIN: 0
NAIL CHANGES: 0
POLYPHAGIA: 0
STIFFNESS: 1
ABDOMINAL PAIN: 0
CONSTIPATION: 1
DECREASED LIBIDO: 1
JOINT SWELLING: 1
BOWEL INCONTINENCE: 0
WEIGHT LOSS: 1
SKIN CHANGES: 0
SWOLLEN GLANDS: 0
TINGLING: 1
MEMORY LOSS: 1
POLYDIPSIA: 0
BRUISES/BLEEDS EASILY: 1
DIZZINESS: 0
FEVER: 0
NERVOUS/ANXIOUS: 1
INCREASED ENERGY: 1
NUMBNESS: 1
ALTERED TEMPERATURE REGULATION: 1
BLOOD IN STOOL: 0
DECREASED APPETITE: 0
MUSCLE CRAMPS: 1
ARTHRALGIAS: 1
FATIGUE: 1
NIGHT SWEATS: 1
JAUNDICE: 0
PARALYSIS: 0
DECREASED CONCENTRATION: 1
VOMITING: 0
HEADACHES: 1
POOR WOUND HEALING: 0
BACK PAIN: 1
HALLUCINATIONS: 0

## 2023-05-23 ENCOUNTER — OFFICE VISIT (OUTPATIENT)
Dept: ENDOCRINOLOGY | Facility: CLINIC | Age: 38
End: 2023-05-23
Attending: PHYSICAL MEDICINE & REHABILITATION
Payer: COMMERCIAL

## 2023-05-23 VITALS
DIASTOLIC BLOOD PRESSURE: 64 MMHG | WEIGHT: 192.2 LBS | BODY MASS INDEX: 31.41 KG/M2 | SYSTOLIC BLOOD PRESSURE: 112 MMHG | HEART RATE: 76 BPM

## 2023-05-23 DIAGNOSIS — R53.83 OTHER FATIGUE: ICD-10-CM

## 2023-05-23 DIAGNOSIS — E03.9 HYPOTHYROIDISM, UNSPECIFIED TYPE: ICD-10-CM

## 2023-05-23 DIAGNOSIS — R41.89 BRAIN FOG: ICD-10-CM

## 2023-05-23 DIAGNOSIS — E66.09 CLASS 1 OBESITY DUE TO EXCESS CALORIES WITH SERIOUS COMORBIDITY AND BODY MASS INDEX (BMI) OF 31.0 TO 31.9 IN ADULT: ICD-10-CM

## 2023-05-23 DIAGNOSIS — L70.9 ACNE, UNSPECIFIED ACNE TYPE: ICD-10-CM

## 2023-05-23 DIAGNOSIS — M35.3 POLYMYALGIA (H): ICD-10-CM

## 2023-05-23 DIAGNOSIS — E66.811 CLASS 1 OBESITY DUE TO EXCESS CALORIES WITH SERIOUS COMORBIDITY AND BODY MASS INDEX (BMI) OF 31.0 TO 31.9 IN ADULT: ICD-10-CM

## 2023-05-23 DIAGNOSIS — L65.9 HAIR LOSS: ICD-10-CM

## 2023-05-23 DIAGNOSIS — G47.30 SLEEP APNEA, UNSPECIFIED TYPE: ICD-10-CM

## 2023-05-23 PROCEDURE — 99205 OFFICE O/P NEW HI 60 MIN: CPT | Performed by: INTERNAL MEDICINE

## 2023-05-23 NOTE — LETTER
"    5/23/2023         RE: Karrie Zhang  [address]  [facility] [address]        Dear Colleague,    Thank you for referring your patient, Karrie Zhang, to the [facility]. Please see a copy of my visit note below.    Subjective:    New patient, no prior Endocrine notes including Care Everywhere    Karrie Zhang is a 38 year old female who presents as a referral from Dr. Rose for \"She did have significant side effects apparently when coming off of prednisone burst in the past and has widespread pain on the paresthesias heat and cold intolerances.  Will refer to endocrinology for examination such as adrenal insufficiency or cortisol abnormality or other endocrine abnormality causing her widespread myalgias\".     She has a number of symptoms including: fatigue, acne, hair loss (vertex), diffuse muscle pain, and brain fog. These are chronic symptoms the past few years.    No prior pregnancies. No plans for pregnancy in the near future. She has been on an OCP (levonorgestrel - ethinyl estradiol) almost continuously since age 15. Menarche was around age 14. No history of PCOS. No prior pelvic US. 8/2022: total testosterone 17 ng/dL, SHBG mid normal. No hirsutism. No increase in muscle mass. No deepening of voice. She recalls possibly being on spironolactone. She does follow with a dermatologist and she is using Rogaine.     She intentionally lost about 50 # between early 2020 and early 2023 and since that time weight has plateaued. She has a history of alcoholism and has been sober since early 2023. Current BMI 31.4 kg/m2. Appetite is normal. She has chronic fatigue. No abdominal pain or nausea.  No recent glucocorticoid exposure outside of topical hydrocortisone.  She has normal sleep-wake cycle.    She was told she had an underactive thyroid about 5 years ago. She has been on thyroid hormone for ~5 years. Currently she takes LT4 75 mcg once daily, 7 days/week. On " labs dating back to 2018 TSH always normal except for mildly elevated on 2 occasions and free T4 normal both times. 8/2022: Tg Ab undetectable, TPO Ab undetectable. Most recent TSH 8/2022: 0.83. No prior thyroid surgery. No prior H/N radiation. No prior JEFFRIES therapy. No prior thyroid US. No known thyroid nodule. No prior thyroid FNA. No FH of thyroid cancer. She takes a hair/nail supplement and a B complex vitamin.     No galactorrhea.    No increase in shoe or ring size.    No polyuria.      No prior hypercalcemia. 8/2022: 25-OH vitamin D mid normal. No prior nephrolithiasis. No prior low impact fracture. She takes vitamin D 2000 units daily.     HbA1c always normal previously - most recent assay 8/2022. Random glucose always <100 mg/dL, most recently 5/2022, possible elevated FPG on occasion - 10/28/2021  mg/dL. No history of DM or prediabetes. She has never been on a medication for glucose lowering.    No history of hypertension. No classic pheochromocytoma type spells.      MRI brain 9/17/2021: per radiology sella is unremarkable, I reviewed the images and agree the pituitary gland appears normal.    No cross-sectional abdominal imaging.    She has sleep apnea - diagnosed remotely prior to recent weight loss. She uses a CPAP. No recent sleep medicine appointment.     No tobacco use. No personal or FH of VTE. No migraine headaches.     Her father had pancreatic cancer and had DM-2. A grandparent had hypothyroidism. No other endocrinopathies in the family.      Objective:    BMI 31.4 kg/m2, /64.     No features of cortisol excess outside of scattered ecchymoses.    No thyroid eye disease. Thyroid examined and normal.    She does have some hair loss at the vertex. Scattered facial acne. No hirsutim.    Radial pulse with a RRR.     Assessment/Plan:    #Constellation of symptoms    Liz presents with a constellation of symptoms with the question of whether or not there is an endocrinopathy as the  etiology.    We will have her stop her birth control pill for 2 months and then proceed with a fasting 8 AM lab draw. I ordered: ACTH, DHEA-sulfate, serum cortisol, TSH, T4, IGF-I, diabetes mellitus screen, androgens, 17 hydroxyprogesterone, and BMP.  Following testing we will complete a 2 mg dexamethasone suppression test which needs to be ordered and completed before she resumes her birth control pill.  However I do have a very low suspicion for cortisol autonomy. Hold biotin (B complex vitamin, hair/nail supplement) for 3 days before testing.     We reviewed that even if her hormonal evaluation does not define an endocrine etiology for her symptoms she could consider spironolactone for acne and hair loss.  Previously she recalls possibly not tolerating a small dose of spironolactone.  We also reviewed that we could consider a small dose of T3 as well in addition to T4.    I did encourage her to reach out to her sleep medicine physician to make sure her CPAP settings are appropriate with her significant weight loss.  I can always place a referral as needed.    60 minutes spent on the date of the encounter doing chart review, history and exam, documentation and further activities as noted above.         Again, thank you for allowing me to participate in the care of your patient.        Sincerely,        Domingo Cunha MD

## 2023-05-23 NOTE — PROGRESS NOTES
"Subjective:    New patient, no prior Endocrine notes including Care Everywhere    Karrie Zhang is a 38 year old female who presents as a referral from Dr. Rose for \"She did have significant side effects apparently when coming off of prednisone burst in the past and has widespread pain on the paresthesias heat and cold intolerances.  Will refer to endocrinology for examination such as adrenal insufficiency or cortisol abnormality or other endocrine abnormality causing her widespread myalgias\".     She has a number of symptoms including: fatigue, acne, hair loss (vertex), diffuse muscle pain, and brain fog. These are chronic symptoms the past few years.    No prior pregnancies. No plans for pregnancy in the near future. She has been on an OCP (levonorgestrel - ethinyl estradiol) almost continuously since age 15. Menarche was around age 14. No history of PCOS. No prior pelvic US. 8/2022: total testosterone 17 ng/dL, SHBG mid normal. No hirsutism. No increase in muscle mass. No deepening of voice. She recalls possibly being on spironolactone. She does follow with a dermatologist and she is using Rogaine.     She intentionally lost about 50 # between early 2020 and early 2023 and since that time weight has plateaued. She has a history of alcoholism and has been sober since early 2023. Current BMI 31.4 kg/m2. Appetite is normal. She has chronic fatigue. No abdominal pain or nausea.  No recent glucocorticoid exposure outside of topical hydrocortisone.  She has normal sleep-wake cycle.    She was told she had an underactive thyroid about 5 years ago. She has been on thyroid hormone for ~5 years. Currently she takes LT4 75 mcg once daily, 7 days/week. On labs dating back to 2018 TSH always normal except for mildly elevated on 2 occasions and free T4 normal both times. 8/2022: Tg Ab undetectable, TPO Ab undetectable. Most recent TSH 8/2022: 0.83. No prior thyroid surgery. No prior H/N radiation. No prior JEFFRIES therapy. " No prior thyroid US. No known thyroid nodule. No prior thyroid FNA. No FH of thyroid cancer. She takes a hair/nail supplement and a B complex vitamin.     No galactorrhea.    No increase in shoe or ring size.    No polyuria.      No prior hypercalcemia. 8/2022: 25-OH vitamin D mid normal. No prior nephrolithiasis. No prior low impact fracture. She takes vitamin D 2000 units daily.     HbA1c always normal previously - most recent assay 8/2022. Random glucose always <100 mg/dL, most recently 5/2022, possible elevated FPG on occasion - 10/28/2021  mg/dL. No history of DM or prediabetes. She has never been on a medication for glucose lowering.    No history of hypertension. No classic pheochromocytoma type spells.      MRI brain 9/17/2021: per radiology sella is unremarkable, I reviewed the images and agree the pituitary gland appears normal.    No cross-sectional abdominal imaging.    She has sleep apnea - diagnosed remotely prior to recent weight loss. She uses a CPAP. No recent sleep medicine appointment.     No tobacco use. No personal or FH of VTE. No migraine headaches.     Her father had pancreatic cancer and had DM-2. A grandparent had hypothyroidism. No other endocrinopathies in the family.      Objective:    BMI 31.4 kg/m2, /64.     No features of cortisol excess outside of scattered ecchymoses.    No thyroid eye disease. Thyroid examined and normal.    She does have some hair loss at the vertex. Scattered facial acne. No hirsutim.    Radial pulse with a RRR.     Assessment/Plan:    #Constellation of symptoms    Liz presents with a constellation of symptoms with the question of whether or not there is an endocrinopathy as the etiology.    We will have her stop her birth control pill for 2 months and then proceed with a fasting 8 AM lab draw. I ordered: ACTH, DHEA-sulfate, serum cortisol, TSH, T4, IGF-I, diabetes mellitus screen, androgens, 17 hydroxyprogesterone, and BMP.  Following testing we  will complete a 2 mg dexamethasone suppression test which needs to be ordered and completed before she resumes her birth control pill.  However I do have a very low suspicion for cortisol autonomy. Hold biotin (B complex vitamin, hair/nail supplement) for 3 days before testing.     We reviewed that even if her hormonal evaluation does not define an endocrine etiology for her symptoms she could consider spironolactone for acne and hair loss.  Previously she recalls possibly not tolerating a small dose of spironolactone.  We also reviewed that we could consider a small dose of T3 as well in addition to T4.    I did encourage her to reach out to her sleep medicine physician to make sure her CPAP settings are appropriate with her significant weight loss.  I can always place a referral as needed.    60 minutes spent on the date of the encounter doing chart review, history and exam, documentation and further activities as noted above.

## 2023-06-03 ENCOUNTER — HEALTH MAINTENANCE LETTER (OUTPATIENT)
Age: 38
End: 2023-06-03

## 2023-06-11 DIAGNOSIS — J45.30 MILD PERSISTENT ASTHMA WITHOUT COMPLICATION: ICD-10-CM

## 2023-06-12 DIAGNOSIS — E03.9 ACQUIRED HYPOTHYROIDISM: ICD-10-CM

## 2023-06-12 RX ORDER — MONTELUKAST SODIUM 10 MG/1
TABLET ORAL
Qty: 90 TABLET | Refills: 0 | Status: SHIPPED | OUTPATIENT
Start: 2023-06-12 | End: 2023-09-18

## 2023-06-12 RX ORDER — LEVOTHYROXINE SODIUM 75 UG/1
TABLET ORAL
Qty: 90 TABLET | Refills: 0 | Status: SHIPPED | OUTPATIENT
Start: 2023-06-12 | End: 2023-10-16

## 2023-06-12 NOTE — LETTER
My Asthma Action Plan    Name: Karrie Zhang   YOB: 1985  Date: 6/12/2023   My doctor: Vanessa Granados MD   My clinic: [facility]        My Control Medicine: Montelukast (Singulair) -  10 mg daily  My Rescue Medicine: Albuterol (Proair/Ventolin/Proventil HFA) 2-4 puffs EVERY 4 HOURS as needed. Use a spacer if recommended by your provider.   My Asthma Severity:   Mild Persistent  Know your asthma triggers: upper respiratory infections               GREEN ZONE   Good Control  I feel good  No cough or wheeze  Can work, sleep and play without asthma symptoms       Take your asthma control medicine every day.     If exercise triggers your asthma, take your rescue medication  15 minutes before exercise or sports, and  During exercise if you have asthma symptoms  Spacer to use with inhaler: If you have a spacer, make sure to use it with your inhaler             YELLOW ZONE Getting Worse  I have ANY of these:  I do not feel good  Cough or wheeze  Chest feels tight  Wake up at night   Keep taking your Green Zone medications  Start taking your rescue medicine:  every 20 minutes for up to 1 hour. Then every 4 hours for 24-48 hours.  If you stay in the Yellow Zone for more than 12-24 hours, contact your doctor.  If you do not return to the Green Zone in 12-24 hours or you get worse, start taking your oral steroid medicine if prescribed by your provider.           RED ZONE Medical Alert - Get Help  I have ANY of these:  I feel awful  Medicine is not helping  Breathing getting harder  Trouble walking or talking  Nose opens wide to breathe       Take your rescue medicine NOW  If your provider has prescribed an oral steroid medicine, start taking it NOW  Call your doctor NOW  If you are still in the Red Zone after 20 minutes and you have not reached your doctor:  Take your rescue medicine again and  Call 911 or go to the emergency room right away    See your regular doctor within 2 weeks of  an Emergency Room or Urgent Care visit for follow-up treatment.          Annual Reminders:  Meet with Asthma Educator,  Flu Shot in the Fall, consider Pneumonia Vaccination for patients with asthma (aged 19 and older).    Pharmacy: [id number] [place], [place] - [address]  AT [facility] [address]    Electronically signed by Vanessa Granados MD   Date: 06/12/23                      Asthma Triggers  How To Control Things That Make Your Asthma Worse    Triggers are things that make your asthma worse.  Look at the list below to help you find your triggers and what you can do about them.  You can help prevent asthma flare-ups by staying away from your triggers.      Trigger                                                          What you can do   Cigarette Smoke  Tobacco smoke can make asthma worse. Do not allow smoking in your home, car or around you.  Be sure no one smokes at a child s day care or school.  If you smoke, ask your health care provider for ways to help you quit.  Ask family members to quit too.  Ask your health care provider for a referral to Quit Plan to help you quit smoking, or call 8-279-806-PLAN.     Colds, Flu, Bronchitis  These are common triggers of asthma. Wash your hands often.  Don t touch your eyes, nose or mouth.  Get a flu shot every year.     Dust Mites  These are tiny bugs that live in cloth or carpet. They are too small to see. Wash sheets and blankets in hot water every week.   Encase pillows and mattress in dust mite proof covers.  Avoid having carpet if you can. If you have carpet, vacuum weekly.   Use a dust mask and HEPA vacuum.   Pollen and Outdoor Mold  Some people are allergic to trees, grass, or weed pollen, or molds. Try to keep your windows closed.  Limit time out doors when pollen count is high.   Ask you health care provider about taking medicine during allergy season.     Animal Dander  Some people are allergic to skin flakes, urine or saliva from pets with  fur or feathers. Keep pets with fur or feathers out of your home.    If you can t keep the pet outdoors, then keep the pet out of your bedroom.  Keep the bedroom door closed.  Keep pets off cloth furniture and away from stuffed toys.     Mice, Rats, and Cockroaches   Some people are allergic to the waste from these pests.   Cover food and garbage.  Clean up spills and food crumbs.  Store grease in the refrigerator.   Keep food out of the bedroom.   Indoor Mold  This can be a trigger if your home has high moisture. Fix leaking faucets, pipes, or other sources of water.   Clean moldy surfaces.  Dehumidify basement if it is damp and smelly.   Smoke, Strong Odors, and Sprays  These can reduce air quality. Stay away from strong odors and sprays, such as perfume, powder, hair spray, paints, smoke incense, paint, cleaning products, candles and new carpet.   Exercise or Sports  Some people with asthma have this trigger. Be active!  Ask your doctor about taking medicine before sports or exercise to prevent symptoms.    Warm up for 5-10 minutes before and after sports or exercise.     Other Triggers of Asthma  Cold air:  Cover your nose and mouth with a scarf.  Sometimes laughing or crying can be a trigger.  Some medicines and food can trigger asthma.

## 2023-07-27 ENCOUNTER — LAB (OUTPATIENT)
Dept: LAB | Facility: HOSPITAL | Age: 38
End: 2023-07-27
Payer: COMMERCIAL

## 2023-07-27 DIAGNOSIS — M35.3 POLYMYALGIA (H): ICD-10-CM

## 2023-07-27 DIAGNOSIS — E66.811 CLASS 1 OBESITY DUE TO EXCESS CALORIES WITH SERIOUS COMORBIDITY AND BODY MASS INDEX (BMI) OF 31.0 TO 31.9 IN ADULT: ICD-10-CM

## 2023-07-27 DIAGNOSIS — L65.9 HAIR LOSS: ICD-10-CM

## 2023-07-27 DIAGNOSIS — E03.9 HYPOTHYROIDISM, UNSPECIFIED TYPE: ICD-10-CM

## 2023-07-27 DIAGNOSIS — E66.09 CLASS 1 OBESITY DUE TO EXCESS CALORIES WITH SERIOUS COMORBIDITY AND BODY MASS INDEX (BMI) OF 31.0 TO 31.9 IN ADULT: ICD-10-CM

## 2023-07-27 DIAGNOSIS — L70.9 ACNE, UNSPECIFIED ACNE TYPE: ICD-10-CM

## 2023-07-27 DIAGNOSIS — R53.83 OTHER FATIGUE: ICD-10-CM

## 2023-07-27 LAB
ANION GAP SERPL CALCULATED.3IONS-SCNC: 11 MMOL/L (ref 7–15)
BUN SERPL-MCNC: 12.3 MG/DL (ref 6–20)
CALCIUM SERPL-MCNC: 8.8 MG/DL (ref 8.6–10)
CHLORIDE SERPL-SCNC: 103 MMOL/L (ref 98–107)
CREAT SERPL-MCNC: 0.81 MG/DL (ref 0.51–0.95)
DEPRECATED HCO3 PLAS-SCNC: 24 MMOL/L (ref 22–29)
DHEA-S SERPL-MCNC: 130 UG/DL (ref 35–430)
FASTING STATUS PATIENT QL REPORTED: YES
GFR SERPL CREATININE-BSD FRML MDRD: >90 ML/MIN/1.73M2
GLUCOSE SERPL-MCNC: 91 MG/DL (ref 70–99)
GLUCOSE SERPL-MCNC: 91 MG/DL (ref 70–99)
HBA1C MFR BLD: 4.8 %
POTASSIUM SERPL-SCNC: 4.2 MMOL/L (ref 3.4–5.3)
SHBG SERPL-SCNC: 57 NMOL/L (ref 30–135)
SODIUM SERPL-SCNC: 138 MMOL/L (ref 136–145)
T4 FREE SERPL-MCNC: 1.18 NG/DL (ref 0.9–1.7)
TSH SERPL DL<=0.005 MIU/L-ACNC: 1.54 UIU/ML (ref 0.3–4.2)

## 2023-07-27 PROCEDURE — 84439 ASSAY OF FREE THYROXINE: CPT

## 2023-07-27 PROCEDURE — 82157 ASSAY OF ANDROSTENEDIONE: CPT

## 2023-07-27 PROCEDURE — 84443 ASSAY THYROID STIM HORMONE: CPT

## 2023-07-27 PROCEDURE — 83498 ASY HYDROXYPROGESTERONE 17-D: CPT

## 2023-07-27 PROCEDURE — 84403 ASSAY OF TOTAL TESTOSTERONE: CPT

## 2023-07-27 PROCEDURE — 82024 ASSAY OF ACTH: CPT

## 2023-07-27 PROCEDURE — 83036 HEMOGLOBIN GLYCOSYLATED A1C: CPT

## 2023-07-27 PROCEDURE — 36415 COLL VENOUS BLD VENIPUNCTURE: CPT

## 2023-07-27 PROCEDURE — 82627 DEHYDROEPIANDROSTERONE: CPT

## 2023-07-27 PROCEDURE — 84305 ASSAY OF SOMATOMEDIN: CPT

## 2023-07-27 PROCEDURE — 84270 ASSAY OF SEX HORMONE GLOBUL: CPT

## 2023-07-27 PROCEDURE — 80048 BASIC METABOLIC PNL TOTAL CA: CPT

## 2023-07-28 ENCOUNTER — APPOINTMENT (OUTPATIENT)
Dept: LAB | Facility: HOSPITAL | Age: 38
End: 2023-07-28
Payer: COMMERCIAL

## 2023-07-28 LAB
ACTH PLAS-MCNC: 18 PG/ML
CORTIS SERPL-MCNC: 0.6 UG/DL
IGF-I BLD-MCNC: 113 NG/ML (ref 76–271)

## 2023-07-28 PROCEDURE — 82533 TOTAL CORTISOL: CPT | Performed by: INTERNAL MEDICINE

## 2023-07-28 PROCEDURE — 36415 COLL VENOUS BLD VENIPUNCTURE: CPT

## 2023-07-29 LAB
TESTOST FREE SERPL-MCNC: 0.2 NG/DL
TESTOST SERPL-MCNC: 16 NG/DL (ref 8–60)

## 2023-07-30 LAB — ANDROST SERPL-MCNC: 0.54 NG/ML

## 2023-08-01 DIAGNOSIS — G47.33 OSA ON CPAP: Primary | ICD-10-CM

## 2023-08-02 DIAGNOSIS — R53.83 OTHER FATIGUE: Primary | ICD-10-CM

## 2023-08-03 LAB — 17OHP SERPL-MCNC: 24 NG/DL

## 2023-08-08 ENCOUNTER — LAB (OUTPATIENT)
Dept: LAB | Facility: HOSPITAL | Age: 38
End: 2023-08-08
Payer: COMMERCIAL

## 2023-08-08 DIAGNOSIS — R53.83 OTHER FATIGUE: ICD-10-CM

## 2023-08-08 LAB
ACTH PLAS-MCNC: 18 PG/ML
CORTIS SERPL-MCNC: 9.6 UG/DL

## 2023-08-08 PROCEDURE — 36415 COLL VENOUS BLD VENIPUNCTURE: CPT

## 2023-08-08 PROCEDURE — 82533 TOTAL CORTISOL: CPT

## 2023-08-08 PROCEDURE — 82024 ASSAY OF ACTH: CPT

## 2023-08-11 ENCOUNTER — TELEPHONE (OUTPATIENT)
Dept: FAMILY MEDICINE | Facility: CLINIC | Age: 38
End: 2023-08-11
Payer: COMMERCIAL

## 2023-08-11 NOTE — TELEPHONE ENCOUNTER
Patient was referred for an MTM appointment by their insurance plan. ?Calling patient to see if they had time to review their medications today, or if we could schedule a follow up appointment. ?Appointment would be a general review of their medications to make sure they are working well, don't have any serious interactions/aren't causing side effects, and if there are ways to simplify them, make them easier to take, or ways to get the cost down. ?Patient can call [phone] to schedule an appointment with me.     Rosemarie Cole, PharmD  Medication Therapy Management Pharmacist  [facility]

## 2023-08-16 ASSESSMENT — ENCOUNTER SYMPTOMS
NAIL CHANGES: 0
TINGLING: 1
NUMBNESS: 1
INSOMNIA: 1
DECREASED LIBIDO: 1
SKIN CHANGES: 0
SWOLLEN GLANDS: 0
WEIGHT LOSS: 0
BRUISES/BLEEDS EASILY: 1
MUSCLE WEAKNESS: 0
POOR WOUND HEALING: 1
PARALYSIS: 0
DIZZINESS: 1
WEAKNESS: 0
DEPRESSION: 1
NERVOUS/ANXIOUS: 1
DISTURBANCES IN COORDINATION: 0
NIGHT SWEATS: 1
DYSURIA: 0
ARTHRALGIAS: 1
PANIC: 0
HEMATURIA: 0
FEVER: 0
MEMORY LOSS: 1
DECREASED CONCENTRATION: 1
DIFFICULTY URINATING: 0
FLANK PAIN: 0
INCREASED ENERGY: 1
MUSCLE CRAMPS: 0
CHILLS: 0
JOINT SWELLING: 0
SPEECH CHANGE: 0
HALLUCINATIONS: 0
POLYPHAGIA: 0
MYALGIAS: 1
STIFFNESS: 1
ALTERED TEMPERATURE REGULATION: 1
HEADACHES: 1
HOT FLASHES: 1
LOSS OF CONSCIOUSNESS: 0
POLYDIPSIA: 0
DECREASED APPETITE: 0
BACK PAIN: 1
SEIZURES: 0
FATIGUE: 1
NECK PAIN: 1
WEIGHT GAIN: 0
TREMORS: 0

## 2023-08-21 NOTE — PROGRESS NOTES
"Video visit    Start time 8:18, stop time 8:35; additional 15 minutes spent on the date of the encounter doing chart review, documentation and further activities as noted.     Provider location: [facility], [address] [place] [address] [address] [place] [id number]    Patient location: patient home    Mode of transmission: video     Subjective:    Established patient. The following is a comprehensive summary of her Endocrine care to date.     Karrie Zhang is a 38 year old female who presents as a referral from Dr. Rose for \"She did have significant side effects apparently when coming off of prednisone burst in the past and has widespread pain on the paresthesias heat and cold intolerances.  Will refer to endocrinology for examination such as adrenal insufficiency or cortisol abnormality or other endocrine abnormality causing her widespread myalgias\".     5/2023: She has a number of symptoms including: fatigue, acne, hair loss (scalp), diffuse muscle pain, and brain fog. These are chronic symptoms the past few years.    8/22/2023: she continues to have fatigue, muscle pain, and hair loss (scalp)     No prior pregnancies. No plans for pregnancy in the near future. She was on an OCP (levonorgestrel - ethinyl estradiol) almost continuously since age 15. Menarche was around age 14. No prior diagnosis of PCOS. No prior pelvic US. No hirsutism. No increase in muscle mass. No deepening of voice. She recalls possibly being on spironolactone. She does follow with a dermatologist and she is using Rogaine. She stopped her OCP at least 2 months before our hormonal testing in the summer of 2023 and hasn't resumed it yet.    8/2022: total testosterone 17 ng/dL, SHBG mid normal  7/2023: 17-OH progesterone 24 ng/dL  7/2023: androstenedione normal, DHEA-S 130 mcg/dL, free testosterone normal, total testosterone 16 ng/dL       5/2023: She intentionally lost about 50 # between early 2020 and early 2023 and since that " time weight has plateaued. She has a history of alcoholism and has been sober since early 2023. Current BMI 31.4 kg/m2. Appetite is normal. She has chronic fatigue. No abdominal pain or nausea.  No recent glucocorticoid exposure outside of topical hydrocortisone.  She has normal sleep-wake cycle.    7/27/2023: DHEA-S 130 mcg/dL    7/28/2023: 2 mg DST with serum cortisol 0.6 mcg/dL     8/8/2023: 8 AM serum cortisol 9.6 mcg/dL and ACTH 18 pg/mL     She was told she had an underactive thyroid about 5 years ago. She has been on thyroid hormone for ~5 years. Currently she takes LT4 75 mcg once daily, 7 days/week. On labs dating back to 2018 TSH always normal except for mildly elevated on 2 occasions and free T4 normal both times. 8/2022: Tg Ab undetectable, TPO Ab undetectable. No prior thyroid surgery. No prior H/N radiation. No prior JEFFRIES therapy. No prior thyroid US. No known thyroid nodule. No prior thyroid FNA. No FH of thyroid cancer. She takes a hair/nail supplement and a B complex vitamin.     7/2023: TSH 1.5, free T4 normal     No galactorrhea.    No increase in shoe or ring size. 7/2023: IGF-1 normal     No polyuria.      No prior hypercalcemia. 8/2022: 25-OH vitamin D mid normal. No prior nephrolithiasis. No prior low impact fracture. She takes vitamin D 2000 units daily.     HbA1c always normal previously. Random glucose always <100 mg/dL, most recently 5/2022, possible elevated FPG on occasion - 10/28/2021  mg/dL. No history of DM or prediabetes. She has never been on a medication for glucose lowering.    7/2023: FPG 91 mg/dL, HbA1c 4.8%    No history of hypertension. No classic pheochromocytoma type spells.      MRI brain 9/17/2021: per radiology sella is unremarkable, I reviewed the images and agree the pituitary gland appears normal.    No cross-sectional abdominal imaging.    She has sleep apnea - diagnosed remotely prior to recent weight loss. She uses a CPAP. No recent sleep medicine appointment.      No tobacco use. No personal or FH of VTE. No migraine headaches.     Her father had pancreatic cancer and had DM-2. A grandparent had hypothyroidism. No other endocrinopathies in the family.      Objective:    Video visit today.     5/2023: BMI 31.4 kg/m2, /64. No features of cortisol excess outside of scattered ecchymoses. No thyroid eye disease. Thyroid examined and normal. She does have some hair loss on the top of the scalp. Scattered facial acne. No hirsutim. Radial pulse with a RRR.     Assessment/Plan:    # Constellation of symptoms    Liz presents with a constellation of symptoms with the question of whether or not there is an endocrinopathy as the etiology. As of 8/2023 Liz has had a complete biochemical Endocrine evaluation for her symptoms and no endocrinopathy has been identified as a cause. No further Endocrine testing is needed at this time.     She did have COVID-19 in 2020 but notes her symptoms were present prior to having COVID and did not significantly worsen afterward.    Will try:  Add T3 - initially 2.5 mcg once daily then after a few weeks increase to 5 mcg daily. Will not adjust her dose of T4 at this time. She'll let me know how she's feeling with this change in 6-8 weeks.  Encouraged her to reach back out to her dermatologist re hair loss. She'll consider spironolactone as well, reviewed the efficacy would likely be limited in her case.   3. I did encourage her to reach out to her sleep medicine physician to make sure her CPAP settings are appropriate with her significant weight loss.      No follow-up ordered.

## 2023-08-22 ENCOUNTER — VIRTUAL VISIT (OUTPATIENT)
Dept: ENDOCRINOLOGY | Facility: CLINIC | Age: 38
End: 2023-08-22
Payer: COMMERCIAL

## 2023-08-22 DIAGNOSIS — L65.9 HAIR LOSS: ICD-10-CM

## 2023-08-22 DIAGNOSIS — M35.3 POLYMYALGIA (H): ICD-10-CM

## 2023-08-22 DIAGNOSIS — G47.30 SLEEP APNEA, UNSPECIFIED TYPE: ICD-10-CM

## 2023-08-22 DIAGNOSIS — E03.9 HYPOTHYROIDISM, UNSPECIFIED TYPE: Primary | ICD-10-CM

## 2023-08-22 DIAGNOSIS — R53.83 OTHER FATIGUE: ICD-10-CM

## 2023-08-22 PROCEDURE — 99214 OFFICE O/P EST MOD 30 MIN: CPT | Mod: VID | Performed by: INTERNAL MEDICINE

## 2023-08-22 RX ORDER — KETOCONAZOLE 20 MG/ML
SHAMPOO TOPICAL DAILY PRN
COMMUNITY
Start: 2023-03-20

## 2023-08-22 RX ORDER — LIOTHYRONINE SODIUM 5 UG/1
5 TABLET ORAL DAILY
Qty: 60 TABLET | Refills: 0 | Status: SHIPPED | OUTPATIENT
Start: 2023-08-22 | End: 2024-01-04

## 2023-08-22 NOTE — LETTER
"    8/22/2023         RE: Karrie Zhang  [address]  [facility] [address]        Dear Colleague,    Thank you for referring your patient, Karrie Zhang, to the [facility]. Please see a copy of my visit note below.    Video visit    Start time 8:18, stop time 8:35; additional 15 minutes spent on the date of the encounter doing chart review, documentation and further activities as noted.     Provider location: [facility], [address] [place] [address] [address], [place] [address]    Patient location: patient home    Mode of transmission: video     Subjective:    Established patient. The following is a comprehensive summary of her Endocrine care to date.     Karrie Zhang is a 38 year old female who presents as a referral from Dr. Rose for \"She did have significant side effects apparently when coming off of prednisone burst in the past and has widespread pain on the paresthesias heat and cold intolerances.  Will refer to endocrinology for examination such as adrenal insufficiency or cortisol abnormality or other endocrine abnormality causing her widespread myalgias\".     5/2023: She has a number of symptoms including: fatigue, acne, hair loss (scalp), diffuse muscle pain, and brain fog. These are chronic symptoms the past few years.    8/22/2023: she continues to have fatigue, muscle pain, and hair loss (scalp)     No prior pregnancies. No plans for pregnancy in the near future. She was on an OCP (levonorgestrel - ethinyl estradiol) almost continuously since age 15. Menarche was around age 14. No prior diagnosis of PCOS. No prior pelvic US. No hirsutism. No increase in muscle mass. No deepening of voice. She recalls possibly being on spironolactone. She does follow with a dermatologist and she is using Rogaine. She stopped her OCP at least 2 months before our hormonal testing in the summer of 2023 and hasn't resumed it yet.    8/2022: total testosterone " 17 ng/dL, SHBG mid normal  7/2023: 17-OH progesterone 24 ng/dL  7/2023: androstenedione normal, DHEA-S 130 mcg/dL, free testosterone normal, total testosterone 16 ng/dL       5/2023: She intentionally lost about 50 # between early 2020 and early 2023 and since that time weight has plateaued. She has a history of alcoholism and has been sober since early 2023. Current BMI 31.4 kg/m2. Appetite is normal. She has chronic fatigue. No abdominal pain or nausea.  No recent glucocorticoid exposure outside of topical hydrocortisone.  She has normal sleep-wake cycle.    7/27/2023: DHEA-S 130 mcg/dL    7/28/2023: 2 mg DST with serum cortisol 0.6 mcg/dL     8/8/2023: 8 AM serum cortisol 9.6 mcg/dL and ACTH 18 pg/mL     She was told she had an underactive thyroid about 5 years ago. She has been on thyroid hormone for ~5 years. Currently she takes LT4 75 mcg once daily, 7 days/week. On labs dating back to 2018 TSH always normal except for mildly elevated on 2 occasions and free T4 normal both times. 8/2022: Tg Ab undetectable, TPO Ab undetectable. No prior thyroid surgery. No prior H/N radiation. No prior JEFFRIES therapy. No prior thyroid US. No known thyroid nodule. No prior thyroid FNA. No FH of thyroid cancer. She takes a hair/nail supplement and a B complex vitamin.     7/2023: TSH 1.5, free T4 normal     No galactorrhea.    No increase in shoe or ring size. 7/2023: IGF-1 normal     No polyuria.      No prior hypercalcemia. 8/2022: 25-OH vitamin D mid normal. No prior nephrolithiasis. No prior low impact fracture. She takes vitamin D 2000 units daily.     HbA1c always normal previously. Random glucose always <100 mg/dL, most recently 5/2022, possible elevated FPG on occasion - 10/28/2021  mg/dL. No history of DM or prediabetes. She has never been on a medication for glucose lowering.    7/2023: FPG 91 mg/dL, HbA1c 4.8%    No history of hypertension. No classic pheochromocytoma type spells.      MRI brain 9/17/2021: per  radiology sella is unremarkable, I reviewed the images and agree the pituitary gland appears normal.    No cross-sectional abdominal imaging.    She has sleep apnea - diagnosed remotely prior to recent weight loss. She uses a CPAP. No recent sleep medicine appointment.     No tobacco use. No personal or FH of VTE. No migraine headaches.     Her father had pancreatic cancer and had DM-2. A grandparent had hypothyroidism. No other endocrinopathies in the family.      Objective:    Video visit today.     5/2023: BMI 31.4 kg/m2, /64. No features of cortisol excess outside of scattered ecchymoses. No thyroid eye disease. Thyroid examined and normal. She does have some hair loss on the top of the scalp. Scattered facial acne. No hirsutim. Radial pulse with a RRR.     Assessment/Plan:    # Constellation of symptoms    Liz presents with a constellation of symptoms with the question of whether or not there is an endocrinopathy as the etiology. As of 8/2023 Liz has had a complete biochemical Endocrine evaluation for her symptoms and no endocrinopathy has been identified as a cause. No further Endocrine testing is needed at this time.     She did have COVID-19 in 2020 but notes her symptoms were present prior to having COVID and did not significantly worsen afterward.    Will try:  Add T3 - initially 2.5 mcg once daily then after a few weeks increase to 5 mcg daily. Will not adjust her dose of T4 at this time. She'll let me know how she's feeling with this change in 6-8 weeks.  Encouraged her to reach back out to her dermatologist re hair loss. She'll consider spironolactone as well, reviewed the efficacy would likely be limited in her case.   3. I did encourage her to reach out to her sleep medicine physician to make sure her CPAP settings are appropriate with her significant weight loss.      No follow-up ordered.         Again, thank you for allowing me to participate in the care of your patient.         Sincerely,        Domingo Cunha MD

## 2023-09-05 ASSESSMENT — PATIENT HEALTH QUESTIONNAIRE - PHQ9
SUM OF ALL RESPONSES TO PHQ QUESTIONS 1-9: 5
10. IF YOU CHECKED OFF ANY PROBLEMS, HOW DIFFICULT HAVE THESE PROBLEMS MADE IT FOR YOU TO DO YOUR WORK, TAKE CARE OF THINGS AT HOME, OR GET ALONG WITH OTHER PEOPLE: SOMEWHAT DIFFICULT
SUM OF ALL RESPONSES TO PHQ QUESTIONS 1-9: 5

## 2023-09-05 ASSESSMENT — ASTHMA QUESTIONNAIRES: ACT_TOTALSCORE: 21

## 2023-09-06 ENCOUNTER — VIRTUAL VISIT (OUTPATIENT)
Dept: FAMILY MEDICINE | Facility: CLINIC | Age: 38
End: 2023-09-06
Payer: COMMERCIAL

## 2023-09-06 DIAGNOSIS — F41.9 ANXIETY: ICD-10-CM

## 2023-09-06 DIAGNOSIS — M25.511 ACUTE PAIN OF RIGHT SHOULDER: Primary | ICD-10-CM

## 2023-09-06 DIAGNOSIS — J01.90 ACUTE SINUSITIS, RECURRENCE NOT SPECIFIED, UNSPECIFIED LOCATION: ICD-10-CM

## 2023-09-06 DIAGNOSIS — F33.0 MAJOR DEPRESSIVE DISORDER, RECURRENT EPISODE, MILD (H): ICD-10-CM

## 2023-09-06 PROCEDURE — 99213 OFFICE O/P EST LOW 20 MIN: CPT | Mod: VID | Performed by: FAMILY MEDICINE

## 2023-09-06 RX ORDER — FLUOXETINE 10 MG/1
10 CAPSULE ORAL DAILY
COMMUNITY
Start: 2023-09-06

## 2023-09-06 RX ORDER — DOXYCYCLINE 100 MG/1
100 CAPSULE ORAL 2 TIMES DAILY
Qty: 20 CAPSULE | Refills: 0 | Status: SHIPPED | OUTPATIENT
Start: 2023-09-06 | End: 2023-09-16

## 2023-09-06 NOTE — PROGRESS NOTES
Liz is a 38 year old who is being evaluated via a billable video visit.      How would you like to obtain your AVS? MyChart  If the video visit is dropped, the invitation should be resent by: Text to cell phone: [phone]  Will anyone else be joining your video visit? No        Assessment & Plan     Major depressive disorder, recurrent episode, mild (H)  - FLUoxetine (PROZAC) 10 MG capsule; Take 1 capsule (10 mg) by mouth daily    Anxiety  - FLUoxetine (PROZAC) 10 MG capsule; Take 1 capsule (10 mg) by mouth daily    Acute pain of right shoulder  Seen at outside urgent care with negative XR. Ongoing right shoulder pain. Recommended in-person visit for further evaluation -- referral to orthopedics placed.  - Orthopedic  Referral; Future    Acute sinusitis, recurrence not specified, unspecified location  Start antibiotic to treat for sinus infection. Continue other symptomatic cares as helpful.  - doxycycline hyclate (VIBRAMYCIN) 100 MG capsule; Take 1 capsule (100 mg) by mouth 2 times daily for 10 days      [facility]   Liz is a 38 year old, presenting for the following health issues:  Sinus Problem      9/6/2023     7:12 AM   Additional Questions   Roomed by Obdulia SHULTZ       Sinus Problem     History of Present Illness       Reason for visit:  Sinus infection  Symptom onset:  3-7 days ago  Symptoms include:  Constabt sinus headache/fullness, mild cough, bottom of head pain  Symptom intensity:  Severe  Symptom progression:  Worsening  Had these symptoms before:  Yes  Has tried/received treatment for these symptoms:  Yes  Previous treatment was successful:  Yes  Prior treatment description:  Antibiotic  What makes it worse:  Moving my eyes or head, coughing  What makes it better:  Cbd pain gel, pressure on sinuses    She eats 0-1 servings of fruits and vegetables daily.She consumes 2 sweetened beverage(s) daily.She exercises with enough effort to  increase her heart rate 9 or less minutes per day.  She exercises with enough effort to increase her heart rate 3 or less days per week.   She is taking medications regularly.       Acute Illness  Acute illness concerns: Sinus Problem   Onset/Duration: 4-5 days   Symptoms:  Fever: No  Chills/Sweats: No  Headache (location?): YES  Sinus Pressure: YES  Conjunctivitis:  No  Ear Pain: not pain just feel full  Rhinorrhea: No  Congestion: YES  Sore Throat: No  Cough: YES-productive of yellow sputum, productive of green sputum  Wheeze: YES- has asthma  Decreased Appetite: No  Nausea: No  Vomiting: No  Diarrhea: No  Dysuria/Freq.: No  Dysuria or Hematuria: No  Fatigue/Achiness: No  Sick/Strep Exposure: No  Therapies tried and outcome: Dayquil, sudafed, tylenol sinus and pressure, regular tylenol, Flonase, Excedrin         Review of Systems   Constitutional, HEENT, cardiovascular, pulmonary, gi and gu systems are negative, except as otherwise noted.      Objective           Vitals:  No vitals were obtained today due to virtual visit.    Physical Exam   GENERAL: Healthy, alert and no distress  EYES: Eyes grossly normal to inspection.  No discharge or erythema, or obvious scleral/conjunctival abnormalities.  RESP: No audible wheeze, cough, or visible cyanosis.  No visible retractions or increased work of breathing.    SKIN: Visible skin clear. No significant rash, abnormal pigmentation or lesions.  NEURO: Cranial nerves grossly intact.  Mentation and speech appropriate for age.  PSYCH: Mentation appears normal, affect normal/bright, judgement and insight intact, normal speech and appearance well-groomed.            Video-Visit Details    Type of service:  Video Visit     Originating Location (pt. Location): Home  Distant Location (provider location):  On-site  Platform used for Video Visit: Algolux

## 2023-09-13 ENCOUNTER — OFFICE VISIT (OUTPATIENT)
Dept: PHYSICAL MEDICINE AND REHAB | Facility: CLINIC | Age: 38
End: 2023-09-13
Payer: COMMERCIAL

## 2023-09-13 VITALS — SYSTOLIC BLOOD PRESSURE: 121 MMHG | DIASTOLIC BLOOD PRESSURE: 75 MMHG | HEART RATE: 59 BPM

## 2023-09-13 DIAGNOSIS — M54.2 CERVICAL SPINE PAIN: ICD-10-CM

## 2023-09-13 DIAGNOSIS — G95.0 SYRINX OF SPINAL CORD (H): ICD-10-CM

## 2023-09-13 DIAGNOSIS — M79.18 MYOFASCIAL PAIN: ICD-10-CM

## 2023-09-13 DIAGNOSIS — M25.511 ACUTE PAIN OF RIGHT SHOULDER: Primary | ICD-10-CM

## 2023-09-13 PROCEDURE — 99214 OFFICE O/P EST MOD 30 MIN: CPT | Performed by: PHYSICAL MEDICINE & REHABILITATION

## 2023-09-13 RX ORDER — METHOCARBAMOL 500 MG/1
500 TABLET, FILM COATED ORAL 3 TIMES DAILY PRN
Qty: 30 TABLET | Refills: 0 | Status: SHIPPED | OUTPATIENT
Start: 2023-09-13 | End: 2024-06-13

## 2023-09-13 ASSESSMENT — PAIN SCALES - GENERAL: PAINLEVEL: MODERATE PAIN (5)

## 2023-09-13 NOTE — PROGRESS NOTES
Assessment/Plan:      Karrie was seen today for neck pain and shoulder pain.    Diagnoses and all orders for this visit:    Acute pain of right shoulder  -     Physical Therapy Referral; Future    Cervical spine pain  -     Physical Therapy Referral; Future    Myofascial pain  -     Physical Therapy Referral; Future  -     methocarbamol (ROBAXIN) 500 MG tablet; Take 1 tablet (500 mg) by mouth 3 times daily as needed for muscle spasms    Syrinx of spinal cord (H)         Assessment: Pleasant 38 year old female with past medical history significant for thyroid disorder, depression, sleep apnea, asthma who presents today for follow-up regardin.  new right shoulder pain for approximately 2 to 3 months.  Consistent with rotator cuff impingement/strain.  She has a lot of kayaking and that likely is putting her in impingement positions.  Plain films reportedly negative through urgent care.    2.  Cervical spine pain at the cervical thoracic junction which is new consistent with myofascial pain.  She also has chronic cervical spine pain with mild degenerative disc disease and disc bulges and left foraminal stenosis C6/7.  Did well following cervical interlaminar epidural steroid injection 2022 significant improvement for at least 1 month.    3.  Persistent paresthesias right greater than left hand nothing new.  This is digits 4 and 5.  Normal EMG.    4.  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         Discussion:    1.  I discussed the diagnosis and treatment options.  We discussed options of physical therapy, steroid injection to the right shoulder, MRI.  At this point her pain is improved today in the shoulder but having more significant pain at the cervical thoracic junction.  We discussed medication changes as well.  We will hold off on MRI given no weakness of the shoulder.    2.  Start physical therapy for cervical parascapular strengthening stabilization for rotator cuff  stabilization.    3.  Trial methocarbamol 500 mg up to 3 times daily as needed for myofascial pain.    4.  Reassurance provided that I do not see signs of complete rotator cuff tear on exam today.    5.  Follow-up with me in 6 to 8 weeks      It was our pleasure caring for your patient today, if there any questions or concerns please do not hesitate to contact us.      Subjective:   Patient ID: Karrie Zhang is a 38 year old female.    History of Present Illness: Patient presents for evaluation of right shoulder pain and cervical thoracic junction pain.  Right shoulder pain started 3 months ago no injury.  Waxes and wanes in intensity.  Worse with internal rotation external rotation and right shoulder extension.  She does a lot of kayaking but does not recall any injury.  Does a lot of lifting of the kayak.  Better with rest and ice.  This can be flared particularly end of the day.  Pain is an 8/10 at worst 5/10 today 4/10 at best.    To accompany that she has cervical thoracic junction pain which is quite severe worsening and is fairly constant worse with reaching with her right arm.    Evaluated in urgent care for her right shoulder: Evaluated the note from urgent care.  Diagnosed with acute shoulder pain plain films are negative at that time.  Recommended rest ice and a sling was provided.    Imaging:  Thoracic spine plain MRI was personally reviewed from March 2023.  12 rib-bearing thoracic vertebrae Modic endplate type I changes T7-8 mild degenerative changes T7-8 unchanged thoracic cord syrinx from T6-7 through T9-10.    Review of Systems: Pertinent positives: Has headaches, paresthesias in the right arm.  Pertinent negatives: No  weakness.  No bowel or bladder incontinence.  No urinary retention.  No fevers, unintentional weight loss, balance changes,  frequent falling, difficulty swallowing, or coordination difficulties.  All others reviewed are negative.         Past Medical History:   Diagnosis Date     Acquired hypothyroidism     Alcohol abuse     Allergic rhinitis     Alopecia     Asthma     Cervical dysplasia     Cervical high risk HPV (human papillomavirus) test positive 6/29/16, 7/5/17    neg 16/18    Depression     Disease of thyroid gland     H/O colposcopy with cervical biopsy 08/08/2017 08/08/17: [facility] and [facility] normal.     Papanicolaou smear of cervix with low grade squamous intraepithelial lesion (LGSIL) 07/05/2017    Rosacea, acne     Sleep apnea     Syrinx of spinal cord (H)     Uncomplicated asthma        The following portions of the patient's history were reviewed and updated as appropriate: allergies, current medications, past family history, past medical history, past social history, past surgical history and problem list.           Objective:   Physical Exam:    /75   Pulse 59   There is no height or weight on file to calculate BMI.      General: Alert and oriented with normal affect. Attention, knowledge, memory, and language are intact. No acute distress.   Eyes: Sclerae are clear.  Respirations: Unlabored.  Skin: No rashes seen.    Gait:  Nonantalgic  Decreased internal rotation right shoulder.  Mild positive Miner on the right.  Negative Neer's, negative Hydro's.  Full external rotation right shoulder.  Tenderness cervical thoracic junction at the spinous process of T1 and C7 and paraspinals.  Full range of motion cervical spine rotation bilaterally flexion extension.  Mild positive Mcclain to the right.  Sensation is intact to light touch throughout the upper extremities.  Reflexes are 2+ and symmetric in the biceps triceps and brachioradialis with negative Hoffmans.      Manual muscle testing reveals:  Right /Left out of 5  5/5 shoulder abductors  5/5 elbow flexors  5/5 elbow extensors  5/5 wrist extensors  5/5 interosseus  5/5 finger flexors

## 2023-09-13 NOTE — PATIENT INSTRUCTIONS
A physical therapy order was provided for you today.  You will be contacted by physical therapy.  If nobody contacts you within 3 to 5 days, please contact the clinic at [phone].  It will be very important for you to do your physical therapy exercises on a regular basis to decrease your pain and prevent future pain flares.   methocarbamol (muscle relaxant medication) has been prescribed today. Please take 1 tab three times daily as needed. This medication may cause drowsiness. Please do not work or drive while taking this medication until you know how it effects you. If it does make you drowsy, you should only take it before bedtime or at times that you do not have to work/drive.

## 2023-09-13 NOTE — LETTER
2023         RE: Karrie Zhang  [address]  [facility] [address]        Dear Colleague,    Thank you for referring your patient, Karrie Zhang, to the [facility]. Please see a copy of my visit note below.    Assessment/Plan:      Karrie was seen today for neck pain and shoulder pain.    Diagnoses and all orders for this visit:    Acute pain of right shoulder  -     Physical Therapy Referral; Future    Cervical spine pain  -     Physical Therapy Referral; Future    Myofascial pain  -     Physical Therapy Referral; Future  -     methocarbamol (ROBAXIN) 500 MG tablet; Take 1 tablet (500 mg) by mouth 3 times daily as needed for muscle spasms    Syrinx of spinal cord (H)         Assessment: Pleasant 38 year old female with past medical history significant for thyroid disorder, depression, sleep apnea, asthma who presents today for follow-up regardin.  new right shoulder pain for approximately 2 to 3 months.  Consistent with rotator cuff impingement/strain.  She has a lot of kayaking and that likely is putting her in impingement positions.  Plain films reportedly negative through urgent care.    2.  Cervical spine pain at the cervical thoracic junction which is new consistent with myofascial pain.  She also has chronic cervical spine pain with mild degenerative disc disease and disc bulges and left foraminal stenosis C6/7.  Did well following cervical interlaminar epidural steroid injection 2022 significant improvement for at least 1 month.    3.  Persistent paresthesias right greater than left hand nothing new.  This is digits 4 and 5.  Normal EMG.    4.  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         Discussion:    1.  I discussed the diagnosis and treatment options.  We discussed options of physical therapy, steroid injection to the right shoulder, MRI.  At this point her pain is improved today in the shoulder  but having more significant pain at the cervical thoracic junction.  We discussed medication changes as well.  We will hold off on MRI given no weakness of the shoulder.    2.  Start physical therapy for cervical parascapular strengthening stabilization for rotator cuff stabilization.    3.  Trial methocarbamol 500 mg up to 3 times daily as needed for myofascial pain.    4.  Reassurance provided that I do not see signs of complete rotator cuff tear on exam today.    5.  Follow-up with me in 6 to 8 weeks      It was our pleasure caring for your patient today, if there any questions or concerns please do not hesitate to contact us.      Subjective:   Patient ID: Karrie Zhang is a 38 year old female.    History of Present Illness: Patient presents for evaluation of right shoulder pain and cervical thoracic junction pain.  Right shoulder pain started 3 months ago no injury.  Waxes and wanes in intensity.  Worse with internal rotation external rotation and right shoulder extension.  She does a lot of kayaking but does not recall any injury.  Does a lot of lifting of the kayak.  Better with rest and ice.  This can be flared particularly end of the day.  Pain is an 8/10 at worst 5/10 today 4/10 at best.    To accompany that she has cervical thoracic junction pain which is quite severe worsening and is fairly constant worse with reaching with her right arm.    Evaluated in urgent care for her right shoulder: Evaluated the note from urgent care.  Diagnosed with acute shoulder pain plain films are negative at that time.  Recommended rest ice and a sling was provided.    Imaging:  Thoracic spine plain MRI was personally reviewed from March 2023.  12 rib-bearing thoracic vertebrae Modic endplate type I changes T7-8 mild degenerative changes T7-8 unchanged thoracic cord syrinx from T6-7 through T9-10.    Review of Systems: Pertinent positives: Has headaches, paresthesias in the right arm.  Pertinent negatives: No  weakness.   No bowel or bladder incontinence.  No urinary retention.  No fevers, unintentional weight loss, balance changes,  frequent falling, difficulty swallowing, or coordination difficulties.  All others reviewed are negative.         Past Medical History:   Diagnosis Date     Acquired hypothyroidism      Alcohol abuse      Allergic rhinitis      Alopecia      Asthma      Cervical dysplasia      Cervical high risk HPV (human papillomavirus) test positive 6/29/16, 7/5/17    neg 16/18     Depression      Disease of thyroid gland      H/O colposcopy with cervical biopsy 08/08/2017 08/08/17: [place] Bx and ECC normal.      Papanicolaou smear of cervix with low grade squamous intraepithelial lesion (LGSIL) 07/05/2017     Rosacea, acne      Sleep apnea      Syrinx of spinal cord (H)      Uncomplicated asthma        The following portions of the patient's history were reviewed and updated as appropriate: allergies, current medications, past family history, past medical history, past social history, past surgical history and problem list.           Objective:   Physical Exam:    /75   Pulse 59   There is no height or weight on file to calculate BMI.      General: Alert and oriented with normal affect. Attention, knowledge, memory, and language are intact. No acute distress.   Eyes: Sclerae are clear.  Respirations: Unlabored.  Skin: No rashes seen.    Gait:  Nonantalgic  Decreased internal rotation right shoulder.  Mild positive Miner on the right.  Negative Neer's, negative Jeff Davis's.  Full external rotation right shoulder.  Tenderness cervical thoracic junction at the spinous process of T1 and C7 and paraspinals.  Full range of motion cervical spine rotation bilaterally flexion extension.  Mild positive Mcclain to the right.  Sensation is intact to light touch throughout the upper extremities.  Reflexes are 2+ and symmetric in the biceps triceps and brachioradialis with negative Hoffmans.      Manual muscle testing  reveals:  Right /Left out of 5  5/5 shoulder abductors  5/5 elbow flexors  5/5 elbow extensors  5/5 wrist extensors  5/5 interosseus  5/5 finger flexors         Again, thank you for allowing me to participate in the care of your patient.        Sincerely,        Tapan Rose, DO

## 2023-09-15 DIAGNOSIS — J45.30 MILD PERSISTENT ASTHMA WITHOUT COMPLICATION: ICD-10-CM

## 2023-09-18 RX ORDER — MONTELUKAST SODIUM 10 MG/1
TABLET ORAL
Qty: 90 TABLET | Refills: 3 | Status: SHIPPED | OUTPATIENT
Start: 2023-09-18 | End: 2024-06-13

## 2023-10-06 ENCOUNTER — THERAPY VISIT (OUTPATIENT)
Dept: PHYSICAL THERAPY | Facility: CLINIC | Age: 38
End: 2023-10-06
Attending: PHYSICAL MEDICINE & REHABILITATION
Payer: COMMERCIAL

## 2023-10-06 DIAGNOSIS — M79.18 MYOFASCIAL PAIN: ICD-10-CM

## 2023-10-06 DIAGNOSIS — M25.511 ACUTE PAIN OF RIGHT SHOULDER: ICD-10-CM

## 2023-10-06 DIAGNOSIS — M54.2 CERVICAL SPINE PAIN: ICD-10-CM

## 2023-10-06 PROCEDURE — 97161 PT EVAL LOW COMPLEX 20 MIN: CPT | Mod: GP | Performed by: PHYSICAL THERAPIST

## 2023-10-06 PROCEDURE — 97110 THERAPEUTIC EXERCISES: CPT | Mod: GP | Performed by: PHYSICAL THERAPIST

## 2023-10-06 NOTE — PROGRESS NOTES
PHYSICAL THERAPY EVALUATION  Type of Visit: Evaluation    See electronic medical record for Abuse and Falls Screening details.    Subjective       Presenting condition or subjective complaint: Pt describes R shoulder/neck pain which started in the early summer of 2023, pt does a lot of kayaking and has noticed that during and afterwards she has a lot of pain.  Pt really struggles to lift and maneuver her kayak as well.  Pt eventually went to [facility] MD who felt that her rotator cuff was likely irritated.  Date of onset:      Relevant medical history: Arthritis; Asthma; Depression; Mental Illness; Migraines or headaches; Pain at night or rest; Sleep disorder like apnea; Substance use disorder; Thyroid problems   Dates & types of surgery:      Prior diagnostic imaging/testing results: X-ray     Prior therapy history for the same diagnosis, illness or injury: No      Prior Level of Function  Transfers:   Ambulation:   ADL:   IADL:     Living Environment  Social support: Alone   Type of home: Apartment/condo   Stairs to enter the home: Yes 20 Is there a railing: Yes   Ramp: No   Stairs inside the home: No       Help at home: None  Equipment owned:       Employment: Yes   Hobbies/Interests: Kayaking    Patient goals for therapy: Kayaking without pain         Objective   SHOULDER EVALUATION  PAIN: Pain Level at Rest: 0/10  Pain Level with Use: 4/10  Pain Location: shoulder  Pain Quality: Sharp  Pain Frequency: intermittent  Pain is Worst: daytime  Pain is Exacerbated By: lifting  Pain is Relieved By: rest  Pain Progression: Improved  INTEGUMENTARY (edema, incisions):   POSTURE:   GAIT:   Weightbearing Status:   Assistive Device(s):   Gait Deviations:   BALANCE/PROPRIOCEPTION:   WEIGHTBEARING ALIGNMENT:   ROM: AROM WNL    STRENGTH:  MMT: R shoulder flex 5-, ER 5-, IR 5-, scap stab fair  FLEXIBILITY:   SPECIAL TESTS:   PALPATION:   JOINT MOBILITY:   CERVICAL SCREEN:     Assessment & Plan   CLINICAL  IMPRESSIONS  Medical Diagnosis: Acute pain of right shoulder  Cervical spine pain  Myofascial pain    Treatment Diagnosis: R shoulder/neck pain   Impression/Assessment: Patient is a 38 year old female with R shoulder complaints.  The following significant findings have been identified: Pain, Decreased strength, Impaired muscle performance, Decreased activity tolerance, and Impaired posture. These impairments interfere with their ability to perform self care tasks, work tasks, recreational activities, household chores, driving , household mobility, and community mobility as compared to previous level of function.     Clinical Decision Making (Complexity):  Clinical Presentation: Stable/Uncomplicated  Clinical Presentation Rationale: based on medical and personal factors listed in PT evaluation  Clinical Decision Making (Complexity): Low complexity    PLAN OF CARE  Treatment Interventions:  Interventions: Manual Therapy, Neuromuscular Re-education, Therapeutic Activity, Therapeutic Exercise, Self-Care/Home Management    Long Term Goals     PT Goal 1  Goal Identifier: reaching  Goal Description: Pt will be able to reach overhead pain free  Rationale: to maximize safety and independence with performance of ADLs and functional tasks  Goal Progress: Pt can reach overhead, PL 4/10  Target Date: 12/01/23      Frequency of Treatment: 1x/week  Duration of Treatment: 8 weeks    Education Assessment:   Learner/Method: Patient;Listening;Reading;Demonstration;Pictures/Video  Education Comments: Pt instructed on findings of evaluation, expectations regarding frequency/intensity of HEP.    Risks and benefits of evaluation/treatment have been explained.   Patient/Family/caregiver agrees with Plan of Care.     Evaluation Time:     PT Eval, Low Complexity Minutes (23062): 15       Signing Clinician: David Charles PT

## 2023-10-13 DIAGNOSIS — E03.9 ACQUIRED HYPOTHYROIDISM: ICD-10-CM

## 2023-10-16 RX ORDER — LEVOTHYROXINE SODIUM 75 UG/1
TABLET ORAL
Qty: 90 TABLET | Refills: 0 | Status: SHIPPED | OUTPATIENT
Start: 2023-10-16 | End: 2024-02-12

## 2023-10-24 ENCOUNTER — TELEPHONE (OUTPATIENT)
Dept: SLEEP MEDICINE | Facility: CLINIC | Age: 38
End: 2023-10-24

## 2023-10-24 NOTE — TELEPHONE ENCOUNTER
Patient concerned about her morning fatigue she is having and wants to know if you can do anything.  Download in media tab.

## 2023-10-27 ENCOUNTER — THERAPY VISIT (OUTPATIENT)
Dept: PHYSICAL THERAPY | Facility: CLINIC | Age: 38
End: 2023-10-27
Attending: PHYSICAL MEDICINE & REHABILITATION
Payer: COMMERCIAL

## 2023-10-27 DIAGNOSIS — M25.511 ACUTE PAIN OF RIGHT SHOULDER: Primary | ICD-10-CM

## 2023-10-27 PROCEDURE — 97110 THERAPEUTIC EXERCISES: CPT | Mod: GP | Performed by: PHYSICAL THERAPIST

## 2023-10-27 PROCEDURE — 97140 MANUAL THERAPY 1/> REGIONS: CPT | Mod: GP | Performed by: PHYSICAL THERAPIST

## 2023-10-27 PROCEDURE — 97112 NEUROMUSCULAR REEDUCATION: CPT | Mod: GP | Performed by: PHYSICAL THERAPIST

## 2023-11-03 DIAGNOSIS — G47.33 OSA ON CPAP: Primary | ICD-10-CM

## 2023-11-03 ASSESSMENT — SLEEP AND FATIGUE QUESTIONNAIRES
HOW LIKELY ARE YOU TO NOD OFF OR FALL ASLEEP WHILE SITTING QUIETLY AFTER LUNCH WITHOUT ALCOHOL: SLIGHT CHANCE OF DOZING
HOW LIKELY ARE YOU TO NOD OFF OR FALL ASLEEP WHILE SITTING AND TALKING TO SOMEONE: WOULD NEVER DOZE
HOW LIKELY ARE YOU TO NOD OFF OR FALL ASLEEP WHILE SITTING AND READING: SLIGHT CHANCE OF DOZING
HOW LIKELY ARE YOU TO NOD OFF OR FALL ASLEEP IN A CAR, WHILE STOPPED FOR A FEW MINUTES IN TRAFFIC: WOULD NEVER DOZE
HOW LIKELY ARE YOU TO NOD OFF OR FALL ASLEEP WHILE WATCHING TV: WOULD NEVER DOZE
HOW LIKELY ARE YOU TO NOD OFF OR FALL ASLEEP WHEN YOU ARE A PASSENGER IN A CAR FOR AN HOUR WITHOUT A BREAK: WOULD NEVER DOZE
HOW LIKELY ARE YOU TO NOD OFF OR FALL ASLEEP WHILE SITTING INACTIVE IN A PUBLIC PLACE: WOULD NEVER DOZE
HOW LIKELY ARE YOU TO NOD OFF OR FALL ASLEEP WHILE LYING DOWN TO REST IN THE AFTERNOON WHEN CIRCUMSTANCES PERMIT: HIGH CHANCE OF DOZING

## 2023-11-06 ENCOUNTER — VIRTUAL VISIT (OUTPATIENT)
Dept: SLEEP MEDICINE | Facility: CLINIC | Age: 38
End: 2023-11-06
Payer: COMMERCIAL

## 2023-11-06 VITALS — WEIGHT: 180 LBS | BODY MASS INDEX: 28.25 KG/M2 | HEIGHT: 67 IN

## 2023-11-06 DIAGNOSIS — G47.33 OSA (OBSTRUCTIVE SLEEP APNEA): Primary | ICD-10-CM

## 2023-11-06 PROBLEM — M35.3 POLYMYALGIA (H): Chronic | Status: ACTIVE | Noted: 2022-01-30

## 2023-11-06 PROCEDURE — 99214 OFFICE O/P EST MOD 30 MIN: CPT | Mod: VID | Performed by: PHYSICIAN ASSISTANT

## 2023-11-06 ASSESSMENT — PAIN SCALES - GENERAL: PAINLEVEL: MILD PAIN (2)

## 2023-11-06 NOTE — PROGRESS NOTES
Virtual Visit Details    Type of service:  Video Visit     Originating Location (pt. Location): Home    Distant Location (provider location):  On-site  Platform used for Video Visit: Randy    Sleep Apnea - Follow-up Visit:    Impression/Plan:    Severe obstructive sleep apnea-  Excellent CPAP compliance and AHI is well controlled on CPAP 7-15 cm/H20. Daytime symptoms are improved, but continues to report significant fatigue  Continue current treatment.   Comprehensive DME order placed.     Fatigue-  Overall, appears to be describing chronic daytime fatigue with lack of energy as opposed to excessive daytime sleepiness given denial of inappropriate sleepiness and normal [place] sleepiness scale today.   We discussed trial of taking Lamictal at night.   We also discussed optimizing treatment of depression  She will complete MI as recommended by Dr. Cooney.     Overweight-  I have congratulated her on her weight loss. We discussed the link between obesity, sleep apnea, and health outcomes.  Will re-evaluate sleep apnea once at goal for weight loss.    Karrie Zhang will follow up in about 1 year or sooner if any concerns.    30 minutes spent on day of encounter doing chart review,  history and exam, counseling, coordinating plan of care, documentation and further activities as noted above.       Ana Gomez PA-C  Sleep Medicine     History of Present Illness:  Chief Complaint   Patient presents with    RECHECK       Karrie Zhang presents for follow-up of their severe sleep apnea, managed with CPAP.     Previous Home sleep study report:  Study Date: 2/13/2019  Analysis Time:  493 minutes  Respiration:   Sleep Associated Hypoxemia: sustained hypoxemia was present. Baseline oxygen saturation was 93%.  Time with saturation less than or equal to 88% was 8 minutes. The lowest oxygen saturation was 80%.   Snoring: Snoring was present up to 100 dB.  Respiratory events: The home study revealed a presence of 105  obstructive apneas and 6 mixed and central apneas. There were 214 hypopneas resulting in a combined apnea/hypopnea index [AHI] of 39.5 events per hour.  AHI was 37 per hour supine, - per hour prone, 48 per hour on left side, and 31 per hour on right side.     [facility] medical equipment    Weight now is 180 lbs.     Do you use a CPAP Machine at home: Yes  Overall, on a scale of 0-10 how would you rate your CPAP (0 poor, 10 great): 10    What type of mask do you use: Nasal Pillow  Is your mask comfortable: Yes  If not, why:      Is your mask leaking: No  If yes, where do you feel it:    How many night per week does the mask leak (0-7):      Do you notice snoring with mask on: No  Do you notice gasping arousals with mask on: No  Are you having significant oral or nasal dryness: Yes  Is the pressure setting comfortable: Yes  If not, why:      What is your typical bedtime: 9:30-11  How long does it take you to go to sleep on PAP therapy: 30 minures?  What time do you typically get out of bed for the day: 6:30  How many hours on average per night are you using PAP therapy: 8-10  How many hours are you sleeping per night: 8-10  Do you feel well rested in the morning: No      ResMed   Auto-PAP 7.0 - 15.0 cmH2O 30 day usage data:    100% of days with > 4 hours of use. 0/30 days with no use.   Average use 599 minutes per day.   95%ile Leak 6.74 L/min.   CPAP 95% pressure 8.6 cm.   AHI 0.64 events per hour.       EPWORTH SLEEPINESS SCALE         11/3/2023    10:56 AM    [place] Sleepiness Scale ( NOELLE Salmeron  [id number]<br>ESS - [place]/English - Final version - 21 Nov 07 - [facility] [place].)   Sitting and reading Slight chance of dozing   Watching TV Would never doze   Sitting, inactive in a public place (e.g. a theatre or a meeting) Would never doze   As a passenger in a car for an hour without a break Would never doze   Lying down to rest in the afternoon when circumstances permit High chance of dozing   Sitting  and talking to someone Would never doze   Sitting quietly after a lunch without alcohol Slight chance of dozing   In a car, while stopped for a few minutes in traffic Would never doze   [place] Score (MC) 5   [place] Score (Sleep) 5       INSOMNIA SEVERITY INDEX ([place])          11/3/2023    10:53 AM   Insomnia Severity Index ([place])   Difficulty falling asleep 0   Difficulty staying asleep 1   Problems waking up too early 0   How SATISFIED/DISSATISFIED are you with your CURRENT sleep pattern? 3   How NOTICEABLE to others do you think your sleep problem is in terms of impairing the quality of your life? 2   How WORRIED/DISTRESSED are you about your current sleep problem? 3   To what extent do you consider your sleep problem to INTERFERE with your daily functioning (e.g. daytime fatigue, mood, ability to function at work/daily chores, concentration, memory, mood, etc.) CURRENTLY? 3   [place] Total Score 12       Guidelines for Scoring/Interpretation:  Total score categories:  0-7 = No clinically significant insomnia   8-14 = Subthreshold insomnia   15-21 = Clinical insomnia (moderate severity)  22-28 = Clinical insomnia (severe)  Used via courtesy of www.myhealth.[place].gov with permission from Stiven Damon PhD., [facility]        Past medical/surgical history, family history, social history, medications and allergies were reviewed.        Problem List:  Patient Active Problem List    Diagnosis Date Noted    Acute pain of right shoulder 10/06/2023     Priority: Medium    Acute pain of left knee 11/11/2022     Priority: Medium    Lumbago 03/11/2022     Priority: Medium    Bilateral thoracic back pain 03/11/2022     Priority: Medium    Polymyalgia (H24) 01/30/2022     Priority: Medium    Syrinx of spinal cord (H) 11/08/2021     Priority: Medium    Cervical pain 02/25/2021     Priority: Medium    Alcohol dependence (H) 09/29/2020     Priority: Medium    Acquired hypothyroidism      Priority: Medium    Rosacea, acne       "Priority: Medium    Cervical high risk HPV (human papillomavirus) test positive 06/29/2016     Priority: Medium     2005: cervical laser treatment for dysplasia and cryotherapy.  6/29/16: NIL Pap, + HR HPV (Neg 16/18). Plan cotest in 1 year.   7/5/17 LSIL Pap, + HR HPV (Not 16/18). Plan colp  08/08/17: [place] Bx and ECC normal. Plan cotest in 1 year per provider.   8/22/18 NIL Pap, Neg HPV. Plan cotest in 3 years.   2/24/22 NIL pap, Neg HPV. Plan cotest in 3 years.       Perioral dermatitis 05/03/2016     Priority: Medium    Mild persistent asthma without complication 05/03/2016     Priority: Medium    Anxiety 05/03/2016     Priority: Medium    Major depressive disorder, recurrent episode, mild (H24) 05/03/2016     Priority: Medium    Multiple environmental allergies 05/03/2016     Priority: Medium    Obesity (BMI 30-39.9) 05/03/2016     Priority: Medium    Hay fever 06/30/2015     Priority: Medium     Overview:   Allergic to pollen (trees - especially oak, grasses, weeds), dust, guinea pigs, apples      Allergic rhinitis due to pollen 06/30/2015     Priority: Medium     Formatting of this note might be different from the original.  Allergic to pollen (trees - especially oak, grasses, weeds), dust, guinea pigs, apples          Ht 1.702 m (5' 7\")   Wt 81.6 kg (180 lb)   BMI 28.19 kg/m     "

## 2023-11-06 NOTE — NURSING NOTE
Is the patient currently in the state of [place]? YES    Visit mode:VIDEO    If the visit is dropped, the patient can be reconnected by: VIDEO VISIT: Text to cell phone:   Telephone Information:   Mobile [phone]       Will anyone else be joining the visit? NO  (If patient encounters technical issues they should call [phone]    How would you like to obtain your AVS? MyChart    Are changes needed to the allergy or medication list? No    Reason for visit: RECHMAGDALENE MC

## 2023-11-08 ENCOUNTER — DOCUMENTATION ONLY (OUTPATIENT)
Dept: SLEEP MEDICINE | Facility: CLINIC | Age: 38
End: 2023-11-08
Payer: COMMERCIAL

## 2023-11-08 NOTE — PROGRESS NOTES
MI RX SIGNED BY: Miguelangel Castellon,  DATE: 11/03/2023  STUDY PERFORMED ON (PAP/O2/RA): ON CURRENT CPAP SETTINGS  DOWNLOAD METHOD (BREATHE/NONIN):  BREATHE- COMMERCIAL INSURANCE  PROVIDER FAX: [phone]    DID YOU DOCUMENT CONTACT ATTEMPTS IN [employer]? YES   AFTER FIRST ATTEMPT PLEASE VERIFY PHONE NUMBER IN [employer] MATCHES PHONE NUMBER IN EPIC.    11/8 AV- CALLED PT NO ANSWER LVM TO COORDINATE MI.

## 2023-11-17 ENCOUNTER — THERAPY VISIT (OUTPATIENT)
Dept: PHYSICAL THERAPY | Facility: CLINIC | Age: 38
End: 2023-11-17
Payer: COMMERCIAL

## 2023-11-17 DIAGNOSIS — M25.511 ACUTE PAIN OF RIGHT SHOULDER: Primary | ICD-10-CM

## 2023-11-17 PROCEDURE — 97112 NEUROMUSCULAR REEDUCATION: CPT | Mod: GP | Performed by: PHYSICAL THERAPIST

## 2023-11-17 PROCEDURE — 97110 THERAPEUTIC EXERCISES: CPT | Mod: GP | Performed by: PHYSICAL THERAPIST

## 2023-11-22 ENCOUNTER — OFFICE VISIT (OUTPATIENT)
Dept: PHYSICAL MEDICINE AND REHAB | Facility: CLINIC | Age: 38
End: 2023-11-22
Payer: COMMERCIAL

## 2023-11-22 VITALS — HEART RATE: 68 BPM | SYSTOLIC BLOOD PRESSURE: 113 MMHG | DIASTOLIC BLOOD PRESSURE: 61 MMHG

## 2023-11-22 DIAGNOSIS — M99.00 SOMATIC DYSFUNCTION OF HEAD REGION: ICD-10-CM

## 2023-11-22 DIAGNOSIS — M99.07 SOMATIC DYSFUNCTION OF UPPER EXTREMITY: ICD-10-CM

## 2023-11-22 DIAGNOSIS — M25.511 ACUTE PAIN OF RIGHT SHOULDER: Primary | ICD-10-CM

## 2023-11-22 DIAGNOSIS — M99.08 SOMATIC DYSFUNCTION OF RIB REGION: ICD-10-CM

## 2023-11-22 DIAGNOSIS — G95.0 SYRINX OF SPINAL CORD (H): ICD-10-CM

## 2023-11-22 DIAGNOSIS — M99.02 SOMATIC DYSFUNCTION OF THORACIC REGION: ICD-10-CM

## 2023-11-22 DIAGNOSIS — M54.2 CERVICAL SPINE PAIN: ICD-10-CM

## 2023-11-22 DIAGNOSIS — M99.01 SOMATIC DYSFUNCTION OF CERVICAL REGION: ICD-10-CM

## 2023-11-22 DIAGNOSIS — M79.18 MYOFASCIAL PAIN: ICD-10-CM

## 2023-11-22 PROCEDURE — 99213 OFFICE O/P EST LOW 20 MIN: CPT | Mod: 25 | Performed by: PHYSICAL MEDICINE & REHABILITATION

## 2023-11-22 PROCEDURE — 98927 OSTEOPATH MANJ 5-6 REGIONS: CPT | Performed by: PHYSICAL MEDICINE & REHABILITATION

## 2023-11-22 ASSESSMENT — PAIN SCALES - GENERAL: PAINLEVEL: NO PAIN (1)

## 2023-11-22 NOTE — LETTER
2023         RE: Karrie Zhang  [address] [place] [address]  [place] [address]        Dear Colleague,    Thank you for referring your patient, Karrie Zhang, to the [facility] SPINE [facility] NEUROSURGERY. Please see a copy of my visit note below.    Assessment/Plan:      Liz was seen today for shoulder pain and neck pain.    Diagnoses and all orders for this visit:    Acute pain of right shoulder    Cervical spine pain    Myofascial pain    Syrinx of spinal cord (H)    Somatic dysfunction of head region  -     OSTEOPATHIC MANIP,5-6 BODY REGN    Somatic dysfunction of cervical region  -     OSTEOPATHIC MANIP,5-6 BODY REGN    Somatic dysfunction of rib region  -     OSTEOPATHIC MANIP,5-6 BODY REGN    Somatic dysfunction of upper extremity  -     OSTEOPATHIC MANIP,5-6 BODY REGN    Somatic dysfunction of thoracic region  -     OSTEOPATHIC MANIP,5-6 BODY REGN         Assessment: Pleasant 38 year old female with past medical history significant for thyroid disorder, depression, sleep apnea, asthma who presents today for follow-up regardin.  new right shoulder pain for approximately 6 months.  Consistent with rotator cuff impingement/strain.  Some improvement with physical therapy and rest.     2.  Persistent cervical spine pain at the cervical thoracic junction which is  consistent with myofascial pain.  She also has chronic cervical spine pain with mild degenerative disc disease and disc bulges and left foraminal stenosis C6/7.  Did well following cervical interlaminar epidural steroid injection 2022 significant improvement for at least 1 month.  Mild benefit with physical therapy.    3.  Persistent paresthesias right greater than left hand nothing new.  This is digits 4 and 5.  Normal EMG.     4.  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         Discussion:    1.  Overall she is doing fairly well but still has a cervical thoracic junction pain.  We  discussed options of continued physical therapy, medication changes, further imaging and Osteopathic manipulative medicine she is not interested in further imaging at this time as her symptoms are generally improved some but she would be open to Osteopathic manipulative medicine    2.  Continue physical therapy and home program.    3.  Osteopathic manipulative medicine today.  She agrees to proceed.  Please attach procedure note.    4.  Follow-up as needed if symptoms worsen.    It was our pleasure caring for your patient today, if there any questions or concerns please do not hesitate to contact us.      Subjective:   Patient ID: Karrie Zhang is a 38 year old female.    History of Present Illness: Patient presents for follow-up of cervical spine pain cervical thoracic junction pain bilateral arm paresthesias and shoulder pain.  She has been working physical therapy and overall her symptoms have improved.  Her shoulder pains are improved with the home exercises.  Started with Thera-Band's but are now just open chain with no weights.  Her most significant issue is the cervical thoracic junction pain which is persistent worse with any activities such as kayaking and rest has been helpful along with ice heat and her home exercises.  Pain is an 8/10 at worst 1/10 at best and today.  Takes Tylenol as needed.  She is concerned that when she restarts her activities such as kayaking she will have increased pain.      Imaging: Plain films of the right shoulder report was reviewed from September 1.  Reported as normal joint space and alignment with no fractures.    Review of Systems: Has bilateral upper extremity paresthesias and headaches.  Some coordination difficulties with the hands but denies weakness, bowel or bladder incontinence, falls, fevers and unintentional weight loss.         Past Medical History:   Diagnosis Date     Acquired hypothyroidism      Alcohol abuse      Allergic rhinitis      Alopecia       Asthma      Cervical dysplasia      Cervical high risk HPV (human papillomavirus) test positive 6/29/16, 7/5/17    neg 16/18     Depression      Disease of thyroid gland      H/O colposcopy with cervical biopsy 08/08/2017 08/08/17: [place] Bx and ECC normal.      Papanicolaou smear of cervix with low grade squamous intraepithelial lesion (LGSIL) 07/05/2017     Rosacea, acne      Sleep apnea      Syrinx of spinal cord (H)      Uncomplicated asthma        The following portions of the patient's history were reviewed and updated as appropriate: allergies, current medications, past family history, past medical history, past social history, past surgical history and problem list.           Objective:   Physical Exam:    /61   Pulse 68   There is no height or weight on file to calculate BMI.      General: Alert and oriented with normal affect. Attention, knowledge, memory, and language are intact. No acute distress.   Eyes: Sclerae are clear.  Respirations: Unlabored. CV: No lower extremity edema.     Gait:  Nonantalgic  Full shoulder abduction bilateral with negative arm drop empty can and speeds test bilaterally.  Sensation is intact to light touch throughout the upper and lower extremities.  Reflexes are 2+ and symmetric in the biceps triceps and brachioradialis with positive Didi's bilaterally.    Manual muscle testing reveals:  Right /Left out of 5  5/5 shoulder abductors  5/5 elbow flexors  5/5 elbow extensors  5/5 wrist extensors  5/5 interosseus  5/5 finger flexors       Structural exam: Cranium: Left cranial torsion OA sidebent right rotated left cervical spine: C2 rotated left side bent left, C3 rotated right sidebent right,   Rib cage: Rib one elevated on the left. Thoracic spine: T1 rotated right sidebent right,   Upper Extremities: myofascial restrictions of the bilat upper trap, infraspinatus/parascapular muscles. bilateral glenohumeral resrictions.    Procedure:    After discussing the risks and  benefits of osteopathic manipulative medicine, verbal consent was obtained. The somatic dysfunctions listed above were treated with the following techniques: Cranium: Cranial indirect technique, VSD, and muscle energy for the OA. Cervical spine: Muscle energy, still technique, FPR, myofascial release, BLT, and soft tissue techniques. Rib cage: Myofascial release and FPR. Thoracic spine: Myofascial release, BLT.   Upper Exrtremity: MFR, FPR, BLT.  The patient tolerated the procedure well and had improved range of motion in all areas treated prior to leaving the clinic.      Again, thank you for allowing me to participate in the care of your patient.        Sincerely,        Tapan Rose, DO

## 2023-11-22 NOTE — PROGRESS NOTES
Assessment/Plan:      Liz was seen today for shoulder pain and neck pain.    Diagnoses and all orders for this visit:    Acute pain of right shoulder    Cervical spine pain    Myofascial pain    Syrinx of spinal cord (H)    Somatic dysfunction of head region  -     OSTEOPATHIC MANIP,5-6 BODY REGN    Somatic dysfunction of cervical region  -     OSTEOPATHIC MANIP,5-6 BODY REGN    Somatic dysfunction of rib region  -     OSTEOPATHIC MANIP,5-6 BODY REGN    Somatic dysfunction of upper extremity  -     OSTEOPATHIC MANIP,5-6 BODY REGN    Somatic dysfunction of thoracic region  -     OSTEOPATHIC MANIP,5-6 BODY REGN         Assessment: Pleasant 38 year old female with past medical history significant for thyroid disorder, depression, sleep apnea, asthma who presents today for follow-up regardin.  new right shoulder pain for approximately 6 months.  Consistent with rotator cuff impingement/strain.  Some improvement with physical therapy and rest.     2.  Persistent cervical spine pain at the cervical thoracic junction which is  consistent with myofascial pain.  She also has chronic cervical spine pain with mild degenerative disc disease and disc bulges and left foraminal stenosis C6/7.  Did well following cervical interlaminar epidural steroid injection 2022 significant improvement for at least 1 month.  Mild benefit with physical therapy.    3.  Persistent paresthesias right greater than left hand nothing new.  This is digits 4 and 5.  Normal EMG.     4.  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         Discussion:    1.  Overall she is doing fairly well but still has a cervical thoracic junction pain.  We discussed options of continued physical therapy, medication changes, further imaging and Osteopathic manipulative medicine she is not interested in further imaging at this time as her symptoms are generally improved some but she would be open to Osteopathic manipulative  medicine    2.  Continue physical therapy and home program.    3.  Osteopathic manipulative medicine today.  She agrees to proceed.  Please attach procedure note.    4.  Follow-up as needed if symptoms worsen.    It was our pleasure caring for your patient today, if there any questions or concerns please do not hesitate to contact us.      Subjective:   Patient ID: Karrie Zhang is a 38 year old female.    History of Present Illness: Patient presents for follow-up of cervical spine pain cervical thoracic junction pain bilateral arm paresthesias and shoulder pain.  She has been working physical therapy and overall her symptoms have improved.  Her shoulder pains are improved with the home exercises.  Started with Thera-Band's but are now just open chain with no weights.  Her most significant issue is the cervical thoracic junction pain which is persistent worse with any activities such as kayaking and rest has been helpful along with ice heat and her home exercises.  Pain is an 8/10 at worst 1/10 at best and today.  Takes Tylenol as needed.  She is concerned that when she restarts her activities such as kayaking she will have increased pain.      Imaging: Plain films of the right shoulder report was reviewed from September 1.  Reported as normal joint space and alignment with no fractures.    Review of Systems: Has bilateral upper extremity paresthesias and headaches.  Some coordination difficulties with the hands but denies weakness, bowel or bladder incontinence, falls, fevers and unintentional weight loss.         Past Medical History:   Diagnosis Date    Acquired hypothyroidism     Alcohol abuse     Allergic rhinitis     Alopecia     Asthma     Cervical dysplasia     Cervical high risk HPV (human papillomavirus) test positive 6/29/16, 7/5/17    neg 16/18    Depression     Disease of thyroid gland     H/O colposcopy with cervical biopsy 08/08/2017 08/08/17: [place] Bx and ECC normal.     Papanicolaou smear of  cervix with low grade squamous intraepithelial lesion (LGSIL) 07/05/2017    Rosacea, acne     Sleep apnea     Syrinx of spinal cord (H)     Uncomplicated asthma        The following portions of the patient's history were reviewed and updated as appropriate: allergies, current medications, past family history, past medical history, past social history, past surgical history and problem list.           Objective:   Physical Exam:    /61   Pulse 68   There is no height or weight on file to calculate BMI.      General: Alert and oriented with normal affect. Attention, knowledge, memory, and language are intact. No acute distress.   Eyes: Sclerae are clear.  Respirations: Unlabored. CV: No lower extremity edema.     Gait:  Nonantalgic  Full shoulder abduction bilateral with negative arm drop empty can and speeds test bilaterally.  Sensation is intact to light touch throughout the upper and lower extremities.  Reflexes are 2+ and symmetric in the biceps triceps and brachioradialis with positive Didi's bilaterally.    Manual muscle testing reveals:  Right /Left out of 5  5/5 shoulder abductors  5/5 elbow flexors  5/5 elbow extensors  5/5 wrist extensors  5/5 interosseus  5/5 finger flexors       Structural exam: Cranium: Left cranial torsion OA sidebent right rotated left cervical spine: C2 rotated left side bent left, C3 rotated right sidebent right,   Rib cage: Rib one elevated on the left. Thoracic spine: T1 rotated right sidebent right,   Upper Extremities: myofascial restrictions of the bilat upper trap, infraspinatus/parascapular muscles. bilateral glenohumeral resrictions.    Procedure:    After discussing the risks and benefits of osteopathic manipulative medicine, verbal consent was obtained. The somatic dysfunctions listed above were treated with the following techniques: Cranium: Cranial indirect technique, VSD, and muscle energy for the OA. Cervical spine: Muscle energy, still technique, FPR,  myofascial release, BLT, and soft tissue techniques. Rib cage: Myofascial release and FPR. Thoracic spine: Myofascial release, BLT.   Upper Exrtremity: MFR, FPR, BLT.  The patient tolerated the procedure well and had improved range of motion in all areas treated prior to leaving the clinic.

## 2023-12-01 ENCOUNTER — TELEPHONE (OUTPATIENT)
Dept: SLEEP MEDICINE | Facility: CLINIC | Age: 38
End: 2023-12-01
Payer: COMMERCIAL

## 2023-12-01 DIAGNOSIS — G47.33 OSA ON CPAP: ICD-10-CM

## 2023-12-01 NOTE — TELEPHONE ENCOUNTER
Oximetry has been resulted and  scanned into chart.  Encounter forwarded to provider for review.    Recording Start Date: 11/22/2023    Completed on: 11/23/2023    Duration: 9  Hrs: 47 Minutes: 20 Seconds   Time (min) Spent below 88%: 0.0 min    Completed Via:  [facility]

## 2023-12-05 ENCOUNTER — THERAPY VISIT (OUTPATIENT)
Dept: PHYSICAL THERAPY | Facility: CLINIC | Age: 38
End: 2023-12-05
Payer: COMMERCIAL

## 2023-12-05 DIAGNOSIS — M25.511 ACUTE PAIN OF RIGHT SHOULDER: Primary | ICD-10-CM

## 2023-12-05 PROCEDURE — 97140 MANUAL THERAPY 1/> REGIONS: CPT | Mod: GP | Performed by: PHYSICAL THERAPIST

## 2023-12-05 PROCEDURE — 97110 THERAPEUTIC EXERCISES: CPT | Mod: GP | Performed by: PHYSICAL THERAPIST

## 2024-01-04 ENCOUNTER — E-VISIT (OUTPATIENT)
Dept: FAMILY MEDICINE | Facility: CLINIC | Age: 39
End: 2024-01-04
Payer: COMMERCIAL

## 2024-01-04 DIAGNOSIS — J01.91 ACUTE RECURRENT SINUSITIS, UNSPECIFIED LOCATION: Primary | ICD-10-CM

## 2024-01-04 PROCEDURE — 99421 OL DIG E/M SVC 5-10 MIN: CPT | Performed by: FAMILY MEDICINE

## 2024-01-04 RX ORDER — DOXYCYCLINE HYCLATE 100 MG
100 TABLET ORAL 2 TIMES DAILY
Qty: 14 TABLET | Refills: 0 | Status: SHIPPED | OUTPATIENT
Start: 2024-01-04 | End: 2024-01-11

## 2024-01-04 NOTE — PATIENT INSTRUCTIONS
Acute Sinusitis: Care Instructions  Overview     Acute sinusitis is an inflammation of the mucous membranes inside the nose and sinuses. Sinuses are the hollow spaces in your skull around the eyes and nose. Acute sinusitis often follows a cold. Acute sinusitis causes thick, discolored mucus that drains from the nose or down the back of the throat. It also can cause pain and pressure in your head and face along with a stuffy or blocked nose.  In most cases, sinusitis gets better on its own in 1 to 2 weeks. But some mild symptoms may last for several weeks. Sometimes antibiotics are needed if there is a bacterial infection.  Follow-up care is a key part of your treatment and safety. Be sure to make and go to all appointments, and call your doctor if you are having problems. It's also a good idea to know your test results and keep a list of the medicines you take.  How can you care for yourself at home?  Use saline (saltwater) nasal washes. This can help keep your nasal passages open and wash out mucus and allergens.  You can buy saline nose washes at a grocery store or drugstore. Follow the instructions on the package.  You can make your own at home. Add 1 teaspoon of non-iodized salt and 1 teaspoon of baking soda to 2 cups of distilled or boiled and cooled water. Fill a squeeze bottle or a nasal cleansing pot (such as a neti pot) with the nasal wash. Then put the tip into your nostril, and lean over the sink. With your mouth open, gently squirt the liquid. Repeat on the other side.  Try a decongestant nasal spray like oxymetazoline (Afrin). Do not use it for more than 3 days in a row. Using it for more than 3 days can make your congestion worse.  If needed, take an over-the-counter pain medicine, such as acetaminophen (Tylenol), ibuprofen (Advil, Motrin), or naproxen (Aleve). Read and follow all instructions on the label.  If the doctor prescribed antibiotics, take them as directed. Do not stop taking them just  "because you feel better. You need to take the full course of antibiotics.  Be careful when taking over-the-counter cold or flu medicines and Tylenol at the same time. Many of these medicines have acetaminophen, which is Tylenol. Read the labels to make sure that you are not taking more than the recommended dose. Too much acetaminophen (Tylenol) can be harmful.  Try a steroid nasal spray. It may help with your symptoms.  Breathe warm, moist air. You can use a steamy shower, a hot bath, or a sink filled with hot water. Avoid cold, dry air. Using a humidifier in your home may help. Follow the directions for cleaning the machine.  When should you call for help?   Call your doctor now or seek immediate medical care if:    You have new or worse swelling, redness, or pain in your face or around one or both of your eyes.     You have double vision or a change in your vision.     You have a high fever.     You have a severe headache and a stiff neck.     You have mental changes, such as feeling confused or much less alert.   Watch closely for changes in your health, and be sure to contact your doctor if:    You are not getting better as expected.   Where can you learn more?  Go to https://www.[employer].net/patiented  Enter I933 in the search box to learn more about \"Acute Sinusitis: Care Instructions.\"  Current as of: February 28, 2023               Content Version: 13.8    1510-5602 [employer].   Care instructions adapted under license by your healthcare professional. If you have questions about a medical condition or this instruction, always ask your healthcare professional. [employer] disclaims any warranty or liability for your use of this information.      Dear Liz,     After reviewing your responses, I've been able to diagnose you with Acute recurrent sinusitis, unspecified location.  In many cases, the cause is viral and gets better on its own, so it would be reasonable to wait to treat " with a prescription until your symptoms have lasted 10 or more days.     Based on your responses and diagnosis, I have prescribed   Orders Placed This Encounter   Medications     doxycycline hyclate (VIBRA-TABS) 100 MG tablet     Sig: Take 1 tablet (100 mg) by mouth 2 times daily for 7 days     Dispense:  14 tablet     Refill:  0     May substitute any formulation of 100mg doxycycline available and best covered by insurance      to treat your symptoms. I have sent this to your pharmacy.?     I also recommend testing for COVID if you have not already done so. You can buy a home test or request one from me to do at a lab only appointment.     It is also important to stay well hydrated, get lots of rest and take over-the-counter decongestants,?tylenol?or ibuprofen if you?are able to?take those medications per your primary care provider to help relieve discomfort.?     It is important that you take?all of?your prescribed medication even if your symptoms are improving after a few doses.? Taking?all of?your medicine helps prevent the symptoms from returning.?     If your symptoms worsen, you develop severe headache, vomiting, high fever (>102), or are not improving in 7 days, please contact your primary care provider for an appointment or visit any of our convenient Walk-in Care or Urgent Care Centers to be seen which can be found on our website?here.?     Thanks again for choosing?us?as your health care partner,?   ?  Vanessa Granados MD?

## 2024-01-13 ENCOUNTER — HOSPITAL ENCOUNTER (OUTPATIENT)
Dept: MRI IMAGING | Facility: HOSPITAL | Age: 39
Discharge: HOME | End: 2024-01-13
Attending: PHYSICAL MEDICINE & REHABILITATION | Admitting: PHYSICAL MEDICINE & REHABILITATION
Payer: COMMERCIAL

## 2024-01-13 DIAGNOSIS — G89.29 CHRONIC RIGHT SHOULDER PAIN: ICD-10-CM

## 2024-01-13 DIAGNOSIS — M25.511 CHRONIC RIGHT SHOULDER PAIN: ICD-10-CM

## 2024-01-13 PROCEDURE — 73221 MRI JOINT UPR EXTREM W/O DYE: CPT | Mod: RT

## 2024-01-18 ENCOUNTER — OFFICE VISIT (OUTPATIENT)
Dept: PHYSICAL MEDICINE AND REHAB | Facility: CLINIC | Age: 39
End: 2024-01-18
Payer: COMMERCIAL

## 2024-01-18 ENCOUNTER — TELEPHONE (OUTPATIENT)
Dept: PHYSICAL MEDICINE AND REHAB | Facility: CLINIC | Age: 39
End: 2024-01-18

## 2024-01-18 VITALS — HEART RATE: 71 BPM | DIASTOLIC BLOOD PRESSURE: 61 MMHG | SYSTOLIC BLOOD PRESSURE: 122 MMHG

## 2024-01-18 DIAGNOSIS — M79.18 MYOFASCIAL PAIN: ICD-10-CM

## 2024-01-18 DIAGNOSIS — S43.431A LABRAL TEAR OF SHOULDER, RIGHT, INITIAL ENCOUNTER: Primary | ICD-10-CM

## 2024-01-18 DIAGNOSIS — M25.511 CHRONIC RIGHT SHOULDER PAIN: Primary | ICD-10-CM

## 2024-01-18 DIAGNOSIS — G89.29 CHRONIC RIGHT SHOULDER PAIN: Primary | ICD-10-CM

## 2024-01-18 DIAGNOSIS — M25.511 CHRONIC RIGHT SHOULDER PAIN: ICD-10-CM

## 2024-01-18 DIAGNOSIS — S43.431A LABRAL TEAR OF SHOULDER, RIGHT, INITIAL ENCOUNTER: ICD-10-CM

## 2024-01-18 DIAGNOSIS — G89.29 CHRONIC RIGHT SHOULDER PAIN: ICD-10-CM

## 2024-01-18 PROCEDURE — 99214 OFFICE O/P EST MOD 30 MIN: CPT | Performed by: PHYSICAL MEDICINE & REHABILITATION

## 2024-01-18 ASSESSMENT — PAIN SCALES - GENERAL: PAINLEVEL: NO PAIN (1)

## 2024-01-18 NOTE — LETTER
2024         RE: Karrie Zhang  [address]  [facility] [address]        Dear Colleague,    Thank you for referring your patient, Karrie Zhang, to the [facility] SPINE AND NEUROSURGERY. Please see a copy of my visit note below.    Assessment/Plan:      Liz was seen today for shoulder pain.    Diagnoses and all orders for this visit:    Chronic right shoulder pain  -     diclofenac (VOLTAREN) 50 MG EC tablet; Take 1 tablet (50 mg) by mouth 2 times daily as needed for moderate pain  -     MR Shoulder Arthrogram  Right w Contrast; Future    Labral tear of shoulder, right, initial encounter  -     diclofenac (VOLTAREN) 50 MG EC tablet; Take 1 tablet (50 mg) by mouth 2 times daily as needed for moderate pain  -     MR Shoulder Arthrogram  Right w Contrast; Future    Myofascial pain         Assessment: Pleasant 38 year old female with past medical history significant for thyroid disorder, depression, sleep apnea, asthma who presents today for follow-up regardin.  Significant worsening of right shoulder pain for approximately 8 to 10 months.  Consistent with labral tear and potentially rotator cuff impingement.  Positive Levering's on exam.     There is mild subacromial bursitis on MRI along with signal abnormality through the superior labrum.  Worsening pain despite physical therapy and activity modification and time.  Flare of her pain over the past few weeks since lifting her cousin's baby.        Discussion:    1.  Will we discussed the diagnosis and treatment options.  She has likely sustained a labral tear of the right shoulder and keeps aggravating this over time despite physical therapy and rest.  We discussed medications along with further diagnostics, injections, surgical evaluation.  She is not interested in an injection today/at this time.    2.   Recommend MR arthrogram right shoulder to confirm labral tear.    3.  Will provide a trial of diclofenac as needed  for right shoulder pain if symptoms flare.    4.  Consideration for surgical evaluation after MR arthrogram.  Likely would want to be seen at [facility] given his proximity to her work in [place].    5.  [facility].    It was our pleasure caring for your patient today, if there any questions or concerns please do not hesitate to contact us.      Subjective:   Patient ID: Karrie Zhang is a 38 year old female.    History of Present Illness: Patient presents for evaluation flare of right shoulder pain.  This is over the right anterior lateral shoulder.  This flared over the past few weeks after lifting her cousin's baby.  Any lifting or rowing increases her pain.  Reaching as well.  She does kayaking in the summer and wants to get this taken care of as she has had recurrent flares since last year.  Pain waxes and wanes today is tolerable but 8/10 at worst 1/10 today 0/10 at best.  Takes Tylenol.  Better with rest and ice.  Steroid burst has been the best for her during times of pain flare.  Every time she aggravates her pain her symptoms are taking longer to improve.      Imaging: MRI report and images were personally reviewed and discussed with the patient.   MRI of the right shoulder personally reviewed showing minimal bursitis of the subacromial/subdeltoid bursa.  Signal abnormality of the superior labrum not completely visualized as this was not contrast/not arthrogram.  Normal rotator cuff.    Review of Systems: Pertinent positives: Paresthesias in the hands.  Some poor balance.  Denies weakness in the arms bowel or bladder incontinence, headache, difficulty swallowing, fevers and unintentional weight loss.         Past Medical History:   Diagnosis Date     Acquired hypothyroidism      Alcohol abuse      Allergic rhinitis      Alopecia      Asthma      Cervical dysplasia      Cervical high risk HPV (human papillomavirus) test positive 6/29/16, 7/5/17    neg 16/18     Depression       Disease of thyroid gland      H/O colposcopy with cervical biopsy 08/08/2017 08/08/17: [place] Bx and ECC normal.      Papanicolaou smear of cervix with low grade squamous intraepithelial lesion (LGSIL) 07/05/2017     Rosacea, acne      Sleep apnea      Syrinx of spinal cord (H)      Uncomplicated asthma        The following portions of the patient's history were reviewed and updated as appropriate: allergies, current medications, past family history, past medical history, past social history, past surgical history and problem list.           Objective:   Physical Exam:    /61   Pulse 71   There is no height or weight on file to calculate BMI.      General: Alert and oriented with normal affect. Attention, knowledge, memory, and language are intact. No acute distress.   Eyes: Sclerae are clear.  Respirations: Unlabored   Gait:  Nonantalgic  Pain of the right shoulder with abduction.  Tenderness over the right anterior shoulder.  Mild positive Miner positive Warba's on the right with reproduction of symptoms.  Negative arm drop empty can and speeds test for weakness.  Sensation is intact to light touch throughout the upper  extremities.  Reflexes are 2+ and symmetric in the biceps triceps and brachioradialis with negative Hoffmans.      Manual muscle testing reveals:  Right /Left out of 5  5/5 shoulder abductors  5/5 elbow flexors  5/5 elbow extensors  5/5 wrist extensors  5/5 interosseus  5/5 finger flexors         Again, thank you for allowing me to participate in the care of your patient.        Sincerely,        Tapan Rose DO

## 2024-01-18 NOTE — PATIENT INSTRUCTIONS
An MRI with arthrogram was ordered for you today.  You will be contacted by scheduling within 3 days.    If you are not contacted, please call Radiology at [phone].   diclofenac (which is an anti-inflammatory) medication is prescribed today. Take 1 tablet 2 times a day as needed for pain.This medication should be taken with food and water to prevent any stomach upset. Do not take ibuprofen/Advil/Motrin/Aleve  while you take diclofenac. Please call if you have any side effects.

## 2024-01-18 NOTE — PROGRESS NOTES
Assessment/Plan:      Liz was seen today for shoulder pain.    Diagnoses and all orders for this visit:    Chronic right shoulder pain  -     diclofenac (VOLTAREN) 50 MG EC tablet; Take 1 tablet (50 mg) by mouth 2 times daily as needed for moderate pain  -     MR Shoulder Arthrogram  Right w Contrast; Future    Labral tear of shoulder, right, initial encounter  -     diclofenac (VOLTAREN) 50 MG EC tablet; Take 1 tablet (50 mg) by mouth 2 times daily as needed for moderate pain  -     MR Shoulder Arthrogram  Right w Contrast; Future    Myofascial pain         Assessment: Pleasant 38 year old female with past medical history significant for thyroid disorder, depression, sleep apnea, asthma who presents today for follow-up regardin.  Significant worsening of right shoulder pain for approximately 8 to 10 months.  Consistent with labral tear and potentially rotator cuff impingement.  Positive Holland's on exam.     There is mild subacromial bursitis on MRI along with signal abnormality through the superior labrum.  Worsening pain despite physical therapy and activity modification and time.  Flare of her pain over the past few weeks since lifting her cousin's baby.        Discussion:    1.  Will we discussed the diagnosis and treatment options.  She has likely sustained a labral tear of the right shoulder and keeps aggravating this over time despite physical therapy and rest.  We discussed medications along with further diagnostics, injections, surgical evaluation.  She is not interested in an injection today/at this time.    2.   Recommend MR arthrogram right shoulder to confirm labral tear.    3.  Will provide a trial of diclofenac as needed for right shoulder pain if symptoms flare.    4.  Consideration for surgical evaluation after MR arthrogram.  Likely would want to be seen at [facility] given his proximity to her work in [employer].    5.  [facility].    It was our pleasure  caring for your patient today, if there any questions or concerns please do not hesitate to contact us.      Subjective:   Patient ID: Karrie Zhang is a 38 year old female.    History of Present Illness: Patient presents for evaluation flare of right shoulder pain.  This is over the right anterior lateral shoulder.  This flared over the past few weeks after lifting her cousin's baby.  Any lifting or rowing increases her pain.  Reaching as well.  She does kayaking in the summer and wants to get this taken care of as she has had recurrent flares since last year.  Pain waxes and wanes today is tolerable but 8/10 at worst 1/10 today 0/10 at best.  Takes Tylenol.  Better with rest and ice.  Steroid burst has been the best for her during times of pain flare.  Every time she aggravates her pain her symptoms are taking longer to improve.      Imaging: MRI report and images were personally reviewed and discussed with the patient.   MRI of the right shoulder personally reviewed showing minimal bursitis of the subacromial/subdeltoid bursa.  Signal abnormality of the superior labrum not completely visualized as this was not contrast/not arthrogram.  Normal rotator cuff.    Review of Systems: Pertinent positives: Paresthesias in the hands.  Some poor balance.  Denies weakness in the arms bowel or bladder incontinence, headache, difficulty swallowing, fevers and unintentional weight loss.         Past Medical History:   Diagnosis Date    Acquired hypothyroidism     Alcohol abuse     Allergic rhinitis     Alopecia     Asthma     Cervical dysplasia     Cervical high risk HPV (human papillomavirus) test positive 6/29/16, 7/5/17    neg 16/18    Depression     Disease of thyroid gland     H/O colposcopy with cervical biopsy 08/08/2017 08/08/17: [place] Bx and ECC normal.     Papanicolaou smear of cervix with low grade squamous intraepithelial lesion (LGSIL) 07/05/2017    Rosacea, acne     Sleep apnea     Syrinx of spinal cord (H)      Uncomplicated asthma        The following portions of the patient's history were reviewed and updated as appropriate: allergies, current medications, past family history, past medical history, past social history, past surgical history and problem list.           Objective:   Physical Exam:    /61   Pulse 71   There is no height or weight on file to calculate BMI.      General: Alert and oriented with normal affect. Attention, knowledge, memory, and language are intact. No acute distress.   Eyes: Sclerae are clear.  Respirations: Unlabored   Gait:  Nonantalgic  Pain of the right shoulder with abduction.  Tenderness over the right anterior shoulder.  Mild positive Miner positive [place]'s on the right with reproduction of symptoms.  Negative arm drop empty can and speeds test for weakness.  Sensation is intact to light touch throughout the upper  extremities.  Reflexes are 2+ and symmetric in the biceps triceps and brachioradialis with negative Hoffmans.      Manual muscle testing reveals:  Right /Left out of 5  5/5 shoulder abductors  5/5 elbow flexors  5/5 elbow extensors  5/5 wrist extensors  5/5 interosseus  5/5 finger flexors

## 2024-01-18 NOTE — TELEPHONE ENCOUNTER
[facility] requesting:    Order Code  [id number]    Be entered into [facility]    Arthrogram, injection order    MRI is already ordered, but these need to be scheduled TOGETHER    If you have any questions, please CALL:    [phone]  Peter

## 2024-02-09 ENCOUNTER — MYC MEDICAL ADVICE (OUTPATIENT)
Dept: FAMILY MEDICINE | Facility: CLINIC | Age: 39
End: 2024-02-09
Payer: COMMERCIAL

## 2024-02-12 ENCOUNTER — ANCILLARY PROCEDURE (OUTPATIENT)
Dept: MAMMOGRAPHY | Facility: HOSPITAL | Age: 39
End: 2024-02-12
Attending: FAMILY MEDICINE
Payer: COMMERCIAL

## 2024-02-12 DIAGNOSIS — Z12.31 VISIT FOR SCREENING MAMMOGRAM: ICD-10-CM

## 2024-02-12 DIAGNOSIS — E03.9 ACQUIRED HYPOTHYROIDISM: ICD-10-CM

## 2024-02-12 PROCEDURE — 77063 BREAST TOMOSYNTHESIS BI: CPT

## 2024-02-12 RX ORDER — LEVOTHYROXINE SODIUM 75 UG/1
75 TABLET ORAL DAILY
Qty: 90 TABLET | Refills: 1 | Status: SHIPPED | OUTPATIENT
Start: 2024-02-12

## 2024-02-27 ENCOUNTER — HOSPITAL ENCOUNTER (OUTPATIENT)
Dept: MRI IMAGING | Facility: CLINIC | Age: 39
Discharge: HOME | End: 2024-02-27
Attending: PHYSICAL MEDICINE & REHABILITATION
Payer: COMMERCIAL

## 2024-02-27 ENCOUNTER — HOSPITAL ENCOUNTER (OUTPATIENT)
Dept: RADIOLOGY | Facility: CLINIC | Age: 39
Discharge: HOME | End: 2024-02-27
Attending: PHYSICAL MEDICINE & REHABILITATION
Payer: COMMERCIAL

## 2024-02-27 DIAGNOSIS — S43.431A LABRAL TEAR OF SHOULDER, RIGHT, INITIAL ENCOUNTER: ICD-10-CM

## 2024-02-27 DIAGNOSIS — G89.29 CHRONIC RIGHT SHOULDER PAIN: ICD-10-CM

## 2024-02-27 DIAGNOSIS — M25.511 CHRONIC RIGHT SHOULDER PAIN: ICD-10-CM

## 2024-02-27 PROCEDURE — 250N000009 HC RX 250: Performed by: PHYSICAL MEDICINE & REHABILITATION

## 2024-02-27 PROCEDURE — 23350 INJECTION FOR SHOULDER X-RAY: CPT

## 2024-02-27 PROCEDURE — 73222 MRI JOINT UPR EXTREM W/DYE: CPT | Mod: RT

## 2024-02-27 PROCEDURE — 255N000002 HC RX 255 OP 636: Performed by: PHYSICAL MEDICINE & REHABILITATION

## 2024-02-27 PROCEDURE — A9585 GADOBUTROL INJECTION: HCPCS | Performed by: PHYSICAL MEDICINE & REHABILITATION

## 2024-02-27 PROCEDURE — 77002 NEEDLE LOCALIZATION BY XRAY: CPT

## 2024-02-27 RX ORDER — IOPAMIDOL 612 MG/ML
7 INJECTION, SOLUTION INTRAVASCULAR ONCE
Status: COMPLETED | OUTPATIENT
Start: 2024-02-27 | End: 2024-02-27

## 2024-02-27 RX ORDER — GADOBUTROL 604.72 MG/ML
0.1 INJECTION INTRAVENOUS ONCE
Status: COMPLETED | OUTPATIENT
Start: 2024-02-27 | End: 2024-02-27

## 2024-02-27 RX ORDER — LIDOCAINE HYDROCHLORIDE 10 MG/ML
30 INJECTION, SOLUTION EPIDURAL; INFILTRATION; INTRACAUDAL; PERINEURAL ONCE
Status: COMPLETED | OUTPATIENT
Start: 2024-02-27 | End: 2024-02-27

## 2024-02-27 RX ADMIN — LIDOCAINE HYDROCHLORIDE 10 ML: 10 INJECTION, SOLUTION EPIDURAL; INFILTRATION; INTRACAUDAL; PERINEURAL at 14:28

## 2024-02-27 RX ADMIN — IOPAMIDOL 7 ML: 612 INJECTION, SOLUTION INTRAVENOUS at 14:30

## 2024-02-27 RX ADMIN — GADOBUTROL 0.1 ML: 604.72 INJECTION INTRAVENOUS at 14:30

## 2024-03-01 ENCOUNTER — TRANSFERRED RECORDS (OUTPATIENT)
Dept: HEALTH INFORMATION MANAGEMENT | Facility: CLINIC | Age: 39
End: 2024-03-01
Payer: COMMERCIAL

## 2024-03-06 ENCOUNTER — TRANSFERRED RECORDS (OUTPATIENT)
Dept: HEALTH INFORMATION MANAGEMENT | Facility: CLINIC | Age: 39
End: 2024-03-06
Payer: COMMERCIAL

## 2024-03-14 ENCOUNTER — TRANSFERRED RECORDS (OUTPATIENT)
Dept: HEALTH INFORMATION MANAGEMENT | Facility: CLINIC | Age: 39
End: 2024-03-14
Payer: COMMERCIAL

## 2024-03-18 ENCOUNTER — E-VISIT (OUTPATIENT)
Dept: FAMILY MEDICINE | Facility: CLINIC | Age: 39
End: 2024-03-18
Payer: COMMERCIAL

## 2024-03-18 DIAGNOSIS — J06.9 VIRAL URI: Primary | ICD-10-CM

## 2024-03-18 DIAGNOSIS — R09.81 NASAL CONGESTION: ICD-10-CM

## 2024-03-18 PROCEDURE — 99207 PR NON-BILLABLE SERV PER CHARTING: CPT | Performed by: PHYSICIAN ASSISTANT

## 2024-03-18 RX ORDER — IPRATROPIUM BROMIDE 42 UG/1
2 SPRAY, METERED NASAL 4 TIMES DAILY
Qty: 15 ML | Refills: 0 | Status: SHIPPED | OUTPATIENT
Start: 2024-03-18 | End: 2024-06-13

## 2024-03-18 NOTE — PATIENT INSTRUCTIONS
The symptoms you describe suggest a viral cause, which is much more common than a bacterial cause. Antibiotics will treat bacterial infections, but have no effect on viral infections. If possible, especially if improving, start with symptom care for the first 7-10 days, then consider seeking further treatment or taking an antibiotic. Bacterial infections generally are more severe, including symptoms such as pus, fever over 101degrees F, or rapidly worsening.  You may want to try a nasal lavage (also known as nasal irrigation). You can find over-the-counter products, such as Neti-Pot, at retail locations or make your own at home. Instructions for homemade nasal lavage and more information on the process are available online at http://www.aafp.org/afp/2009/1115/p1121.html.

## 2024-03-19 ENCOUNTER — VIRTUAL VISIT (OUTPATIENT)
Dept: FAMILY MEDICINE | Facility: CLINIC | Age: 39
End: 2024-03-19
Payer: COMMERCIAL

## 2024-03-19 DIAGNOSIS — G47.00 INSOMNIA, UNSPECIFIED TYPE: Primary | ICD-10-CM

## 2024-03-19 DIAGNOSIS — U07.1 INFECTION DUE TO 2019 NOVEL CORONAVIRUS: ICD-10-CM

## 2024-03-19 PROCEDURE — 99214 OFFICE O/P EST MOD 30 MIN: CPT | Mod: 95 | Performed by: PHYSICIAN ASSISTANT

## 2024-03-19 RX ORDER — TRAZODONE HYDROCHLORIDE 50 MG/1
50 TABLET, FILM COATED ORAL AT BEDTIME
Status: CANCELLED | OUTPATIENT
Start: 2024-03-19

## 2024-03-19 RX ORDER — TRAZODONE HYDROCHLORIDE 50 MG/1
50 TABLET, FILM COATED ORAL AT BEDTIME
Qty: 15 TABLET | Refills: 0 | Status: SHIPPED | OUTPATIENT
Start: 2024-03-19

## 2024-03-19 ASSESSMENT — PATIENT HEALTH QUESTIONNAIRE - PHQ9
SUM OF ALL RESPONSES TO PHQ QUESTIONS 1-9: 9
SUM OF ALL RESPONSES TO PHQ QUESTIONS 1-9: 9
10. IF YOU CHECKED OFF ANY PROBLEMS, HOW DIFFICULT HAVE THESE PROBLEMS MADE IT FOR YOU TO DO YOUR WORK, TAKE CARE OF THINGS AT HOME, OR GET ALONG WITH OTHER PEOPLE: VERY DIFFICULT

## 2024-03-19 ASSESSMENT — ANXIETY QUESTIONNAIRES
5. BEING SO RESTLESS THAT IT IS HARD TO SIT STILL: NEARLY EVERY DAY
1. FEELING NERVOUS, ANXIOUS, OR ON EDGE: SEVERAL DAYS
3. WORRYING TOO MUCH ABOUT DIFFERENT THINGS: SEVERAL DAYS
8. IF YOU CHECKED OFF ANY PROBLEMS, HOW DIFFICULT HAVE THESE MADE IT FOR YOU TO DO YOUR WORK, TAKE CARE OF THINGS AT HOME, OR GET ALONG WITH OTHER PEOPLE?: VERY DIFFICULT
7. FEELING AFRAID AS IF SOMETHING AWFUL MIGHT HAPPEN: NOT AT ALL
IF YOU CHECKED OFF ANY PROBLEMS ON THIS QUESTIONNAIRE, HOW DIFFICULT HAVE THESE PROBLEMS MADE IT FOR YOU TO DO YOUR WORK, TAKE CARE OF THINGS AT HOME, OR GET ALONG WITH OTHER PEOPLE: VERY DIFFICULT
GAD7 TOTAL SCORE: 11
GAD7 TOTAL SCORE: 11
6. BECOMING EASILY ANNOYED OR IRRITABLE: MORE THAN HALF THE DAYS
4. TROUBLE RELAXING: NEARLY EVERY DAY
7. FEELING AFRAID AS IF SOMETHING AWFUL MIGHT HAPPEN: NOT AT ALL
GAD7 TOTAL SCORE: 11
2. NOT BEING ABLE TO STOP OR CONTROL WORRYING: SEVERAL DAYS

## 2024-03-19 NOTE — PROGRESS NOTES
"Liz is a 39 year old who is being evaluated via a billable video visit.    How would you like to obtain your AVS? MyChart  If the video visit is dropped, the invitation should be resent by: Text to cell phone: [phone]  Will anyone else be joining your video visit? No      Assessment & Plan     Insomnia, unspecified type  Discussed. Patient having a lot of stress and anxiety. Tried lorazepam and hydroxyzine without relief. Can trial trazodone - but discussed following up with her psychiatrist for long term management.   - traZODone (DESYREL) 50 MG tablet; Take 1 tablet (50 mg) by mouth at bedtime    Infection due to 2019 novel coronavirus  Has had symptoms for greater than 5 days at this time - not a candidate for an antiviral medication. Discussed with patient the new return to work changes. Continue with OTC supportive medications. If sinus congestion worsens and persists for another 5-6 days, may be a candidate for an antibiotic.             BMI  Estimated body mass index is 28.19 kg/m  as calculated from the following:    Height as of 11/6/23: 1.702 m (5' 7\").    Weight as of 11/6/23: 81.6 kg (180 lb).   Weight management plan: Discussed healthy diet and exercise guidelines          Subjective   Liz is a 39 year old, presenting for the following health issues:  Covid Concern (Tested positive for COVID yesterday and have been unable to sleep)        3/19/2024    10:06 AM   Additional Questions   Roomed by An V.         3/19/2024    10:06 AM   Patient Reported Additional Medications   Patient reports taking the following new medications none     History of Present Illness       Mental Health Follow-up:  Patient presents to follow-up on Depression & Anxiety.Patient's depression since last visit has been:  Medium  The patient is having other symptoms associated with depression.  Patient's anxiety since last visit has been:  Medium  The patient is having other symptoms associated with anxiety.  Any " "significant life events: job concerns and health concerns  Patient is feeling anxious or having panic attacks.  Patient has no concerns about alcohol or drug use.    Headaches:   Since the patient's last clinic visit, headaches are: worsened  The patient is getting headaches:  I have covid  She is not able to do normal daily activities when she has a migraine.  The patient is taking the following rescue/relief medications:  Ibuprofen (Advil, Motrin), Naproxyn (Aleve), Tylenol and Excedrin   Patient states \"The relief is inconsistent\" from the rescue/relief medications.   The patient is taking the following medications to prevent migraines:  No medications to prevent migraines  In the past 4 weeks, the patient has gone to an Urgent Care or Emergency Room 0 times times due to headaches.    She eats 0-1 servings of fruits and vegetables daily.She consumes 1 sweetened beverage(s) daily.She exercises with enough effort to increase her heart rate 9 or less minutes per day.  She exercises with enough effort to increase her heart rate 3 or less days per week.   She is taking medications regularly.         COVID-19 Symptom Review  How many days ago did these symptoms start? 5 days     Are any of the following symptoms significant for you?  New or worsening difficulty breathing? No  Worsening cough? Yes, it's a dry cough.   Fever or chills? No  Headache: YES  Sore throat: No  Chest pain: No  Diarrhea: No  Body aches? YES- slightly     What treatments has patient tried? Guaifenesin (mucinex) and ibuprofen    Does patient live in a nursing home, group home, or shelter? No  Does patient have a way to get food/medications during quarantined? Yes, I have a friend or family member who can help me.          Insomnia  Onset/Duration: Last week   Description:   Frequency of insomnia:  nightly  Time to fall asleep (sleep latency): 6 hours   Middle of night awakening:  YES  Early morning awakening:  YES  Progression of Symptoms:  " worsening  Accompanying Signs & Symptoms:  Daytime sleepiness/napping: No  Excessive snoring/apnea: YES- sleep apnea   Restless legs: YES  Waking to urinate: YES  Chronic pain:  YES- shoulders pain  Depression symptoms (if yes, do PHQ9): YES  Anxiety symptoms (if yes, do RICKY-7): YES  History:  Prior Insomnia: YES  New stressful situation: No  Precipitating factors:   Caffeine intake: YES  OTC decongestants: YES- Benadryl   Any new medications: No  Alleviating factors:  Self medicating (alcohol, etc.):  No  Stress-reduction (exercise, yoga, meditation etc): YES- sometimes will do mediation before bedtime   Therapies tried and outcome: Benadryl -  not effective and lorazepam -  not effective    3 hours last night. Feels like she can't fall asleep.       Review of Systems  Constitutional, HEENT, cardiovascular, pulmonary, gi and gu systems are negative, except as otherwise noted.      Objective           Vitals:  No vitals were obtained today due to virtual visit.    Physical Exam   GENERAL: alert and no distress  EYES: Eyes grossly normal to inspection.  No discharge or erythema, or obvious scleral/conjunctival abnormalities.  RESP: No audible wheeze or visible cyanosis.  + cough.  SKIN: Visible skin clear. No significant rash, abnormal pigmentation or lesions.  NEURO: Cranial nerves grossly intact.  Mentation and speech appropriate for age.  PSYCH: Appropriate affect, tone, and pace of words          Video-Visit Details    Type of service:  Video Visit   Originating Location (pt. Location): Home    Distant Location (provider location):  On-site  Platform used for Video Visit: Randy  Signed Electronically by: Maddy Tijerina PA-C

## 2024-03-20 ENCOUNTER — MYC MEDICAL ADVICE (OUTPATIENT)
Dept: FAMILY MEDICINE | Facility: CLINIC | Age: 39
End: 2024-03-20
Payer: COMMERCIAL

## 2024-03-20 DIAGNOSIS — J01.91 ACUTE RECURRENT SINUSITIS, UNSPECIFIED LOCATION: Primary | ICD-10-CM

## 2024-03-21 RX ORDER — DOXYCYCLINE 100 MG/1
100 TABLET ORAL 2 TIMES DAILY
Qty: 14 TABLET | Refills: 0 | Status: SHIPPED | OUTPATIENT
Start: 2024-03-21 | End: 2024-03-28

## 2024-03-21 NOTE — TELEPHONE ENCOUNTER
Please review and advise.    Pt had E-visit 03/18/24 and then a virtual visit on 03/19/24.     Kathia Garcia RN

## 2024-03-26 ENCOUNTER — TELEPHONE (OUTPATIENT)
Dept: PHYSICAL MEDICINE AND REHAB | Facility: CLINIC | Age: 39
End: 2024-03-26
Payer: COMMERCIAL

## 2024-03-26 NOTE — TELEPHONE ENCOUNTER
Please contact the patient and inform her that I have received the MR arthrogram of her right shoulder.  This does verify a tearing within the base of the labrum of the right shoulder.    I can refer her to orthopedics if she wishes.  If she has a specific orthopedic surgeon location, she should let me know and I will specifically refer her.

## 2024-03-29 NOTE — TELEPHONE ENCOUNTER
"Phone call to patient to give results as she has not read the [place] correspondence with results and recommendations sent to her 3 days ago. Results given and explained. Patient reports the \"study was incomplete and a horrible experience. It was botched.\"    Reports she went to [facility] on 3/1/24. Had a 3T MRI of right shoulder that was ordered on 3/6/24. At her follow up to MRI, was told it showed a labral tear, fraying of the rotator cuff and bursitis. Was sent home with oral steroids, but has not started them as she came down with COVID. Has just completed the antibiotics for COVID, so plans to start the oral steroids in a day or two. She inquires if [facility] has seen the MRI results. Explained as they are outside the system, will have to have pushed.   "

## 2024-03-29 NOTE — TELEPHONE ENCOUNTER
I had reviewed the MRI results which were sent to me on March 26.  I would recommend orthopedic evaluation for the labral tear.  If she has a specific location that she would like to be seen, I can place that order for her.

## 2024-03-30 ENCOUNTER — LAB (OUTPATIENT)
Dept: LAB | Facility: HOSPITAL | Age: 39
End: 2024-03-30
Payer: COMMERCIAL

## 2024-03-30 DIAGNOSIS — F06.8 SUBACUTE PSYCHOSIS ASSOCIATED WITH ENDOCRINE OR METABOLIC DISORDER: ICD-10-CM

## 2024-03-30 DIAGNOSIS — Z78.9 NORMAL TISSUE: ICD-10-CM

## 2024-03-30 DIAGNOSIS — R53.82 CHRONIC FATIGUE: Primary | ICD-10-CM

## 2024-03-30 DIAGNOSIS — Z79.899 NEED FOR PROPHYLACTIC CHEMOTHERAPY: ICD-10-CM

## 2024-03-30 DIAGNOSIS — F33.9 RECURRENT MAJOR DEPRESSION (H): ICD-10-CM

## 2024-03-30 LAB
ALBUMIN SERPL BCG-MCNC: 4.1 G/DL (ref 3.5–5.2)
ALP SERPL-CCNC: 58 U/L (ref 40–150)
ALT SERPL W P-5'-P-CCNC: 19 U/L (ref 0–50)
ANION GAP SERPL CALCULATED.3IONS-SCNC: 8 MMOL/L (ref 7–15)
AST SERPL W P-5'-P-CCNC: 17 U/L (ref 0–45)
BASOPHILS # BLD AUTO: 0 10E3/UL (ref 0–0.2)
BASOPHILS NFR BLD AUTO: 1 %
BILIRUB SERPL-MCNC: 0.4 MG/DL
BUN SERPL-MCNC: 13 MG/DL (ref 6–20)
CALCIUM SERPL-MCNC: 8.9 MG/DL (ref 8.6–10)
CHLORIDE SERPL-SCNC: 104 MMOL/L (ref 98–107)
CHOLEST SERPL-MCNC: 148 MG/DL
CORTIS SERPL-MCNC: 8.6 UG/DL
CREAT SERPL-MCNC: 0.93 MG/DL (ref 0.51–0.95)
CRP SERPL HS-MCNC: 0.44 MG/L
DEPRECATED HCO3 PLAS-SCNC: 26 MMOL/L (ref 22–29)
EGFRCR SERPLBLD CKD-EPI 2021: 79 ML/MIN/1.73M2
EOSINOPHIL # BLD AUTO: 0.2 10E3/UL (ref 0–0.7)
EOSINOPHIL NFR BLD AUTO: 4 %
ERYTHROCYTE [DISTWIDTH] IN BLOOD BY AUTOMATED COUNT: 12.1 % (ref 10–15)
FASTING STATUS PATIENT QL REPORTED: NO
FERRITIN SERPL-MCNC: 192 NG/ML (ref 6–175)
FOLATE SERPL-MCNC: 26.3 NG/ML (ref 4.6–34.8)
GLUCOSE SERPL-MCNC: 93 MG/DL (ref 70–99)
HBA1C MFR BLD: 4.8 %
HCT VFR BLD AUTO: 36.9 % (ref 35–47)
HCYS SERPL-SCNC: 5.8 UMOL/L (ref 0–15)
HDLC SERPL-MCNC: 47 MG/DL
HGB BLD-MCNC: 12.2 G/DL (ref 11.7–15.7)
IMM GRANULOCYTES # BLD: 0 10E3/UL
IMM GRANULOCYTES NFR BLD: 0 %
IRON BINDING CAPACITY (ROCHE): 226 UG/DL (ref 240–430)
IRON SATN MFR SERPL: 44 % (ref 15–46)
IRON SERPL-MCNC: 99 UG/DL (ref 37–145)
LDLC SERPL CALC-MCNC: 90 MG/DL
LYMPHOCYTES # BLD AUTO: 2.2 10E3/UL (ref 0.8–5.3)
LYMPHOCYTES NFR BLD AUTO: 39 %
MCH RBC QN AUTO: 30 PG (ref 26.5–33)
MCHC RBC AUTO-ENTMCNC: 33.1 G/DL (ref 31.5–36.5)
MCV RBC AUTO: 91 FL (ref 78–100)
MONOCYTES # BLD AUTO: 0.4 10E3/UL (ref 0–1.3)
MONOCYTES NFR BLD AUTO: 6 %
NEUTROPHILS # BLD AUTO: 2.9 10E3/UL (ref 1.6–8.3)
NEUTROPHILS NFR BLD AUTO: 50 %
NONHDLC SERPL-MCNC: 101 MG/DL
NRBC # BLD AUTO: 0 10E3/UL
NRBC BLD AUTO-RTO: 0 /100
PLATELET # BLD AUTO: 243 10E3/UL (ref 150–450)
POTASSIUM SERPL-SCNC: 4.3 MMOL/L (ref 3.4–5.3)
PROT SERPL-MCNC: 6.2 G/DL (ref 6.4–8.3)
RBC # BLD AUTO: 4.06 10E6/UL (ref 3.8–5.2)
SODIUM SERPL-SCNC: 138 MMOL/L (ref 135–145)
T4 FREE SERPL-MCNC: 1.51 NG/DL (ref 0.9–1.7)
TRIGL SERPL-MCNC: 54 MG/DL
TSH SERPL DL<=0.005 MIU/L-ACNC: 0.65 UIU/ML (ref 0.3–4.2)
VIT B12 SERPL-MCNC: 760 PG/ML (ref 232–1245)
VIT D+METAB SERPL-MCNC: 35 NG/ML (ref 20–50)
WBC # BLD AUTO: 5.8 10E3/UL (ref 4–11)

## 2024-03-30 PROCEDURE — 84443 ASSAY THYROID STIM HORMONE: CPT

## 2024-03-30 PROCEDURE — 82607 VITAMIN B-12: CPT

## 2024-03-30 PROCEDURE — 83550 IRON BINDING TEST: CPT

## 2024-03-30 PROCEDURE — 80061 LIPID PANEL: CPT

## 2024-03-30 PROCEDURE — 82533 TOTAL CORTISOL: CPT

## 2024-03-30 PROCEDURE — 36415 COLL VENOUS BLD VENIPUNCTURE: CPT

## 2024-03-30 PROCEDURE — 80053 COMPREHEN METABOLIC PANEL: CPT

## 2024-03-30 PROCEDURE — 83735 ASSAY OF MAGNESIUM: CPT

## 2024-03-30 PROCEDURE — 83090 ASSAY OF HOMOCYSTEINE: CPT

## 2024-03-30 PROCEDURE — 82746 ASSAY OF FOLIC ACID SERUM: CPT

## 2024-03-30 PROCEDURE — 82627 DEHYDROEPIANDROSTERONE: CPT

## 2024-03-30 PROCEDURE — 86141 C-REACTIVE PROTEIN HS: CPT

## 2024-03-30 PROCEDURE — 84207 ASSAY OF VITAMIN B-6: CPT

## 2024-03-30 PROCEDURE — 85041 AUTOMATED RBC COUNT: CPT

## 2024-03-30 PROCEDURE — 83921 ORGANIC ACID SINGLE QUANT: CPT

## 2024-03-30 PROCEDURE — 84439 ASSAY OF FREE THYROXINE: CPT

## 2024-03-30 PROCEDURE — 84630 ASSAY OF ZINC: CPT

## 2024-03-30 PROCEDURE — 82728 ASSAY OF FERRITIN: CPT

## 2024-03-30 PROCEDURE — 82306 VITAMIN D 25 HYDROXY: CPT

## 2024-03-30 PROCEDURE — 83036 HEMOGLOBIN GLYCOSYLATED A1C: CPT

## 2024-03-31 LAB
MAGNESIUM RBC-SCNC: 4.7 MG/DL
ZINC SERPL-MCNC: 62.5 UG/DL

## 2024-04-01 LAB — DHEA-S SERPL-MCNC: 85 UG/DL (ref 35–430)

## 2024-04-01 NOTE — TELEPHONE ENCOUNTER
Phone call to patient to relay [facility]'s response. She will continue working with [facility] and their recommendations for the tear. She is starting the oral steroids today.

## 2024-04-03 LAB
METHYLMALONATE SERPL-SCNC: 0.2 UMOL/L (ref 0–0.4)
PYRIDOXAL PHOS SERPL-SCNC: 168.8 NMOL/L

## 2024-04-12 ENCOUNTER — OFFICE VISIT (OUTPATIENT)
Dept: FAMILY MEDICINE | Facility: CLINIC | Age: 39
End: 2024-04-12
Payer: COMMERCIAL

## 2024-04-12 VITALS
RESPIRATION RATE: 20 BRPM | OXYGEN SATURATION: 97 % | TEMPERATURE: 98.6 F | WEIGHT: 187.6 LBS | HEART RATE: 64 BPM | SYSTOLIC BLOOD PRESSURE: 110 MMHG | HEIGHT: 66 IN | BODY MASS INDEX: 30.15 KG/M2 | DIASTOLIC BLOOD PRESSURE: 62 MMHG

## 2024-04-12 DIAGNOSIS — A63.0 GENITAL WARTS: ICD-10-CM

## 2024-04-12 DIAGNOSIS — Z11.3 SCREEN FOR STD (SEXUALLY TRANSMITTED DISEASE): Primary | ICD-10-CM

## 2024-04-12 LAB
HIV 1+2 AB+HIV1 P24 AG SERPL QL IA: NONREACTIVE
T PALLIDUM AB SER QL: NONREACTIVE
T VAGINALIS DNA SPEC QL NAA+PROBE: NOT DETECTED

## 2024-04-12 PROCEDURE — 99213 OFFICE O/P EST LOW 20 MIN: CPT | Mod: 25

## 2024-04-12 PROCEDURE — 87389 HIV-1 AG W/HIV-1&-2 AB AG IA: CPT

## 2024-04-12 PROCEDURE — 86780 TREPONEMA PALLIDUM: CPT

## 2024-04-12 PROCEDURE — 57061 DESTRUCTION VAG LESIONS SMPL: CPT

## 2024-04-12 PROCEDURE — 36415 COLL VENOUS BLD VENIPUNCTURE: CPT

## 2024-04-12 PROCEDURE — 87491 CHLMYD TRACH DNA AMP PROBE: CPT

## 2024-04-12 PROCEDURE — 87591 N.GONORRHOEAE DNA AMP PROB: CPT

## 2024-04-12 PROCEDURE — 87661 TRICHOMONAS VAGINALIS AMPLIF: CPT

## 2024-04-12 RX ORDER — LAMOTRIGINE 100 MG/1
100 TABLET ORAL DAILY
COMMUNITY
Start: 2024-02-12 | End: 2024-06-13 | Stop reason: ALTCHOICE

## 2024-04-12 ASSESSMENT — PAIN SCALES - GENERAL: PAINLEVEL: NO PAIN (0)

## 2024-04-12 NOTE — PROGRESS NOTES
Assessment & Plan     Screen for STD (sexually transmitted disease)  Requesting STD testing, has new sexual partner.  Denies vaginal discharge or irritation or dysuria.  - Treponema Abs w Reflex to RPR and Titer  - HIV Antigen Antibody Combo Cascade  - Chlamydia trachomatis/Neisseria gonorrhoeae by PCR (vagina)  - Trichomonas vaginalis by PCR (first-voided urine)    Genital warts  Cryotherapy risk and benefits discussed for genital wart treatment.  Completed 2 freeze-thaw cycles.  If genital warts still present after 1 to 2 weeks, return back for another treatment of cryotherapy.    Kostas Hill is a 39 year old, presenting for the following health issues:  STD testing      4/12/2024     6:57 AM   Additional Questions   Roomed by Brooke BROWN CMA     History of Present Illness       Reason for visit:  Possible genital warts  Symptom onset:  More than a month  Symptoms include:  See above  Symptom intensity:  Mild  Symptom progression:  Staying the same  Had these symptoms before:  No    She eats 0-1 servings of fruits and vegetables daily.She consumes 1 sweetened beverage(s) daily.She exercises with enough effort to increase her heart rate 9 or less minutes per day.  She exercises with enough effort to increase her heart rate 3 or less days per week.   She is taking medications regularly.    Patient is a 39-year-old female who presents for STD testing.  Medical history includes polymyalgia, mild persistent asthma without complication, obesity, hypothyroidism, depression, and anxiety.    Has not been sexually active for 2 years, no STD testing for some time.  Patient has new partner and desires to be sexually active in the future.  Has noted two small growths in vaginal area for the past year, believed to be genital warts.  Vaginal growths are not painful but catch on razor while shaving.  Have not grown in size.  No concerns of vaginal discharge, irritation, dysuria, abdominal pain, pelvic pain.  Previous  history of chlamydia and positive HPV testing.  No history of HIV, syphilis, gonorrhea.      Review of Systems  Review of systems negative otherwise known HPI.    Past Medical History:   Diagnosis Date    Acquired hypothyroidism     Alcohol abuse     Allergic rhinitis     Alopecia     Asthma     Cervical dysplasia     Cervical high risk HPV (human papillomavirus) test positive 6/29/16, 7/5/17    neg 16/18    Depression     Disease of thyroid gland     H/O colposcopy with cervical biopsy 08/08/2017 08/08/17: [place] Bx and ECC normal.     Papanicolaou smear of cervix with low grade squamous intraepithelial lesion (LGSIL) 07/05/2017    Rosacea, acne     Sleep apnea     Syrinx of spinal cord (H)     Uncomplicated asthma      Past Surgical History:   Procedure Laterality Date    LASER [place], CERVICAL  2005     Family History   Problem Relation Age of Onset    Osteoporosis Mother     Diabetes Father     Other Cancer Father         Pancreatic    Substance Abuse Father         alcoholic: stopped when I was in grade school    Prostate Cancer Maternal Grandfather     Prostate Cancer Other     Depression Brother     Alcoholism Brother     Anxiety Disorder Brother     Coronary Artery Disease No family hx of     Cancer Father      Social History     Tobacco Use    Smoking status: Never    Smokeless tobacco: Never   Substance Use Topics    Alcohol use: Yes     Alcohol/week: 42.0 standard drinks of alcohol     Types: 42 Shots of liquor per week     Current Outpatient Medications   Medication Sig Dispense Refill    albuterol (PROAIR HFA/PROVENTIL HFA/VENTOLIN HFA) 108 (90 Base) MCG/ACT inhaler Inhale 2 puffs into the lungs every 6 hours as needed for shortness of breath or wheezing 18 g 3    busPIRone HCl (BUSPAR) 30 MG tablet TAKE 1 TABLET BY MOUTH TWICE DAILY AS NEEDED FOR ANXIETY      fluconazole (DIFLUCAN) 150 MG tablet Take 1 tablet (150 mg) by mouth every 72 hours as needed (itching, until symptoms resolve) 3 tablet 0     "FLUoxetine (PROZAC) 10 MG capsule Take 1 capsule (10 mg) by mouth daily      fluticasone (FLONASE) 50 MCG/ACT nasal spray Spray 1 spray into both nostrils daily 16 g 0    hydrocortisone 2.5 % cream       ketoconazole (NIZORAL) 2 % external shampoo Apply topically daily as needed      lamoTRIgine (LAMICTAL) 100 MG tablet Take 100 mg by mouth daily      levothyroxine (SYNTHROID/LEVOTHROID) 75 MCG tablet Take 1 tablet (75 mcg) by mouth daily 90 tablet 1    methocarbamol (ROBAXIN) 500 MG tablet Take 1 tablet (500 mg) by mouth 3 times daily as needed for muscle spasms 30 tablet 0    metroNIDAZOLE (METROGEL) 0.75 % vaginal gel Place 1 applicator (5 g) vaginally daily 70 g 0    montelukast (SINGULAIR) 10 MG tablet TAKE 1 TABLET(10 MG) BY MOUTH AT BEDTIME 90 tablet 3    nabumetone (RELAFEN) 500 MG tablet Take 1-2 tablets (500-1,000 mg) by mouth 2 times daily as needed for pain 90 tablet 1    tacrolimus (PROTOPIC) 0.1 % external ointment APPLY TOPICALLY BID ON FACE UTD      traZODone (DESYREL) 50 MG tablet Take 1 tablet (50 mg) by mouth at bedtime 15 tablet 0    diclofenac (VOLTAREN) 50 MG EC tablet Take 1 tablet (50 mg) by mouth 2 times daily as needed for moderate pain (Patient not taking: Reported on 3/19/2024) 60 tablet 0    ipratropium (ATROVENT) 0.06 % nasal spray Spray 2 sprays into both nostrils 4 times daily (Patient not taking: Reported on 4/12/2024) 15 mL 0    lamoTRIgine (LAMICTAL) 25 MG tablet Take 100 mg by mouth daily (Patient not taking: Reported on 4/12/2024)       No current facility-administered medications for this visit.         Objective    /62 (BP Location: Left arm, Patient Position: Sitting, Cuff Size: Adult Regular)   Pulse 64   Temp 98.6  F (37  C) (Temporal)   Resp 20   Ht 1.676 m (5' 6\")   Wt 85.1 kg (187 lb 9.6 oz)   LMP 04/01/2024 (Exact Date)   SpO2 97%   BMI 30.28 kg/m    Body mass index is 30.28 kg/m .  Physical Exam   General: Alert, oriented, no acute distress.  "   Respiratory: Easy work of breathing.   Cardiovascular: Appears warm and well-perfused.    Skin: No abnormalities noted on visualized skin.   Neurologic: Mentation intact and speech normal.     Psychiatric: Appropriate affect.    (female): Two small flesh-colored papular cauliflower growth on pubis.     Procedure: Cryotherapy  Diagnosis: genital warts  Location: pubis area   Specimen number: 2  Lesion size: 0.2-0.4 cm  Cryotherapy 2 freeze-thaw cycles.  Follow up with any wound problems, poor healing, or signs of infection.    Signed Electronically by: JOHNNIE Byrnes CNP

## 2024-04-13 LAB
C TRACH DNA SPEC QL PROBE+SIG AMP: NEGATIVE
N GONORRHOEA DNA SPEC QL NAA+PROBE: NEGATIVE

## 2024-04-19 ENCOUNTER — TRANSFERRED RECORDS (OUTPATIENT)
Dept: HEALTH INFORMATION MANAGEMENT | Facility: CLINIC | Age: 39
End: 2024-04-19

## 2024-05-03 ENCOUNTER — THERAPY VISIT (OUTPATIENT)
Dept: PHYSICAL THERAPY | Facility: CLINIC | Age: 39
End: 2024-05-03
Payer: COMMERCIAL

## 2024-05-03 DIAGNOSIS — M25.511 ACUTE PAIN OF RIGHT SHOULDER: Primary | ICD-10-CM

## 2024-05-03 PROCEDURE — 97112 NEUROMUSCULAR REEDUCATION: CPT | Mod: GP | Performed by: PHYSICAL THERAPIST

## 2024-05-03 PROCEDURE — 97110 THERAPEUTIC EXERCISES: CPT | Mod: GP | Performed by: PHYSICAL THERAPIST

## 2024-05-03 NOTE — PROGRESS NOTES
05/03/24 0500   Appointment Info   Signing clinician's name / credentials Dennis Charles DPT   Total/Authorized Visits 8   Visits Used 5   Medical Diagnosis Acute pain of right shoulder  Cervical spine pain  Myofascial pain   PT Tx Diagnosis R shoulder/neck pain   Progress Note/Certification   Therapy Frequency 1x/week   Predicted Duration 8 weeks   Progress Note Completed Date 05/03/24   PT Goal 1   Goal Identifier reaching   Goal Description Pt will be able to reach overhead pain free   Rationale to maximize safety and independence with performance of ADLs and functional tasks   Goal Progress Pt can reach overhead, PL 1/10   Target Date 06/28/24   Subjective Report   Subjective Report Pt has followed up with MD for a couple visits, an MRI shows labral tear, bursitis, and supraspinatus tendinopathy. Pt didn't respond well to steroids so that is unlikely to be an option for treatment.  At this point conservative management is recommended per MD.  Pt comes to clinic today to review and adjust HEP as appropriate given the new information available at this point.   Objective Measures   Objective Measures Objective Measure 1;Objective Measure 2   Objective Measure 1   Objective Measure strength   Details MMT: R shoulder: flex 5-, ER 5-, IR 4+,   Objective Measure 2   Objective Measure ROM   Details R shoulder AROM: wnl   Treatment Interventions (PT)   Interventions Therapeutic Procedure/Exercise;Neuromuscular Re-education;Manual Therapy   Therapeutic Procedure/Exercise   Therapeutic Procedures: strength, endurance, ROM, flexibility minutes (38302) 30   Therapeutic Procedures Ther Proc 2   Ther Proc 1 squat 10x 2#   PTRx Ther Proc 1 All 4s Serratus Press   PTRx Ther Proc 1 - Details 3x 10 sec hold   PTRx Ther Proc 2 Shoulder Theraband Internal Rotation   PTRx Ther Proc 2 - Details 15x green   PTRx Ther Proc 3 Shoulder External Rotation Sidelying   PTRx Ther Proc 3 - Details 15x partial range of motion, 4#   PTRx Ther Proc  4 Supine Active Shoulder Flexion   PTRx Ther Proc 4 - Details 15x 4# - stopping at 90   PTRx Ther Proc 5 Shoulder Extension in Standing   PTRx Ther Proc 5 - Details 15x 1#   PTRx Ther Proc 6 Shoulder Horizontal Abduction/Diagonals With Theraband   PTRx Ther Proc 6 - Details 15x blue   PTRx Ther Proc 7 Squat   PTRx Ther Proc 7 - Details HEP   PTRx Ther Proc 8 Cervical Retraction With Patient Overpressure   PTRx Ther Proc 8 - Details HEP   PTRx Ther Proc 9 Bent Over Rows   PTRx Ther Proc 9 - Details HEP   Neuromuscular Re-education   Neuromuscular re-ed of mvmt, balance, coord, kinesthetic sense, posture, proprioception minutes (66813) 10   PTRx Neuro Re-ed 1 Prone Arm Raise #1   PTRx Neuro Re-ed 1 - Details 10x 5 sec hold, cues for supination   Neuro Re-ed 1 scap training review - relaxation of UT and focus on scap control   Education   Learner/Method Patient;Listening;Reading;Demonstration;Pictures/Video   Education Comments Pt instructed on findings of evaluation, expectations regarding frequency/intensity of HEP.   Plan   Home program Home program established based on findings of the evaluation   Total Session Time   Timed Code Treatment Minutes 40   Total Treatment Time (sum of timed and untimed services) 40         PLAN  Continue therapy per current plan of care.    Beginning/End Dates of Progress Note Reporting Period:  05/03/24 to 05/03/2024    Referring Provider:  No ref. provider found

## 2024-05-07 DIAGNOSIS — B37.31 YEAST INFECTION OF THE VAGINA: Primary | ICD-10-CM

## 2024-05-07 DIAGNOSIS — B96.89 BV (BACTERIAL VAGINOSIS): ICD-10-CM

## 2024-05-07 DIAGNOSIS — N76.0 BV (BACTERIAL VAGINOSIS): ICD-10-CM

## 2024-05-07 RX ORDER — FLUCONAZOLE 150 MG/1
150 TABLET ORAL
Qty: 3 TABLET | Refills: 0 | OUTPATIENT
Start: 2024-05-07

## 2024-05-07 RX ORDER — FLUCONAZOLE 150 MG/1
150 TABLET ORAL
Qty: 3 TABLET | Refills: 0 | Status: SHIPPED | OUTPATIENT
Start: 2024-05-07 | End: 2024-06-13 | Stop reason: ALTCHOICE

## 2024-05-08 ENCOUNTER — MYC MEDICAL ADVICE (OUTPATIENT)
Dept: SLEEP MEDICINE | Facility: CLINIC | Age: 39
End: 2024-05-08
Payer: COMMERCIAL

## 2024-05-09 ENCOUNTER — OFFICE VISIT (OUTPATIENT)
Dept: FAMILY MEDICINE | Facility: CLINIC | Age: 39
End: 2024-05-09
Payer: COMMERCIAL

## 2024-05-09 VITALS
TEMPERATURE: 98.1 F | WEIGHT: 186.4 LBS | BODY MASS INDEX: 29.96 KG/M2 | OXYGEN SATURATION: 98 % | HEART RATE: 67 BPM | RESPIRATION RATE: 20 BRPM | HEIGHT: 66 IN | SYSTOLIC BLOOD PRESSURE: 92 MMHG | DIASTOLIC BLOOD PRESSURE: 63 MMHG

## 2024-05-09 DIAGNOSIS — J34.89 SINUS PAIN: Primary | ICD-10-CM

## 2024-05-09 DIAGNOSIS — J30.1 NON-SEASONAL ALLERGIC RHINITIS DUE TO POLLEN: ICD-10-CM

## 2024-05-09 DIAGNOSIS — G43.909 MIGRAINE WITHOUT STATUS MIGRAINOSUS, NOT INTRACTABLE, UNSPECIFIED MIGRAINE TYPE: ICD-10-CM

## 2024-05-09 DIAGNOSIS — J32.9 RECURRENT SINUS INFECTIONS: ICD-10-CM

## 2024-05-09 PROCEDURE — 99417 PROLNG OP E/M EACH 15 MIN: CPT

## 2024-05-09 PROCEDURE — 99215 OFFICE O/P EST HI 40 MIN: CPT

## 2024-05-09 RX ORDER — AZELASTINE 1 MG/ML
1 SPRAY, METERED NASAL 2 TIMES DAILY
Qty: 30 ML | Refills: 0 | Status: SHIPPED | OUTPATIENT
Start: 2024-05-09 | End: 2024-08-12

## 2024-05-09 RX ORDER — SUMATRIPTAN 25 MG/1
25 TABLET, FILM COATED ORAL
Qty: 6 TABLET | Refills: 0 | Status: SHIPPED | OUTPATIENT
Start: 2024-05-09 | End: 2024-06-13

## 2024-05-09 ASSESSMENT — PATIENT HEALTH QUESTIONNAIRE - PHQ9
10. IF YOU CHECKED OFF ANY PROBLEMS, HOW DIFFICULT HAVE THESE PROBLEMS MADE IT FOR YOU TO DO YOUR WORK, TAKE CARE OF THINGS AT HOME, OR GET ALONG WITH OTHER PEOPLE: SOMEWHAT DIFFICULT
SUM OF ALL RESPONSES TO PHQ QUESTIONS 1-9: 6
SUM OF ALL RESPONSES TO PHQ QUESTIONS 1-9: 6

## 2024-05-09 ASSESSMENT — PAIN SCALES - GENERAL: PAINLEVEL: SEVERE PAIN (6)

## 2024-05-09 NOTE — PROGRESS NOTES
"Assessment & Plan     Sinus pain  Chronic/recurrent intermittent symptoms. Chart review shows a number of ABRS treatments over past few years, mostly treated with doxycyline. She is currently being treated with doxycycline for preseptal cellulitis and has a few days left and overall eye symptoms improved, though she now has headache and sinus \"burning\". Chronic use of flonase for asthma/allergies so \"burning\" could be nasal mucosa irritation/dryness. recommended trying astelin instead, steamy showers tylenol/ibuprofen for comfort, I'm not sure her sinus pain is related to an infection or her headaches. Possibly allergies or TMJ as she does have jaw clicking.   - Adult ENT  Referral  - azelastine (ASTELIN) 0.1 % nasal spray  Dispense: 30 mL; Refill: 0  - SUMAtriptan (IMITREX) 25 MG tablet  Dispense: 6 tablet; Refill: 0    Recurrent sinus infections  Advised seeing ENT to consider if any abnormal anatomy/polyps contributing to severe symptoms. No obvious abnormalities on exam today.   - Adult ENT  Referral    Non-seasonal allergic rhinitis due to pollen  Chronic, stable. Advised trial astelin vs flonase.   - Adult ENT  Referral  - azelastine (ASTELIN) 0.1 % nasal spray  Dispense: 30 mL; Refill: 0    Migraine without status migrainosus, not intractable, unspecified migraine type  Headache did not improve with 800 mg dose of ibuprofen last night. Will try a few tablets of imitrex, ? Triggered by recent eye/preseptal cellulitis.   - SUMAtriptan (IMITREX) 25 MG tablet  Dispense: 6 tablet; Refill: 0          See Patient Instructions    Kostas Hill is a 39 year old, presenting for the following health issues:  Sinus Problem        5/9/2024     9:12 AM   Additional Questions   Roomed by Amaury CHUNG MA     History of Present Illness       Headaches:   Since the patient's last clinic visit, headaches are: worsened  The patient is getting headaches:  Monthly  She is not able to do normal daily " "activities when she has a migraine.  The patient is taking the following rescue/relief medications:  Ibuprofen (Advil, Motrin), Tylenol and Excedrin   Patient states \"I get no relief\" from the rescue/relief medications.   The patient is taking the following medications to prevent migraines:  No medications to prevent migraines  In the past 4 weeks, the patient has gone to an Urgent Care or Emergency Room 1 time times due to headaches.    She eats 0-1 servings of fruits and vegetables daily.She consumes 0 sweetened beverage(s) daily.She exercises with enough effort to increase her heart rate 9 or less minutes per day.  She exercises with enough effort to increase her heart rate 3 or less days per week.   She is taking medications regularly.     1 week ago, left eye was swollen, went to eye doctor, was given doxycycline monohydrate 100 mg and had headaches at the same time, eye swelling went away, headache went away.     Headache came back yesterday and patient is still finishing abx.     Pt has had 3-4 sinus issues in the past 4 months.    Eye better, still having sinus pain.  4/30/24 - switched from keflex to doxycyline, erythromycin ointment  4/28/24 - urgency room w preseptal cellulitis/ sinus pain - cephalexin 500  3/18/24 - COVID, symptoms severe sinus pain - doxycyline 100 mg  1/4/24 - sinusitis doxyclyine 100 mg BID  9/2023 - sinus infection doxy   12/2022 - sinusitis doxy  12/2021 - bacterial sinusitis doxy      Patient has tried OTC things, tried saline spray with aloe that made things worse.   Dorene pot is hit or miss, sometimes it hurts, sometimes it doesn't.   Patient says it is debilitating pain, feels like nose is on fire, when air passes it feels raw.     Patient feels dizzy.   Neck and ears are puffy and swollen.   Patient is taking allegra for sinus and headache.     Dizziness  Onset/Duration: since last week, when getting headaches, gets dizzy, maybe waves of vertigo, sometimes standing and will " "feel like just went on a roller coaster.   Description:   Do you feel faint: No  Does it feel like the surroundings (bed, room) are moving: No  Unsteady/off balance: YES- unsteady  Have you passed out or fallen: No  Intensity: mild  Progression of Symptoms: improved when initially starting abx, but now the symptoms have returned.   Accompanying Signs & Symptoms:  Heart palpitations or chest pain: No  Nausea, vomiting: No  Weakness or lack of coordination in arms or legs: No, arm gets tingly sometimes  Vision or speech changes: No  Numbness or tingling: YES- after sleeping gets tingling in both hands  Ringing in ears (Tinnitus): No  Hearing Loss: No  History:   Head trauma/concussion history: No  Previous similar symptoms: YES- last couple of times when patient had sinus problems, but it was not like this.   Recent bleeding history: No  Any new medications (BP?): No  Precipitating factors:   Worse with activity: YES  Worse with head movement: YES  Alleviating factors:   Does staying in a fixed position give relief: YES, for dizziness but does not help headache.   Therapies tried and outcome: None        Optometry visit 4/30/24  HPI    Has never had an eye exam      LLL swollen, thinks she has a stye, but also the eye itself is very red and hurts a little when she blinks; symptoms started Saturday - Worst part is the sinus pain, both above and below eyes - This morning when she woke up, it looks like there's \"fluid under the outer layer of left eye\" and is somewhat watery -   Says she was seen in Urgent Care on Sunday and prescribed Cephalexin and Erythromycin oint - Took the Cephalexin yesterday and woke up worse today - Mentions she's allergic to PCN, wonders about that.   Last edited by Denice Stark COA on 4/30/2024 10:39 AM.         She started get a HA Monday.  Feels like a sinus HA.  This  morning no HA.  Started to take ibuprofen and that didn't help. But now a pressure feeling, a five.   Before the eye " was just swollen.     Was to  Sunday.  [facility].  She was sent home with Keflex and only took one of those Monday night.   They knew her drug allergies.  Now she thinks she may be allergic to Keflex because the eye looks swollen filled with fluid (the white part.).  when she was allergic to PCN she developed a rash.     Not pregnant not nursing.     Aside:  she has been treated for sinus infections in the past:  Usually with doxycycline and sinus issues have resolved with that.     ASSESSMENT:  Encounter Diagnoses   Name Primary?    Hordeolum internum of left lower eyelid Yes    Preseptal cellulitis of left lower eyelid At risk, or very mild    Conjunctivochalasis, left Reassure  likely due to inflammation      PLAN:  Doubt Keflex allergy but to avoid the issue start Doxycycline 100mg / bid.     Stop the Keflex  (Cephalexin)    Instead use the Doxycline 100mg twice a day.  Discussed will likely get worse before it gets better, and for the oral medication to 'kick in' after a couple of days.     Stop the ointment  (Erythromycin)   Instead use TobraDex four times a day left eye until next follow-up     Hot packs to that area 5-10 mins per hour, 3-4 x a day.     Let's see you back in one week.  Sooner PRN      BRAIN MRI 2021  IMPRESSION:  1.  No evidence of acute intracranial hemorrhage, mass effect, or infarction.  2.  Few foci of signal abnormality within the cerebral hemispheric white matter which are nonspecific, though most commonly ascribed to chronic small vessel ischemic disease. Other diagnostic considerations include sequela of migraine headaches or   multiple sclerosis. These do not have a characteristic distribution for multiple sclerosis. Clinical correlation is advised.      CERVICAL SPINE MRI 2022  INDICATION:  Upper extremity numbness, please include craniocervical junction  COMPARISON:  MR thoracic spine 8/25/2021 and 9/17/2021.  TECHNIQUE:   1) MRI Cervical Spine without IV contrast.  2) MRI  Thoracic Spine without IV contrast.     FINDINGS:     CERVICAL SPINE:   Normal vertebral body heights and marrow signal. Straightening of the usual cervical lordosis.  No abnormal cord signal. No extraspinal abnormality.     Craniovertebral junction and C1-C2: Widely patent.     C2-C3: Unremarkable appearance of the disc. No significant canal or foraminal stenosis.      C3-C4: Small disc and osteophyte complex and mild bilateral uncovertebral spurring. No significant canal or foraminal stenosis.     C4-C5: Small disc osteophyte complex and right uncovertebral spurring. No significant canal stenosis. Mild right foraminal stenosis.     C5-C6: Central disc and osteophyte complex with mild ventral cord flattening. Mild canal stenosis. Mild bilateral uncovertebral spurring and mild bilateral foraminal stenosis.     C6-C7: Disc and osteophyte complex with left uncovertebral spurring. No significant canal stenosis. Mild left foraminal stenosis.     C7-T1: Unremarkable appearance of the disc. No significant canal stenosis or foraminal stenosis.     THORACIC SPINE:  Normal vertebral body heights, alignment and marrow signal.  Unchanged small disc herniation at T7-T8. No significant canal or foraminal stenosis.      Stable thoracic cord syrinx spanning T6-T10 and measuring up to 6 x 5 mm. Otherwise, no abnormal cord signal.      No extraspinal abnormality.                                                                      IMPRESSION:     CERVICAL SPINE MRI:  1.  Mild cervical spondylosis, worse at C5-C6 where there is mild ventral cord flattening. No high-grade canal stenosis or abnormal cord signal.  2.  Cerebellar tonsils are above the foramen magnum.     THORACIC SPINE MRI:  1.  Stable thoracic cord syrinx since 08/25/2021.  2.  Remaining findings are without significant change.      Current Outpatient Medications   Medication Instructions    albuterol (PROAIR HFA/PROVENTIL HFA/VENTOLIN HFA) 108 (90 Base) MCG/ACT inhaler  2 puffs, Inhalation, EVERY 6 HOURS PRN    azelastine (ASTELIN) 0.1 % nasal spray 1 spray, Both Nostrils, 2 TIMES DAILY    busPIRone HCl (BUSPAR) 30 MG tablet TAKE 1 TABLET BY MOUTH TWICE DAILY AS NEEDED FOR ANXIETY    diclofenac (VOLTAREN) 50 mg, Oral, 2 TIMES DAILY PRN    fluconazole (DIFLUCAN) 150 mg, Oral, EVERY 72 HOURS PRN    fluconazole (DIFLUCAN) 150 mg, Oral, EVERY 72 HOURS PRN    FLUoxetine (PROZAC) 10 mg, Oral, DAILY    fluticasone (FLONASE) 50 MCG/ACT nasal spray 1 spray, Both Nostrils, DAILY    hydrocortisone 2.5 % cream No dose, route, or frequency recorded.    ipratropium (ATROVENT) 0.06 % nasal spray 2 sprays, Both Nostrils, 4 TIMES DAILY    ketoconazole (NIZORAL) 2 % external shampoo Topical, DAILY PRN    lamoTRIgine (LAMICTAL) 87.5 mg, Oral, DAILY    lamoTRIgine (LAMICTAL) 100 mg, DAILY    levothyroxine (SYNTHROID/LEVOTHROID) 75 mcg, Oral, DAILY    methocarbamol (ROBAXIN) 500 mg, Oral, 3 TIMES DAILY PRN    metroNIDAZOLE (METROGEL) 5 g, Vaginal, DAILY    montelukast (SINGULAIR) 10 MG tablet TAKE 1 TABLET(10 MG) BY MOUTH AT BEDTIME    nabumetone (RELAFEN) 500-1,000 mg, Oral, 2 TIMES DAILY PRN    SUMAtriptan (IMITREX) 25 mg, Oral, AT ONSET OF HEADACHE, May repeat in 2 hours. Max 8 tablets/24 hours.    tacrolimus (PROTOPIC) 0.1 % external ointment APPLY TOPICALLY BID ON FACE UTD    traZODone (DESYREL) 50 mg, Oral, AT BEDTIME      PAST MEDICAL HISTORY:   Past Medical History:   Diagnosis Date    Acquired hypothyroidism     Alcohol abuse     Allergic rhinitis     Alopecia     Asthma     Cervical dysplasia     Cervical high risk HPV (human papillomavirus) test positive 6/29/16, 7/5/17    neg 16/18    Depression     Disease of thyroid gland     H/O colposcopy with cervical biopsy 08/08/2017 08/08/17: [place] Bx and ECC normal.     Papanicolaou smear of cervix with low grade squamous intraepithelial lesion (LGSIL) 07/05/2017    Rosacea, acne     Sleep apnea     Syrinx of spinal cord (H)     Uncomplicated  "asthma        PAST SURGICAL HISTORY:   Past Surgical History:   Procedure Laterality Date    LASER [place], CERVICAL  2005       FAMILY HISTORY:   Family History   Problem Relation Age of Onset    Osteoporosis Mother     Diabetes Father     Other Cancer Father         Pancreatic    Substance Abuse Father         alcoholic: stopped when I was in grade school    Prostate Cancer Maternal Grandfather     Prostate Cancer Other     Depression Brother     Alcoholism Brother     Anxiety Disorder Brother     Coronary Artery Disease No family hx of     Cancer Father        SOCIAL HISTORY:   Social History     Tobacco Use    Smoking status: Never    Smokeless tobacco: Never   Substance Use Topics    Alcohol use: Yes     Alcohol/week: 42.0 standard drinks of alcohol     Types: 42 Shots of liquor per week           Review of Systems  Constitutional, HEENT, cardiovascular, pulmonary, gi and gu systems are negative, except as otherwise noted.      Objective    Ht 1.676 m (5' 6\")   Wt 84.6 kg (186 lb 6.4 oz)   LMP 04/27/2024 (Exact Date)   Breastfeeding No   BMI 30.09 kg/m    Body mass index is 30.09 kg/m .  Physical Exam   GENERAL: alert and no distress  EYES: Eyes grossly normal to inspection, PERRL and conjunctivae and sclerae normal  HENT: ear canals and TM's normal, nose and mouth without ulcers or lesions. + bilaterally TMJ clicking.   NECK: no adenopathy, no asymmetry, masses, or scars  RESP: lungs clear to auscultation - no rales, rhonchi or wheezes  CV: regular rate and rhythm, normal S1 S2, no S3 or S4, no murmur, click or rub, no peripheral edema  NEURO: Normal strength and tone, mentation intact and speech normal  PSYCH: mentation appears normal, affect normal/bright              60 minutes spent on the date of the encounter doing chart review, history and exam, documentation and further activities as noted above    Signed Electronically by: JOHNNIE Holland CNP      "

## 2024-05-09 NOTE — PATIENT INSTRUCTIONS
Try imitrex for nausea  Try tylenol (1000 mg dose 3 times per day)  Finish doxycycline course  Start astelin nasal spray   After 2-3 days, try reducing/stopping flonase    If sinus pressure or drainage worsen, send me a [employer] message - I will send a course of levoquin.     Schedule ENT appointment

## 2024-06-12 SDOH — HEALTH STABILITY: PHYSICAL HEALTH: ON AVERAGE, HOW MANY MINUTES DO YOU ENGAGE IN EXERCISE AT THIS LEVEL?: 0 MIN

## 2024-06-12 SDOH — HEALTH STABILITY: PHYSICAL HEALTH: ON AVERAGE, HOW MANY DAYS PER WEEK DO YOU ENGAGE IN MODERATE TO STRENUOUS EXERCISE (LIKE A BRISK WALK)?: 0 DAYS

## 2024-06-12 ASSESSMENT — SOCIAL DETERMINANTS OF HEALTH (SDOH): HOW OFTEN DO YOU GET TOGETHER WITH FRIENDS OR RELATIVES?: ONCE A WEEK

## 2024-06-13 ENCOUNTER — OFFICE VISIT (OUTPATIENT)
Dept: FAMILY MEDICINE | Facility: CLINIC | Age: 39
End: 2024-06-13
Payer: COMMERCIAL

## 2024-06-13 VITALS
HEIGHT: 66 IN | BODY MASS INDEX: 30.76 KG/M2 | TEMPERATURE: 98.1 F | SYSTOLIC BLOOD PRESSURE: 126 MMHG | WEIGHT: 191.4 LBS | HEART RATE: 76 BPM | RESPIRATION RATE: 16 BRPM | OXYGEN SATURATION: 98 % | DIASTOLIC BLOOD PRESSURE: 74 MMHG

## 2024-06-13 DIAGNOSIS — B37.31 CANDIDAL VULVOVAGINITIS: ICD-10-CM

## 2024-06-13 DIAGNOSIS — G43.909 MIGRAINE WITHOUT STATUS MIGRAINOSUS, NOT INTRACTABLE, UNSPECIFIED MIGRAINE TYPE: ICD-10-CM

## 2024-06-13 DIAGNOSIS — B96.89 BV (BACTERIAL VAGINOSIS): ICD-10-CM

## 2024-06-13 DIAGNOSIS — N76.0 BV (BACTERIAL VAGINOSIS): ICD-10-CM

## 2024-06-13 DIAGNOSIS — F33.0 MAJOR DEPRESSIVE DISORDER, RECURRENT EPISODE, MILD (H): ICD-10-CM

## 2024-06-13 DIAGNOSIS — M35.3 POLYMYALGIA (H): ICD-10-CM

## 2024-06-13 DIAGNOSIS — J45.30 MILD PERSISTENT ASTHMA WITHOUT COMPLICATION: ICD-10-CM

## 2024-06-13 DIAGNOSIS — Z00.00 ROUTINE GENERAL MEDICAL EXAMINATION AT A HEALTH CARE FACILITY: Primary | ICD-10-CM

## 2024-06-13 DIAGNOSIS — F10.21 ALCOHOL DEPENDENCE IN REMISSION (H): ICD-10-CM

## 2024-06-13 DIAGNOSIS — M79.18 MYOFASCIAL PAIN: ICD-10-CM

## 2024-06-13 LAB
ALBUMIN SERPL BCG-MCNC: 4.6 G/DL (ref 3.5–5.2)
ALP SERPL-CCNC: 60 U/L (ref 40–150)
ALT SERPL W P-5'-P-CCNC: 18 U/L (ref 0–50)
ANION GAP SERPL CALCULATED.3IONS-SCNC: 9 MMOL/L (ref 7–15)
AST SERPL W P-5'-P-CCNC: 18 U/L (ref 0–45)
BILIRUB SERPL-MCNC: 0.6 MG/DL
BUN SERPL-MCNC: 13.4 MG/DL (ref 6–20)
CALCIUM SERPL-MCNC: 9.2 MG/DL (ref 8.6–10)
CHLORIDE SERPL-SCNC: 103 MMOL/L (ref 98–107)
CREAT SERPL-MCNC: 0.9 MG/DL (ref 0.51–0.95)
DEPRECATED HCO3 PLAS-SCNC: 25 MMOL/L (ref 22–29)
EGFRCR SERPLBLD CKD-EPI 2021: 82 ML/MIN/1.73M2
FERRITIN SERPL-MCNC: 198 NG/ML (ref 6–175)
GLUCOSE SERPL-MCNC: 79 MG/DL (ref 70–99)
HBV SURFACE AB SERPL IA-ACNC: 190 M[IU]/ML
HBV SURFACE AB SERPL IA-ACNC: REACTIVE M[IU]/ML
POTASSIUM SERPL-SCNC: 4.3 MMOL/L (ref 3.4–5.3)
PROT SERPL-MCNC: 6.6 G/DL (ref 6.4–8.3)
SODIUM SERPL-SCNC: 137 MMOL/L (ref 135–145)

## 2024-06-13 PROCEDURE — 99214 OFFICE O/P EST MOD 30 MIN: CPT | Mod: 25

## 2024-06-13 PROCEDURE — 36415 COLL VENOUS BLD VENIPUNCTURE: CPT

## 2024-06-13 PROCEDURE — 99396 PREV VISIT EST AGE 40-64: CPT

## 2024-06-13 PROCEDURE — 86706 HEP B SURFACE ANTIBODY: CPT

## 2024-06-13 PROCEDURE — 82728 ASSAY OF FERRITIN: CPT

## 2024-06-13 PROCEDURE — 80053 COMPREHEN METABOLIC PANEL: CPT

## 2024-06-13 RX ORDER — METRONIDAZOLE 7.5 MG/G
1 GEL VAGINAL DAILY
Qty: 70 G | Refills: 0 | Status: SHIPPED | OUTPATIENT
Start: 2024-06-13

## 2024-06-13 RX ORDER — SUMATRIPTAN 25 MG/1
25 TABLET, FILM COATED ORAL
Qty: 12 TABLET | Refills: 1 | Status: SHIPPED | OUTPATIENT
Start: 2024-06-13

## 2024-06-13 RX ORDER — ERGOCALCIFEROL 1.25 MG/1
50000 CAPSULE, LIQUID FILLED ORAL WEEKLY
COMMUNITY
Start: 2024-05-23

## 2024-06-13 RX ORDER — FLUCONAZOLE 150 MG/1
150 TABLET ORAL
Qty: 3 TABLET | Refills: 0 | Status: SHIPPED | OUTPATIENT
Start: 2024-06-13

## 2024-06-13 RX ORDER — METHOCARBAMOL 500 MG/1
500 TABLET, FILM COATED ORAL 3 TIMES DAILY PRN
Qty: 30 TABLET | Refills: 0 | Status: SHIPPED | OUTPATIENT
Start: 2024-06-13

## 2024-06-13 RX ORDER — PREDNISONE 10 MG/1
10-20 TABLET ORAL
COMMUNITY
Start: 2024-03-14

## 2024-06-13 RX ORDER — MONTELUKAST SODIUM 10 MG/1
1 TABLET ORAL AT BEDTIME
Qty: 90 TABLET | Refills: 3 | Status: SHIPPED | OUTPATIENT
Start: 2024-06-13

## 2024-06-13 ASSESSMENT — PAIN SCALES - GENERAL: PAINLEVEL: NO PAIN (0)

## 2024-06-13 NOTE — PATIENT INSTRUCTIONS
"Consider mammogram repeat next spring (every 1-2 years)  Next PAP (cervical cancer check) in Spring 2025    Refilled today: sumatriptan, singulair, robaxin, diflucan and metrogel.       Patient Education   Preventive Care Advice   This is general advice we often give to help people stay healthy. Your care team may have specific advice just for you. Please talk to your care team about your own preventive care needs.  Lifestyle  Exercise at least 150 minutes each week (30 minutes a day, 5 days a week).  Do muscle strengthening activities 2 days a week. These help control your weight and prevent disease.  No smoking.  Wear sunscreen to prevent skin cancer.  Have your home tested for radon every 2 to 5 years. Radon is a colorless, odorless gas that can harm your lungs. To learn more, go to www.health.[facility].[place].us and search for \"Radon in Homes.\"  Keep guns unloaded and locked up in a safe place like a safe or gun vault, or, use a gun lock and hide the keys. Always lock away bullets separately. To learn more, visit Keepy.[place].gov and search for \"safe gun storage.\"  Nutrition  Eat 5 or more servings of fruits and vegetables each day.  Try wheat bread, brown rice and whole grain pasta (instead of white bread, rice, and pasta).  Get enough calcium and vitamin D. Check the label on foods and aim for 100% of the RDA (recommended daily allowance).  Regular exams  Have a dental exam and cleaning every 6 months.  See your health care team every year to talk about:  Any changes in your health.  Any medicines your care team has prescribed.  Preventive care, family planning, and ways to prevent chronic diseases.  Shots (vaccines)   HPV shots (up to age 26), if you've never had them before.  Hepatitis B shots (up to age 59), if you've never had them before.  COVID-19 shot: Get this shot when it's due.  Flu shot: Get a flu shot every year.  Tetanus shot: Get a tetanus shot every 10 years.  Pneumococcal, hepatitis A, and RSV shots: Ask " your care team if you need these based on your risk.  Shingles shot (for age 50 and up).  General health tests  Diabetes screening:  Starting at age 35, Get screened for diabetes at least every 3 years.  If you are younger than age 35, ask your care team if you should be screened for diabetes.  Cholesterol test: At age 39, start having a cholesterol test every 5 years, or more often if advised.  Bone density scan (DEXA): At age 50, ask your care team if you should have this scan for osteoporosis (brittle bones).  Hepatitis C: Get tested at least once in your life.  Abdominal aortic aneurysm screening: Talk to your doctor about having this screening if you:  Have ever smoked; and  Are biologically male; and  Are between the ages of 65 and 75.  STIs (sexually transmitted infections)  Before age 24: Ask your care team if you should be screened for STIs.  After age 24: Get screened for STIs if you're at risk. You are at risk for STIs (including HIV) if:  You are sexually active with more than one person.  You don't use condoms every time.  You or a partner was diagnosed with a sexually transmitted infection.  If you are at risk for HIV, ask about PrEP medicine to prevent HIV.  Get tested for HIV at least once in your life, whether you are at risk for HIV or not.  Cancer screening tests  Cervical cancer screening: If you have a cervix, begin getting regular cervical cancer screening tests at age 21. Most people who have regular screenings with normal results can stop after age 65. Talk about this with your provider.  Breast cancer scan (mammogram): If you've ever had breasts, begin having regular mammograms starting at age 40. This is a scan to check for breast cancer.  Colon cancer screening: It is important to start screening for colon cancer at age 45.  Have a colonoscopy test every 10 years (or more often if you're at risk) Or, ask your provider about stool tests like a FIT test every year or Cologuard test every 3  years.  To learn more about your testing options, visit: www.[employer]/809986.pdf.  For help making a decision, visit: raymundo/sh83388.  Prostate cancer screening test: If you have a prostate and are age 55 to 69, ask your provider if you would benefit from a yearly prostate cancer screening test.  Lung cancer screening: If you are a current or former smoker age 50 to 80, ask your care team if ongoing lung cancer screenings are right for you.  For informational purposes only. Not to replace the advice of your health care provider. Copyright   2023 [facility]. All rights reserved. Clinically reviewed by the [facility] Transitions Program. [employer] [id number] - REV 04/24.  Safer Sex: Care Instructions  Overview  Safer sex is a way to reduce your risk of getting a sexually transmitted infection (STI). It can also help prevent pregnancy.  Several products can help you practice safer sex and reduce your chance of STIs. One of the best is a condom. There are internal and external condoms. You can use a special rubber sheet (dental dam) for protection during oral sex. Disposable gloves can keep your hands from touching blood, semen, or other body fluids that can carry infections.  Remember that birth control methods such as diaphragms, IUDs, foams, and birth control pills do not stop you from getting STIs.  Follow-up care is a key part of your treatment and safety. Be sure to make and go to all appointments, and call your doctor if you are having problems. It's also a good idea to know your test results and keep a list of the medicines you take.  How can you care for yourself at home?  Think about getting vaccinated to help prevent hepatitis A, hepatitis B, and human papillomavirus (HPV). They can be spread through sex.  Use a condom every time you have sex. Use an external condom, which goes on the penis. Or use an internal condom, which goes into the vagina or anus.  Make sure you use the right size  "external condom. A condom that's too small can break easily. A condom that's too big can slip off during sex.  Use a new condom each time you have sex. Be careful not to poke a hole in the condom when you open the wrapper.  Don't use an internal condom and an external condom at the same time.  Never use petroleum jelly (such as Vaseline), grease, hand lotion, baby oil, or anything with oil in it. These products can make holes in the condom.  After intercourse, hold the edge of the condom as you remove it. This will help keep semen from spilling out of the condom.  Do not have sex with anyone who has symptoms of an STI, such as sores on the genitals or mouth.  Do not drink a lot of alcohol or use drugs before sex.  Limit your sex partners. Sex with one partner who has sex only with you can reduce your risk of getting an STI.  Don't share sex toys. But if you do share them, use a condom and clean the sex toys between each use.  Talk to any partners before you have sex. Talk about what you feel comfortable with and whether you have any boundaries with sex. And find out if your partner or partners may be at risk for any STI. Keep in mind that a person may be able to spread an STI even if they do not have symptoms. You and any partners may want to get tested for STIs.  Where can you learn more?  Go to https://www.[employer]net/patiented  Enter B608 in the search box to learn more about \"Safer Sex: Care Instructions.\"  Current as of: November 27, 2023               Content Version: 14.0    0462-6014 [employer].   Care instructions adapted under license by your healthcare professional. If you have questions about a medical condition or this instruction, always ask your healthcare professional. [employer] disclaims any warranty or liability for your use of this information.         "

## 2024-06-13 NOTE — PROGRESS NOTES
Preventive Care Visit  [facility]  JOHNNIE Holland CNP, Family Medicine  Jun 13, 2024      Assessment & Plan     Routine general medical examination at a health care facility    Major depressive disorder, recurrent episode, mild (H24)  Chronic, stable, following with mental health provider.     Alcohol dependence in remission (H)  Chronic, stable. Following with mental health provider.   - Hepatitis B Surface Antibody  - Ferritin  - Comprehensive metabolic panel (BMP + Alb, Alk Phos, ALT, AST, Total. Bili, TP)    Polymyalgia (H24)  Myofascial pain  Chronic, refilled robaxin but advised follow up to discuss pain in more detail.   - methocarbamol (ROBAXIN) 500 MG tablet  Dispense: 30 tablet; Refill: 0    Migraine without status migrainosus, not intractable, unspecified migraine type  Chronic, refilled prn med. 1 refill. Follow up if migraines worsening.   - SUMAtriptan (IMITREX) 25 MG tablet  Dispense: 12 tablet; Refill: 1    BV (bacterial vaginosis)  - metroNIDAZOLE (METROGEL) 0.75 % vaginal gel  Dispense: 70 g; Refill: 0  - fluconazole (DIFLUCAN) 150 MG tablet  Dispense: 3 tablet; Refill: 0    Candidal vulvovaginitis  - metroNIDAZOLE (METROGEL) 0.75 % vaginal gel  Dispense: 70 g; Refill: 0    Mild persistent asthma without complication  Chronic, stable. Refilled singulair.   - montelukast (SINGULAIR) 10 MG tablet  Dispense: 90 tablet; Refill: 3      Patient has been advised of split billing requirements and indicates understanding: Yes        Counseling  Appropriate preventive services were discussed with this patient, including applicable screening as appropriate for fall prevention, nutrition, physical activity, Tobacco-use cessation, weight loss and cognition.  Checklist reviewing preventive services available has been given to the patient.  Reviewed patient's diet, addressing concerns and/or questions.   She is at risk for psychosocial distress and has been provided with  information to reduce risk.       See Patient Instructions    Subjective   Liz is a 39 year old, presenting for the following:  Physical        6/13/2024     7:42 AM   Additional Questions   Roomed by Megan CHUNG   Accompanied by self         6/13/2024     7:42 AM   Patient Reported Additional Medications   Patient reports taking the following new medications none        Health Care Directive  Patient does not have a Health Care Directive or Living Will: Discussed advance care planning with patient; information given to patient to review.    HPI    -- Check spot on right buttock area     -- Check spot on bottom of lip            6/12/2024   General Health   How would you rate your overall physical health? Good   Feel stress (tense, anxious, or unable to sleep) Only a little   (!) STRESS CONCERN      6/12/2024   Nutrition   Three or more servings of calcium each day? (!) I DON'T KNOW   Diet: Other   If other, please elaborate: I try to not eat bread and cow dairy   How many servings of fruit and vegetables per day? (!) 2-3   How many sweetened beverages each day? 0-1         6/12/2024   Exercise   Days per week of moderate/strenous exercise 0 days   Average minutes spent exercising at this level 0 min   (!) EXERCISE CONCERN      6/12/2024   Social Factors   Frequency of gathering with friends or relatives Once a week   Worry food won't last until get money to buy more No   Food not last or not have enough money for food? No   Do you have housing?  Yes   Are you worried about losing your housing? No   Lack of transportation? No   Unable to get utilities (heat,electricity)? No         6/12/2024   Dental   Dentist two times every year? Yes         3/18/2024   TB Screening   Were you born outside of the [place]? No         Today's PHQ-2 Score:       6/12/2024    11:10 AM   PHQ-2 ( 1999 Pf[employer])   Q1: Little interest or pleasure in doing things 1   Q2: Feeling down, depressed or hopeless 1   PHQ-2 Score 2   Q1: Little interest or  pleasure in doing things Several days   Q2: Feeling down, depressed or hopeless Several days   PHQ-2 Score 2           6/12/2024   Substance Use   Alcohol more than 3/day or more than 7/wk Not Applicable   Do you use any other substances recreationally? No     Social History     Tobacco Use    Smoking status: Never     Passive exposure: Never    Smokeless tobacco: Never   Vaping Use    Vaping status: Never Used   Substance Use Topics    Alcohol use: Not Currently     Alcohol/week: 42.0 standard drinks of alcohol     Types: 42 Shots of liquor per week     Comment: In recovery    Drug use: No           2/12/2024   LAST FHS-7 RESULTS   1st degree relative breast or ovarian cancer No   Any relative bilateral breast cancer No   Any male have breast cancer No   Any ONE woman have BOTH breast AND ovarian cancer No   Any woman with breast cancer before 50yrs No   2 or more relatives with breast AND/OR ovarian cancer No   2 or more relatives with breast AND/OR bowel cancer No        Mammogram Screening - Mammogram every 1-2 years updated in Health Maintenance based on mutual decision making        6/12/2024   STI Screening   New sexual partner(s) since last STI/HIV test? (!) YES - last testing 4/12/24     History of abnormal Pap smear: YES - reflected in Problem List and Health Maintenance accordingly        Latest Ref Rng & Units 2/16/2022     3:34 PM 8/22/2018     8:07 AM 8/22/2018     8:06 AM   PAP / HPV   PAP  Negative for Intraepithelial Lesion or Malignancy (NILM)      PAP (Historical)    NIL    HPV 16 DNA Negative Negative  Negative     HPV 18 DNA Negative Negative  Negative     Other HR HPV Negative Negative  Negative       ASCVD Risk   The 10-year ASCVD risk score (Damian BURGESS, et al., 2019) is: 0.4%    Values used to calculate the score:      Age: 39 years      Sex: Female      Is Non- : No      Diabetic: No      Tobacco smoker: No      Systolic Blood Pressure: 126 mmHg      Is BP  treated: No      HDL Cholesterol: 47 mg/dL      Total Cholesterol: 148 mg/dL        2024   Contraception/Family Planning   Questions about contraception or family planning (!) YES - not on birth control, tracking with chelly and ovulation test strips. Periods are regular         Reviewed and updated as needed this visit by Provider                    Past Medical History:   Diagnosis Date    Acquired hypothyroidism     Alcohol abuse     Allergic rhinitis     Alopecia     Arthritis     Asthma     Cervical dysplasia     Cervical high risk HPV (human papillomavirus) test positive 16, 17    neg     Depression     Depressive disorder     10+ years    Disease of thyroid gland     H/O colposcopy with cervical biopsy 2017: [place] Bx and ECC normal.     Papanicolaou smear of cervix with low grade squamous intraepithelial lesion (LGSIL) 2017    Rosacea, acne     Sleep apnea     Syrinx of spinal cord (H)     Uncomplicated asthma      Past Surgical History:   Procedure Laterality Date    BIOPSY      Vaginal cervix    LASER TX, CERVICAL       OB History    Para Term  AB Living   0 0 0 0 0 0   SAB IAB Ectopic Multiple Live Births   0 0 0 0 0     Lab work is in process  Labs reviewed in EPIC  BP Readings from Last 3 Encounters:   24 126/74   24 92/63   24 110/62    Wt Readings from Last 3 Encounters:   24 86.8 kg (191 lb 6.4 oz)   24 84.6 kg (186 lb 6.4 oz)   24 85.1 kg (187 lb 9.6 oz)                  Patient Active Problem List   Diagnosis    Perioral dermatitis    Mild persistent asthma without complication    Anxiety    Major depressive disorder, recurrent episode, mild (H24)    Multiple environmental allergies    Obesity (BMI 30-39.9)    Cervical high risk HPV (human papillomavirus) test positive    Rosacea, acne    Acquired hypothyroidism    Hay fever    Alcohol dependence (H)    Cervical pain    Syrinx of spinal cord (H)     Polymyalgia (H24)    Lumbago    Bilateral thoracic back pain    Acute pain of left knee    Allergic rhinitis due to pollen    Acute pain of right shoulder     Past Surgical History:   Procedure Laterality Date    BIOPSY      Vaginal cervix    LASER TX, CERVICAL  2005       Social History     Tobacco Use    Smoking status: Never     Passive exposure: Never    Smokeless tobacco: Never   Substance Use Topics    Alcohol use: Not Currently     Alcohol/week: 42.0 standard drinks of alcohol     Types: 42 Shots of liquor per week     Comment: In recovery     Family History   Problem Relation Age of Onset    Osteoporosis Mother     Diabetes Father     Other Cancer Father         Pancreatic    Substance Abuse Father         alcoholic: stopped when I was in grade school    Cancer Father     Prostate Cancer Maternal Grandfather     Thyroid Disease Maternal Grandfather     Prostate Cancer Other     Depression Brother     Alcoholism Brother     Anxiety Disorder Brother     Substance Abuse Brother     Coronary Artery Disease No family hx of          Current Outpatient Medications   Medication Sig Dispense Refill    albuterol (PROAIR HFA/PROVENTIL HFA/VENTOLIN HFA) 108 (90 Base) MCG/ACT inhaler Inhale 2 puffs into the lungs every 6 hours as needed for shortness of breath or wheezing 18 g 3    azelastine (ASTELIN) 0.1 % nasal spray Spray 1 spray into both nostrils 2 times daily 30 mL 0    busPIRone HCl (BUSPAR) 30 MG tablet TAKE 1 TABLET BY MOUTH TWICE DAILY AS NEEDED FOR ANXIETY      fluconazole (DIFLUCAN) 150 MG tablet Take 1 tablet (150 mg) by mouth every 72 hours as needed (itching, until symptoms resolve) 3 tablet 0    FLUoxetine (PROZAC) 10 MG capsule Take 1 capsule (10 mg) by mouth daily      fluticasone (FLONASE) 50 MCG/ACT nasal spray Spray 1 spray into both nostrils daily 16 g 0    hydrocortisone 2.5 % cream       ketoconazole (NIZORAL) 2 % external shampoo Apply topically daily as needed      lamoTRIgine (LAMICTAL) 25 MG  tablet Take 87.5 mg by mouth daily      levothyroxine (SYNTHROID/LEVOTHROID) 75 MCG tablet Take 1 tablet (75 mcg) by mouth daily 90 tablet 1    methocarbamol (ROBAXIN) 500 MG tablet Take 1 tablet (500 mg) by mouth 3 times daily as needed for muscle spasms 30 tablet 0    metroNIDAZOLE (METROGEL) 0.75 % vaginal gel Place 1 applicator (5 g) vaginally daily 70 g 0    montelukast (SINGULAIR) 10 MG tablet Take 1 tablet (10 mg) by mouth at bedtime 90 tablet 3    nabumetone (RELAFEN) 500 MG tablet Take 1-2 tablets (500-1,000 mg) by mouth 2 times daily as needed for pain 90 tablet 1    predniSONE (DELTASONE) 10 MG tablet Take 10-20 mg by mouth      SUMAtriptan (IMITREX) 25 MG tablet Take 1 tablet (25 mg) by mouth at onset of headache for migraine May repeat in 2 hours. Max 8 tablets/24 hours. 12 tablet 1    tacrolimus (PROTOPIC) 0.1 % external ointment APPLY TOPICALLY BID ON FACE UTD      traZODone (DESYREL) 50 MG tablet Take 1 tablet (50 mg) by mouth at bedtime 15 tablet 0    vitamin D2 (ERGOCALCIFEROL) 89285 units (1250 mcg) capsule Take 50,000 Units by mouth once a week       Allergies   Allergen Reactions    Penicillins Rash     Recent Labs   Lab Test 06/13/24  0907 03/30/24  1110 07/27/23  0803 08/02/22  0831 05/31/22  1640 01/19/22  0947 12/11/20  1630 12/11/20  0000 10/03/20  1039 10/03/20  0000 02/01/20  0000 11/11/19  0740 10/31/19  0000 09/30/19  1524   A1C  --  4.8 4.8  --   --   --  4.7   < >  --   --   --   --   --   --    LDL  --  90  --   --   --   --   --   --   --   --   --  89  --  87   HDL  --  47*  --   --   --   --   --   --   --   --   --  66  --  57   TRIG  --  54  --   --   --   --   --   --   --   --   --  129  --  191*   ALT 18 19  --   --  12   < >  --   --  17 17  --   --   --  18   CR 0.90 0.93 0.81  --  0.71   < >  --   --  0.77 0.77  --   --   --   --    GFRESTIMATED 82 79 >90  --  >90   < >  --   --  >60 >60  --   --   --   --    GFRESTBLACK  --   --   --   --   --   --   --   --  >60 >60  --   " --   --   --    POTASSIUM 4.3 4.3 4.2   < > 3.9   < >  --   --  4.3 4.3  --   --   --   --    TSH  --  0.65 1.54   < > 1.38   < > 1.16   < > 0.48 0.48   < > 6.57*   < > 5.66*    < > = values in this interval not displayed.          Review of Systems  Constitutional, HEENT, cardiovascular, pulmonary, gi and gu systems are negative, except as otherwise noted.     Objective    Exam  /74 (BP Location: Right arm, Patient Position: Sitting, Cuff Size: Adult Regular)   Pulse 76   Temp 98.1  F (36.7  C) (Oral)   Resp 16   Ht 1.676 m (5' 6\")   Wt 86.8 kg (191 lb 6.4 oz)   LMP 05/23/2024 (Exact Date)   SpO2 98%   BMI 30.89 kg/m     Estimated body mass index is 30.89 kg/m  as calculated from the following:    Height as of this encounter: 1.676 m (5' 6\").    Weight as of this encounter: 86.8 kg (191 lb 6.4 oz).    Physical Exam  GENERAL: alert and no distress  EYES: Eyes grossly normal to inspection, PERRL and conjunctivae and sclerae normal  HENT: ear canals and TM's normal, nose and mouth without ulcers or lesions  NECK: no adenopathy, no asymmetry, masses, or scars  RESP: lungs clear to auscultation - no rales, rhonchi or wheezes  CV: regular rate and rhythm, normal S1 S2, no S3 or S4, no murmur, click or rub, no peripheral edema  ABDOMEN: soft, nontender, no hepatosplenomegaly, no masses and bowel sounds normal   (female): normal female external genitalia, normal urethral meatus, normal vaginal mucosa  MS: no gross musculoskeletal defects noted, no edema  SKIN: no suspicious lesions or rashes  NEURO: Normal strength and tone, mentation intact and speech normal  PSYCH: mentation appears normal, affect normal/bright        Signed Electronically by: JOHNNIE Holland CNP    "

## 2024-07-12 ENCOUNTER — E-VISIT (OUTPATIENT)
Dept: FAMILY MEDICINE | Facility: CLINIC | Age: 39
End: 2024-07-12
Payer: COMMERCIAL

## 2024-07-12 DIAGNOSIS — Z20.2 POSSIBLE EXPOSURE TO STI: Primary | ICD-10-CM

## 2024-07-12 PROCEDURE — 99421 OL DIG E/M SVC 5-10 MIN: CPT

## 2024-07-12 NOTE — TELEPHONE ENCOUNTER
----- Message from Tapan Rose DO sent at 6/11/2021 12:16 PM CDT -----  Lab work returned and is negative/normal this includes CRP, ESR for inflammation, rheumatoid factor, TIFFANIE, CCP for rheumatologic/connective tissue disorder/inflammatory arthropathy, and celiac panel.    Recommend continue plan of physical therapy and trial of nabumetone until follow-up.  
Chart reviewed. Patient in contact with [facility] through [employer] on 6/14/21. Aware of results and recommendations at this time.  
Phone call to patient to review results and provider's recommendations. Left message to return call.   
[phone]

## 2024-07-15 ENCOUNTER — LAB (OUTPATIENT)
Dept: LAB | Facility: CLINIC | Age: 39
End: 2024-07-15
Payer: COMMERCIAL

## 2024-07-15 DIAGNOSIS — Z20.2 POSSIBLE EXPOSURE TO STI: ICD-10-CM

## 2024-07-15 DIAGNOSIS — Z32.00 ENCOUNTER FOR PREGNANCY TEST, RESULT UNKNOWN: ICD-10-CM

## 2024-07-15 LAB
CLUE CELLS: NORMAL
T PALLIDUM AB SER QL: NONREACTIVE
TRICHOMONAS, WET PREP: NORMAL
WBC'S/HIGH POWER FIELD, WET PREP: NORMAL
YEAST, WET PREP: NORMAL

## 2024-07-15 PROCEDURE — 86780 TREPONEMA PALLIDUM: CPT

## 2024-07-15 PROCEDURE — 87591 N.GONORRHOEAE DNA AMP PROB: CPT

## 2024-07-15 PROCEDURE — 87389 HIV-1 AG W/HIV-1&-2 AB AG IA: CPT

## 2024-07-15 PROCEDURE — 87340 HEPATITIS B SURFACE AG IA: CPT

## 2024-07-15 PROCEDURE — 87210 SMEAR WET MOUNT SALINE/INK: CPT

## 2024-07-15 PROCEDURE — 84702 CHORIONIC GONADOTROPIN TEST: CPT

## 2024-07-15 PROCEDURE — 36415 COLL VENOUS BLD VENIPUNCTURE: CPT

## 2024-07-15 PROCEDURE — 87491 CHLMYD TRACH DNA AMP PROBE: CPT

## 2024-07-16 LAB
C TRACH DNA SPEC QL PROBE+SIG AMP: NEGATIVE
HBV SURFACE AG SERPL QL IA: NONREACTIVE
HCG INTACT+B SERPL-ACNC: <1 MIU/ML
HIV 1+2 AB+HIV1 P24 AG SERPL QL IA: NONREACTIVE
N GONORRHOEA DNA SPEC QL NAA+PROBE: NEGATIVE

## 2024-07-17 NOTE — PATIENT INSTRUCTIONS
Thank you for choosing us for your care. Given your symptoms, I would like you to do a lab-only visit to determine what is causing them.  I have placed the orders.  Please schedule an appointment with the lab right here in [address], or call [phone].  I will let you know when the results are back and next steps to take.    Schedule a Lab Only appointment here.

## 2024-07-29 ENCOUNTER — VIRTUAL VISIT (OUTPATIENT)
Dept: FAMILY MEDICINE | Facility: CLINIC | Age: 39
End: 2024-07-29
Payer: COMMERCIAL

## 2024-07-29 DIAGNOSIS — T74.21XD SEXUAL ASSAULT OF ADULT, SUBSEQUENT ENCOUNTER: ICD-10-CM

## 2024-07-29 DIAGNOSIS — R79.89 ELEVATED FERRITIN: Primary | ICD-10-CM

## 2024-07-29 DIAGNOSIS — Z11.3 ROUTINE SCREENING FOR STI (SEXUALLY TRANSMITTED INFECTION): ICD-10-CM

## 2024-07-29 PROCEDURE — 99214 OFFICE O/P EST MOD 30 MIN: CPT | Mod: 95

## 2024-07-29 NOTE — PATIENT INSTRUCTIONS
Repeat labs in mid September - lab only visit  Repeat STI testing anytime you are next available
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
## 2024-08-02 ENCOUNTER — LAB (OUTPATIENT)
Dept: LAB | Facility: HOSPITAL | Age: 39
End: 2024-08-02
Payer: COMMERCIAL

## 2024-08-02 DIAGNOSIS — Z11.3 ROUTINE SCREENING FOR STI (SEXUALLY TRANSMITTED INFECTION): ICD-10-CM

## 2024-08-02 DIAGNOSIS — T74.21XD SEXUAL ASSAULT OF ADULT, SUBSEQUENT ENCOUNTER: ICD-10-CM

## 2024-08-02 LAB — HIV 1+2 AB+HIV1 P24 AG SERPL QL IA: NONREACTIVE

## 2024-08-02 PROCEDURE — 86780 TREPONEMA PALLIDUM: CPT

## 2024-08-02 PROCEDURE — 36415 COLL VENOUS BLD VENIPUNCTURE: CPT

## 2024-08-02 PROCEDURE — 87389 HIV-1 AG W/HIV-1&-2 AB AG IA: CPT

## 2024-08-03 LAB — T PALLIDUM AB SER QL: NONREACTIVE

## 2024-08-11 DIAGNOSIS — J30.1 NON-SEASONAL ALLERGIC RHINITIS DUE TO POLLEN: ICD-10-CM

## 2024-08-11 DIAGNOSIS — J34.89 SINUS PAIN: ICD-10-CM

## 2024-08-12 RX ORDER — AZELASTINE 1 MG/ML
1 SPRAY, METERED NASAL 2 TIMES DAILY
Qty: 30 ML | Refills: 0 | Status: SHIPPED | OUTPATIENT
Start: 2024-08-12

## 2024-08-15 ENCOUNTER — TRANSFERRED RECORDS (OUTPATIENT)
Dept: HEALTH INFORMATION MANAGEMENT | Facility: CLINIC | Age: 39
End: 2024-08-15
Payer: COMMERCIAL

## 2024-09-28 ENCOUNTER — LAB (OUTPATIENT)
Dept: LAB | Facility: HOSPITAL | Age: 39
End: 2024-09-28
Payer: COMMERCIAL

## 2024-09-28 DIAGNOSIS — R79.89 ELEVATED FERRITIN: ICD-10-CM

## 2024-09-28 LAB
ALBUMIN SERPL BCG-MCNC: 4.6 G/DL (ref 3.5–5.2)
ALP SERPL-CCNC: 60 U/L (ref 40–150)
ALT SERPL W P-5'-P-CCNC: 18 U/L (ref 0–50)
ANION GAP SERPL CALCULATED.3IONS-SCNC: 12 MMOL/L (ref 7–15)
AST SERPL W P-5'-P-CCNC: 16 U/L (ref 0–45)
BILIRUB SERPL-MCNC: 0.6 MG/DL
BUN SERPL-MCNC: 13.1 MG/DL (ref 6–20)
CALCIUM SERPL-MCNC: 9.3 MG/DL (ref 8.8–10.4)
CHLORIDE SERPL-SCNC: 105 MMOL/L (ref 98–107)
CREAT SERPL-MCNC: 0.95 MG/DL (ref 0.51–0.95)
CRP SERPL-MCNC: <3 MG/L
EGFRCR SERPLBLD CKD-EPI 2021: 77 ML/MIN/1.73M2
ERYTHROCYTE [SEDIMENTATION RATE] IN BLOOD BY WESTERGREN METHOD: 3 MM/HR (ref 0–20)
FERRITIN SERPL-MCNC: 177 NG/ML (ref 6–175)
GLUCOSE SERPL-MCNC: 115 MG/DL (ref 70–99)
HCO3 SERPL-SCNC: 22 MMOL/L (ref 22–29)
POTASSIUM SERPL-SCNC: 4.2 MMOL/L (ref 3.4–5.3)
PROT SERPL-MCNC: 6.8 G/DL (ref 6.4–8.3)
SODIUM SERPL-SCNC: 139 MMOL/L (ref 135–145)

## 2024-09-28 PROCEDURE — 85652 RBC SED RATE AUTOMATED: CPT

## 2024-09-28 PROCEDURE — 80053 COMPREHEN METABOLIC PANEL: CPT

## 2024-09-28 PROCEDURE — 86140 C-REACTIVE PROTEIN: CPT

## 2024-09-28 PROCEDURE — 82728 ASSAY OF FERRITIN: CPT

## 2024-09-28 PROCEDURE — 36415 COLL VENOUS BLD VENIPUNCTURE: CPT

## 2024-11-05 ENCOUNTER — TRANSFERRED RECORDS (OUTPATIENT)
Dept: HEALTH INFORMATION MANAGEMENT | Facility: CLINIC | Age: 39
End: 2024-11-05
Payer: COMMERCIAL

## 2024-11-14 ENCOUNTER — TRANSFERRED RECORDS (OUTPATIENT)
Dept: HEALTH INFORMATION MANAGEMENT | Facility: CLINIC | Age: 39
End: 2024-11-14
Payer: COMMERCIAL

## 2024-11-21 DIAGNOSIS — E03.9 ACQUIRED HYPOTHYROIDISM: ICD-10-CM

## 2024-11-21 RX ORDER — LEVOTHYROXINE SODIUM 75 UG/1
75 TABLET ORAL DAILY
Qty: 90 TABLET | Refills: 0 | Status: SHIPPED | OUTPATIENT
Start: 2024-11-21

## 2024-11-21 RX ORDER — LEVOTHYROXINE SODIUM 75 UG/1
75 TABLET ORAL DAILY
Qty: 90 TABLET | Refills: 1 | OUTPATIENT
Start: 2024-11-21

## 2024-11-29 PROBLEM — M79.671 RIGHT FOOT PAIN: Status: ACTIVE | Noted: 2024-11-29

## 2024-11-29 PROBLEM — M25.511 ACUTE PAIN OF RIGHT SHOULDER: Status: RESOLVED | Noted: 2023-10-06 | Resolved: 2024-11-29

## 2024-12-03 ENCOUNTER — TRANSCRIBE ORDERS (OUTPATIENT)
Dept: OTHER | Age: 39
End: 2024-12-03

## 2024-12-03 DIAGNOSIS — M76.821 POSTERIOR TIBIAL TENDONITIS, RIGHT: Primary | ICD-10-CM

## 2024-12-03 DIAGNOSIS — G47.33 OSA (OBSTRUCTIVE SLEEP APNEA): Primary | ICD-10-CM

## 2025-01-16 ENCOUNTER — MYC MEDICAL ADVICE (OUTPATIENT)
Dept: SLEEP MEDICINE | Facility: CLINIC | Age: 40
End: 2025-01-16
Payer: COMMERCIAL

## 2025-01-30 ASSESSMENT — SLEEP AND FATIGUE QUESTIONNAIRES
HOW LIKELY ARE YOU TO NOD OFF OR FALL ASLEEP WHEN YOU ARE A PASSENGER IN A CAR FOR AN HOUR WITHOUT A BREAK: WOULD NEVER DOZE
HOW LIKELY ARE YOU TO NOD OFF OR FALL ASLEEP WHILE WATCHING TV: WOULD NEVER DOZE
HOW LIKELY ARE YOU TO NOD OFF OR FALL ASLEEP WHILE SITTING AND TALKING TO SOMEONE: WOULD NEVER DOZE
HOW LIKELY ARE YOU TO NOD OFF OR FALL ASLEEP WHILE LYING DOWN TO REST IN THE AFTERNOON WHEN CIRCUMSTANCES PERMIT: MODERATE CHANCE OF DOZING
HOW LIKELY ARE YOU TO NOD OFF OR FALL ASLEEP WHILE SITTING INACTIVE IN A PUBLIC PLACE: WOULD NEVER DOZE
HOW LIKELY ARE YOU TO NOD OFF OR FALL ASLEEP WHILE SITTING AND READING: WOULD NEVER DOZE
HOW LIKELY ARE YOU TO NOD OFF OR FALL ASLEEP WHILE SITTING QUIETLY AFTER LUNCH WITHOUT ALCOHOL: WOULD NEVER DOZE
HOW LIKELY ARE YOU TO NOD OFF OR FALL ASLEEP IN A CAR, WHILE STOPPED FOR A FEW MINUTES IN TRAFFIC: WOULD NEVER DOZE

## 2025-02-03 PROBLEM — G47.33 OSA (OBSTRUCTIVE SLEEP APNEA): Chronic | Status: ACTIVE | Noted: 2025-02-03

## 2025-02-03 PROBLEM — M25.562 ACUTE PAIN OF LEFT KNEE: Status: RESOLVED | Noted: 2022-11-11 | Resolved: 2025-02-03

## 2025-02-03 PROBLEM — F51.04 CHRONIC INSOMNIA: Status: ACTIVE | Noted: 2025-02-03

## 2025-02-03 PROBLEM — G43.909 MIGRAINE: Chronic | Status: ACTIVE | Noted: 2025-02-03

## 2025-02-04 ENCOUNTER — VIRTUAL VISIT (OUTPATIENT)
Dept: SLEEP MEDICINE | Facility: CLINIC | Age: 40
End: 2025-02-04
Payer: COMMERCIAL

## 2025-02-04 VITALS — BODY MASS INDEX: 30.7 KG/M2 | WEIGHT: 191 LBS | HEIGHT: 66 IN

## 2025-02-04 DIAGNOSIS — E66.9 OBESITY (BMI 30-39.9): Chronic | ICD-10-CM

## 2025-02-04 DIAGNOSIS — F51.04 CHRONIC INSOMNIA: ICD-10-CM

## 2025-02-04 DIAGNOSIS — G47.33 OSA (OBSTRUCTIVE SLEEP APNEA): Primary | Chronic | ICD-10-CM

## 2025-02-04 PROCEDURE — 98007 SYNCH AUDIO-VIDEO EST HI 40: CPT | Performed by: INTERNAL MEDICINE

## 2025-02-04 ASSESSMENT — PAIN SCALES - GENERAL: PAINLEVEL_OUTOF10: NO PAIN (0)

## 2025-02-04 NOTE — PATIENT INSTRUCTIONS
Your BMI is Body mass index is 30.83 kg/m .    What is BMI?  Body mass index (BMI) is one way to tell whether you are at a healthy weight, overweight, or obese. It measures your weight in relation to your height.  A BMI of 18.5 to 24.9 is in the healthy range. A person with a BMI of 25 to 29.9 is considered overweight, and someone with a BMI of 30 or greater is considered obese.  Another way to find out if you are at risk for health problems caused by overweight and obesity is to measure your waist. If you are a woman and your waist is more than 35 inches, or if you are a man and your waist is more than 40 inches, your risk of disease may be higher.  More than two-thirds of American adults are considered overweight or obese. Being overweight or obese increases the risk for further weight gain.  Excess weight may lead to heart disease and diabetes. Creating and following plans for healthy eating and physical activity may help you improve your health.    Methods for maintaining or losing weight.  Weight control is part of healthy lifestyle and includes exercise, emotional health, and healthy eating habits.  Careful eating habits lifelong is the mainstay of weight control.  Though there are significant health benefits from weight loss, long-term weight loss with diet alone may be very difficult to achieve- studies show long-term success with dietary management in less than 10% of people. Attaining a healthy weight may be especially difficult to achieve in those with severe obesity. In some cases, medications, devices and surgical management might be considered.    What can you do?  If you are overweight or obese and are interested in methods for weight loss, you should discuss this with your provider. In addition, we recommend that you review healthy life styles and methods for weight loss available through the National Institutes of Health patient information sites:       Read the book Say Good Night To Insomnia                                   Digital CBT-I    Your health care provider has recommended a digital therapeutic program for the treatment of insomnia.  Insomnia  is a self-guided 5-week digital cognitive-behavioral therapy program for insomnia (dCBT-I).  The program follows evidence-based insomnia  treatment strategies similar to those used in our [facility].  It is based on extensive development and collaboration between the [place] Department of [place], [facility], an other sleep experts.         How Does it work?    Research indicates that digital CBT-I can be effective in the treatment of insomnia for some people.  Insomnia  has very high acceptability among users and in early research has been show to significantly reduce insomnia symptoms and improve sleep quality when compared to a control wait list.    Watch this introductory video for more details about Insomnia .    Insomnia  Introduction  Is Digital CBT-I right for you?    Digital CBT-I has been shown to be effective in treating insomnia across a variety of age group and with individuals who have other health conditions.    You may be a good candidate for Digital CBT-I if:    You believe CBT-I can help you sleep better.  You are motivated to follow a five-week behavioral program.   You are able (or have assistance) to use a mobile Android or IPhone.  Your Medical and/or mental health conditions are stable and treated.    However, it is recommended that before you start using this chelly you consult with your health care provider if you have any of the following that may be affected by changes in your sleep habits:     Sleep Apnea  Restless Leg Syndrome  Bipolar Disorder  Seizure Disorder  Sleep Walking  Night Terrors  Excessive Daytime Sleepiness  Frailty and risk of falling when getting up at night,    The chelly may not be for you if you are a shift worker.  It also may not be helpful for people who  regularly have to change their sleep schedules or have to sleep at times that are out of sync with their body's natural sleep rhythm.    It is recommended that before you start using this chelly you consult with your health care provider if you are using prescription or over the counter sleep medication.     Getting Started    Download Insomnia  onto your Apple or Android Phone  Review and accept user agreement  Complete initial screening assessments  Read Understanding Sleep and Insomnia found below.  TIP:  Record your sleep diary in the morning recalling your night of sleep. Do not monitor ur use the clock during the night when using this program.    Understanding Sleep and Insomnia    Most people have trouble sleeping at some point in their life.   Chronic insomnia means you have had trouble falling asleep and/or staying asleep for at least the past three months.  Despite allowing enough time for sleep, it is affecting how you feel. You are not alone.  It is estimated that 10-15% of adults experience chronic insomnia.     Cognitive-Behavioral Therapy for Insomnia    The American College of Physicians  recommends Cognitive Behavioral Therapy for Insomnia (CBT-I) as the first-line treatment for insomnia.     [employer] CBT-I is a five-week program that utilizes the downloadable chelly Insomnia .  It can be completed with the guidance of a trained health care provider or on your own. The program will teach you the skills and strategies you will need for a better night's sleep. The first step in your program is learning a bit about sleep and insomnia.      The Basics of Sleep    How does sleep help us?    Sleep, like food and water, is something we need every day. The purpose of sleep is still not exactly clear, but sleep experts agree we need consistent quality sleep to function at our best. There is evidence that sleep helps maintain brain and body functions.  It helps maintain thinking ability  and mood.  Sleep is actually a very active part of life. Sleep occurs in four stages that cycle every  minutes throughout the night. We get our deepest sleep during the first few hours of sleep.  During the last half of our sleep, we usually get the bulk of our REM (Rapid Eye Movement) sleep.  REM sleep is when most of our dreaming occurs.    Watch the following short video to learn more:  Stages of Sleep    How much sleep do we need?    Sleep needs vary from person to person. Most adults need at least 7 hours of sleep though some may need less and others more.  Sleeping too little or too much may be a health risk.  As we age, most people report their sleep gets lighter, earlier, shorter, and more restless.     What Controls Sleep?    The three things that regulate your sleep are your:     Sleep Drive  Biological Clock  Arousal System (physical and emotional states)    Together these make us feel alert during the day and promote sleep at night.    Your sleep drive depends on how long you have been awake. It is lowest when you first wake up.  Sleep drive increases as the day goes on.  The longer time you are awake the easier it is to fall asleep.  Sleeping gradually reduces your sleep drive. That is why napping in the evening or close to bed can make it harder to sleep at night.    Your biological clock promotes wakefulness during the day and sleep at night.    Adapted from Tirso et al. (2009). Evaluation and Management of Insomnia in the Psychiatric setting. Published Online: 19/1/2009; DOI: https://doi.org/10.1176/foc.7.4.bwj882XX    Watch the following short video to learn more: Two  Processes of Sleep      How does sleep change with age?    Sleep usually is lighter and shorter and earlier.  Sleep may become more restless   Increased time to fall asleep and more time awake during the night    Understanding Insomnia    What is insomnia?    Insomnia occurs when you:    Have difficulty with...  Falling  "Asleep  Staying Asleep  Short amount of sleep    Have adequate time for sleep  Experience daytime problems as a result of your sleep difficulties    What causes insomnia?    Some people have a greater chance of developing insomnia due to biological, psychological or social factors. These are often referred to as predisposing factors.  A host of things can trigger insomnia such as a stressful event, jet lag, working a different shift, medication, or the onset of a medical or mental health condition. There are called precipitating factors.        What maintains insomnia?    Short-term insomnia can become chronic because of habits or conditions that perpetuate it including:    Unhelpful sleep habits such as spending more time in bed, sleeping in on weekends to try to catch up on sleep  Stress, worry or depression  Worry or fear about your insomnia  Pain or other medical conditions  Some medications  Untreated sleep disorders    As you spend more time in bed or try forcing yourself to sleep your bed becomes linked with wakefulness.  This type of  conditioning  along with unhelpful sleep habits maintains the insomnia even when the triggering event has resolved.    Sleep and Your Arousal System    Mental activity, emotions and physical symptoms can make your brain too active to sleep by masking the strength of your sleep drive. Your brain's arousal system not only can triggers insomnia.  It also plays a role in maintaining it as well.  Common sources of arousal include:    Worry about sleep  An active mind concerned about unfinished tasks  Anxiety, stress and depression  Pain    Common Treatments for Insomnia    Behavioral:  Changing your behavior and habits to improve your sleep  Sleeping Pills (Prescription and OTC)  Antidepressants   \"Alternative\" remedies (Melatonin, Ashwagandha, Chamomile, Valerian, 5-HTP etc.)    How CBT-I Works     CBT-I targets negative behavioral conditioning and habits that hurt your sleep and " "lead to long-term insomnia     Behavioral Conditioning    When you lay awake in bed over many nights, your body actually becomes trained or 'conditioned' to be awake during the night.  It makes your bed trigger alertness instead of sleepiness.  Brief CBT-I helps strengthen the behavioral association between sleep and your bed.    Habits that HURT sleep:    Using your bed for things other than sleep and intimacy  Shifting sleep schedules from night to night  Extending time in bed  Worrying  Worrying about sleep or trying too hard to sleep  Lack of a \"wind down\" time before bed  Long or late naps  Uncomfortable sleep environment  Alcohol  Caffeine    Developing habits that HELP your sleep:    Keeping the bed for sleep and intimacy  Maintaining a consistent sleep schedule  Daily routines including time to wind down and manage worry  Managing thoughts and expectations about sleep               "

## 2025-02-04 NOTE — PROGRESS NOTES
Sleep Apnea - Follow-up Visit:    Impression/Plan:     Severe Sleep apnea.   Tolerating PAP well. Benefitting from treatment  Machine nearing end of life  - Ordered replacement device      Sleep onset, maintenance difficulties (CAROLINA 17)  Managed with trazodone. Never discussed by prior sleep providers. Reports depression and anxiety are 'not great'   Suspect psychophysiologic insomnia comorbid to depression and anxiety and complicated by delayed sleep phase on weekends. We discussed the pros and cons of medical vs behavioral therapy. We discussed stimulus control, sleep restriction and regular wake times.   - She is not a good candidate for cognitive behavioral training at this time     Obesity  20# weight loss since sleep study 2019  - See patient instructions        Karrie Zhang will follow up in about 2 year(s).     I spent 20 minutes with patient including counseling, and 20 minutes with chart review, and documentation     CC:: Andie Perez     History of Present Illness:  Chief Complaint   Patient presents with    RECHECK       Karrie Zhang presents for follow-up of their severe sleep apnea, managed with CPAP.     I am the 4th sleep provider she has seen     [facility]    PAtient was initially seen by Dr. Gomez in 2019 with daytime tiredness and non-restorative sleep (ESS 11) in the setting of motor restlessness and nocturnal snoring. Possible atypical RLS. RBD likely secondary to SSRI    Home sleep study 2/13/2019 (215 lbs)  Analysis Time:  493 minutes  - Time with saturation less than or equal to 88% was 8 minutes. The lowest oxygen saturation was 80%.   - Apnea/hypopnea index [AHI] of 39.5 events per hour.    - AHI was 37 per hour supine, - per hour prone, 48 per hour on left side, and 31 per hour on right side.  - Percent of time spent: supine - 39%, prone - -%, on left - 35%, on right - 2%.    Patient Karrie Zhang was set up at [facility] on March 7, 2019. Patient  received a Resmed AirSense 10 Auto. Pressures were set at 5-15 cm H2O    Do you use a CPAP Machine at home: (Patient-Rptd) Yes  Overall, on a scale of 0-10 how would you rate your CPAP (0 poor, 10 great): (Patient-Rptd) 9    What type of mask do you use: (Patient-Rptd) Nasal Pillow  Is your mask comfortable: (Patient-Rptd) Yes    Is your mask leaking: (Patient-Rptd) No    Do you notice snoring with mask on: (Patient-Rptd) No  Do you notice gasping arousals with mask on: (Patient-Rptd) No  Are you having significant oral <  nasal dryness: (Patient-Rptd) Yes   Is the pressure setting comfortable: (Patient-Rptd) No  If not, why: (Patient-Rptd) sometimes if I wake up in the morning it is blasting me and i gotta turn it down to start back at lower ramp     What is your typical bedtime: (Patient-Rptd) I try to lay down at 9PM  How long does it take you to go to sleep on PAP therapy: (Patient-Rptd) with trazodone within the hour, without the trazodone 2+ hours  What time do you typically get out of bed for the day: (Patient-Rptd) 6:30-7AM weekdays, 9AM or later on weekends   How many hours on average per night are you using PAP therapy: (Patient-Rptd) entire night 10 hours?  How many hours are you sleeping per night: (Patient-Rptd) 7 maybe on a good day with meds  Do you feel well rested in the morning: (Patient-Rptd) No        ResMed   Auto-PAP 7.0 - 15.0 cmH2O 30 day usage data:    100% of days with > 4 hours of use. 0/30 days with no use.   Average use 9' 24 minutes per day.   95%ile Leak 13.2 L/min.   CPAP 95% pressure 8.6 cm. Median 7.3. Max 9.9  AHI 0.57 events per hour.       EPWORTH SLEEPINESS SCALE         1/30/2025     3:36 PM    [place] Sleepiness Scale ( M.WBabak Salmeron  [id number]<br>ESS - [place]/English - Final version - 21 Nov 07 - [facility] [place].)   Sitting and reading Would never doze   Watching TV Would never doze   Sitting, inactive in a public place (e.g. a theatre or a meeting) Would never doze   As a  passenger in a car for an hour without a break Would never doze   Lying down to rest in the afternoon when circumstances permit Moderate chance of dozing   Sitting and talking to someone Would never doze   Sitting quietly after a lunch without alcohol Would never doze   In a car, while stopped for a few minutes in traffic Would never doze   Deeth Score (MC) 2   Deeth Score (Sleep) 2        Patient-reported       INSOMNIA SEVERITY INDEX (CAROLINA)          1/30/2025     3:31 PM   Insomnia Severity Index (CAROLINA)   Difficulty falling asleep 2   Difficulty staying asleep 2   Problems waking up too early 2   How SATISFIED/DISSATISFIED are you with your CURRENT sleep pattern? 3   How NOTICEABLE to others do you think your sleep problem is in terms of impairing the quality of your life? 2   How WORRIED/DISTRESSED are you about your current sleep problem? 3   To what extent do you consider your sleep problem to INTERFERE with your daily functioning (e.g. daytime fatigue, mood, ability to function at work/daily chores, concentration, memory, mood, etc.) CURRENTLY? 3   CAROLINA Total Score 17        Patient-reported       Guidelines for Scoring/Interpretation:  Total score categories:  0-7 = No clinically significant insomnia   8-14 = Subthreshold insomnia   15-21 = Clinical insomnia (moderate severity)  22-28 = Clinical insomnia (severe)  Used via courtesy of www.Earnestealth.va.gov with permission from Stiven Damon PhD., [facility]        Past medical/surgical history, family history, social history, medications and allergies were reviewed.        Problem List:  Patient Active Problem List    Diagnosis Date Noted    Migraine 02/03/2025     Priority: Medium    IRINA (obstructive sleep apnea) - severe (AHI 39) 02/03/2025     Priority: Medium     Home sleep study 2/13/2019 (215 lbs) - Time with saturation less than or equal to 88% was 8 minutes. The lowest oxygen saturation was 80%. Apnea/hypopnea index [AHI] of 39.5 events per  "hour. AHI was 37 per hour supine, - per hour prone, 48 per hour on left side, and 31 per hour on right side. Percent of time spent: supine - 39%, prone - -%, on left - 35%, on right - 2%.      Right foot pain 11/29/2024     Priority: Medium    Alcohol dependence (H) 09/29/2020     Priority: Medium     Sober since 12/2022      Cervical high risk HPV (human papillomavirus) test positive 06/29/2016     Priority: Medium     2005: cervical laser treatment for dysplasia and cryotherapy.  6/29/16: NIL Pap, + HR HPV (Neg 16/18). Plan cotest in 1 year.   7/5/17 LSIL Pap, + HR HPV (Not 16/18). Plan colp  08/08/17: [place] Bx and ECC normal. Plan cotest in 1 year per provider.   8/22/18 NIL Pap, Neg HPV. Plan cotest in 3 years.   2/24/22 NIL pap, Neg HPV. Plan cotest in 3 years.       Mild persistent asthma without complication 05/03/2016     Priority: Medium    Anxiety 05/03/2016     Priority: Medium    Major depressive disorder, recurrent episode, mild 05/03/2016     Priority: Medium    Depression 06/30/2015     Priority: Medium    Chronic insomnia 02/03/2025     Priority: Low    Lumbago 03/11/2022     Priority: Low    Bilateral thoracic back pain 03/11/2022     Priority: Low    Polymyalgia 01/30/2022     Priority: Low    Syrinx of spinal cord (H) 11/08/2021     Priority: Low    Cervical pain 02/25/2021     Priority: Low    Acquired hypothyroidism      Priority: Low    Rosacea, acne      Priority: Low    Perioral dermatitis 05/03/2016     Priority: Low    Multiple environmental allergies 05/03/2016     Priority: Low    Obesity (BMI 30-39.9) 05/03/2016     Priority: Low    Allergic rhinitis due to pollen 06/30/2015     Priority: Low     Allergic to pollen (trees - especially oak, grasses, weeds), dust, guinea pigs, apples          Ht 1.676 m (5' 6\")   Wt 86.6 kg (191 lb)   BMI 30.83 kg/m      "

## 2025-02-04 NOTE — PROGRESS NOTES
Virtual Visit Details    Type of service:  Video Visit     Originating Location (pt. Location): Home    Distant Location (provider location):  Off-site  Platform used for Video Visit: Randy

## 2025-02-04 NOTE — Clinical Note
Typically a motor life exceeded message alone does not necessitate an URGENT visit, usually machines do not just fail all of a sudden

## 2025-02-04 NOTE — NURSING NOTE
Current patient location: [address]  [place] [address]    Is the patient currently in the state of [place]? YES    Visit mode: VIDEO    If the visit is dropped, the patient can be reconnected by:VIDEO VISIT: Text to cell phone:   Telephone Information:   Mobile [phone]       Will anyone else be joining the visit? NO  (If patient encounters technical issues they should call [phone])    Are changes needed to the allergy or medication list? No    Are refills needed on medications prescribed by this physician? Discuss with provider NEW CPAP REQUEST    Rooming Documentation:  Not applicable    Reason for visit: DEJUAN Celis VVF

## 2025-03-04 ENCOUNTER — DOCUMENTATION ONLY (OUTPATIENT)
Dept: SLEEP MEDICINE | Facility: CLINIC | Age: 40
End: 2025-03-04
Payer: COMMERCIAL

## 2025-03-04 ENCOUNTER — ANCILLARY PROCEDURE (OUTPATIENT)
Dept: MAMMOGRAPHY | Facility: CLINIC | Age: 40
End: 2025-03-04
Payer: COMMERCIAL

## 2025-03-04 DIAGNOSIS — F51.04 CHRONIC INSOMNIA: ICD-10-CM

## 2025-03-04 DIAGNOSIS — Z12.31 SCREENING MAMMOGRAM, ENCOUNTER FOR: ICD-10-CM

## 2025-03-04 DIAGNOSIS — G47.33 OSA (OBSTRUCTIVE SLEEP APNEA): Primary | Chronic | ICD-10-CM

## 2025-03-04 DIAGNOSIS — E66.9 OBESITY (BMI 30-39.9): Chronic | ICD-10-CM

## 2025-03-04 PROCEDURE — 77067 SCR MAMMO BI INCL CAD: CPT

## 2025-03-04 PROCEDURE — 77063 BREAST TOMOSYNTHESIS BI: CPT

## 2025-03-04 NOTE — PROGRESS NOTES
Patient was offered choice of vendor and chose [facility].  Patient Karrie Zhang was set up at [place]  on March 4, 2025. Patient received a Resmed Airsense 11 Pressures were set at  7-15 cm H2O.   Patient s ramp is 5 cm H2O for Auto and FLEX/EPR is EPR, 2.  Patient received a Resmed Mask name: AIRFIT [id number]  Nasal mask size Standard, heated tubing and heated humidifier.  Patient has the following compliance requirements: none    Tang Stevens

## 2025-03-18 DIAGNOSIS — E03.9 ACQUIRED HYPOTHYROIDISM: ICD-10-CM

## 2025-03-19 PROBLEM — M79.671 RIGHT FOOT PAIN: Status: RESOLVED | Noted: 2024-11-29 | Resolved: 2025-03-19

## 2025-03-19 PROBLEM — M54.50 LUMBAGO: Status: RESOLVED | Noted: 2022-03-11 | Resolved: 2025-03-19

## 2025-03-19 RX ORDER — LEVOTHYROXINE SODIUM 75 UG/1
75 TABLET ORAL DAILY
Qty: 90 TABLET | Refills: 0 | Status: SHIPPED | OUTPATIENT
Start: 2025-03-19

## 2025-03-21 ENCOUNTER — E-VISIT (OUTPATIENT)
Dept: FAMILY MEDICINE | Facility: CLINIC | Age: 40
End: 2025-03-21
Payer: COMMERCIAL

## 2025-03-21 DIAGNOSIS — J01.90 ACUTE SINUSITIS WITH SYMPTOMS > 10 DAYS: Primary | ICD-10-CM

## 2025-03-24 RX ORDER — DOXYCYCLINE HYCLATE 100 MG
100 TABLET ORAL 2 TIMES DAILY
Qty: 14 TABLET | Refills: 0 | Status: SHIPPED | OUTPATIENT
Start: 2025-03-24 | End: 2025-03-31

## 2025-03-24 NOTE — PATIENT INSTRUCTIONS
You may want to try a nasal lavage (also known as nasal irrigation). You can find over-the-counter products, such as Neti-Pot, at retail locations or make your own at home. Instructions for homemade nasal lavage and more information on the process are available online at http://www.aafp.org/[email or web address].    Thank you for choosing us for your care. I have placed an order for a prescription so that you can start treatment:  Orders Placed This Encounter   Medications     doxycycline hyclate (VIBRA-TABS) 100 MG tablet     Sig: Take 1 tablet (100 mg) by mouth 2 times daily for 7 days.     Dispense:  14 tablet     Refill:  0     May substitute any formulation of 100mg doxycycline available and best covered by insurance          View your full visit summary for details by clicking on the link below. Your pharmacist will able to address any questions you may have about the medication.     If you're not feeling better within 5-7 days, please schedule an appointment.  You can schedule an appointment right here in [facility], or call [phone]  If the visit is for the same symptoms as your eVisit, we'll refund the cost of your eVisit if seen within seven days.

## 2025-04-17 ENCOUNTER — TRANSFERRED RECORDS (OUTPATIENT)
Dept: HEALTH INFORMATION MANAGEMENT | Facility: CLINIC | Age: 40
End: 2025-04-17
Payer: COMMERCIAL

## 2025-04-23 ENCOUNTER — TRANSFERRED RECORDS (OUTPATIENT)
Dept: HEALTH INFORMATION MANAGEMENT | Facility: CLINIC | Age: 40
End: 2025-04-23

## 2025-04-30 ENCOUNTER — TRANSCRIBE ORDERS (OUTPATIENT)
Dept: OTHER | Age: 40
End: 2025-04-30

## 2025-04-30 DIAGNOSIS — M47.9 DEGENERATIVE JOINT DISEASE OF SPINE: ICD-10-CM

## 2025-04-30 DIAGNOSIS — M47.816 LUMBAR FACET ARTHROPATHY: ICD-10-CM

## 2025-04-30 DIAGNOSIS — M54.16 LUMBAR RADICULOPATHY: Primary | ICD-10-CM

## 2025-05-14 ENCOUNTER — PATIENT OUTREACH (OUTPATIENT)
Dept: CARE COORDINATION | Facility: CLINIC | Age: 40
End: 2025-05-14
Payer: COMMERCIAL

## 2025-05-21 ENCOUNTER — E-VISIT (OUTPATIENT)
Dept: FAMILY MEDICINE | Facility: CLINIC | Age: 40
End: 2025-05-21
Payer: COMMERCIAL

## 2025-05-21 ENCOUNTER — LAB (OUTPATIENT)
Dept: LAB | Facility: CLINIC | Age: 40
End: 2025-05-21
Payer: COMMERCIAL

## 2025-05-21 DIAGNOSIS — N89.8 VAGINAL DISCHARGE: Primary | ICD-10-CM

## 2025-05-21 DIAGNOSIS — N89.8 VAGINAL DISCHARGE: ICD-10-CM

## 2025-05-21 DIAGNOSIS — E03.9 ACQUIRED HYPOTHYROIDISM: Primary | ICD-10-CM

## 2025-05-21 LAB
CLUE CELLS: ABNORMAL
TRICHOMONAS, WET PREP: ABNORMAL
WBC'S/HIGH POWER FIELD, WET PREP: ABNORMAL
YEAST, WET PREP: PRESENT

## 2025-05-21 PROCEDURE — 87210 SMEAR WET MOUNT SALINE/INK: CPT

## 2025-05-21 NOTE — PATIENT INSTRUCTIONS

## 2025-05-22 ENCOUNTER — RESULTS FOLLOW-UP (OUTPATIENT)
Dept: FAMILY MEDICINE | Facility: CLINIC | Age: 40
End: 2025-05-22

## 2025-05-22 DIAGNOSIS — B37.31 YEAST INFECTION OF THE VAGINA: Primary | ICD-10-CM

## 2025-05-22 LAB
C TRACH DNA SPEC QL PROBE+SIG AMP: NEGATIVE
N GONORRHOEA DNA SPEC QL NAA+PROBE: NEGATIVE
SPECIMEN TYPE: NORMAL

## 2025-05-22 RX ORDER — FLUCONAZOLE 150 MG/1
150 TABLET ORAL ONCE
Qty: 2 TABLET | Refills: 0 | Status: SHIPPED | OUTPATIENT
Start: 2025-05-22 | End: 2025-05-22

## 2025-05-23 ENCOUNTER — RESULTS FOLLOW-UP (OUTPATIENT)
Dept: FAMILY MEDICINE | Facility: CLINIC | Age: 40
End: 2025-05-23
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
## 2025-06-14 ENCOUNTER — E-VISIT (OUTPATIENT)
Dept: FAMILY MEDICINE | Facility: CLINIC | Age: 40
End: 2025-06-14
Payer: COMMERCIAL

## 2025-06-14 DIAGNOSIS — N76.0 VAGINITIS AND VULVOVAGINITIS: Primary | ICD-10-CM

## 2025-06-14 PROCEDURE — 99207 PR NON-BILLABLE SERV PER CHARTING: CPT

## 2025-06-16 ENCOUNTER — RESULTS FOLLOW-UP (OUTPATIENT)
Dept: FAMILY MEDICINE | Facility: CLINIC | Age: 40
End: 2025-06-16

## 2025-06-16 NOTE — PATIENT INSTRUCTIONS
Vaginitis: Care Instructions  Overview     Vaginitis is soreness or infection of the vagina. This common problem can cause itching and burning. And it can cause a change in vaginal discharge. Sometimes it can cause pain during sex. Vaginitis may be caused by bacteria, yeast, or other germs. Some infections that cause it are caught from a sexual partner. Bath products, spermicides, and douches can irritate the vagina too.  This problem can also happen during and after menopause. A drop in estrogen levels during this time can cause dryness, soreness, and pain during sex.  Your doctor can give you medicine to treat vaginitis. And home care may help you feel better. For certain types of infections, your sex partner(s) must be treated too.  Follow-up care is a key part of your treatment and safety. Be sure to make and go to all appointments, and call your doctor if you are having problems. It's also a good idea to know your test results and keep a list of the medicines you take.  How can you care for yourself at home?  Take your medicines exactly as prescribed. Call your doctor if you think you are having a problem with your medicine.  Ask your doctor if your sex partner(s) also needs treatment.  Do not eat or drink anything that has alcohol if you are taking metronidazole (Flagyl).  Wash your vulva daily with water. You also can use a mild, unscented soap if you want.  Do not use scented bath products. And do not use vaginal sprays or douches.  Change out of wet or damp clothes as soon as possible. Wear cotton underwear. And avoid tight clothing that could increase moisture.  Put a washcloth soaked in cool water on the area to relieve itching. Or you can take cool baths.  If you have dryness because of menopause, use estrogen cream or pills that your doctor prescribes.  Ask your doctor about when it is okay to have sex.  Use a personal lubricant before sex if you have dryness. Examples are Astroglide, K-Y Jelly, and Wet  "Lubricant Gel.  When should you call for help?   Call your doctor now or seek immediate medical care if:    You have a fever.     You have new or increased pain in your vagina or pelvis.     You have new or worse vaginal itching or discharge.   Watch closely for changes in your health, and be sure to contact your doctor if:    You have bleeding other than your period.     You do not get better as expected.   Where can you learn more?  Go to https://www.[employer].net/patiented  Enter F219 in the search box to learn more about \"Vaginitis: Care Instructions.\"  Current as of: April 30, 2024  Content Version: 14.5 2024-2025 [employer].   Care instructions adapted under license by your healthcare professional. If you have questions about a medical condition or this instruction, always ask your healthcare professional. [employer] disclaims any warranty or liability for your use of this information.    "

## 2025-07-02 ENCOUNTER — TRANSFERRED RECORDS (OUTPATIENT)
Dept: HEALTH INFORMATION MANAGEMENT | Facility: CLINIC | Age: 40
End: 2025-07-02
Payer: COMMERCIAL

## 2025-07-19 DIAGNOSIS — J45.30 MILD PERSISTENT ASTHMA WITHOUT COMPLICATION: ICD-10-CM

## 2025-07-21 ENCOUNTER — LAB (OUTPATIENT)
Dept: LAB | Facility: CLINIC | Age: 40
End: 2025-07-21
Payer: COMMERCIAL

## 2025-07-21 DIAGNOSIS — N89.8 VAGINAL DRYNESS: ICD-10-CM

## 2025-07-21 LAB
ESTRADIOL SERPL-MCNC: 22 PG/ML
FSH SERPL IRP2-ACNC: 25.5 MIU/ML
MIS SERPL-MCNC: 0.1 NG/ML (ref 0.03–5.5)

## 2025-07-21 PROCEDURE — 82166 ASSAY ANTI-MULLERIAN HORM: CPT

## 2025-07-21 PROCEDURE — 83001 ASSAY OF GONADOTROPIN (FSH): CPT

## 2025-07-21 PROCEDURE — 36415 COLL VENOUS BLD VENIPUNCTURE: CPT

## 2025-07-21 PROCEDURE — 82670 ASSAY OF TOTAL ESTRADIOL: CPT

## 2025-07-21 RX ORDER — MONTELUKAST SODIUM 10 MG/1
1 TABLET ORAL AT BEDTIME
Qty: 90 TABLET | Refills: 1 | Status: SHIPPED | OUTPATIENT
Start: 2025-07-21

## 2025-08-09 ENCOUNTER — MYC MEDICAL ADVICE (OUTPATIENT)
Dept: FAMILY MEDICINE | Facility: CLINIC | Age: 40
End: 2025-08-09
Payer: COMMERCIAL

## 2025-08-09 DIAGNOSIS — N89.8 VAGINAL DRYNESS: Primary | ICD-10-CM

## 2025-08-12 ENCOUNTER — PATIENT OUTREACH (OUTPATIENT)
Dept: CARE COORDINATION | Facility: CLINIC | Age: 40
End: 2025-08-12
Payer: COMMERCIAL

## 2025-08-14 ENCOUNTER — PATIENT OUTREACH (OUTPATIENT)
Dept: CARE COORDINATION | Facility: CLINIC | Age: 40
End: 2025-08-14
Payer: COMMERCIAL

## 2025-08-19 ENCOUNTER — E-VISIT (OUTPATIENT)
Dept: FAMILY MEDICINE | Facility: CLINIC | Age: 40
End: 2025-08-19
Payer: COMMERCIAL

## 2025-08-19 DIAGNOSIS — N92.6 MISSED PERIOD: Primary | ICD-10-CM

## 2025-08-19 PROCEDURE — 99421 OL DIG E/M SVC 5-10 MIN: CPT

## 2025-08-25 ENCOUNTER — MYC MEDICAL ADVICE (OUTPATIENT)
Dept: FAMILY MEDICINE | Facility: CLINIC | Age: 40
End: 2025-08-25
Payer: COMMERCIAL

## 2025-08-25 DIAGNOSIS — N92.6 MISSED PERIOD: Primary | ICD-10-CM
